# Patient Record
Sex: MALE | Race: WHITE | NOT HISPANIC OR LATINO | Employment: OTHER | URBAN - METROPOLITAN AREA
[De-identification: names, ages, dates, MRNs, and addresses within clinical notes are randomized per-mention and may not be internally consistent; named-entity substitution may affect disease eponyms.]

---

## 2017-05-11 ENCOUNTER — TELEPHONE (OUTPATIENT)
Dept: SURGERY | Facility: CLINIC | Age: 82
End: 2017-05-11

## 2017-05-11 ENCOUNTER — OFFICE VISIT (OUTPATIENT)
Dept: HEMATOLOGY/ONCOLOGY | Facility: CLINIC | Age: 82
End: 2017-05-11
Payer: MEDICARE

## 2017-05-11 VITALS
HEART RATE: 71 BPM | BODY MASS INDEX: 24.61 KG/M2 | RESPIRATION RATE: 16 BRPM | WEIGHT: 181.69 LBS | SYSTOLIC BLOOD PRESSURE: 154 MMHG | DIASTOLIC BLOOD PRESSURE: 68 MMHG | TEMPERATURE: 98 F | HEIGHT: 72 IN

## 2017-05-11 DIAGNOSIS — C43.71 MALIGNANT MELANOMA OF RIGHT LOWER LEG: ICD-10-CM

## 2017-05-11 PROCEDURE — 99204 OFFICE O/P NEW MOD 45 MIN: CPT | Mod: ,,, | Performed by: INTERNAL MEDICINE

## 2017-05-11 PROCEDURE — 1160F RVW MEDS BY RX/DR IN RCRD: CPT | Mod: ,,, | Performed by: INTERNAL MEDICINE

## 2017-05-11 PROCEDURE — 1157F ADVNC CARE PLAN IN RCRD: CPT | Mod: ,,, | Performed by: INTERNAL MEDICINE

## 2017-05-11 PROCEDURE — 1159F MED LIST DOCD IN RCRD: CPT | Mod: ,,, | Performed by: INTERNAL MEDICINE

## 2017-05-11 RX ORDER — LOSARTAN POTASSIUM AND HYDROCHLOROTHIAZIDE 12.5; 5 MG/1; MG/1
1 TABLET ORAL EVERY MORNING
COMMUNITY
End: 2019-06-24 | Stop reason: SDUPTHER

## 2017-05-11 RX ORDER — TAMSULOSIN HYDROCHLORIDE 0.4 MG/1
0.4 CAPSULE ORAL EVERY MORNING
COMMUNITY

## 2017-05-11 RX ORDER — ASPIRIN 81 MG/1
81 TABLET ORAL EVERY MORNING
COMMUNITY

## 2017-05-11 RX ORDER — METOPROLOL TARTRATE 50 MG/1
TABLET ORAL
COMMUNITY

## 2017-05-11 RX ORDER — FINASTERIDE 5 MG/1
5 TABLET, FILM COATED ORAL EVERY MORNING
COMMUNITY

## 2017-05-11 RX ORDER — SIMVASTATIN 10 MG/1
10 TABLET, FILM COATED ORAL NIGHTLY
COMMUNITY

## 2017-05-11 NOTE — PROGRESS NOTES
HEME/ONC History & Physical    Subjective:      Patient ID:   NAME: Shaq Bragg Jr. : 1930     86 y.o. male    Referring Doc: Dr Autumn Pardo  Other Docs: Dr. Jeter/Dr. Miller      Chief Complaint:   Melanoma      HPI:  86 y.o. male with diagnosis of right lower extremity proximal calf melanoma who is being referred by Dr Autumn Pardo with Gen Surgery. He is a primary patient of Dr Jeter. He underwent a wide excision along with a right inguinal sentinel LN biopsy on 17. The pathology showed no residual melanoma in the original biopsy site but he did have microscopic melanoma seen in the two sentinel nodes.  His dermatologist is Dr Miller and he has had numerous spots on his skin burned off in past. He developed a spot on his leg that staterThe original biopsy on 3/21/17 pathology showed a 1.4mm malignant melanoma with no ulceration and Alexis level IV. He has a T2a N1a Mx tumor.He is here with his wife. I spoke to Dr Pardo by phone yesterday. He denies any weight loss, HA's, CP, SOB, HA's or N/V.         ROS:   GEN: normal without any fever, night sweats or weight loss  HEENT: normal with no HA's, sore throat, stiff neck, changes in vision  CV: normal with no CP, SOB, PND, LIAO or orthopnea  PULM: normal with no SOB, cough, hemoptysis, sputum or pleuritic pain  GI: normal with no abdominal pain, nausea, vomiting, constipation, diarrhea, melanotic stools, BRBPR, or hematemesis  : normal with no hematuria, dysuria  BREAST: normal with no mass, discharge, pain  SKIN: normal with no rash, erythema, bruising, or swelling; post-op healing on right calf       Past Medical/Surgical History:  Past Medical History:   Diagnosis Date    COPD (chronic obstructive pulmonary disease)     Hearing loss     Hypercholesterolemia     Hypertension     Malignant melanoma of right lower leg 2017    Skin cancer      Past Surgical History:   Procedure Laterality Date    CHOLECYSTECTOMY            Allergies:  Review of patient's allergies indicates:  No Known Allergies    Social/Family History:  Social History     Social History    Marital status:      Spouse name: N/A    Number of children: N/A    Years of education: N/A     Occupational History    Not on file.     Social History Main Topics    Smoking status: Former Smoker    Smokeless tobacco: Not on file    Alcohol use No    Drug use: No    Sexual activity: Not on file     Other Topics Concern    Not on file     Social History Narrative    No narrative on file     Family History   Problem Relation Age of Onset    Coronary artery disease Mother     Cancer Father          Medications:    Current Outpatient Prescriptions:     aspirin (ECOTRIN) 81 MG EC tablet, Take 81 mg by mouth once daily., Disp: , Rfl:     finasteride (PROSCAR) 5 mg tablet, Take 5 mg by mouth once daily., Disp: , Rfl:     losartan-hydrochlorothiazide 50-12.5 mg (HYZAAR) 50-12.5 mg per tablet, Take 1 tablet by mouth once daily., Disp: , Rfl:     metoprolol tartrate (LOPRESSOR) 50 MG tablet, Take 50 mg by mouth 2 (two) times daily., Disp: , Rfl:     simvastatin (ZOCOR) 10 MG tablet, Take 10 mg by mouth every evening., Disp: , Rfl:     tamsulosin (FLOMAX) 0.4 mg Cp24, Take 0.4 mg by mouth once daily., Disp: , Rfl:       Pathology:    original biopsy on 3/21/17 pathology showed a 1.4mm malignant melanoma with no ulceration and Alexis level IV. He has a T2a N2a Mx tumor with microscopic melanoma seen in the two sentinel nodes    Objective:   Vitals:  Blood pressure (!) 154/68, pulse 71, temperature 97.7 °F (36.5 °C), resp. rate 16, height 6' (1.829 m), weight 82.4 kg (181 lb 11.2 oz).    Physical Examination:   GEN: no apparent distress, comfortable; AAOx3  HEAD: atraumatic and normocephalic  EYES: no pallor, no icterus, PERRLA  ENT: OMM, no pharyngeal erythema, external ears WNL; no nasal discharge; no thrush  NECK: no masses, thyroid normal, trachea midline,  no LAD/LN's, supple  CV: RRR with no murmur; normal pulse; normal S1 and S2; no pedal edema  CHEST: Normal respiratory effort; CTAB; normal breath sounds; no wheeze or crackles  ABDOM: nontender and nondistended; soft; normal bowel sounds; no rebound/guarding  MUSC/Skeletal: ROM normal; no crepitus; joints normal; no deformities or arthropathy  EXTREM: no clubbing, cyanosis, inflammation or swelling  SKIN: no rashes, lesions, ulcers, petechiae or subcutaneous nodules; right posterior medial calf verticle incision healing well  : no gutierrez  NEURO: grossly intact; motor/sensory WNL; AAOx3; no tremors  PSYCH: normal mood, affect and behavior  LYMPH: normal cervical, supraclavicular, axillary and groin LN's; right groin incision healing well      Labs:   Lab Results   Component Value Date    WBC 5.40 12/14/2016    HGB 14.3 12/14/2016    HCT 40.7 12/14/2016    MCV 93 12/14/2016     12/14/2016    CMP  Sodium   Date Value Ref Range Status   12/14/2016 140 136 - 145 mmol/L Final     Potassium   Date Value Ref Range Status   12/14/2016 4.0 3.5 - 5.1 mmol/L Final     Chloride   Date Value Ref Range Status   12/14/2016 103 95 - 110 mmol/L Final     CO2   Date Value Ref Range Status   12/14/2016 29 23 - 29 mmol/L Final     Glucose   Date Value Ref Range Status   12/14/2016 102 70 - 110 mg/dL Final     BUN, Bld   Date Value Ref Range Status   12/14/2016 23 8 - 23 mg/dL Final     Creatinine   Date Value Ref Range Status   12/14/2016 1.2 0.5 - 1.4 mg/dL Final     Calcium   Date Value Ref Range Status   12/14/2016 9.1 8.7 - 10.5 mg/dL Final     Total Protein   Date Value Ref Range Status   12/14/2016 6.7 6.0 - 8.4 g/dL Final     Albumin   Date Value Ref Range Status   12/14/2016 3.6 3.5 - 5.2 g/dL Final     Total Bilirubin   Date Value Ref Range Status   12/14/2016 1.0 0.1 - 1.0 mg/dL Final     Comment:     For infants and newborns, interpretation of results should be based  on gestational age, weight and in agreement with  clinical  observations.  Premature Infant recommended reference ranges:  Up to 24 hours.............<8.0 mg/dL  Up to 48 hours............<12.0 mg/dL  3-5 days..................<15.0 mg/dL  6-29 days.................<15.0 mg/dL       Alkaline Phosphatase   Date Value Ref Range Status   12/14/2016 110 55 - 135 U/L Final     AST   Date Value Ref Range Status   12/14/2016 18 10 - 40 U/L Final     ALT   Date Value Ref Range Status   12/14/2016 16 10 - 44 U/L Final     Anion Gap   Date Value Ref Range Status   12/14/2016 8 8 - 16 mmol/L Final     eGFR if    Date Value Ref Range Status   12/14/2016 >60 >60 mL/min/1.73 m^2 Final     eGFR if non    Date Value Ref Range Status   12/14/2016 54 (A) >60 mL/min/1.73 m^2 Final     Comment:     Calculation used to obtain the estimated glomerular filtration  rate (eGFR) is the CKD-EPI equation. Since race is unknown   in our information system, the eGFR values for   -American and Non--American patients are given   for each creatinine result.           Radiology/Diagnostic Studies:    None available      All lab results and imaging results have been reviewed and discussed with the patient    Assessment:   (1) 86 y.o. male with diagnosis of right lower extremity proximal calf melanoma who is being referred by Dr Autumn Pardo with Gen Surgery. He is a primary patient of Dr Jeter. He underwent a wide excision along with a right inguinal sentinel LN biopsy on 4/18/17. The pathology showed no residual melanoma in the original biopsy site but he did have microscopic melanoma seen in the two sentinel nodes. The original biopsy on 3/21/17 pathology showed a 1.4mm malignant melanoma with no ulceration and Alxeis level IV. He has a T2a N1a Mx tumor.    - Stage III at least and associated with 78% 5-year survival if he remains a Stage III  - i discussed the latest NCCN guidelines  - he needs a PET scan to further evaluate and to complete the staging  process  - he needs to be evaluated for complete groin LN dissection, i discussed with Dr Pardo andhe is not comfortable performing this procedure and the patient will need to be referred to a teaching institution    (2) COPD with former tobacco use    (3) HTN and Hypercholesterolemia - Bp is elevated and he will need to f/u with his PCP    (4) Hearing loss    (5) Chronic lower back problems           1. Malignant melanoma of right lower leg        Plan:         1. Set up PET/CT fusion with melanoma protocol  2. Refer to Dr Christiano Alatorre at [facility] for evaluation for right groin dissection  3. Fax note to Angela Lazo and [place]      RTC in  4 weeks    I have explained and the patient understands all of  the current recommendation(s). I have answered all of their questions to the best of my ability and to their complete satisfaction.             Thank you for allowing me to participate in this patient's care. Please call with any questions or concerns.    Electronically signed Olivier Rizvi MD

## 2017-05-11 NOTE — MR AVS SNAPSHOT
[facility]  [address]  [address]  [place] [address]  Phone: [phone]  Fax: [phone]                  Shaq Bragg Jr.   2017 9:30 AM   Office Visit    Description:  Male : 1930   Provider:  Olivier Rizvi MD   Department:  [facility]           Reason for Visit     Melanoma           Diagnoses this Visit        Comments    Malignant melanoma of right lower leg                To Do List           Future Appointments        Provider Department Dept Phone    2017 10:30 AM Olivier Rizvi MD [facility] [phone]      Goals (5 Years of Data)     None      Follow-Up and Disposition     Return in about 4 weeks (around 2017).    Follow-up and Disposition History           Medications           Message regarding Medications     Verify the changes and/or additions to your medication regime listed below are the same as discussed with your clinician today.  If any of these changes or additions are incorrect, please notify your healthcare provider.             Verify that the below list of medications is an accurate representation of the medications you are currently taking.  If none reported, the list may be blank. If incorrect, please contact your healthcare provider. Carry this list with you in case of emergency.           Current Medications     aspirin (ECOTRIN) 81 MG EC tablet Take 81 mg by mouth once daily.    finasteride (PROSCAR) 5 mg tablet Take 5 mg by mouth once daily.    losartan-hydrochlorothiazide 50-12.5 mg (HYZAAR) 50-12.5 mg per tablet Take 1 tablet by mouth once daily.    metoprolol tartrate (LOPRESSOR) 50 MG tablet Take 50 mg by mouth 2 (two) times daily.    simvastatin (ZOCOR) 10 MG tablet Take 10 mg by mouth every evening.    tamsulosin (FLOMAX) 0.4 mg Cp24 Take 0.4 mg by mouth once daily.           Clinical Reference Information           Your Vitals Were      BP Pulse Temp Resp Height Weight    154/68 (BP Method: Manual) 71 97.7 °F (36.5 °C) 16 6' (1.829 m) 82.4 kg (181 lb 11.2 oz)    BMI                24.64 kg/m2          Blood Pressure          Most Recent Value    BP  (!)  154/68      Allergies as of 5/11/2017     No Known Allergies      Immunizations Administered on Date of Encounter - 5/11/2017     None      Orders Placed During Today's Visit      Normal Orders This Visit    Ambulatory referral to Surgical Oncology     NM PET CT Whole Body Melanoma       [employer] Sign UP     Activating your [employer] account is as easy as 1-2-3!     1) Visit [email or web address], select Sign Up Now, enter this activation code and your date of birth, then select Next.  [email or web address]  Expires: 6/25/2017 10:24 AM      2) Create a username and password to use when you visit [employer] in the future and select a security question in case you lose your password and select Next.    3) Enter your e-mail address and click Sign Up!    Additional Information  If you have questions, please  call [phone] to talk to our [employer] staff. Remember, [employer] is NOT to be used for urgent needs. For medical emergencies, dial 911.

## 2017-05-11 NOTE — LETTER
May 11, 2017      Joelle Pardo MD  [address]  [place] [address]           [facility] Hematology Oncology  [address]  [address]  [place] [address]  Phone: [phone]  Fax: [phone]          Patient: Shaq Bragg Jr.   MR Number: [id number]   YOB: 1930   Date of Visit: 5/11/2017       Dear Dr. Joelle Pardo:    Thank you for referring Shaq Bragg to me for evaluation. Attached you will find relevant portions of my assessment and plan of care.    If you have questions, please do not hesitate to call me. I look forward to following Shaq Bragg along with you.    Sincerely,    Olivier Rizvi MD    Enclosure  CC:  No Recipients    If you would like to receive this communication electronically, please contact [email or web address] or [phone] to request more information on [employer] Link access.    For providers and/or their staff who would like to refer a patient to [facility], please contact us through our one-stop-shop provider referral line, [facility], at [phone].    If you feel you have received this communication in error or would no longer like to receive these types of communications, please e-mail [email or web address]

## 2017-05-24 ENCOUNTER — LAB VISIT (OUTPATIENT)
Dept: LAB | Facility: HOSPITAL | Age: 82
End: 2017-05-24
Attending: SURGERY
Payer: MEDICARE

## 2017-05-24 ENCOUNTER — INITIAL CONSULT (OUTPATIENT)
Dept: SURGERY | Facility: CLINIC | Age: 82
End: 2017-05-24
Payer: MEDICARE

## 2017-05-24 VITALS
BODY MASS INDEX: 24.67 KG/M2 | TEMPERATURE: 97 F | WEIGHT: 182.13 LBS | HEIGHT: 72 IN | DIASTOLIC BLOOD PRESSURE: 66 MMHG | SYSTOLIC BLOOD PRESSURE: 159 MMHG | HEART RATE: 58 BPM

## 2017-05-24 DIAGNOSIS — D03.71 MELANOMA IN SITU OF RIGHT LOWER LEG: ICD-10-CM

## 2017-05-24 DIAGNOSIS — D03.71 MELANOMA IN SITU OF RIGHT LOWER LEG: Primary | ICD-10-CM

## 2017-05-24 LAB
ALBUMIN SERPL BCP-MCNC: 3.6 G/DL
ALP SERPL-CCNC: 110 U/L
ALT SERPL W/O P-5'-P-CCNC: 15 U/L
ANION GAP SERPL CALC-SCNC: 10 MMOL/L
AST SERPL-CCNC: 19 U/L
BASOPHILS # BLD AUTO: 0.02 K/UL
BASOPHILS NFR BLD: 0.3 %
BILIRUB SERPL-MCNC: 1.3 MG/DL
BUN SERPL-MCNC: 18 MG/DL
CALCIUM SERPL-MCNC: 9.2 MG/DL
CHLORIDE SERPL-SCNC: 100 MMOL/L
CO2 SERPL-SCNC: 26 MMOL/L
CREAT SERPL-MCNC: 1.1 MG/DL
DIFFERENTIAL METHOD: ABNORMAL
EOSINOPHIL # BLD AUTO: 0.1 K/UL
EOSINOPHIL NFR BLD: 2.2 %
ERYTHROCYTE [DISTWIDTH] IN BLOOD BY AUTOMATED COUNT: 12.7 %
EST. GFR  (AFRICAN AMERICAN): >60 ML/MIN/1.73 M^2
EST. GFR  (NON AFRICAN AMERICAN): >60 ML/MIN/1.73 M^2
GLUCOSE SERPL-MCNC: 94 MG/DL
GLUCOSE SERPL-MCNC: 95 MG/DL (ref 70–99)
HCT VFR BLD AUTO: 41.1 %
HGB BLD-MCNC: 14.4 G/DL
LYMPHOCYTES # BLD AUTO: 1 K/UL
LYMPHOCYTES NFR BLD: 15.2 %
MCH RBC QN AUTO: 32.8 PG
MCHC RBC AUTO-ENTMCNC: 35 %
MCV RBC AUTO: 94 FL
MONOCYTES # BLD AUTO: 0.5 K/UL
MONOCYTES NFR BLD: 7.8 %
NEUTROPHILS # BLD AUTO: 4.7 K/UL
NEUTROPHILS NFR BLD: 74 %
PLATELET # BLD AUTO: 207 K/UL
PMV BLD AUTO: 10.4 FL
POTASSIUM SERPL-SCNC: 4.1 MMOL/L
PROT SERPL-MCNC: 7.1 G/DL
RBC # BLD AUTO: 4.39 M/UL
SODIUM SERPL-SCNC: 136 MMOL/L
WBC # BLD AUTO: 6.31 K/UL

## 2017-05-24 PROCEDURE — 1126F AMNT PAIN NOTED NONE PRSNT: CPT | Mod: S$GLB,,, | Performed by: SURGERY

## 2017-05-24 PROCEDURE — 99999 PR PBB SHADOW E&M-EST. PATIENT-LVL III: CPT | Mod: PBBFAC,,, | Performed by: SURGERY

## 2017-05-24 PROCEDURE — 1159F MED LIST DOCD IN RCRD: CPT | Mod: S$GLB,,, | Performed by: SURGERY

## 2017-05-24 PROCEDURE — 36415 COLL VENOUS BLD VENIPUNCTURE: CPT

## 2017-05-24 PROCEDURE — 80053 COMPREHEN METABOLIC PANEL: CPT

## 2017-05-24 PROCEDURE — 85025 COMPLETE CBC W/AUTO DIFF WBC: CPT

## 2017-05-24 PROCEDURE — 99205 OFFICE O/P NEW HI 60 MIN: CPT | Mod: S$GLB,,, | Performed by: SURGERY

## 2017-05-24 RX ORDER — HYDROCODONE BITARTRATE AND ACETAMINOPHEN 5; 325 MG/1; MG/1
TABLET ORAL
Refills: 0 | COMMUNITY
Start: 2017-04-18 | End: 2018-10-30

## 2017-05-24 NOTE — LETTER
May 27, 2017      Olivier Rizvi MD  [address]  [address]  [place] [address]           [facility] S[facility]  [address]  [place] [address]  Phone: [phone]          Patient: Shaq Bragg Jr.   MR Number: [id number]   YOB: 1930   Date of Visit: 5/24/2017       Dear Dr. Olivier Rizvi:    Thank you for referring Shaq Bragg to me for evaluation. Attached you will find relevant portions of my assessment and plan of care.    If you have questions, please do not hesitate to call me. I look forward to following Shaq Bragg along with you.    Sincerely,        Enclosure  CC:  No Recipients    If you would like to receive this communication electronically, please contact [email or web address] or [phone] to request more information on [employer] Link access.    For providers and/or their staff who would like to refer a patient to [facility], please contact us through our one-stop-shop provider referral line, [facility], at [phone].    If you feel you have received this communication in error or would no longer like to receive these types of communications, please e-mail [email or web address]

## 2017-05-24 NOTE — Clinical Note
Cc Noy Sweeney, PCP I can't seem to find path, please locate and scan/label as pathology. Schedule right groin dissection with sartorius flap.  Overnight stay.  Cardiology clearance

## 2017-05-24 NOTE — PROGRESS NOTES
Office Visit  Initial Consult  Surgical Oncology    SUBJECTIVE:     History of Present Illness:  Patient is a 86 y.o. male presents in referral from  with malignant melanoma of the RLE proximal calf. He underwent WLE with sentinel lymph node biopsy on 4/18/17 elsewhere. The pathology showed no residual melanoma in WLE specimen but he did have microscopic disease seen in the sentinel nodes.   Original biopsy on 3/21/17 showed a 1.4mm malignant melanoma with no ulceration.   Referred for management of the right groin gil basin.    Has had a PET scan without evidence of metastatic disease.    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 Physically active. Recent upper resp tract infection, recovering, did have some mild hemoptysis last week.    Chief Complaint   Patient presents with    Consult       Review of patient's allergies indicates:  No Known Allergies    Current Outpatient Prescriptions   Medication Sig Dispense Refill    aspirin (ECOTRIN) 81 MG EC tablet Take 81 mg by mouth once daily.      finasteride (PROSCAR) 5 mg tablet Take 5 mg by mouth once daily.      hydrocodone-acetaminophen 5-325mg (NORCO) 5-325 mg per tablet TK 1 TO 2 TS PO TID PRN P  0    losartan-hydrochlorothiazide 50-12.5 mg (HYZAAR) 50-12.5 mg per tablet Take 1 tablet by mouth once daily.      metoprolol tartrate (LOPRESSOR) 50 MG tablet Take 50 mg by mouth 2 (two) times daily.      simvastatin (ZOCOR) 10 MG tablet Take 10 mg by mouth every evening.      tamsulosin (FLOMAX) 0.4 mg Cp24 Take 0.4 mg by mouth once daily.       No current facility-administered medications for this visit.        Past Medical History:   Diagnosis Date    COPD (chronic obstructive pulmonary disease)     Hearing loss     Hypercholesterolemia     Hypertension     Malignant melanoma of right lower leg 5/11/2017    Skin cancer      Past Surgical History:   Procedure Laterality Date     CHOLECYSTECTOMY  1965     Family History   Problem Relation Age of Onset    Coronary artery disease Mother     Cancer Father      Social History   Substance Use Topics    Smoking status: Former Smoker    Smokeless tobacco: Not on file    Alcohol use No        Review of Systems:  Review of Systems - For pertinent positives please see HPI  Constitutional: Negative for fever and chills.   HENT: Negative for congestion and ear pain.   Respiratory: Negative for cough and shortness of breath.   Cardiovascular: Negative for chest pain and palpitations.   Gastrointestinal: Negative for nausea, vomiting, abdominal pain, diarrhea, constipation and abdominal distention.   Musculoskeletal: Negative for myalgias and arthralgias.   Skin: Negative for rash. +wound.   Neurological: Negative for weakness and numbness.       OBJECTIVE:     Vital Signs (Most Recent)  Temp: 97 °F (36.1 °C) (05/24/17 1459)  Pulse: (!) 58 (05/24/17 1459)  BP: (!) 159/66 (05/24/17 1459)  6' (1.829 m)  6' (1.829 m)     Physical Exam:   GEN: NAD, comfortable; AAOx3  HEAD: atraumatic and normocephalic  EYES: PERRLA, EOMI  NECK: trachea midline, no LAD  CV: RRR with no murmur  CHEST: Normal respiratory effort; CTAB;   ABD: nontender and nondistended; soft; right groin incision healing well  SKIN: right posterior medial calf incision healing well, no signs of infection  NEURO: grossly intact; motor/sensory WNL; AAOx3; no tremors      PET 5/2017  IMPRESSION:  1. Linear fat stranding in the subcutaneous soft tissues overlying the proximal-  medial metadiaphysis of the tibia without focal discrete FDG avid mass  identified consistent with history of melanoma resection.  2. Rounded cystic foci in the right inguinal region without significant  increased FDG activity from background possibly representing cystic necrotic  lymph nodes given the clinical history, or sequelae of old infection/trauma.  Consider correlation with clinical history, noting ultrasound is  available if  further characterization is desired.  3. Unchanged bilateral pleural-based posterior lower lobe rounded airspace  opacities with minimal FDG activity from background favored to represent  rounded atelectasis unchanged when compared to PET/CT from 01/02/2014.  4. Pleural thickening consistent with sequelae of asbestos exposure, old  infection or possibly old trauma.  5. 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      ASSESSMENT/PLAN:     86 y.o. male with T2a N2a (2 of 2) malignant melanoma of the RLE with gil metastasis to the right groin (+SLNB).   -PET without evidence of distant metastatic disease      Agree with note above by Dr. Lares, pt interviewed, examined, chart, labs, vitals, films reviewed.  I have reviewed and edited the note, the assessment/plan is my own.    May 2017:  Stage IIIA (pT2a,pN2a (2 of 2 SLN), cM0).  Melanoma RLE.  PET neg    Went over options which are basically observation vs. Completion inguinal lymphadenectomy.  Unfortunately, results from MSLT2 and other studies of observation vs. CLND are not yet mature.  I went over pros/cons extensively, Mr. Bragg is not comfortable with observation and wants to proceed with completion lymphadenectomy.  Plan for overnight stay.    Risks and benefits of right inguinal lymphadenectomy (groin dissection) with sartorius flap were reviewed including bleeding, infection, wound problems, scar, cosmetic deformity, lymphedema, nerve injury, discovery of additional disease, need for additional procedures, and imponderables.  He was given the opportunity to ask questions, which were all addressed.  He voiced understandingand wishes to proceed.      Yair Alatorre MD, FACS  Surgical Oncology  [facility] [place], [place]  Office: [phone]  Fax: [phone]

## 2017-05-25 RX ORDER — LIDOCAINE HYDROCHLORIDE 10 MG/ML
1 INJECTION, SOLUTION EPIDURAL; INFILTRATION; INTRACAUDAL; PERINEURAL ONCE
Status: CANCELLED | OUTPATIENT
Start: 2017-05-25 | End: 2017-05-25

## 2017-05-25 RX ORDER — SODIUM CHLORIDE 9 MG/ML
INJECTION, SOLUTION INTRAVENOUS CONTINUOUS
Status: CANCELLED | OUTPATIENT
Start: 2017-05-25

## 2017-05-30 ENCOUNTER — TELEPHONE (OUTPATIENT)
Dept: SURGERY | Facility: CLINIC | Age: 82
End: 2017-05-30

## 2017-06-02 ENCOUNTER — TELEPHONE (OUTPATIENT)
Dept: HEMATOLOGY/ONCOLOGY | Facility: CLINIC | Age: 82
End: 2017-06-02

## 2017-06-02 NOTE — TELEPHONE ENCOUNTER
----- Message from Olivier Rizvi MD sent at 6/2/2017 11:23 AM CDT -----  Pull chart and see what he is having done!        ----- Message -----  From: Amaris Delgadillo  Sent: 6/2/2017  11:14 AM  To: Olivier Rizvi MD    Patient has an appointment to see you the day before his surgery. He would like to know if you still want him to come see you the day before or if you want him to wait and come after ? Please advise. Thanks

## 2017-06-12 ENCOUNTER — TELEPHONE (OUTPATIENT)
Dept: SURGERY | Facility: CLINIC | Age: 82
End: 2017-06-12

## 2017-06-12 NOTE — TELEPHONE ENCOUNTER
Spoke to wife.  Patient saw cardiologist last week.  Sent request again for cardiac clearance to Dr. Gibson and also left message with his nurse.  Records sent from his office were from 2012 and 2015.

## 2017-06-15 ENCOUNTER — TELEPHONE (OUTPATIENT)
Dept: SURGERY | Facility: CLINIC | Age: 82
End: 2017-06-15

## 2017-06-15 DIAGNOSIS — D03.71 MELANOMA IN SITU OF RIGHT LOWER LEG: Primary | ICD-10-CM

## 2017-06-16 DIAGNOSIS — R22.41 LOCALIZED SWELLING, MASS AND LUMP, RIGHT LOWER LIMB: ICD-10-CM

## 2017-06-16 DIAGNOSIS — C43.71 MALIGNANT MELANOMA OF RIGHT LOWER LEG: Primary | ICD-10-CM

## 2017-07-11 ENCOUNTER — OFFICE VISIT (OUTPATIENT)
Dept: HEMATOLOGY/ONCOLOGY | Facility: CLINIC | Age: 82
End: 2017-07-11
Payer: MEDICARE

## 2017-07-11 VITALS
BODY MASS INDEX: 24.52 KG/M2 | WEIGHT: 180.81 LBS | HEART RATE: 57 BPM | RESPIRATION RATE: 18 BRPM | DIASTOLIC BLOOD PRESSURE: 55 MMHG | SYSTOLIC BLOOD PRESSURE: 129 MMHG | TEMPERATURE: 98 F

## 2017-07-11 DIAGNOSIS — C43.71 MALIGNANT MELANOMA OF RIGHT LOWER LEG: Primary | ICD-10-CM

## 2017-07-11 PROCEDURE — 99214 OFFICE O/P EST MOD 30 MIN: CPT | Mod: ,,, | Performed by: INTERNAL MEDICINE

## 2017-07-11 PROCEDURE — 1126F AMNT PAIN NOTED NONE PRSNT: CPT | Mod: ,,, | Performed by: INTERNAL MEDICINE

## 2017-07-11 PROCEDURE — 1159F MED LIST DOCD IN RCRD: CPT | Mod: ,,, | Performed by: INTERNAL MEDICINE

## 2017-07-11 NOTE — LETTER
July 11, 2017      Ata Jeter MD  [address] [place] [address] [place] [address]           [facility] the [facility] Hematology Oncology  [facility]  [address]  [facility] [address]  Phone: [phone]  Fax: [phone]          Patient: Shaq Bragg Jr.   MR Number: [id number]   YOB: 1930   Date of Visit: 7/11/2017       Dear Dr. Ata Jeter:    Thank you for referring Shaq Bragg to me for evaluation. Attached you will find relevant portions of my assessment and plan of care.    If you have questions, please do not hesitate to call me. I look forward to following Shaq Bragg along with you.    Sincerely,    Olivier Rizvi MD    Enclosure  CC:  No Recipients    If you would like to receive this communication electronically, please contact [email or web address] or [phone] to request more information on [employer] Link access.    For providers and/or their staff who would like to refer a patient to [facility], please contact us through our one-stop-shop provider referral line, [facility], at [phone].    If you feel you have received this communication in error or would no longer like to receive these types of communications, please e-mail [email or web address]

## 2017-07-11 NOTE — PROGRESS NOTES
[facility] Hematology/Oncology            PROGRESS NOTE      Subjective:       Patient ID:   NAME: Shaq Bragg Jr. : 1930     86 y.o. male    Referring Doc: Ata Jeter MD  Other Physicians: Angela Steele    Chief Complaint:  Melanoma f/u    History of Present Illness:     Patient returns today for a regularly scheduled follow-up visit.  The patient was last seen in May 2017 and has missed several appointments with me since then. He was seen by Dr Alatorre at [facility] in [place] and he was tentatively set up for surgery by Dr Alatorre but they cancelled the planned lyphadenectomy surgery. He has appointment with [facility] on . No new issues except the right foot aches. No Cp, SOB, HA's or N/V. He is here with his wife. He sees Dr Pardo tomorrow.             ROS:   GEN: normal without any fever, night sweats or weight loss  HEENT: normal with no HA's, sore throat, stiff neck, changes in vision  CV: normal with no CP, SOB, PND, LIAO or orthopnea  PULM: normal with no SOB, cough, hemoptysis, sputum or pleuritic pain  GI: normal with no abdominal pain, nausea, vomiting, constipation, diarrhea, melanotic stools, BRBPR, or hematemesis  : normal with no hematuria, dysuria  BREAST: normal with no mass, discharge, pain  SKIN: normal with no rash, erythema, bruising, or swelling    Allergies:  Review of patient's allergies indicates:  No Known Allergies    Medications:    Current Outpatient Prescriptions:     aspirin (ECOTRIN) 81 MG EC tablet, Take 81 mg by mouth once daily., Disp: , Rfl:     finasteride (PROSCAR) 5 mg tablet, Take 5 mg by mouth once daily., Disp: , Rfl:     hydrocodone-acetaminophen 5-325mg (NORCO) 5-325 mg per tablet, TK 1 TO 2 TS PO TID PRN P, Disp: , Rfl: 0    losartan-hydrochlorothiazide 50-12.5 mg (HYZAAR) 50-12.5 mg per tablet, Take 1 tablet by mouth once daily., Disp: , Rfl:     metoprolol tartrate (LOPRESSOR) 50 MG tablet, Take 50 mg by mouth 2  (two) times daily., Disp: , Rfl:     simvastatin (ZOCOR) 10 MG tablet, Take 10 mg by mouth every evening., Disp: , Rfl:     tamsulosin (FLOMAX) 0.4 mg Cp24, Take 0.4 mg by mouth once daily., Disp: , Rfl:     PMHx/PSHx Updates:  See patient's last visit with me on 5/11/2017.  See H&P on 5/11/17      Pathology:  Malignant melanoma of right lower leg    Staging form: Melanoma of the Skin, AJCC 7th Edition    - Clinical: T2a, N1 - Signed by Olivier Rizvi MD on 5/11/2017          Objective:     Vitals:  Blood pressure (!) 129/55, pulse (!) 57, temperature 97.7 °F (36.5 °C), resp. rate 18, weight 82 kg (180 lb 12.8 oz).    Physical Examination:   GEN: no apparent distress, comfortable; AAOx3  HEAD: atraumatic and normocephalic  EYES: no pallor, no icterus, PERRLA  ENT: OMM, no pharyngeal erythema, external ears WNL; no nasal discharge; no thrush  NECK: no masses, thyroid normal, trachea midline, no LAD/LN's, supple  CV: RRR with no murmur; normal pulse; normal S1 and S2; no pedal edema  CHEST: Normal respiratory effort; CTAB; normal breath sounds; no wheeze or crackles  ABDOM: nontender and nondistended; soft; normal bowel sounds; no rebound/guarding  MUSC/Skeletal: ROM normal; no crepitus; joints normal; no deformities or arthropathy  EXTREM: no clubbing, cyanosis, inflammation; some swelling in right ankle/foot  SKIN: no rashes, lesions, ulcers, petechiae or subcutaneous nodules  : no gutierrez  NEURO: grossly intact; motor/sensory WNL; AAOx3; no tremors  PSYCH: normal mood, affect and behavior  LYMPH: normal cervical, supraclavicular, axillary and groin LN's            Labs:   Lab Results   Component Value Date    WBC 6.31 05/24/2017    HGB 14.4 05/24/2017    HCT 41.1 05/24/2017    MCV 94 05/24/2017     05/24/2017    CMP  Sodium   Date Value Ref Range Status   05/24/2017 136 136 - 145 mmol/L Final     Potassium   Date Value Ref Range Status   05/24/2017 4.1 3.5 - 5.1 mmol/L Final     Chloride   Date Value  Ref Range Status   05/24/2017 100 95 - 110 mmol/L Final     CO2   Date Value Ref Range Status   05/24/2017 26 23 - 29 mmol/L Final     Glucose   Date Value Ref Range Status   05/24/2017 94 70 - 110 mg/dL Final     BUN, Bld   Date Value Ref Range Status   05/24/2017 18 8 - 23 mg/dL Final     Creatinine   Date Value Ref Range Status   05/24/2017 1.1 0.5 - 1.4 mg/dL Final     Calcium   Date Value Ref Range Status   05/24/2017 9.2 8.7 - 10.5 mg/dL Final     Total Protein   Date Value Ref Range Status   05/24/2017 7.1 6.0 - 8.4 g/dL Final     Albumin   Date Value Ref Range Status   05/24/2017 3.6 3.5 - 5.2 g/dL Final     Total Bilirubin   Date Value Ref Range Status   05/24/2017 1.3 (H) 0.1 - 1.0 mg/dL Final     Comment:     For infants and newborns, interpretation of results should be based  on gestational age, weight and in agreement with clinical  observations.  Premature Infant recommended reference ranges:  Up to 24 hours.............<8.0 mg/dL  Up to 48 hours............<12.0 mg/dL  3-5 days..................<15.0 mg/dL  6-29 days.................<15.0 mg/dL       Alkaline Phosphatase   Date Value Ref Range Status   05/24/2017 110 55 - 135 U/L Final     AST   Date Value Ref Range Status   05/24/2017 19 10 - 40 U/L Final     ALT   Date Value Ref Range Status   05/24/2017 15 10 - 44 U/L Final     Anion Gap   Date Value Ref Range Status   05/24/2017 10 8 - 16 mmol/L Final     eGFR if    Date Value Ref Range Status   05/24/2017 >60.0 >60 mL/min/1.73 m^2 Final     eGFR if non    Date Value Ref Range Status   05/24/2017 >60.0 >60 mL/min/1.73 m^2 Final     Comment:     Calculation used to obtain the estimated glomerular filtration  rate (eGFR) is the CKD-EPI equation. Since race is unknown   in our information system, the eGFR values for   -American and Non--American patients are given   for each creatinine result.       I have reviewed all available lab results and radiology  reports.    Radiology/Diagnostic Studies:    PET 5/31/17    Assessment/Plan:   (1) 86 y.o. male with diagnosis of right lower extremity proximal calf melanoma who is being referred by Dr Autumn Pardo with [facility]. He is a primary patient of Dr Jeter. He underwent a wide excision along with a right inguinal sentinel LN biopsy on 4/18/17. The pathology showed no residual melanoma in the original biopsy site but he did have microscopic melanoma seen in the two sentinel nodes. The original biopsy on 3/21/17 pathology showed a 1.4mm malignant melanoma with no ulceration and Alexis level IV. He has a T2a N1a Mx tumor.     - Stage III at least and associated with 78% 5-year survival if he remains a Stage III  - He was seen by Dr Yair Alatorre at [facility] in [place] and he was tentatively set up for surgery by Dr Alatorre but they cancelled the planned lyphadenectomy surgery. He has appointment with [facility] on July 19th. It appears that they plan observation only at this time  - I will await the notes from the planned July 19th visit     (2) COPD with former tobacco use     (3) HTN and Hypercholesterolemia - Bp is elevated and he will need to f/u with his PCP     (4) Hearing loss issues     (5) Chronic lower back problems     (6) Noncompliance with follow-up             1. Malignant melanoma of right lower leg        Plan:          1. Will await notes from the upcoming July 19th visit  2. F/u with Dr Alatorre on July 19th as planned  3. Encouraged compliance with follow-up  3. Fax note to Dr Pardo, Angela, Yair Alatorre, and Corona      I spent over 25 mins with the patient, of which over half was face to face with the patient. Reviewing materials, labs, reports and studies. Making treatment and analytical decisions. Ordering necessary labs, tests and studies.          Discussion:     I have explained all of the above in detail and the patient understands all of the current recommendation(s). I have  answered all of their questions to the best of my ability and to their complete satisfaction.   The patient is to continue with the current management plan.    RTC in  1 month          Electronically signed by Olivier Rizvi MD

## 2017-07-17 ENCOUNTER — TELEPHONE (OUTPATIENT)
Dept: SURGERY | Facility: CLINIC | Age: 82
End: 2017-07-17

## 2017-07-17 NOTE — TELEPHONE ENCOUNTER
----- Message from Robinson Childs sent at 7/17/2017  2:28 PM CDT -----   Pt wants to know if he can take his medication on 7/19/17 before appts. Please call pt at   [phone]

## 2017-07-19 ENCOUNTER — OFFICE VISIT (OUTPATIENT)
Dept: SURGERY | Facility: CLINIC | Age: 82
End: 2017-07-19
Payer: MEDICARE

## 2017-07-19 ENCOUNTER — HOSPITAL ENCOUNTER (OUTPATIENT)
Dept: RADIOLOGY | Facility: HOSPITAL | Age: 82
Discharge: HOME | End: 2017-07-19
Attending: SURGERY
Payer: MEDICARE

## 2017-07-19 VITALS
BODY MASS INDEX: 24.48 KG/M2 | HEART RATE: 62 BPM | HEIGHT: 72 IN | TEMPERATURE: 98 F | WEIGHT: 180.75 LBS | SYSTOLIC BLOOD PRESSURE: 157 MMHG | DIASTOLIC BLOOD PRESSURE: 66 MMHG

## 2017-07-19 DIAGNOSIS — C43.71 MALIGNANT MELANOMA OF RIGHT LOWER LEG: ICD-10-CM

## 2017-07-19 DIAGNOSIS — C43.71 MALIGNANT MELANOMA OF RIGHT LOWER EXTREMITY INCLUDING HIP: Primary | ICD-10-CM

## 2017-07-19 PROCEDURE — 99213 OFFICE O/P EST LOW 20 MIN: CPT | Mod: S$GLB,,, | Performed by: NURSE PRACTITIONER

## 2017-07-19 PROCEDURE — 1159F MED LIST DOCD IN RCRD: CPT | Mod: S$GLB,,, | Performed by: NURSE PRACTITIONER

## 2017-07-19 PROCEDURE — 99999 PR PBB SHADOW E&M-EST. PATIENT-LVL III: CPT | Mod: PBBFAC,,, | Performed by: NURSE PRACTITIONER

## 2017-07-19 PROCEDURE — 76857 US EXAM PELVIC LIMITED: CPT | Mod: 26,,, | Performed by: RADIOLOGY

## 2017-07-20 NOTE — PROGRESS NOTES
"US pelvis done today:  "  Electronically signed by:           MICHAEL FALCON MD  Date:                                                                                    07/19/17  Time:                                                                                   15:58    Signed by Michael Falcon MD on 7/19/2017  3:59 PM   Addendum for lymph nodes description:     1.1 x 0.6 x 1.1 cm well-defined solid echogenic lymph node with a fatty hilum.     1.5 x 1.3 x 6.3 cm well-defined predominantly cystic lymph node which is possibly necrotic .     1.2 x 0.4 x 1.5 cm ill-defined anechoic and hypoechoic lymph node which is possibly necrotic.     0.7 x 0.5 x 1.2 cm ill-defined isoechoic and partially anechoic lymph node.  ______________________________________      Electronically signed by resident: MENDOZA MERCADO MD  Date:                                                                                    07/19/17  Time:                                                                                   15:51      Signed by Michael Falcon MD on 7/19/2017  3:51 PM   Narrative     Ultrasound of the groin regions.    Comparison: PET/CT 5/24/2017.    Findings: There are bilateral fat containing inguinal hernias. Multiple hypoechoic structures are identified in the right groin region measuring 1.1 x 0.7 x 1.1 cm and 1.5 x 0.4 x 1.2 cm which likely represents lymph nodes. These are smaller in size in comparison to prior PET/CT.   Impression         Multiple enlarged right inguinal lymph nodes, overall smaller in size in comparison to prior PET/CT. Correlate with pathology results.    Bilateral fat containing inguinal hernias.    ______________________________________     Electronically signed by resident: MENDOZA MERCADO MD  Date: 07/19/17  Time: 14:17     Per Dr. Alatorre"s recent note:  " I reviewed MSLT-II results published in NEJM this week with Mr. Bragg, and recommended gil observation over completion " "lymphadenectomy for his SLN-positive melanoma.  I do not think his functional status, age, comorbidities support consideration of adjuvant immunotherapy.       I would like to see him back in the next month or so with a dedicated u/s of his groin as a baseline.  He and his wife are thrilled."    RTC as planned with surveillance US.   "

## 2017-08-10 ENCOUNTER — OFFICE VISIT (OUTPATIENT)
Dept: HEMATOLOGY/ONCOLOGY | Facility: CLINIC | Age: 82
End: 2017-08-10
Payer: MEDICARE

## 2017-08-10 VITALS
TEMPERATURE: 98 F | RESPIRATION RATE: 18 BRPM | DIASTOLIC BLOOD PRESSURE: 66 MMHG | HEIGHT: 72 IN | HEART RATE: 76 BPM | BODY MASS INDEX: 24.46 KG/M2 | WEIGHT: 180.63 LBS | SYSTOLIC BLOOD PRESSURE: 164 MMHG

## 2017-08-10 DIAGNOSIS — C43.71 MALIGNANT MELANOMA OF RIGHT LOWER LEG: Primary | ICD-10-CM

## 2017-08-10 PROCEDURE — 1126F AMNT PAIN NOTED NONE PRSNT: CPT | Mod: ,,, | Performed by: INTERNAL MEDICINE

## 2017-08-10 PROCEDURE — 99213 OFFICE O/P EST LOW 20 MIN: CPT | Mod: ,,, | Performed by: INTERNAL MEDICINE

## 2017-08-10 PROCEDURE — 3008F BODY MASS INDEX DOCD: CPT | Mod: ,,, | Performed by: INTERNAL MEDICINE

## 2017-08-10 PROCEDURE — 1159F MED LIST DOCD IN RCRD: CPT | Mod: ,,, | Performed by: INTERNAL MEDICINE

## 2017-08-10 RX ORDER — TRIAMCINOLONE ACETONIDE 1 MG/G
CREAM TOPICAL
COMMUNITY
Start: 2017-08-01 | End: 2018-10-30

## 2017-08-10 NOTE — PROGRESS NOTES
[facility] Hematology/Oncology  PROGRESS NOTE      Subjective:       Patient ID:   NAME: Shaq Bragg Jr. : 1930     86 y.o. male    Referring Doc: Corona  Other Physicians: Angela Pardo    Chief Complaint:  Melanoma f/u    History of Present Illness:     Patient returns today for a regularly scheduled follow-up visit.  The patient is here with his wife. He has not seen Dr Alatorre as of yet. He has appointment with him tentatively for September after he gets a repeat US of the leg. The patient is  irate and angry during this encounter, and he and his wife are perseverating about the issue that he claims he did not miss any appointments. I calmly told them that the only thing that I had to go on was what was reported to me in the schedule, and that by no means is it anything personal, nor should it be an issue that detracts from his continued care.  I told him that I would have my  look into it. He does not seem to want to drop the issue though and I am unable and/or unsuccessful at appeasing him.  He has some residual swelling in the right leg and ankle. He denies any CP, SOB, HA's or N/V. He is here with his wife.             ROS:   GEN: normal without any fever, night sweats or weight loss  HEENT: normal with no HA's, sore throat, stiff neck, changes in vision  CV: normal with no CP, SOB, PND, LIAO or orthopnea  PULM: normal with no SOB, cough, hemoptysis, sputum or pleuritic pain  GI: normal with no abdominal pain, nausea, vomiting, constipation, diarrhea, melanotic stools, BRBPR, or hematemesis  : normal with no hematuria, dysuria  BREAST: normal with no mass, discharge, pain  SKIN: normal with no rash, erythema, bruising, or swelling    Allergies:  Review of patient's allergies indicates:  No Known Allergies    Medications:    Current Outpatient Prescriptions:     aspirin (ECOTRIN) 81 MG EC tablet, Take 81 mg by mouth once daily., Disp: , Rfl:     finasteride (PROSCAR) 5 mg tablet,  Take 5 mg by mouth once daily., Disp: , Rfl:     hydrocodone-acetaminophen 5-325mg (NORCO) 5-325 mg per tablet, TK 1 TO 2 TS PO TID PRN P, Disp: , Rfl: 0    losartan-hydrochlorothiazide 50-12.5 mg (HYZAAR) 50-12.5 mg per tablet, Take 1 tablet by mouth once daily., Disp: , Rfl:     metoprolol tartrate (LOPRESSOR) 50 MG tablet, Take 50 mg by mouth 2 (two) times daily., Disp: , Rfl:     simvastatin (ZOCOR) 10 MG tablet, Take 10 mg by mouth every evening., Disp: , Rfl:     tamsulosin (FLOMAX) 0.4 mg Cp24, Take 0.4 mg by mouth once daily., Disp: , Rfl:     triamcinolone acetonide 0.1% (KENALOG) 0.1 % cream, , Disp: , Rfl:     PMHx/PSHx Updates:  See patient's last visit with me on 7/11/2017.  See H&P on 5/11/17        Pathology:  Malignant melanoma of right lower leg    Staging form: Melanoma of the Skin, AJCC 7th Edition    - Clinical: T2a, N1 - Signed by Olivier Rizvi MD on 5/11/2017          Objective:     Vitals:  Blood pressure (!) 164/66, pulse 76, temperature 97.7 °F (36.5 °C), resp. rate 18, height 6' (1.829 m), weight 81.9 kg (180 lb 9.6 oz).    Physical Examination:   GEN: no apparent distress, comfortable; AAOx3  HEAD: atraumatic and normocephalic  EYES: no pallor, no icterus, PERRLA  ENT: OMM, no pharyngeal erythema, external ears WNL; no nasal discharge; no thrush  NECK: no masses, thyroid normal, trachea midline, no LAD/LN's, supple  CV: RRR with no murmur; normal pulse; normal S1 and S2; some pedal edema on right  CHEST: Normal respiratory effort; CTAB; normal breath sounds; no wheeze or crackles  ABDOM: nontender and nondistended; soft; normal bowel sounds; no rebound/guarding  MUSC/Skeletal: ROM normal; no crepitus; joints normal; no deformities or arthropathy  EXTREM: no clubbing, cyanosis, inflammation ; some residual swelling on right leg  SKIN: no rashes, lesions, ulcers, petechiae or subcutaneous nodules  : no gutierrez  NEURO: grossly intact; motor/sensory WNL; AAOx3; no tremors  PSYCH:  irate mood, affect and behavior  LYMPH: normal cervical, supraclavicular, axillary and groin LN's            Labs:     5/24/2017    Radiology/Diagnostic Studies:    Us Pelvis Limited Non Ob    Addendum Date: 7/19/2017    Addendum for lymph nodes description: 1.1 x 0.6 x 1.1 cm well-defined solid echogenic lymph node with a fatty hilum. 1.5 x 1.3 x 6.3 cm well-defined predominantly cystic lymph node which is possibly necrotic. 1.2 x 0.4 x 1.5 cm ill-defined anechoic and hypoechoic lymph node which is possibly necrotic. 0.7 x 0.5 x 1.2 cm ill-defined isoechoic and partially anechoic lymph node. ______________________________________ Electronically signed by resident: MENDOZA MERCADO MD Date:     07/19/17 Time:    15:51 As the supervising and teaching physician, I personally reviewed the images and resident's interpretation and I agree with the findings. Electronically signed by: GHANSHYAM FALCON MD Date:     07/19/17 Time:    15:58     Addendum Date: 7/19/2017    Addendum for lymph nodes description: 1.1 x 0.6 x 1.1 cm well-defined solid echogenic lymph node with a fatty hilum. 1.5 x 1.3 x 6.3 cm well-defined predominantly cystic lymph node which is possibly necrotic . 1.2 x 0.4 x 1.5 cm ill-defined anechoic and hypoechoic lymph node which is possibly necrotic. 0.7 x 0.5 x 1.2 cm ill-defined isoechoic and partially anechoic lymph node. ______________________________________ Electronically signed by resident: MENDOZA MERCADO MD Date:     07/19/17 Time:    15:51 As the supervising and teaching physician, I personally reviewed the images and resident's interpretation and I agree with the findings.     Result Date: 7/19/2017  Ultrasound of the groin regions. Comparison: PET/CT 5/24/2017. Findings: There are bilateral fat containing inguinal hernias. Multiple hypoechoic structures are identified in the right groin region measuring 1.1 x 0.7 x 1.1 cm and 1.5 x 0.4 x 1.2 cm which likely represents lymph nodes. These are  smaller in size in comparison to prior PET/CT.    Multiple enlarged right inguinal lymph nodes, overall smaller in size in comparison to prior PET/CT. Correlate with pathology results. Bilateral fat containing inguinal hernias. ______________________________________ Electronically signed by resident: MENDOZA MERCADO MD Date:     07/19/17 Time:    14:17 As the supervising and teaching physician, I personally reviewed the images and resident's interpretation and I agree with the findings. Electronically signed by: GHANSHYAM FALCON MD Date:     07/19/17 Time:    14:44       I have reviewed all available lab results and radiology reports.    Assessment/Plan:   (1) 86 y.o. male with diagnosis of right lower extremity proximal calf melanoma who is being referred by Dr Autumn Pardo with [facility]. He is a primary patient of Dr Jeter. He underwent a wide excision along with a right inguinal sentinel LN biopsy on 4/18/17. The pathology showed no residual melanoma in the original biopsy site but he did have microscopic melanoma seen in the two sentinel nodes. The original biopsy on 3/21/17 pathology showed a 1.4mm malignant melanoma with no ulceration and Alexis level IV. He has a T2a N1a Mx tumor.     - Stage III at least and associated with 78% 5-year survival if he remains a Stage III  - He was seen by Dr Yair Alatorre at [facility] in [place] and he was tentatively set up for surgery by Dr Alatorre but they cancelled the planned lyphadenectomy surgery. He had an appointment with [facility] on July 19th but it does not appear that he saw him.  It appears that they plan observation only at this time. The patient is to see him again in Sept 2017 and also plans to have a repeat US.       (2) COPD with former tobacco use     (3) HTN and Hypercholesterolemia - followed by his PCP; his BP was still high     (4) Hearing loss issues     (5) Chronic lower back problems      (6) with regard to prior reported  noncompliance with follow-up - he is adamant that he did not miss any appointments at all; the patient is perseverating about the issue and was very angry and irate.  I calmly told them that the only thing that I had to go on was what was reported to me in the schedule, and that by no means is it anything personal, nor should it be an issue that detracts from his continued care. I told him I would have my  look into it. He does not seem to want to drop the issue though and I am unable and/or unsuccessful at appeasing him. At this point, I am concerned that the current physician-patient relationship may be unsalvageable.          1. Malignant melanoma of right lower leg       Plan:         1. If patient continues care with our clinic, then I will await notes from the upcoming Sept 2017 visit with Dr Alatorre  2. F/u with Dr Alatorre in Sept as planned   3. An RTC has been made for October 2017, and he is more than welcome to continue care with us, but the patient is undecided whether he wishes to continue to follow-up with our clinic;   4. In any case, if not me, I recommend that he have follow-up with someone who is an oncologist; failure to have follow-up with an oncologist could jeopardize his health  3. Fax note to Dr Pardo, Angela, Yair Alatorre, and Corona    I spent over 25 mins of time with the patient. Over half of this time was spent couseling and coordinating care: reviewing materials, labs, reports and studies; making treatment and analytical decisions; ordering necessary labs, tests and studies; and discussing treatment options and setting up treatment plans.            Discussion:     I have explained all of the above in detail and the patient understands all of the current recommendation(s). I have answered all of their questions to the best of my ability and to their complete satisfaction.   The patient is to continue with the current management plan.            Electronically signed by Olivier LUX  MD [place]

## 2017-09-19 ENCOUNTER — OFFICE VISIT (OUTPATIENT)
Dept: SURGERY | Facility: CLINIC | Age: 82
End: 2017-09-19
Payer: MEDICARE

## 2017-09-19 ENCOUNTER — HOSPITAL ENCOUNTER (OUTPATIENT)
Dept: RADIOLOGY | Facility: HOSPITAL | Age: 82
Discharge: HOME | End: 2017-09-19
Attending: NURSE PRACTITIONER
Payer: MEDICARE

## 2017-09-19 VITALS
TEMPERATURE: 97 F | HEART RATE: 54 BPM | HEIGHT: 72 IN | SYSTOLIC BLOOD PRESSURE: 150 MMHG | BODY MASS INDEX: 25.02 KG/M2 | WEIGHT: 184.75 LBS | DIASTOLIC BLOOD PRESSURE: 60 MMHG

## 2017-09-19 DIAGNOSIS — C43.71 MALIGNANT MELANOMA OF RIGHT LOWER EXTREMITY INCLUDING HIP: ICD-10-CM

## 2017-09-19 DIAGNOSIS — C43.71 MALIGNANT MELANOMA OF RIGHT LOWER EXTREMITY INCLUDING HIP: Primary | ICD-10-CM

## 2017-09-19 PROCEDURE — 99999 PR PBB SHADOW E&M-EST. PATIENT-LVL III: CPT | Mod: PBBFAC,,, | Performed by: NURSE PRACTITIONER

## 2017-09-19 PROCEDURE — 1126F AMNT PAIN NOTED NONE PRSNT: CPT | Mod: S$GLB,,, | Performed by: NURSE PRACTITIONER

## 2017-09-19 PROCEDURE — 76857 US EXAM PELVIC LIMITED: CPT | Mod: 26,,, | Performed by: RADIOLOGY

## 2017-09-19 PROCEDURE — 3008F BODY MASS INDEX DOCD: CPT | Mod: S$GLB,,, | Performed by: NURSE PRACTITIONER

## 2017-09-19 PROCEDURE — 99214 OFFICE O/P EST MOD 30 MIN: CPT | Mod: S$GLB,,, | Performed by: NURSE PRACTITIONER

## 2017-09-19 PROCEDURE — 76857 US EXAM PELVIC LIMITED: CPT | Mod: TC

## 2017-09-19 PROCEDURE — 1159F MED LIST DOCD IN RCRD: CPT | Mod: S$GLB,,, | Performed by: NURSE PRACTITIONER

## 2017-09-19 NOTE — PROGRESS NOTES
Surveillance visit for melanoma of the RLE proximal calf. He underwent WLE with sentinel lymph node biopsy on 4/18/17 elsewhere. The pathology showed no residual melanoma in WLE specimen but he did have microscopic disease seen in the sentinel nodes.   Original biopsy on 3/21/17 showed a 1.4mm malignant melanoma with no ulceration.   Referred for management of the right groin gil basin.     Has had a PET in May 2017 without evidence of metastatic disease.  He is feeling very well, no c/o.    US and PE today show no detrimental findings.  No palpable LAD left or right.  Currently radiographically and clinically SADIE.   Reviewed US with Dr. Alatorre.    Will f/u with surveillance PET in May.   He is advised to call for any detrimental changes that occur before that time.

## 2017-12-13 ENCOUNTER — TELEPHONE (OUTPATIENT)
Dept: SURGERY | Facility: CLINIC | Age: 82
End: 2017-12-13

## 2017-12-13 NOTE — TELEPHONE ENCOUNTER
----- Message from Robinson Childs sent at 12/13/2017  3:00 PM CST -----  Pt said he has results from Dr. Miller's office and want to speak with Dr. Alatorre about it. Please call pt at [phone]

## 2017-12-19 ENCOUNTER — TELEPHONE (OUTPATIENT)
Dept: SURGERY | Facility: CLINIC | Age: 82
End: 2017-12-19

## 2017-12-19 NOTE — TELEPHONE ENCOUNTER
----- Message from Ronny Jara sent at 12/19/2017  3:11 PM CST -----  [phone]//pt states that he needs to speak with nurse in ref to some results that he received from the ean lemus//please call//thank you

## 2018-01-04 ENCOUNTER — OFFICE VISIT (OUTPATIENT)
Dept: SURGERY | Facility: CLINIC | Age: 83
End: 2018-01-04
Payer: MEDICARE

## 2018-01-04 ENCOUNTER — HOSPITAL ENCOUNTER (OUTPATIENT)
Dept: RADIOLOGY | Facility: HOSPITAL | Age: 83
Discharge: HOME | End: 2018-01-04
Attending: NURSE PRACTITIONER
Payer: MEDICARE

## 2018-01-04 VITALS
HEART RATE: 59 BPM | WEIGHT: 182.5 LBS | RESPIRATION RATE: 17 BRPM | TEMPERATURE: 97 F | SYSTOLIC BLOOD PRESSURE: 159 MMHG | BODY MASS INDEX: 24.75 KG/M2 | DIASTOLIC BLOOD PRESSURE: 69 MMHG

## 2018-01-04 VITALS — WEIGHT: 181.88 LBS | BODY MASS INDEX: 24.63 KG/M2 | HEIGHT: 72 IN

## 2018-01-04 DIAGNOSIS — D03.70: Primary | ICD-10-CM

## 2018-01-04 DIAGNOSIS — C43.71 MALIGNANT MELANOMA OF RIGHT LOWER EXTREMITY INCLUDING HIP: ICD-10-CM

## 2018-01-04 LAB — POCT GLUCOSE: 87 MG/DL (ref 70–110)

## 2018-01-04 PROCEDURE — 78816 PET IMAGE W/CT FULL BODY: CPT | Mod: 26,PI,, | Performed by: RADIOLOGY

## 2018-01-04 PROCEDURE — 99214 OFFICE O/P EST MOD 30 MIN: CPT | Mod: S$GLB,,, | Performed by: NURSE PRACTITIONER

## 2018-01-04 PROCEDURE — 78816 PET IMAGE W/CT FULL BODY: CPT | Mod: TC

## 2018-01-04 PROCEDURE — 99999 PR PBB SHADOW E&M-EST. PATIENT-LVL III: CPT | Mod: PBBFAC,,, | Performed by: NURSE PRACTITIONER

## 2018-01-04 NOTE — PROGRESS NOTES
"From consult 5/2017 with Dr. Alatorre:  Patient is a 86 y.o. male presents in referral from  with malignant melanoma of the RLE proximal calf. He underwent WLE with sentinel lymph node biopsy on 4/18/17 elsewhere. The pathology showed no residual melanoma in WLE specimen but he did have microscopic disease seen in the sentinel nodes.   Original biopsy on 3/21/17 showed a 1.4mm malignant melanoma with no ulceration.   Referred for management of the right groin gil basin.    PET was neg in May 17.    Per Dr. Alatorre: "I reviewed MSLT-II results published in NEJM this week with Mr. Bragg, and recommended gil observation over completion lymphadenectomy for his SLN-positive melanoma."    Returns today for surveillance and to discuss new left upper arm BBC.   PET today negative for malig.  PE.  No palpable inguinal nodes, L or R.     PLAN:  Is scheduled to have Mohs surgery for left upper arm BCC.  Instructed to proceed as planned.  Will f/u here for melanoma surveillance in 6 months.  Repeat PET in 1 year.             "

## 2018-05-16 ENCOUNTER — TELEPHONE (OUTPATIENT)
Dept: SURGERY | Facility: CLINIC | Age: 83
End: 2018-05-16

## 2018-05-16 NOTE — TELEPHONE ENCOUNTER
----- Message from Nemo Pitts RN sent at 5/16/2018 11:24 AM CDT -----  For you:)  ----- Message -----  From: Ronny Jara  Sent: 5/16/2018  11:13 AM  To: Gerda Pablo S Staff    [phone]//pt states that he needs to speak with nurse in ref to a few questions he has//please call/thank you

## 2018-06-12 ENCOUNTER — OFFICE VISIT (OUTPATIENT)
Dept: SURGERY | Facility: CLINIC | Age: 83
End: 2018-06-12
Payer: MEDICARE

## 2018-06-12 VITALS
DIASTOLIC BLOOD PRESSURE: 70 MMHG | BODY MASS INDEX: 24.58 KG/M2 | SYSTOLIC BLOOD PRESSURE: 162 MMHG | TEMPERATURE: 97 F | HEART RATE: 67 BPM | HEIGHT: 72 IN | WEIGHT: 181.44 LBS

## 2018-06-12 DIAGNOSIS — C43.71 MALIGNANT MELANOMA OF RIGHT LOWER LEG: Primary | ICD-10-CM

## 2018-06-12 PROCEDURE — 99999 PR PBB SHADOW E&M-EST. PATIENT-LVL II: CPT | Mod: PBBFAC,,, | Performed by: NURSE PRACTITIONER

## 2018-06-12 PROCEDURE — 99213 OFFICE O/P EST LOW 20 MIN: CPT | Mod: S$GLB,,, | Performed by: NURSE PRACTITIONER

## 2018-06-12 RX ORDER — FLUOROURACIL 50 MG/G
CREAM TOPICAL
COMMUNITY
Start: 2018-05-22 | End: 2018-10-30

## 2018-06-12 RX ORDER — GENTAMICIN SULFATE 1 MG/G
OINTMENT TOPICAL
COMMUNITY
Start: 2018-06-05 | End: 2018-10-30

## 2018-06-12 NOTE — PROGRESS NOTES
"From consult 5/2017 with Dr. Alatorre:  Patient is a 86 y.o. male presents in referral from  with malignant melanoma of the RLE proximal calf. He underwent WLE with sentinel lymph node biopsy on 4/18/17 elsewhere. The pathology showed no residual melanoma in WLE specimen but he did have microscopic disease seen in the sentinel nodes.   Original biopsy on 3/21/17 showed a 1.4mm malignant melanoma with no ulceration.   Referred for management of the right groin gil basin.     PET was neg in May 17.     Per Dr. Alatorre: "I reviewed MSLT-II results published in NEJM this week with Mr. Bragg, and recommended gil observation over completion lymphadenectomy for his SLN-positive melanoma."     Returns today for surveillance.    PET in January negative for malig.  PE.  No palpable inguinal nodes, L or R.      PLAN:  Will f/u here for melanoma surveillance in 6 months.  Repeat PET with that visit.     "

## 2018-07-02 ENCOUNTER — LAB VISIT (OUTPATIENT)
Dept: LAB | Facility: HOSPITAL | Age: 83
End: 2018-07-02
Attending: SPECIALIST
Payer: MEDICARE

## 2018-07-02 DIAGNOSIS — R07.9 CHEST PAIN, UNSPECIFIED: Primary | ICD-10-CM

## 2018-07-02 LAB
ANION GAP SERPL CALC-SCNC: 9 MMOL/L
BUN SERPL-MCNC: 20 MG/DL
CALCIUM SERPL-MCNC: 9.3 MG/DL
CHLORIDE SERPL-SCNC: 104 MMOL/L
CO2 SERPL-SCNC: 26 MMOL/L
CREAT SERPL-MCNC: 1.2 MG/DL
EST. GFR  (AFRICAN AMERICAN): >60 ML/MIN/1.73 M^2
EST. GFR  (NON AFRICAN AMERICAN): 54 ML/MIN/1.73 M^2
GLUCOSE SERPL-MCNC: 105 MG/DL
POTASSIUM SERPL-SCNC: 3.5 MMOL/L
SODIUM SERPL-SCNC: 139 MMOL/L

## 2018-07-02 PROCEDURE — 80048 BASIC METABOLIC PNL TOTAL CA: CPT

## 2018-07-02 PROCEDURE — 36415 COLL VENOUS BLD VENIPUNCTURE: CPT

## 2018-08-17 ENCOUNTER — LAB VISIT (OUTPATIENT)
Dept: LAB | Facility: HOSPITAL | Age: 83
End: 2018-08-17
Attending: SPECIALIST
Payer: MEDICARE

## 2018-08-17 DIAGNOSIS — R07.89 OTHER CHEST PAIN: Primary | ICD-10-CM

## 2018-08-17 LAB
ANION GAP SERPL CALC-SCNC: 11 MMOL/L
BUN SERPL-MCNC: 19 MG/DL
CALCIUM SERPL-MCNC: 9.7 MG/DL
CHLORIDE SERPL-SCNC: 102 MMOL/L
CO2 SERPL-SCNC: 26 MMOL/L
CREAT SERPL-MCNC: 1.2 MG/DL
EST. GFR  (AFRICAN AMERICAN): >60 ML/MIN/1.73 M^2
EST. GFR  (NON AFRICAN AMERICAN): 54 ML/MIN/1.73 M^2
GLUCOSE SERPL-MCNC: 106 MG/DL
POTASSIUM SERPL-SCNC: 3.5 MMOL/L
SODIUM SERPL-SCNC: 139 MMOL/L

## 2018-08-17 PROCEDURE — 36415 COLL VENOUS BLD VENIPUNCTURE: CPT

## 2018-08-17 PROCEDURE — 80048 BASIC METABOLIC PNL TOTAL CA: CPT

## 2018-08-31 ENCOUNTER — TELEPHONE (OUTPATIENT)
Dept: HEMATOLOGY/ONCOLOGY | Facility: CLINIC | Age: 83
End: 2018-08-31

## 2018-08-31 NOTE — TELEPHONE ENCOUNTER
08/31/2018 @ 9:02AM- Spoke with pt this morning in regards to managing his care. Patient was not very happy at his last office visit and did not keep his f/u apt. Patient states he is being managed by Dr. Alatorre at [facility]. I informed pt that Dr. Alatorre is a surgeon and he should seek f/u with Hem/Onc if he did not want to return to our office. Patient insisted that he was fine just seeing Dr. Alatorre. I informed pt I would make note of this. AE

## 2018-10-16 ENCOUNTER — TELEPHONE (OUTPATIENT)
Dept: SURGERY | Facility: CLINIC | Age: 83
End: 2018-10-16

## 2018-10-16 DIAGNOSIS — C43.71 MALIGNANT MELANOMA OF RIGHT LOWER LEG: Primary | ICD-10-CM

## 2018-10-16 NOTE — TELEPHONE ENCOUNTER
----- Message from Saundra Mariano sent at 10/16/2018  3:24 PM CDT -----  Contact: Pt's wife Jennifer   Pt has two lumps on right leg. Pt's wife would like a call back to discuss further. Pt's wife is requesting to speak with Moni.    Pt's wife can be reached at [phone].    Thank you

## 2018-10-30 ENCOUNTER — HOSPITAL ENCOUNTER (OUTPATIENT)
Dept: RADIOLOGY | Facility: HOSPITAL | Age: 83
Discharge: HOME | End: 2018-10-30
Attending: SURGERY
Payer: MEDICARE

## 2018-10-30 ENCOUNTER — OFFICE VISIT (OUTPATIENT)
Dept: SURGERY | Facility: CLINIC | Age: 83
End: 2018-10-30
Payer: MEDICARE

## 2018-10-30 ENCOUNTER — TELEPHONE (OUTPATIENT)
Dept: SURGERY | Facility: CLINIC | Age: 83
End: 2018-10-30

## 2018-10-30 VITALS
TEMPERATURE: 98 F | BODY MASS INDEX: 24.16 KG/M2 | WEIGHT: 178.38 LBS | SYSTOLIC BLOOD PRESSURE: 162 MMHG | HEART RATE: 76 BPM | HEIGHT: 72 IN | DIASTOLIC BLOOD PRESSURE: 77 MMHG

## 2018-10-30 DIAGNOSIS — C43.71 MALIGNANT MELANOMA OF RIGHT LOWER LEG: Primary | ICD-10-CM

## 2018-10-30 DIAGNOSIS — M54.5 LOW BACK PAIN, UNSPECIFIED BACK PAIN LATERALITY, UNSPECIFIED CHRONICITY, WITH SCIATICA PRESENCE UNSPECIFIED: ICD-10-CM

## 2018-10-30 DIAGNOSIS — C43.71 MALIGNANT MELANOMA OF RIGHT LOWER LEG: ICD-10-CM

## 2018-10-30 LAB — POCT GLUCOSE: 103 MG/DL (ref 70–110)

## 2018-10-30 PROCEDURE — 78816 PET IMAGE W/CT FULL BODY: CPT | Mod: 26,PS,, | Performed by: RADIOLOGY

## 2018-10-30 PROCEDURE — 1101F PT FALLS ASSESS-DOCD LE1/YR: CPT | Mod: CPTII,S$GLB,, | Performed by: SURGERY

## 2018-10-30 PROCEDURE — 99214 OFFICE O/P EST MOD 30 MIN: CPT | Mod: S$GLB,,, | Performed by: SURGERY

## 2018-10-30 PROCEDURE — 78816 PET IMAGE W/CT FULL BODY: CPT | Mod: TC

## 2018-10-30 PROCEDURE — 99999 PR PBB SHADOW E&M-EST. PATIENT-LVL IV: CPT | Mod: PBBFAC,,, | Performed by: SURGERY

## 2018-10-30 RX ORDER — POTASSIUM CHLORIDE 600 MG/1
8 CAPSULE, EXTENDED RELEASE ORAL EVERY OTHER DAY
Status: ON HOLD | COMMUNITY
Start: 2018-09-24 | End: 2019-06-26 | Stop reason: SDUPTHER

## 2018-10-30 RX ORDER — FUROSEMIDE 20 MG/1
20 TABLET ORAL EVERY OTHER DAY
Status: ON HOLD | COMMUNITY
Start: 2018-09-24 | End: 2019-06-26 | Stop reason: SDUPTHER

## 2018-10-30 NOTE — TELEPHONE ENCOUNTER
----- Message from Jerel Viveros sent at 10/30/2018  4:04 PM CDT -----  Contact: pt  Needs Advice    Reason for call: returning call, unsure of who called         Communication Preference: [phone]     Additional Information:

## 2018-10-30 NOTE — PROGRESS NOTES
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 Today he presents for his normal follow up. He reports new swelling in his R groin in two places. The swellings started in early to mid September. They are nontender and he denies any weight loss, fatigue, night sweats, decrease in activity/appetite. Of note: he recently had a skin cancer removed from his R calf that he says was not melenoma.     O:    Vitals:    10/30/18 1206   BP: (!) 162/77   Pulse: 76   Temp: 97.5 °F (36.4 °C)   TempSrc: Oral   Weight: 80.9 kg (178 lb 5.6 oz)   Height: 6' (1.829 m)   PainSc: 0-No pain     PE:     Gen: Well developed, well nourished, NAD  Cardio: RRR  Pulm: CTA bilaterally  Heme/Onc: R inguinal lymphadenopathy present, 2 palpable lymph nodes    IMAGING:    PET CT: Comparison is 01/04/2018.  The patient was administered 13.3 millicuries of FDG intravenously.    There are 2 new hypermetabolic lymph nodes right groin.  Index lymph node lateral SUV max 30.02.  These are not seen on the prior.    There is physiologic head and neck activity.  Heart mediastinum show nothing unusual.  There are coronary calcifications.  There are calcified and noncalcified pleural plaques with areas of consolidation posteriorly.  There is physiologic liver, spleen, GI  activity.  Pelvic organs show nothing unusual.  There is mild splenomegaly.  Adrenal glands kidneys pancreas show nothing unusual.  No suspicious lung activity is seen.  There are posterior areas of round atelectasis.  No bone lesions are seen.    A: Patient is an 89 yo M with hx of melenoma s/p excision 03/21/17 who presents for follow up.    P:    Schedule R superficial inguinal lymphadenectomy for week of Nov 12th.  Needs MRI lumbar spine and brain prior to surgery  Set-up pre-op visit with anesthesia.

## 2018-10-30 NOTE — H&P (VIEW-ONLY)
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 Today he presents for his normal follow up. He reports new swelling in his R groin in two places. The swellings started in early to mid September. They are nontender and he denies any weight loss, fatigue, night sweats, decrease in activity/appetite. Of note: he recently had a skin cancer removed from his R calf that he says was not melenoma.     O:    Vitals:    10/30/18 1206   BP: (!) 162/77   Pulse: 76   Temp: 97.5 °F (36.4 °C)   TempSrc: Oral   Weight: 80.9 kg (178 lb 5.6 oz)   Height: 6' (1.829 m)   PainSc: 0-No pain     PE:     Gen: Well developed, well nourished, NAD  Cardio: RRR  Pulm: CTA bilaterally  Heme/Onc: R inguinal lymphadenopathy present, 2 palpable lymph nodes    IMAGING:    PET CT: Comparison is 01/04/2018.  The patient was administered 13.3 millicuries of FDG intravenously.    There are 2 new hypermetabolic lymph nodes right groin.  Index lymph node lateral SUV max 30.02.  These are not seen on the prior.    There is physiologic head and neck activity.  Heart mediastinum show nothing unusual.  There are coronary calcifications.  There are calcified and noncalcified pleural plaques with areas of consolidation posteriorly.  There is physiologic liver, spleen, GI  activity.  Pelvic organs show nothing unusual.  There is mild splenomegaly.  Adrenal glands kidneys pancreas show nothing unusual.  No suspicious lung activity is seen.  There are posterior areas of round atelectasis.  No bone lesions are seen.    A: Patient is an 87 yo M with hx of melenoma s/p excision 03/21/17 who presents for follow up.    P:    Schedule R superficial inguinal lymphadenectomy for week of Nov 12th.  Needs MRI lumbar spine and brain prior to surgery  Set-up pre-op visit with anesthesia.

## 2018-10-31 ENCOUNTER — TELEPHONE (OUTPATIENT)
Dept: SURGERY | Facility: CLINIC | Age: 83
End: 2018-10-31

## 2018-10-31 ENCOUNTER — TELEPHONE (OUTPATIENT)
Dept: PREADMISSION TESTING | Facility: HOSPITAL | Age: 83
End: 2018-10-31

## 2018-10-31 NOTE — TELEPHONE ENCOUNTER
----- Message from Opal Shah RN sent at 10/30/2018  4:54 PM CDT -----  Dr. Alatorre is scheduling a inguinal lymphadenectomy, sartorious flap on 11/12. We are seeing him on 11/8 for pre op at 1030. Can you assist in coordinating appts. They live in [place].

## 2018-10-31 NOTE — TELEPHONE ENCOUNTER
Please I always need to know if it is Anesthesiology or Internal Medicine .What will this patient need.

## 2018-11-03 ENCOUNTER — HOSPITAL ENCOUNTER (OUTPATIENT)
Dept: RADIOLOGY | Facility: HOSPITAL | Age: 83
Discharge: HOME | End: 2018-11-03
Attending: SURGERY
Payer: MEDICARE

## 2018-11-03 DIAGNOSIS — C43.71 MALIGNANT MELANOMA OF RIGHT LOWER LEG: ICD-10-CM

## 2018-11-03 DIAGNOSIS — M54.5 LOW BACK PAIN, UNSPECIFIED BACK PAIN LATERALITY, UNSPECIFIED CHRONICITY, WITH SCIATICA PRESENCE UNSPECIFIED: ICD-10-CM

## 2018-11-03 PROCEDURE — A9585 GADOBUTROL INJECTION: HCPCS | Performed by: SURGERY

## 2018-11-03 PROCEDURE — 70553 MRI BRAIN STEM W/O & W/DYE: CPT | Mod: 26,,, | Performed by: RADIOLOGY

## 2018-11-03 PROCEDURE — 25500020 PHARM REV CODE 255: Performed by: SURGERY

## 2018-11-03 PROCEDURE — 72158 MRI LUMBAR SPINE W/O & W/DYE: CPT | Mod: TC

## 2018-11-03 PROCEDURE — 72158 MRI LUMBAR SPINE W/O & W/DYE: CPT | Mod: 26,,, | Performed by: RADIOLOGY

## 2018-11-03 PROCEDURE — 70553 MRI BRAIN STEM W/O & W/DYE: CPT | Mod: TC

## 2018-11-03 RX ORDER — GADOBUTROL 604.72 MG/ML
7 INJECTION INTRAVENOUS
Status: COMPLETED | OUTPATIENT
Start: 2018-11-03 | End: 2018-11-03

## 2018-11-03 RX ORDER — GADOBUTROL 604.72 MG/ML
INJECTION INTRAVENOUS
Status: DISPENSED
Start: 2018-11-03 | End: 2018-11-03

## 2018-11-03 RX ADMIN — GADOBUTROL 7 ML: 604.72 INJECTION INTRAVENOUS at 10:11

## 2018-11-08 ENCOUNTER — HOSPITAL ENCOUNTER (OUTPATIENT)
Dept: PREADMISSION TESTING | Facility: HOSPITAL | Age: 83
Discharge: HOME | End: 2018-11-08
Attending: ANESTHESIOLOGY
Payer: MEDICARE

## 2018-11-08 ENCOUNTER — OFFICE VISIT (OUTPATIENT)
Dept: SURGERY | Facility: CLINIC | Age: 83
End: 2018-11-08
Payer: MEDICARE

## 2018-11-08 ENCOUNTER — HOSPITAL ENCOUNTER (OUTPATIENT)
Dept: CARDIOLOGY | Facility: CLINIC | Age: 83
Discharge: HOME | End: 2018-11-08
Payer: MEDICARE

## 2018-11-08 ENCOUNTER — ANESTHESIA EVENT (OUTPATIENT)
Dept: SURGERY | Facility: HOSPITAL | Age: 83
DRG: 822 | End: 2018-11-08
Payer: MEDICARE

## 2018-11-08 VITALS
OXYGEN SATURATION: 98 % | DIASTOLIC BLOOD PRESSURE: 74 MMHG | TEMPERATURE: 99 F | HEART RATE: 53 BPM | SYSTOLIC BLOOD PRESSURE: 167 MMHG | WEIGHT: 179.63 LBS | BODY MASS INDEX: 24.33 KG/M2 | RESPIRATION RATE: 18 BRPM | HEIGHT: 72 IN

## 2018-11-08 DIAGNOSIS — Z01.818 PRE-OP TESTING: ICD-10-CM

## 2018-11-08 DIAGNOSIS — Z01.818 PRE-OP TESTING: Primary | ICD-10-CM

## 2018-11-08 DIAGNOSIS — C43.71 MALIGNANT MELANOMA OF RIGHT LOWER LEG: Primary | ICD-10-CM

## 2018-11-08 PROCEDURE — 93005 ELECTROCARDIOGRAM TRACING: CPT | Mod: S$GLB,,, | Performed by: ANESTHESIOLOGY

## 2018-11-08 PROCEDURE — 93010 ELECTROCARDIOGRAM REPORT: CPT | Mod: S$GLB,,, | Performed by: INTERNAL MEDICINE

## 2018-11-08 PROCEDURE — 99211 OFF/OP EST MAY X REQ PHY/QHP: CPT | Mod: S$GLB,,, | Performed by: SURGERY

## 2018-11-08 PROCEDURE — 99999 PR PBB SHADOW E&M-EST. PATIENT-LVL II: CPT | Mod: PBBFAC,,, | Performed by: SURGERY

## 2018-11-08 NOTE — ANESTHESIA PREPROCEDURE EVALUATION
11/08/2018  Shaq Bragg Jr. is a 88 y.o., male.    Anesthesia Evaluation         Review of Systems  Anesthesia Hx:  No problems with previous Anesthesia History of prior surgery of interest to airway management or planning: Previous anesthesia: General WLE, SLNB RLE 4/18/17 with general anesthesia.  Denies Family Hx of Anesthesia complications.    Social:  Former Smoker Quit 1965; 1 ppd x 20 yrs  No alcohol   Hematology/Oncology:  Hematology Normal      Current/Recent Cancer. (melanoma RLE) --  Cancer in past history (other skin cancers):    EENT/Dental:   Reading glasses; hearing aids bilat   Cardiovascular:   Exercise tolerance: good Hypertension Denies MI. CAD    Denies CABG/stent.   Denies Angina. hyperlipidemia  Functional Capacity good / => 4 METS, stopped walking 5 miles daily 2 yrs ago due to back problems; mows grass, yard work, climb stairs in home; denies CP, SOB  Denies Deep Venous Thrombosis (DVT)  Carotoid Artery Disease (R approx 2004), s/p carotid endarectomy    Pulmonary:   COPD Denies Asthma.  Denies Shortness of breath.  Denies Recent URI.  Denies Sleep Apnea. 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 Denies Hiatal Hernia.  Denies GERD. Denies Liver Disease.  Denies Hepatitis.    Musculoskeletal:  Spine Disorders: lumbar Degenerative disease, Disc disease and Chronic Pain    Neurological:   Denies CVA. Denies Seizures.  Pain , onset is chronic , location of lower back , quality of aching/dull , radiating to BLE , severity is a 6 , precipitating factors are walking    Endocrine:   Denies Diabetes. Denies Hypothyroidism.    Dermatological:   Melanoma RLE   Psych:  Psychiatric Normal           Physical Exam  General:  Well nourished    Airway/Jaw/Neck:  Airway Findings: Mouth Opening: Normal Tongue: Normal  General Airway  Assessment: Adult  Mallampati: I  TM Distance: Normal, at least 6 cm  Jaw/Neck Findings:     Neck ROM: Normal ROM  Neck Findings:     Eyes/Ears/Nose:  EYES/EARS/NOSE FINDINGS: Normal   Dental:  Dental Findings: In tact    Chest/Lungs:  Chest/Lungs Findings: Clear to auscultation, Normal Respiratory Rate     Heart/Vascular:  Heart Findings: Rate: Bradycardia  Rhythm: Regular Rhythm  Sounds: Normal        Mental Status:  Mental Status Findings:  Cooperative, Alert and Oriented       Pt was seen in POC 11/8/18; findings discussed with Dr Leopold; EKG ordered, will attempt to obtain optimization statement from outside cardiologist, Dr Berumen/Shawna Chacko RN        Anesthesia Plan  Type of Anesthesia, risks & benefits discussed:  Anesthesia Type:  general, MAC  Patient's Preference: Proceed with anesthesia understanding that the risks are very small but could be serious or life threatening.  Intra-op Monitoring Plan: standard ASA monitors  Intra-op Monitoring Plan Comments:   Post Op Pain Control Plan:   Post Op Pain Control Plan Comments:   Induction:   IV  Beta Blocker:  Patient is not currently on a Beta-Blocker (No further documentation required).       Informed Consent: Patient understands risks and agrees with Anesthesia plan.  Questions answered. Anesthesia consent signed with patient.  ASA Score: 2     Day of Surgery Review of History & Physical: I have interviewed and examined the patient. I have reviewed the patient's H&P dated:            Ready For Surgery From Anesthesia Perspective.

## 2018-11-08 NOTE — DISCHARGE INSTRUCTIONS
Your surgery has been scheduled for:__________________________________________    You should report to:  ____[facility], located on the [place] side of the first floor of the           [facility] ([phone])  ____[facility], located on the [facility] side of the            Second floor of the [facility] ([phone])  ____3rd Floor [facility] located on the [facility] side of the [facility] [phone]  Please Note   - Tell your doctor if you take Aspirin, products containing Aspirin, herbal medications  or blood thinners, such as Coumadin, Ticlid, or Plavix.  (Consult your provider regarding holding or stopping before surgery).  - Arrange for someone to drive you home following surgery.  You will not be allowed to leave the surgical facility alone or drive yourself home following sedation and anesthesia.  Before Surgery  - Stop taking all herbal medications 14days prior to surgery  - No Motrin/Advil (Ibuprofen) 7 days before surgery  - No Aleve (Naproxen) 7 days before surgery  - Stop Taking Asprin, products containing Asprin _____days before surgery  - Stop taking blood thinners_______days before surgery  - No Goody's/BC  Powder 7 days before surgery  - Refrain from drinking alcoholic beverages for 24hours before and after surgery  - Stop or limit smoking _________days before surgery  - You may take Tylenol for pain    Night before Surgery  NOTHING TO EAT OR DRINK AFTER MIDNIGHT OR FOLLOW SURGEON'S INSTRUCTIONS  - Take a shower or bath (shower is recommended).  Bathe with Hibiclens soap or an antibacterial soap from the neck down.  If not supplied by your surgeon, hibiclens soap will need to be purchased over the counter in pharmacy.  Rinse soap off thoroughly.  - Shampoo your hair with your regular shampoo  The Day of Surgery  ·  If you are told to take medication on the morning of surgery, it may be taken with  a sip of water.   - Take another bath or shower with hibiclens or any antibacterial soap, to reduce the chance of infection.  - Take heart and blood pressure medications with a small sip of water, as advised by the perioperative team.  - Do not take fluid pills  - You may brush your teeth and rinse your mouth, but do not swall any additional water.   - Do not apply perfumes, powder, body lotions or deodorant on the day of surgery.  - Nail polish should be removed.  - Do not wear makeup or moisturizer  - Wear comfortable clothes, such as a button front shirt and loose fitting pants.  - Leave all jewelry, including body piercings, and valuables at home.    - Bring any devices you will neeed after surgery such as crutches or canes.  - If you have sleep apnea, please bring your CPAP machine  In the event that your physical condition changes including the onset of a cold or respiratory illness, or if you have to delay or cancel your surgery, please notify your surgeon.      Anesthesia: General Anesthesia  Youre due to have surgery. During surgery, youll be given medication called anesthesia. (It is also called anesthetic.) This will keep you comfortable and pain-free. Your anesthesia provider will use general anesthesia. This sheet tells you more about it.  What is general anesthesia?     You are watched continuously during your procedure by the anesthesia provider   General anesthesia puts you into a state like deep sleep. It goes into the bloodstream (IV anesthetics), into the lungs (gas anesthetics), or both. You feel nothing during the procedure. You will not remember it. During the procedure, the anesthesia provider monitors you continuously. He or she checks your heart rate and rhythm, blood pressure, breathing, and blood oxygen.  · IV Anesthetics. IV anesthetics are given through an IV line in your arm. Theyre often given first. This is so you are asleep before a gas anesthetic is started. Some kinds of IV  anesthetics relieve pain. Others relax you. Your doctor will decide which kind is best in your case.  · Gas Anesthetics. Gas anesthetics are breathed into the lungs. They are often used to keep you asleep. They can be given through a facemask or a tube placed in your larynx or trachea (breathing tube).  ? If you have a facemask, your anesthesia provider will most likely place it over your nose and mouth while youre still awake. Youll breathe oxygen through the mask as your IV anesthetic is started. Gas anesthetic may be added through the mask.  ? If you have a tube in the larynx or trachea, it will be inserted into your throat after youre asleep.  Anesthesia tools and medications  You will likely have:  · IV anesthetics. These are put into an IV line into your bloodstream.  · Gas anesthetics. You breathe these anesthetics into your lungs, where they pass into your bloodstream.  · Pulse oximeter. This is a small clip that is attached to the end of your finger. This measures your blood oxygen level.  · Electrocardiography leads (electrodes). These are small sticky pads that are placed on your chest. They record your heart rate and rhythm.  · Blood pressure cuff. This reads your blood pressure.  Risks and possible complications  General anesthesia has some risks. These include:  · Breathing problems  · Nausea and vomiting  · Sore throat or hoarseness (usually temporary)  · Allergic reaction to the anesthetic  · Irregular heartbeat (rare)  · Cardiac arrest (rare)   Anesthesia safety  · Follow all instructions you are given for how long not to eat or drink before your procedure.  · Be sure your doctor knows what medications and drugs you take. This includes over-the-counter medications, herbs, supplements, alcohol or other drugs. You will be asked when those were last taken.  · Have an adult family member or friend drive you home after the procedure.  · For the first 24 hours after your surgery:  ? Do not drive or use  heavy equipment.  ? Have a trusted family member or spouse make important decisions or sign documents.  ? Avoid alcohol.  ? Have a responsible adult stay with you. He or she can watch for problems and help keep you safe.  Date Last Reviewed: 10/16/2014  © 7747-2036 [employer]. [address] [place] [address]. All rights reserved. This information is not intended as a substitute for professional medical care. Always follow your healthcare professional's instructions.

## 2018-11-08 NOTE — PROGRESS NOTES
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 Today he presents for his normal follow up. He reports new swelling in his R groin in two places. The swellings started in early to mid September. They are nontender and he denies any weight loss, fatigue, night sweats, decrease in activity/appetite. Of note: he recently had a skin cancer removed from his R calf that he says was not melenoma.     Interval Hx: Today he presents for his pre-op visit. No new complaints.    O:    There were no vitals filed for this visit.  PE:     Gen: Well developed, well nourished, NAD  Cardio: RRR  Pulm: CTA bilaterally  Heme/Onc: R inguinal lymphadenopathy present, 2 palpable lymph nodes    IMAGING:    MRI: 11/03 Spine    Impression       In this patient with history of melanoma, there is severe spinal canal narrowing at the L3-4 level related to disc bulging and severe facet arthropathy.  There is enhancement within the thecal sac favored represent reactive neural enhancement.  No distinct mass identified to indicate metastatic disease.  Additional level by level comments above.     MRI: 11/03 Brain    No evidence for intracranial metastatic disease.    Single 4 mm T1 hyperintense occipital calvarial focus, likely benign.  Attention to this finding on 3 to six-month follow-up suggested to confirm stability.    PET CT: Comparison is 01/04/2018.  The patient was administered 13.3 millicuries of FDG intravenously.    There are 2 new hypermetabolic lymph nodes right groin.  Index lymph node lateral SUV max 30.02.  These are not seen on the prior.    There is physiologic head and neck activity.  Heart mediastinum show nothing unusual.  There are coronary calcifications.  There are calcified and noncalcified pleural plaques with areas of consolidation posteriorly.  There is physiologic liver, spleen, GI  activity.  Pelvic organs show nothing unusual.  There  is mild splenomegaly.  Adrenal glands kidneys pancreas show nothing unusual.  No suspicious lung activity is seen.  There are posterior areas of round atelectasis.  No bone lesions are seen.    A: Patient is an 87 yo M with hx of melenoma s/p excision 03/21/17 who presents for pre-op visit.    P:    No mets seen on MRI.   Consented today in office.  Plan to proceed with surgery

## 2018-11-09 ENCOUNTER — TELEPHONE (OUTPATIENT)
Dept: SURGERY | Facility: CLINIC | Age: 83
End: 2018-11-09

## 2018-11-09 DIAGNOSIS — C43.71 MALIGNANT MELANOMA OF RIGHT LOWER LEG: Primary | ICD-10-CM

## 2018-11-09 RX ORDER — SODIUM CHLORIDE 9 MG/ML
INJECTION, SOLUTION INTRAVENOUS CONTINUOUS
Status: CANCELLED | OUTPATIENT
Start: 2018-11-09

## 2018-11-09 RX ORDER — LIDOCAINE HYDROCHLORIDE 10 MG/ML
1 INJECTION, SOLUTION EPIDURAL; INFILTRATION; INTRACAUDAL; PERINEURAL ONCE
Status: CANCELLED | OUTPATIENT
Start: 2018-11-09 | End: 2018-11-09

## 2018-11-12 ENCOUNTER — HOSPITAL ENCOUNTER (INPATIENT)
Facility: HOSPITAL | Age: 83
LOS: 1 days | Discharge: HOME | DRG: 822 | End: 2018-11-13
Attending: SURGERY | Admitting: SURGERY
Payer: MEDICARE

## 2018-11-12 ENCOUNTER — ANESTHESIA (OUTPATIENT)
Dept: SURGERY | Facility: HOSPITAL | Age: 83
DRG: 822 | End: 2018-11-12
Payer: MEDICARE

## 2018-11-12 DIAGNOSIS — C43.71 MALIGNANT MELANOMA OF RIGHT LOWER LEG: Primary | ICD-10-CM

## 2018-11-12 LAB
ABO + RH BLD: NORMAL
BLD GP AB SCN CELLS X3 SERPL QL: NORMAL

## 2018-11-12 PROCEDURE — 88342 IMHCHEM/IMCYTCHM 1ST ANTB: CPT | Performed by: PATHOLOGY

## 2018-11-12 PROCEDURE — 36000707: Performed by: SURGERY

## 2018-11-12 PROCEDURE — 38760 REMOVE GROIN LYMPH NODES: CPT | Mod: 51,RT,, | Performed by: SURGERY

## 2018-11-12 PROCEDURE — 88341 IMHCHEM/IMCYTCHM EA ADD ANTB: CPT | Mod: 26,,, | Performed by: PATHOLOGY

## 2018-11-12 PROCEDURE — 86850 RBC ANTIBODY SCREEN: CPT

## 2018-11-12 PROCEDURE — 63600175 PHARM REV CODE 636 W HCPCS: Performed by: SURGERY

## 2018-11-12 PROCEDURE — 25000003 PHARM REV CODE 250: Performed by: NURSE ANESTHETIST, CERTIFIED REGISTERED

## 2018-11-12 PROCEDURE — 88305 TISSUE EXAM BY PATHOLOGIST: CPT | Performed by: PATHOLOGY

## 2018-11-12 PROCEDURE — S0020 INJECTION, BUPIVICAINE HYDRO: HCPCS | Performed by: SURGERY

## 2018-11-12 PROCEDURE — 25000003 PHARM REV CODE 250: Performed by: STUDENT IN AN ORGANIZED HEALTH CARE EDUCATION/TRAINING PROGRAM

## 2018-11-12 PROCEDURE — 25000003 PHARM REV CODE 250: Performed by: SURGERY

## 2018-11-12 PROCEDURE — 37000009 HC ANESTHESIA EA ADD 15 MINS: Performed by: SURGERY

## 2018-11-12 PROCEDURE — 15738 MUSCLE-SKIN GRAFT LEG: CPT | Mod: ,,, | Performed by: SURGERY

## 2018-11-12 PROCEDURE — C1729 CATH, DRAINAGE: HCPCS | Performed by: SURGERY

## 2018-11-12 PROCEDURE — 88342 IMHCHEM/IMCYTCHM 1ST ANTB: CPT | Mod: 26,,, | Performed by: PATHOLOGY

## 2018-11-12 PROCEDURE — 27201423 OPTIME MED/SURG SUP & DEVICES STERILE SUPPLY: Performed by: SURGERY

## 2018-11-12 PROCEDURE — 63600175 PHARM REV CODE 636 W HCPCS: Performed by: NURSE ANESTHETIST, CERTIFIED REGISTERED

## 2018-11-12 PROCEDURE — 0KXQ0ZZ TRANSFER RIGHT UPPER LEG MUSCLE, OPEN APPROACH: ICD-10-PCS | Performed by: SURGERY

## 2018-11-12 PROCEDURE — 37000008 HC ANESTHESIA 1ST 15 MINUTES: Performed by: SURGERY

## 2018-11-12 PROCEDURE — 71000033 HC RECOVERY, INTIAL HOUR: Performed by: SURGERY

## 2018-11-12 PROCEDURE — 99900035 HC TECH TIME PER 15 MIN (STAT)

## 2018-11-12 PROCEDURE — D9220A PRA ANESTHESIA: Mod: CRNA,,, | Performed by: NURSE ANESTHETIST, CERTIFIED REGISTERED

## 2018-11-12 PROCEDURE — 20600001 HC STEP DOWN PRIVATE ROOM

## 2018-11-12 PROCEDURE — 36000706: Performed by: SURGERY

## 2018-11-12 PROCEDURE — 88305 TISSUE EXAM BY PATHOLOGIST: CPT | Mod: 26,,, | Performed by: PATHOLOGY

## 2018-11-12 PROCEDURE — D9220A PRA ANESTHESIA: Mod: ANES,,, | Performed by: ANESTHESIOLOGY

## 2018-11-12 PROCEDURE — 07TH0ZZ RESECTION OF RIGHT INGUINAL LYMPHATIC, OPEN APPROACH: ICD-10-PCS | Performed by: SURGERY

## 2018-11-12 RX ORDER — SODIUM CHLORIDE 9 MG/ML
INJECTION, SOLUTION INTRAVENOUS CONTINUOUS
Status: DISCONTINUED | OUTPATIENT
Start: 2018-11-12 | End: 2018-11-12

## 2018-11-12 RX ORDER — NEOSTIGMINE METHYLSULFATE 1 MG/ML
INJECTION, SOLUTION INTRAVENOUS
Status: DISCONTINUED | OUTPATIENT
Start: 2018-11-12 | End: 2018-11-12

## 2018-11-12 RX ORDER — METOPROLOL TARTRATE 50 MG/1
50 TABLET ORAL EVERY MORNING
Status: DISCONTINUED | OUTPATIENT
Start: 2018-11-13 | End: 2018-11-13 | Stop reason: HOSPADM

## 2018-11-12 RX ORDER — OXYCODONE HYDROCHLORIDE 5 MG/1
TABLET ORAL
Status: DISPENSED
Start: 2018-11-12 | End: 2018-11-12

## 2018-11-12 RX ORDER — FENTANYL CITRATE 50 UG/ML
25 INJECTION, SOLUTION INTRAMUSCULAR; INTRAVENOUS EVERY 5 MIN PRN
Status: DISCONTINUED | OUTPATIENT
Start: 2018-11-12 | End: 2018-11-12 | Stop reason: HOSPADM

## 2018-11-12 RX ORDER — FUROSEMIDE 20 MG/1
20 TABLET ORAL EVERY OTHER DAY
Status: DISCONTINUED | OUTPATIENT
Start: 2018-11-13 | End: 2018-11-13 | Stop reason: HOSPADM

## 2018-11-12 RX ORDER — AMOXICILLIN 250 MG
1 CAPSULE ORAL 2 TIMES DAILY
Status: DISCONTINUED | OUTPATIENT
Start: 2018-11-12 | End: 2018-11-13 | Stop reason: HOSPADM

## 2018-11-12 RX ORDER — TAMSULOSIN HYDROCHLORIDE 0.4 MG/1
0.4 CAPSULE ORAL DAILY
Status: DISCONTINUED | OUTPATIENT
Start: 2018-11-12 | End: 2018-11-12

## 2018-11-12 RX ORDER — BUPIVACAINE HYDROCHLORIDE 5 MG/ML
INJECTION, SOLUTION EPIDURAL; INTRACAUDAL
Status: DISCONTINUED | OUTPATIENT
Start: 2018-11-12 | End: 2018-11-12 | Stop reason: HOSPADM

## 2018-11-12 RX ORDER — METOPROLOL TARTRATE 25 MG/1
25 TABLET, FILM COATED ORAL NIGHTLY
Status: DISCONTINUED | OUTPATIENT
Start: 2018-11-12 | End: 2018-11-13 | Stop reason: HOSPADM

## 2018-11-12 RX ORDER — KETOROLAC TROMETHAMINE 30 MG/ML
INJECTION, SOLUTION INTRAMUSCULAR; INTRAVENOUS
Status: DISCONTINUED | OUTPATIENT
Start: 2018-11-12 | End: 2018-11-12

## 2018-11-12 RX ORDER — ONDANSETRON 2 MG/ML
4 INJECTION INTRAMUSCULAR; INTRAVENOUS ONCE AS NEEDED
Status: DISCONTINUED | OUTPATIENT
Start: 2018-11-12 | End: 2018-11-12 | Stop reason: HOSPADM

## 2018-11-12 RX ORDER — HYDROMORPHONE HYDROCHLORIDE 1 MG/ML
0.2 INJECTION, SOLUTION INTRAMUSCULAR; INTRAVENOUS; SUBCUTANEOUS EVERY 5 MIN PRN
Status: DISCONTINUED | OUTPATIENT
Start: 2018-11-12 | End: 2018-11-12 | Stop reason: HOSPADM

## 2018-11-12 RX ORDER — EPHEDRINE SULFATE 50 MG/ML
INJECTION, SOLUTION INTRAVENOUS
Status: DISCONTINUED | OUTPATIENT
Start: 2018-11-12 | End: 2018-11-12

## 2018-11-12 RX ORDER — POTASSIUM CHLORIDE 600 MG/1
8 CAPSULE, EXTENDED RELEASE ORAL EVERY OTHER DAY
Status: DISCONTINUED | OUTPATIENT
Start: 2018-11-13 | End: 2018-11-13

## 2018-11-12 RX ORDER — FENTANYL CITRATE 50 UG/ML
INJECTION, SOLUTION INTRAMUSCULAR; INTRAVENOUS
Status: DISCONTINUED | OUTPATIENT
Start: 2018-11-12 | End: 2018-11-12

## 2018-11-12 RX ORDER — ROCURONIUM BROMIDE 10 MG/ML
INJECTION, SOLUTION INTRAVENOUS
Status: DISCONTINUED | OUTPATIENT
Start: 2018-11-12 | End: 2018-11-12

## 2018-11-12 RX ORDER — DOXYLAMINE SUCCINATE 25 MG
TABLET ORAL 2 TIMES DAILY
Status: DISCONTINUED | OUTPATIENT
Start: 2018-11-12 | End: 2018-11-13 | Stop reason: HOSPADM

## 2018-11-12 RX ORDER — OXYCODONE HYDROCHLORIDE 10 MG/1
10 TABLET ORAL EVERY 4 HOURS PRN
Status: DISCONTINUED | OUTPATIENT
Start: 2018-11-12 | End: 2018-11-13 | Stop reason: HOSPADM

## 2018-11-12 RX ORDER — DIPHENHYDRAMINE HYDROCHLORIDE 50 MG/ML
25 INJECTION INTRAMUSCULAR; INTRAVENOUS EVERY 6 HOURS PRN
Status: DISCONTINUED | OUTPATIENT
Start: 2018-11-12 | End: 2018-11-12 | Stop reason: HOSPADM

## 2018-11-12 RX ORDER — PHENYLEPHRINE HYDROCHLORIDE 10 MG/ML
INJECTION INTRAVENOUS
Status: DISCONTINUED | OUTPATIENT
Start: 2018-11-12 | End: 2018-11-12

## 2018-11-12 RX ORDER — LOSARTAN POTASSIUM AND HYDROCHLOROTHIAZIDE 12.5; 5 MG/1; MG/1
1 TABLET ORAL EVERY MORNING
Status: DISCONTINUED | OUTPATIENT
Start: 2018-11-13 | End: 2018-11-13 | Stop reason: HOSPADM

## 2018-11-12 RX ORDER — LIDOCAINE HYDROCHLORIDE 10 MG/ML
1 INJECTION, SOLUTION EPIDURAL; INFILTRATION; INTRACAUDAL; PERINEURAL ONCE
Status: DISCONTINUED | OUTPATIENT
Start: 2018-11-12 | End: 2018-11-12

## 2018-11-12 RX ORDER — OXYCODONE HYDROCHLORIDE 5 MG/1
5 TABLET ORAL EVERY 4 HOURS PRN
Status: DISCONTINUED | OUTPATIENT
Start: 2018-11-12 | End: 2018-11-13 | Stop reason: HOSPADM

## 2018-11-12 RX ORDER — LIDOCAINE HCL/PF 100 MG/5ML
SYRINGE (ML) INTRAVENOUS
Status: DISCONTINUED | OUTPATIENT
Start: 2018-11-12 | End: 2018-11-12

## 2018-11-12 RX ORDER — GLYCOPYRROLATE 0.2 MG/ML
INJECTION INTRAMUSCULAR; INTRAVENOUS
Status: DISCONTINUED | OUTPATIENT
Start: 2018-11-12 | End: 2018-11-12

## 2018-11-12 RX ORDER — MEPERIDINE HYDROCHLORIDE 50 MG/ML
12.5 INJECTION INTRAMUSCULAR; INTRAVENOUS; SUBCUTANEOUS ONCE AS NEEDED
Status: DISCONTINUED | OUTPATIENT
Start: 2018-11-12 | End: 2018-11-12 | Stop reason: HOSPADM

## 2018-11-12 RX ORDER — ASPIRIN 81 MG/1
81 TABLET ORAL EVERY MORNING
Status: DISCONTINUED | OUTPATIENT
Start: 2018-11-13 | End: 2018-11-13 | Stop reason: HOSPADM

## 2018-11-12 RX ORDER — CEFAZOLIN SODIUM 1 G/3ML
2 INJECTION, POWDER, FOR SOLUTION INTRAMUSCULAR; INTRAVENOUS
Status: COMPLETED | OUTPATIENT
Start: 2018-11-12 | End: 2018-11-12

## 2018-11-12 RX ORDER — DEXAMETHASONE SODIUM PHOSPHATE 4 MG/ML
INJECTION, SOLUTION INTRA-ARTICULAR; INTRALESIONAL; INTRAMUSCULAR; INTRAVENOUS; SOFT TISSUE
Status: DISCONTINUED | OUTPATIENT
Start: 2018-11-12 | End: 2018-11-12

## 2018-11-12 RX ORDER — FINASTERIDE 5 MG/1
5 TABLET, FILM COATED ORAL EVERY MORNING
Status: DISCONTINUED | OUTPATIENT
Start: 2018-11-13 | End: 2018-11-13 | Stop reason: HOSPADM

## 2018-11-12 RX ORDER — PROPOFOL 10 MG/ML
VIAL (ML) INTRAVENOUS
Status: DISCONTINUED | OUTPATIENT
Start: 2018-11-12 | End: 2018-11-12

## 2018-11-12 RX ORDER — ONDANSETRON 2 MG/ML
INJECTION INTRAMUSCULAR; INTRAVENOUS
Status: DISCONTINUED | OUTPATIENT
Start: 2018-11-12 | End: 2018-11-12

## 2018-11-12 RX ORDER — TAMSULOSIN HYDROCHLORIDE 0.4 MG/1
0.4 CAPSULE ORAL DAILY
Status: DISCONTINUED | OUTPATIENT
Start: 2018-11-13 | End: 2018-11-13 | Stop reason: HOSPADM

## 2018-11-12 RX ORDER — ACETAMINOPHEN 325 MG/1
650 TABLET ORAL EVERY 6 HOURS
Status: DISCONTINUED | OUTPATIENT
Start: 2018-11-12 | End: 2018-11-13 | Stop reason: HOSPADM

## 2018-11-12 RX ORDER — SIMVASTATIN 10 MG/1
10 TABLET, FILM COATED ORAL NIGHTLY
Status: DISCONTINUED | OUTPATIENT
Start: 2018-11-12 | End: 2018-11-13 | Stop reason: HOSPADM

## 2018-11-12 RX ADMIN — LIDOCAINE HYDROCHLORIDE 100 MG: 20 INJECTION, SOLUTION INTRAVENOUS at 07:11

## 2018-11-12 RX ADMIN — NEOSTIGMINE METHYLSULFATE 5 MG: 1 INJECTION INTRAVENOUS at 09:11

## 2018-11-12 RX ADMIN — SODIUM CHLORIDE, SODIUM GLUCONATE, SODIUM ACETATE, POTASSIUM CHLORIDE, MAGNESIUM CHLORIDE, SODIUM PHOSPHATE, DIBASIC, AND POTASSIUM PHOSPHATE: .53; .5; .37; .037; .03; .012; .00082 INJECTION, SOLUTION INTRAVENOUS at 07:11

## 2018-11-12 RX ADMIN — ACETAMINOPHEN 650 MG: 325 TABLET, FILM COATED ORAL at 06:11

## 2018-11-12 RX ADMIN — METOPROLOL TARTRATE 25 MG: 25 TABLET ORAL at 08:11

## 2018-11-12 RX ADMIN — PHENYLEPHRINE HYDROCHLORIDE 100 MCG: 10 INJECTION INTRAVENOUS at 07:11

## 2018-11-12 RX ADMIN — SODIUM CHLORIDE: 0.9 INJECTION, SOLUTION INTRAVENOUS at 12:11

## 2018-11-12 RX ADMIN — DEXAMETHASONE SODIUM PHOSPHATE 4 MG: 4 INJECTION, SOLUTION INTRAMUSCULAR; INTRAVENOUS at 07:11

## 2018-11-12 RX ADMIN — SENNOSIDES AND DOCUSATE SODIUM 1 TABLET: 8.6; 5 TABLET ORAL at 08:11

## 2018-11-12 RX ADMIN — SODIUM CHLORIDE: 0.9 INJECTION, SOLUTION INTRAVENOUS at 06:11

## 2018-11-12 RX ADMIN — ONDANSETRON 4 MG: 2 INJECTION INTRAMUSCULAR; INTRAVENOUS at 09:11

## 2018-11-12 RX ADMIN — EPHEDRINE SULFATE 10 MG: 50 INJECTION, SOLUTION INTRAMUSCULAR; INTRAVENOUS; SUBCUTANEOUS at 07:11

## 2018-11-12 RX ADMIN — CEFAZOLIN 2 G: 330 INJECTION, POWDER, FOR SOLUTION INTRAMUSCULAR; INTRAVENOUS at 07:11

## 2018-11-12 RX ADMIN — FENTANYL CITRATE 50 MCG: 50 INJECTION, SOLUTION INTRAMUSCULAR; INTRAVENOUS at 08:11

## 2018-11-12 RX ADMIN — EPHEDRINE SULFATE 10 MG: 50 INJECTION, SOLUTION INTRAMUSCULAR; INTRAVENOUS; SUBCUTANEOUS at 09:11

## 2018-11-12 RX ADMIN — ACETAMINOPHEN 650 MG: 325 TABLET, FILM COATED ORAL at 12:11

## 2018-11-12 RX ADMIN — GLYCOPYRROLATE 0.6 MG: 0.2 INJECTION, SOLUTION INTRAMUSCULAR; INTRAVENOUS at 09:11

## 2018-11-12 RX ADMIN — FENTANYL CITRATE 50 MCG: 50 INJECTION, SOLUTION INTRAMUSCULAR; INTRAVENOUS at 09:11

## 2018-11-12 RX ADMIN — FENTANYL CITRATE 50 MCG: 50 INJECTION, SOLUTION INTRAMUSCULAR; INTRAVENOUS at 07:11

## 2018-11-12 RX ADMIN — KETOROLAC TROMETHAMINE 15 MG: 30 INJECTION, SOLUTION INTRAMUSCULAR; INTRAVENOUS at 09:11

## 2018-11-12 RX ADMIN — SIMVASTATIN 10 MG: 10 TABLET, FILM COATED ORAL at 08:11

## 2018-11-12 RX ADMIN — ROCURONIUM BROMIDE 50 MG: 10 INJECTION, SOLUTION INTRAVENOUS at 07:11

## 2018-11-12 RX ADMIN — MICONAZOLE NITRATE: 20 CREAM TOPICAL at 08:11

## 2018-11-12 RX ADMIN — PROPOFOL 100 MG: 10 INJECTION, EMULSION INTRAVENOUS at 07:11

## 2018-11-12 RX ADMIN — OXYCODONE HYDROCHLORIDE 5 MG: 5 TABLET ORAL at 09:11

## 2018-11-12 RX ADMIN — EPHEDRINE SULFATE 5 MG: 50 INJECTION, SOLUTION INTRAMUSCULAR; INTRAVENOUS; SUBCUTANEOUS at 09:11

## 2018-11-12 NOTE — TRANSFER OF CARE
Anesthesia Transfer of Care Note    Patient: Shaq Bragg Jr.    Procedure(s) Performed: Procedure(s) (LRB):  LYMPHADENECTOMY, INGUINAL, Sartorious Flap (Right)    Patient location: PACU    Anesthesia Type: general    Transport from OR: Transported from OR on 6-10 L/min O2 by face mask with adequate spontaneous ventilation    Post pain: adequate analgesia    Post assessment: no apparent anesthetic complications and tolerated procedure well    Post vital signs: stable    Level of consciousness: awake, alert and oriented    Nausea/Vomiting: no nausea/vomiting    Complications: none    Transfer of care protocol was followed      Last vitals:   Visit Vitals  BP (!) 183/80   Pulse 91   Temp 36.7 °C (98.1 °F) (Temporal)   Resp 18   Ht 6' (1.829 m)   Wt 82.1 kg (181 lb)   SpO2 100%   BMI 24.55 kg/m²

## 2018-11-12 NOTE — INTERVAL H&P NOTE
The patient has been examined and the H&P has been reviewed:    I concur with the findings and no changes have occurred since H&P was written.     Past Medical History:   Diagnosis Date    COPD (chronic obstructive pulmonary disease)     Hearing loss     Hypercholesterolemia     Hypertension     Malignant melanoma of right lower leg 5/11/2017    Skin cancer      Past Surgical History:   Procedure Laterality Date    CHOLECYSTECTOMY  1965     Review of patient's allergies indicates:  No Known Allergies    Home Meds:   aspirin (ECOTRIN) 81 MG EC tablet Take 81 mg by mouth every morning.      finasteride (PROSCAR) 5 mg tablet Take 5 mg by mouth every morning.      furosemide (LASIX) 20 MG tablet Take 20 mg by mouth every other day.      losartan-hydrochlorothiazide 50-12.5 mg (HYZAAR) 50-12.5 mg per tablet Take 1 tablet by mouth every morning.      metoprolol tartrate (LOPRESSOR) 50 MG tablet Take 50 mg by mouth. Takes 50 mg in morning and half tablet (25 mg) in evening     potassium chloride (MICRO-K) 8 mEq CpSR Take 8 mEq by mouth every other day.      simvastatin (ZOCOR) 10 MG tablet Take 10 mg by mouth every evening.     tamsulosin (FLOMAX) 0.4 mg Cp24 Take 0.4 mg by mouth every morning.              Anesthesia/Surgery risks, benefits and alternative options discussed and understood by patient/family.          Active Hospital Problems    Diagnosis  POA    Malignant melanoma of right lower leg [C43.71]  Yes      Resolved Hospital Problems   No resolved problems to display.

## 2018-11-12 NOTE — NURSING TRANSFER
Nursing Transfer Note      11/12/2018     Transfer To: 1012    Transfer via stretcher    Transfer with none    Transported by pct    Medicines sent: none    Chart send with patient: Yes    Notified: [email or web address]    Patient reassessed at: 1036

## 2018-11-12 NOTE — OP NOTE
[facility]  Surgery Department  Operative Note       Date of Procedure: 11/12/2018       Pre-Operative Diagnosis: Malignant melanoma of right lower leg [C43.71]    Post-Operative Diagnosis: Post-Op Diagnosis Codes:     * Malignant melanoma of right lower leg [C43.71]  1. Same    Anesthesia: General    Procedures:     1. Right Superficial Inguinal Lymphadenectomy   2. Right Sartorious Muscle Flap ( CPT 20453 )    Operative Findings:  1. Palpable nodes along femoral vessels    Anesthesia:    General     EBL:    <30 mL    Indications:  Shaq Bragg JrNapoleon presents for the above procedures.  The operative plan, risks and benefits including bleeding, infection, neurovascular injury, lymphedema, discovery of additional disease, and imponderables were reviewed.  The patient gave verbal consent to proceed.     Details:  The patient was brought to the operating room, and laid in the supine position.  General endotracheal anesthesia was induced.  The ipsilateral leg was placed in a slight frog-leg position on a bump and padded throughout.      A field block was established with local anesthesia.  An obliquely oriented transverse incision was made below the inguinal ligament, encompassing the previous sentinel lymph node scar.  The incision was deepened with electrocautery to the superficial fascia, and flaps were elevated at that level medially to the symphysis and adductor longus, laterally to the sartorius and ASIS, superiorly to 6 cm above the inguinal ligament, and inferiorly to the apex of the femoral triangle.      The dissection was deepened onto the external oblique and all fibrofatty tissue swept inferiorly. Cautery was used to resect the node-bearing tissue inferiorly off the edge of the inguinal ligament. The adductor longus and sartorius were identified and their fascia incised. The femoral artery and vein were skeletonized.  Manvel's space medial to the vein was cleared and swept onto  the specimen.  Adjacent gil tissue was cleared off the artery and vein, small venous tributaries and lymphatics were divided between clips. The greater saphenous vein was spared and skeletonized in similar fashion.  Fibrofatty tissue anterior to the femoral nerve swept ontol the specimen and all fibrofatty tissue between the sartorius and adductor longus taken en bloc with a combination of cautery, clips and ties as appropriate. The specimen was submitted for permanent section.    Gloves and instruments were changed.  The wound was irrigated clean and hemostasis ensured.  A 2-0 Ethibond suture was used to close Mario's ligament to the inguinal ligament.  I should mention that this patient had known asymptomatic direct inguinal hernias on exam and we made efforts not to disturb or dissect the cord structures.  The sartorius muscle was encircled and divided at the level of the inguinal ligament, mobilized distally until adequate coverage of the femoral vessels was obtained. The muscle was viable and was treated to the inferior edge of the ligament with interrupted 2-0 Ethibond mattress sutures.    The wound was irrigated and inspected for hemostasis.  Marcy was applied.  A 19Fr Round closed suction drain was brought out through a separate stab incision superiorly and secured with a nylon stitch.  The superficial fascia, dermis and skin were closed in layers with absorbable sutures and steri-strips.    The patient recovered from anesthesia and was taken to the recovery room in stable condition.  I was present for and performed the entirety of the procedure.  All needle, lap, and sponge counts were reported as correct.  I communicated the intraoperative findings with the family following the procedure.       Implants: * No implants in log *    Specimens:   Specimen (12h ago, onward)    Start     Ordered    11/12/18 0816  Specimen to Pathology - Surgery  Once     Comments:  1. RIGHT Inguinal lymphadenectomy for  PERMANENT; no preservative; placed in specimen fridge via RN     Start Status   11/12/18 0841 In process       11/12/18 0918                  Condition: Stable    Disposition: PACU - hemodynamically stable.    Attestation: I was present and scrubbed for the entire procedure.

## 2018-11-12 NOTE — BRIEF OP NOTE
[facility]  Brief Operative Note    SUMMARY     Surgery Date: 11/12/2018     Surgeon(s) and Role:     * Yair Alatorre MD - Primary     * Delon Metcalf MD - Resident - Assisting      Pre-op Diagnosis:  Malignant melanoma of right lower leg [C43.71]    Post-op Diagnosis:  Post-Op Diagnosis Codes:     * Malignant melanoma of right lower leg [C43.71]    Procedure(s) (LRB):  LYMPHADENECTOMY, INGUINAL, Sartorious Flap (Right)    Anesthesia: General    Description of Procedure: right inguinal lymphadenectomy with Sartorious flap    Description of the findings of the procedure: clinical lymphadenopathy with 3 palpable lymph nodes    Estimated Blood Loss: 10 mL         Specimens:   Specimen (12h ago, onward)    Start     Ordered    11/12/18 0841  Specimen to Pathology - Surgery  Once     Comments:  1. RIGHT Inguinal lymphadenectomy for PERMANENT; no preservative; placed in specimen fridge via RN     Start Status   11/12/18 0841 Collected (11/12/18 0921)       11/12/18 0918

## 2018-11-13 VITALS
OXYGEN SATURATION: 97 % | TEMPERATURE: 98 F | BODY MASS INDEX: 24.52 KG/M2 | HEART RATE: 60 BPM | RESPIRATION RATE: 20 BRPM | SYSTOLIC BLOOD PRESSURE: 138 MMHG | WEIGHT: 181 LBS | DIASTOLIC BLOOD PRESSURE: 58 MMHG | HEIGHT: 72 IN

## 2018-11-13 LAB
ALBUMIN SERPL BCP-MCNC: 3.1 G/DL
ALP SERPL-CCNC: 88 U/L
ALT SERPL W/O P-5'-P-CCNC: 20 U/L
ANION GAP SERPL CALC-SCNC: 10 MMOL/L
AST SERPL-CCNC: 25 U/L
BASOPHILS # BLD AUTO: 0.02 K/UL
BASOPHILS NFR BLD: 0.2 %
BILIRUB SERPL-MCNC: 1 MG/DL
BUN SERPL-MCNC: 22 MG/DL
CALCIUM SERPL-MCNC: 8.4 MG/DL
CHLORIDE SERPL-SCNC: 105 MMOL/L
CO2 SERPL-SCNC: 22 MMOL/L
CREAT SERPL-MCNC: 1 MG/DL
DIFFERENTIAL METHOD: ABNORMAL
EOSINOPHIL # BLD AUTO: 0 K/UL
EOSINOPHIL NFR BLD: 0.4 %
ERYTHROCYTE [DISTWIDTH] IN BLOOD BY AUTOMATED COUNT: 13.2 %
EST. GFR  (AFRICAN AMERICAN): >60 ML/MIN/1.73 M^2
EST. GFR  (NON AFRICAN AMERICAN): >60 ML/MIN/1.73 M^2
GLUCOSE SERPL-MCNC: 110 MG/DL
HCT VFR BLD AUTO: 35 %
HGB BLD-MCNC: 12 G/DL
IMM GRANULOCYTES # BLD AUTO: 0.04 K/UL
IMM GRANULOCYTES NFR BLD AUTO: 0.4 %
LYMPHOCYTES # BLD AUTO: 1 K/UL
LYMPHOCYTES NFR BLD: 10.7 %
MAGNESIUM SERPL-MCNC: 2.1 MG/DL
MCH RBC QN AUTO: 33 PG
MCHC RBC AUTO-ENTMCNC: 34.3 G/DL
MCV RBC AUTO: 96 FL
MONOCYTES # BLD AUTO: 0.7 K/UL
MONOCYTES NFR BLD: 7.3 %
NEUTROPHILS # BLD AUTO: 7.7 K/UL
NEUTROPHILS NFR BLD: 81 %
NRBC BLD-RTO: 0 /100 WBC
PHOSPHATE SERPL-MCNC: 2.5 MG/DL
PLATELET # BLD AUTO: 149 K/UL
PMV BLD AUTO: 10.5 FL
POTASSIUM SERPL-SCNC: 3.5 MMOL/L
PROT SERPL-MCNC: 5.6 G/DL
RBC # BLD AUTO: 3.64 M/UL
SODIUM SERPL-SCNC: 137 MMOL/L
WBC # BLD AUTO: 9.5 K/UL

## 2018-11-13 PROCEDURE — 25000003 PHARM REV CODE 250: Performed by: SURGERY

## 2018-11-13 PROCEDURE — 25000003 PHARM REV CODE 250: Performed by: STUDENT IN AN ORGANIZED HEALTH CARE EDUCATION/TRAINING PROGRAM

## 2018-11-13 PROCEDURE — 36415 COLL VENOUS BLD VENIPUNCTURE: CPT

## 2018-11-13 PROCEDURE — 85025 COMPLETE CBC W/AUTO DIFF WBC: CPT

## 2018-11-13 PROCEDURE — 97165 OT EVAL LOW COMPLEX 30 MIN: CPT

## 2018-11-13 PROCEDURE — 80053 COMPREHEN METABOLIC PANEL: CPT

## 2018-11-13 PROCEDURE — 83735 ASSAY OF MAGNESIUM: CPT

## 2018-11-13 PROCEDURE — 97161 PT EVAL LOW COMPLEX 20 MIN: CPT

## 2018-11-13 PROCEDURE — 84100 ASSAY OF PHOSPHORUS: CPT

## 2018-11-13 RX ORDER — ENOXAPARIN SODIUM 100 MG/ML
40 INJECTION SUBCUTANEOUS EVERY 24 HOURS
Status: DISCONTINUED | OUTPATIENT
Start: 2018-11-13 | End: 2018-11-13 | Stop reason: HOSPADM

## 2018-11-13 RX ORDER — SODIUM,POTASSIUM PHOSPHATES 280-250MG
2 POWDER IN PACKET (EA) ORAL
Status: DISCONTINUED | OUTPATIENT
Start: 2018-11-13 | End: 2018-11-13

## 2018-11-13 RX ORDER — POTASSIUM CHLORIDE 20 MEQ/1
40 TABLET, EXTENDED RELEASE ORAL ONCE
Status: COMPLETED | OUTPATIENT
Start: 2018-11-13 | End: 2018-11-13

## 2018-11-13 RX ORDER — OXYCODONE HYDROCHLORIDE 5 MG/1
5 TABLET ORAL EVERY 4 HOURS PRN
Qty: 21 TABLET | Refills: 0 | Status: SHIPPED | OUTPATIENT
Start: 2018-11-13 | End: 2019-02-05

## 2018-11-13 RX ADMIN — SENNOSIDES AND DOCUSATE SODIUM 1 TABLET: 8.6; 5 TABLET ORAL at 09:11

## 2018-11-13 RX ADMIN — TAMSULOSIN HYDROCHLORIDE 0.4 MG: 0.4 CAPSULE ORAL at 09:11

## 2018-11-13 RX ADMIN — ASPIRIN 81 MG: 81 TABLET, COATED ORAL at 07:11

## 2018-11-13 RX ADMIN — LOSARTAN POTASSIUM AND HYDROCHLOROTHIAZIDE 1 TABLET: 12.5; 5 TABLET ORAL at 07:11

## 2018-11-13 RX ADMIN — POTASSIUM CHLORIDE 40 MEQ: 1500 TABLET, EXTENDED RELEASE ORAL at 09:11

## 2018-11-13 RX ADMIN — FINASTERIDE 5 MG: 5 TABLET, FILM COATED ORAL at 09:11

## 2018-11-13 RX ADMIN — POTASSIUM & SODIUM PHOSPHATES POWDER PACK 280-160-250 MG 2 PACKET: 280-160-250 PACK at 09:11

## 2018-11-13 RX ADMIN — METOPROLOL TARTRATE 50 MG: 50 TABLET ORAL at 07:11

## 2018-11-13 RX ADMIN — SODIUM CHLORIDE, SODIUM LACTATE, POTASSIUM CHLORIDE, AND CALCIUM CHLORIDE 1000 ML: .6; .31; .03; .02 INJECTION, SOLUTION INTRAVENOUS at 12:11

## 2018-11-13 NOTE — PLAN OF CARE
Problem: Patient Care Overview  Goal: Plan of Care Review  Outcome: Ongoing (interventions implemented as appropriate)  Patient is alert, awake, and oriented. Denies pain. VS stable. 1 BROOKS to suction. Free of injuries and falls. Verbals understanding of plan of care. Up with assist. Free of injuries and falls. Verbalizes understanding of plan of care. Nurse will continue to monitor.

## 2018-11-13 NOTE — PLAN OF CARE
Patient was admitted for malignant melanoma of right lower leg; Lives in a 1 story house with spouse-no steps, independent and agile, blue d/c folder given, HH discussed as well as Bedside prescription delivery, no needs identified at this time       11/13/18 1045   Discharge Assessment   Confirmed/corrected address and phone number on facesheet? Yes   Assessment information obtained from? Patient;Caregiver;Medical Record   Expected Length of Stay (days) 1   Communicated expected length of stay with patient/caregiver no  (per MD )   Prior to hospitilization cognitive status: Alert/Oriented;No Deficits   Prior to hospitalization functional status: Independent   Current cognitive status: Alert/Oriented;No Deficits   Current Functional Status: Independent   Facility Arrived From: n/a   Lives With spouse   Able to Return to Prior Arrangements yes   Is patient able to care for self after discharge? Yes   Who are your caregiver(s) and their phone number(s)? Jennifer Bragg Spouse [phone]      Patient's perception of discharge disposition home health;home or selfcare   Readmission Within The Last 30 Days no previous admission in last 30 days   Patient currently being followed by outpatient case management? No   Patient currently receives any other outside agency services? No   Equipment Currently Used at Home none   Do you have any problems affording any of your prescribed medications? No   Is the patient taking medications as prescribed? yes   Does the patient have transportation home? Yes   Transportation Available family or friend will provide;car   Dialysis Name and Scheduled days n/a   Does the patient receive services at the [facility]? No   Discharge Plan A Home with family;Home Health

## 2018-11-13 NOTE — PLAN OF CARE
SW was informed by Surgery NP, Kelsey Nuno, that Pt was anticipated to discharge home today and would benefit from home health. Per CM's conversation with Pt, he does not have a preference for a home health agency. LUTHER sent all necessary referral information to [facility] via [facility].     Daria Perez LCSW

## 2018-11-13 NOTE — DISCHARGE SUMMARY
[facility]  General Surgery  Discharge Summary      Patient Name: Shaq Bragg Jr.  MRN: [id number]  Admission Date: 11/12/2018  Hospital Length of Stay: 1 days  Discharge Date and Time:  11/13/2018 8:46 AM  Attending Physician: Yair Alatorre MD   Discharging Provider: Libby Chapman MD  Primary Care Provider: Ata Jeter MD     HPI: Patient is an 87 yo M with a hx of melenoma 1.4 cm excised on 03/21/17 who is being followed every 6 months with PET CT scans. Today he presents for his normal follow up. He reports new swelling in his R groin in two places. The swellings started in early to mid September.  10/30/2018 Palpable groin nodes, newly PET avid.      Procedure(s) (LRB):  LYMPHADENECTOMY, INGUINAL, Sartorious Flap (Right)     Hospital Course: The patient tolerated the procedure well. His pain was well-controlled. He tolerated a diet. He received drain teaching and was discharged to home.     Consults:     Significant Diagnostic Studies: Labs:   CBC   Recent Labs   Lab 11/13/18  0420   WBC 9.50   HGB 12.0*   HCT 35.0*   *       Pending Diagnostic Studies:     None        Final Active Diagnoses:    Diagnosis Date Noted POA    PRINCIPAL PROBLEM:  Malignant melanoma of right lower leg [C43.71] 05/11/2017 Yes      Problems Resolved During this Admission:      Discharged Condition: stable    Disposition:     Follow Up:  Follow-up Information     Yair Alatorre MD In 1 week.    Specialties:  General Surgery, Surgical Oncology  Contact information:  [address] [place] [address] [id number] [phone]                 Patient Instructions:      Diet Adult Regular     Notify your health care provider if you experience any of the following:  temperature >100.4     Notify your health care provider if you experience any of the following:  persistent nausea and vomiting or diarrhea     Notify your health care provider if you experience any of the following:  severe  uncontrolled pain     Notify your health care provider if you experience any of the following:  redness, tenderness, or signs of infection (pain, swelling, redness, odor or green/yellow discharge around incision site)     Notify your health care provider if you experience any of the following:  worsening rash     Notify your health care provider if you experience any of the following:  persistent dizziness, light-headedness, or visual disturbances     Notify your health care provider if you experience any of the following:  increased confusion or weakness     No dressing needed   Order Comments: Keep steri-strips (white tape) intact over wound, they will be removed in clinic. If they fall off before that's ok  Empty and record drain output when the drain fills. Call for changes to drain appearance (if concern for infection/purulence).  Wear SHARIF stockings     Incentive spirometry   Standing Status: Future Standing Exp. Date: 01/08/20     Activity as tolerated     Medications:  Reconciled Home Medications:      Medication List      START taking these medications    oxyCODONE 5 MG immediate release tablet  Commonly known as:  ROXICODONE  Take 1 tablet (5 mg total) by mouth every 4 (four) hours as needed.        CONTINUE taking these medications    aspirin 81 MG EC tablet  Commonly known as:  ECOTRIN  Take 81 mg by mouth every morning.     finasteride 5 mg tablet  Commonly known as:  PROSCAR  Take 5 mg by mouth every morning.     furosemide 20 MG tablet  Commonly known as:  LASIX  Take 20 mg by mouth every other day.     losartan-hydrochlorothiazide 50-12.5 mg 50-12.5 mg per tablet  Commonly known as:  HYZAAR  Take 1 tablet by mouth every morning.     metoprolol tartrate 50 MG tablet  Commonly known as:  LOPRESSOR  Take 50 mg by mouth. Takes 50 mg in morning and half tablet (25 mg) in evening     potassium chloride 8 mEq Cpsr  Commonly known as:  MICRO-K  Take 8 mEq by mouth every other day.     simvastatin 10 MG  tablet  Commonly known as:  ZOCOR  Take 10 mg by mouth every evening.     tamsulosin 0.4 mg Cap  Commonly known as:  FLOMAX  Take 0.4 mg by mouth every morning.            Libby Chapman MD  General Surgery  [facility]

## 2018-11-13 NOTE — PLAN OF CARE
Problem: Occupational Therapy Goal  Goal: Occupational Therapy Goal  Outcome: Outcome(s) achieved Date Met: 11/13/18  Patient presents with no functional deficits that would affect his self care and functional independence.      Comments: Mara Campos OTR/L

## 2018-11-13 NOTE — PLAN OF CARE
[facility]       HOME  HEALTH ORDERS                                    FACE TO FACE ENCOUNTER      Patient Name: Shaq Bragg Jr.  YOB: 1930    PCP: Ata Jeter MD   PCP Address: [address] [place] [address] / [place] [address]  PCP Phone Number: [phone]  PCP Fax: [phone]    Encounter Date: 11/13/2018    Admit to Home Health    Diagnoses:  Active Hospital Problems    Diagnosis  POA    *Malignant melanoma of right lower leg [C43.71]  Yes      Resolved Hospital Problems   No resolved problems to display.       No future appointments.  Follow-up Information     Yair Alatorre MD In 1 week.    Specialties:  General Surgery, Surgical Oncology  Contact information:  [address]  [facility] [address] [phone]                   I have seen and examined this patient face to face today. My clinical findings that support the need for the home health skilled services and home bound status are the following:  Weakness/numbness causing balance and gait disturbance due to Surgery making it taxing to leave home.    Allergies:Review of patient's allergies indicates:  No Known Allergies    Diet: regular diet    Activities: activity as tolerated    Nursing:   SN to complete comprehensive assessment including routine vital signs. Instruct on disease process and s/s of complications to report to MD. Review/verify medication list sent home with the patient at time of discharge  and instruct patient/caregiver as needed. Frequency may be adjusted depending on start of care date.    Notify MD if SBP > 160 or < 90; DBP > 90 or < 50; HR > 120 or < 50; Temp > 101    WOUND CARE ORDERS  Right groin sanam drain.  Keep steri-strips (white tape) intact over wound, they will be removed in clinic. If they fall off before that's ok  Empty and record drain output when the drain fills. Call for changes to drain appearance (if concern for  infection/purulence).  Wear SHARIF stockings    Medications: Review discharge medications with patient and family and provide education.      Current Discharge Medication List      START taking these medications    Details   oxyCODONE (ROXICODONE) 5 MG immediate release tablet Take 1 tablet (5 mg total) by mouth every 4 (four) hours as needed.  Qty: 21 tablet, Refills: 0         CONTINUE these medications which have NOT CHANGED    Details   aspirin (ECOTRIN) 81 MG EC tablet Take 81 mg by mouth every morning.       finasteride (PROSCAR) 5 mg tablet Take 5 mg by mouth every morning.       furosemide (LASIX) 20 MG tablet Take 20 mg by mouth every other day.       losartan-hydrochlorothiazide 50-12.5 mg (HYZAAR) 50-12.5 mg per tablet Take 1 tablet by mouth every morning.       metoprolol tartrate (LOPRESSOR) 50 MG tablet Take 50 mg by mouth. Takes 50 mg in morning and half tablet (25 mg) in evening      potassium chloride (MICRO-K) 8 mEq CpSR Take 8 mEq by mouth every other day.       simvastatin (ZOCOR) 10 MG tablet Take 10 mg by mouth every evening.      tamsulosin (FLOMAX) 0.4 mg Cp24 Take 0.4 mg by mouth every morning.              I certify that this patient is confined to his home and needs  intermittent skilled nursing care.    Electronically Signed  _________________________________  Kelsey Inman NP  11/13/2018

## 2018-11-13 NOTE — PLAN OF CARE
Problem: Physical Therapy Goal  Goal: Physical Therapy Goal  Outcome: Outcome(s) achieved Date Met: 11/13/18  Pt discharged from physical therapy due to independence with functional mobility at this time.  Jasvir Shepard, SPT  11/13/18  I certify that I was present in the room directing the student in service delivery and guiding them using my skilled judgment. As the co-signing therapist I have reviewed the students documentation and am responsible for the treatment, assessment, and plan.     Melina Noonan PT 11/13/18

## 2018-11-13 NOTE — PT/OT/SLP EVAL
"Physical Therapy Evaluation / Discharge Summary    Patient Name:  Shaq Bragg Jr.   MRN:  [id number]    Recommendations:     Discharge Recommendations:  home   Discharge Equipment Recommendations: none  Barriers to discharge: None    Assessment:     Shaq Bragg Jr. is a 88 y.o. male admitted with a medical diagnosis of Malignant melanoma of right lower leg.  He presents with the following impairments/functional limitations:  impaired skin. Pt ascended and descended one flight of stairs with SBA without instability or LOB.    Rehab Prognosis:  good    Recent Surgery: Procedure(s) (LRB):  LYMPHADENECTOMY, INGUINAL, Sartorious Flap (Right) 1 Day Post-Op    Plan:     Pt discharged from physical therapy due to independence with functional mobility at this time.    · Plan of Care Expires:  12/13/18   Plan of Care Reviewed with: patient, spouse    Subjective     Communicated with nurse prior to session.  Patient found standing in room upon PT entry to room, agreeable to evaluation.    "I feel good, I'm supposed to leave in a bit."    Chief Complaint: none stated  Patient comments/goals: to return home  Pain/Comfort:  · Pain Rating 1: 0/10    Patients cultural, spiritual, Druze conflicts given the current situation: none noted    Living Environment:  Pt lives with wife in 2SH with 1 MERLIN. Bed/bath on 2nd floor, tub/shower combo. Prior to admission, patients level of function was independent with all ADLs. Patient has the following equipment: None.   Upon discharge, patient will have assistance from wife.    Objective:     Patient found with: N/A    General Precautions: Standard, fall  Orthopedic Precautions:N/A  Braces: N/A     Exams:  · Cognitive Exam:  Patient is oriented to Person, Place, Time and Situation  · Sensation:    · -       Intact  light/touch B LEs (pt reported occasional "tingling" in B LEs)  · RLE ROM: WFL  · RLE Strength: WFL  · LLE ROM: WFL  · LLE Strength: WFL    Functional " Mobility:  · Transfers:     · Sit to Stand:  modified independence with no AD (took extra time)  · Gait: 50 feet with no AD with supervision. Pt ambulated at a normal pace with no instability or LOB.  · Stairs:  Pt ascended/descended 1 flight with No Assistive Device with left handrail with Stand-by Assistance. Pt used reciprocal pattern while ascending, step-to pattern while descending. No instability or LOB.    AM-PAC 6 CLICK MOBILITY  Total Score:24     Patient left standing in room with wife present.    GOALS:   Multidisciplinary Problems     Physical Therapy Goals     Not on file          Multidisciplinary Problems (Resolved)        Problem: Physical Therapy Goal    Goal Priority Disciplines Outcome Goal Variances Interventions   Physical Therapy Goal   (Resolved)     PT, PT/OT Outcome(s) achieved                     History:     Past Medical History:   Diagnosis Date    COPD (chronic obstructive pulmonary disease)     Hearing loss     Hypercholesterolemia     Hypertension     Malignant melanoma of right lower leg 5/11/2017    Skin cancer        Past Surgical History:   Procedure Laterality Date    CHOLECYSTECTOMY  1965       Clinical Decision Making:     History  Co-morbidities and personal factors that may impact the plan of care Examination  Body Structures and Functions, activity limitations and participation restrictions that may impact the plan of care Clinical Presentation   Decision Making/ Complexity Score   Co-morbidities:   [] Time since onset of injury / illness / exacerbation  [] Status of current condition  []Patient's cognitive status and safety concerns    [] Multiple Medical Problems (see med hx)  Personal Factors:   [] Patient's age  [] Prior Level of function   [] Patient's home situation (environment and family support)  [] Patient's level of motivation  [] Expected progression of patient      HISTORY:(criteria)    [x] 71707 - no personal factors/history    [] 10615 - has 1-2 personal  factor/comorbidity     [] 11439 - has >3 personal factor/comorbidity     Body Regions:  [] Objective examination findings  [] Head     []  Neck  [] Trunk   [] Upper Extremity  [] Lower Extremity    Body Systems:  [] For communication ability, affect, cognition, language, and learning style: the assessment of the ability to make needs known, consciousness, orientation (person, place, and time), expected emotional /behavioral responses, and learning preferences (eg, learning barriers, education  needs)  [] For the neuromuscular system: a general assessment of gross coordinated movement (eg, balance, gait, locomotion, transfers, and transitions) and motor function  (motor control and motor learning)  [] For the musculoskeletal system: the assessment of gross symmetry, gross range of motion, gross strength, height, and weight  [] For the integumentary system: the assessment of pliability(texture), presence of scar formation, skin color, and skin integrity  [] For cardiovascular/pulmonary system: the assessment of heart rate, respiratory rate, blood pressure, and edema     Activity limitations:    [] Patient's cognitive status and saf ety concerns          [] Status of current condition      [] Weight bearing restriction  [] Cardiopulmunary Restriction    Participation Restrictions:   [] Goals and goal agreement with the patient     [] Rehab potential (prognosis) and probable outcome      Examination of Body System: (criteria)    [] 08824 - addressing 1-2 elements    [] 08797 - addressing a total of 3 or more elements     [] 30916 -  Addressing a total of 4 or more elements         Clinical Presentation: (criteria)  Stable - 06248     On examination of body system using standardized tests and measures patient presents with (CHOOSE ONE) elements from any of the following: body structures and functions, activity limitations, and/or participation restrictions.  Leading to a clinical presentation that is considered (CHOOSE  ONE)                              Clinical Decision Making  (Eval Complexity):  Low- 84106     Time Tracking:     PT Received On: 11/13/18  PT Start Time: 0938     PT Stop Time: 0950  PT Total Time (min): 12 min     Billable Minutes: Evaluation 12    MARCELLO Mcdonald  11/13/2018

## 2018-11-13 NOTE — ANESTHESIA POSTPROCEDURE EVALUATION
Anesthesia Post Evaluation    Patient: Shaq Bragg JrNapoleon    Procedure(s) Performed: Procedure(s) (LRB):  LYMPHADENECTOMY, INGUINAL, Sartorious Flap (Right)    Final Anesthesia Type: general  Patient location during evaluation: PACU  Patient participation: Yes- Able to Participate  Level of consciousness: awake and alert  Post-procedure vital signs: reviewed and stable  Pain management: adequate  Airway patency: patent  PONV status at discharge: No PONV  Anesthetic complications: no      Cardiovascular status: blood pressure returned to baseline  Respiratory status: unassisted  Hydration status: euvolemic  Follow-up not needed.        Visit Vitals  BP (!) 138/58 (BP Location: Left arm, Patient Position: Sitting)   Pulse 60   Temp 36.7 °C (98 °F) (Oral)   Resp 20   Ht 6' (1.829 m)   Wt 82.1 kg (181 lb)   SpO2 97%   BMI 24.55 kg/m²       Pain/Beverly Score: Pain Assessment Performed: Yes (11/12/2018 10:00 PM)  Presence of Pain: denies (11/12/2018 10:00 PM)  Pain Rating Prior to Med Admin: 0 (11/12/2018  5:48 PM)  Pain Rating Post Med Admin: 0 (11/12/2018  1:17 PM)  Beverly Score: 10 (11/12/2018 10:25 AM)

## 2018-11-13 NOTE — PROGRESS NOTES
Notified Dr. Reyna that patient voided at 2300 but it was unmeasured and he voided 100cc in urinal. No urine charted since 0950. Bladder scan showed 200-300cc post void. Per MD administered 1L bolus of LR. Nurse will continue to monitor.

## 2018-11-13 NOTE — SUBJECTIVE & OBJECTIVE
Interval History: NAEON. Tolerated breakfast. No pain.    Medications:  Continuous Infusions:  Scheduled Meds:   acetaminophen  650 mg Oral Q6H    aspirin  81 mg Oral QAM    enoxaparin  40 mg Subcutaneous Daily    finasteride  5 mg Oral QAM    furosemide  20 mg Oral Every other day    losartan-hydrochlorothiazide 50-12.5 mg  1 tablet Oral QAM    metoprolol tartrate  25 mg Oral QHS    metoprolol tartrate  50 mg Oral QAM    miconazole   Topical (Top) BID    potassium chloride  8 mEq Oral Every other day    potassium chloride  40 mEq Oral Once    potassium, sodium phosphates  2 packet Oral QID (AC & HS)    senna-docusate 8.6-50 mg  1 tablet Oral BID    simvastatin  10 mg Oral QHS    tamsulosin  0.4 mg Oral Daily     PRN Meds:oxyCODONE, oxyCODONE     Review of patient's allergies indicates:  No Known Allergies  Objective:     Vital Signs (Most Recent):  Temp: 98 °F (36.7 °C) (11/13/18 0440)  Pulse: 60 (11/13/18 0440)  Resp: 20 (11/13/18 0440)  BP: (!) 138/58 (11/13/18 0440)  SpO2: 97 % (11/13/18 0440) Vital Signs (24h Range):  Temp:  [97.4 °F (36.3 °C)-98.1 °F (36.7 °C)] 98 °F (36.7 °C)  Pulse:  [53-91] 60  Resp:  [18-20] 20  SpO2:  [94 %-100 %] 97 %  BP: (133-183)/(58-80) 138/58     Weight: 82.1 kg (181 lb)  Body mass index is 24.55 kg/m².    Intake/Output - Last 3 Shifts       11/11 0700 - 11/12 0659 11/12 0700 - 11/13 0659 11/13 0700 - 11/14 0659    P.O.  450 100    I.V. (mL/kg)  1900 (23.1)     Total Intake(mL/kg)  2350 (28.6) 100 (1.2)    Urine (mL/kg/hr)  800 (0.4) 400 (4.4)    Drains  220     Total Output  1020 400    Net  +1330 -300           Urine Occurrence  1 x           Physical Exam   Constitutional: He is oriented to person, place, and time. He appears well-developed and well-nourished. No distress.   HENT:   Head: Normocephalic.   Eyes: EOM are normal.   Cardiovascular: Normal rate.   Pulmonary/Chest: Effort normal. No respiratory distress.   Abdominal: Soft. He exhibits no distension.  There is no tenderness.   Musculoskeletal:   R inguinal incision c/d/i, TTP appropriate for post op period, minimal swelling, BROOKS in place with SS output   Neurological: He is alert and oriented to person, place, and time.   Skin: Skin is warm and dry.   Nursing note and vitals reviewed.      Significant Labs:  CBC:   Recent Labs   Lab 11/13/18 0420   WBC 9.50   RBC 3.64*   HGB 12.0*   HCT 35.0*   *   MCV 96   MCH 33.0*   MCHC 34.3     CMP:   Recent Labs   Lab 11/13/18 0420      CALCIUM 8.4*   ALBUMIN 3.1*   PROT 5.6*      K 3.5   CO2 22*      BUN 22   CREATININE 1.0   ALKPHOS 88   ALT 20   AST 25   BILITOT 1.0       Significant Diagnostics:  I have reviewed all pertinent imaging results/findings within the past 24 hours.

## 2018-11-13 NOTE — ASSESSMENT & PLAN NOTE
88 y.o. male 1 Day Post-Op for Procedure(s) (LRB):  LYMPHADENECTOMY, INGUINAL, Sartorious Flap (Right)    Reg diet  PO pain control  OOB and ambulate  DVT ppx    Dispo: Discharge today

## 2018-11-13 NOTE — PLAN OF CARE
Patient discharged to home with [facility]     11/13/18 1200   Final Note   Assessment Type Final Discharge Note   Anticipated Discharge Disposition Home-Health   What phone number can be called within the next 1-3 days to see how you are doing after discharge? ([phone])   Hospital Follow Up  Appt(s) scheduled? Yes   Discharge plans and expectations educations in teach back method with documentation complete? Yes   Right Care Referral Info   Post Acute Recommendation Home-care      Future Appointments   Date Time Provider Department Center   11/27/2018 11:45 AM Yair Alatorre MD [facility]

## 2018-11-13 NOTE — PT/OT/SLP EVAL
Occupational Therapy   Evaluation and Discharge Note    Name: Shaq Bragg Jr.  MRN: [id number]  Admitting Diagnosis:  Malignant melanoma of right lower leg 1 Day Post-Op    Recommendations:     Discharge Recommendations:   None  Discharge Equipment Recommendations:    None  Barriers to discharge:  None    History:     Occupational Profile:  Living Environment: Pt lives with wife in a DSH with a threshold to enter. Pt has a tub- shower combination as his bathroom setup. Pt's bedroom and bathroom are on the 2nd floor.  Previous level of function: I in all ADLs/ IADLs  Roles and Routines: Still active in the community and around the house  Equipment Owned:    None  Assistance upon Discharge: 24 hr assistance from wife when needed    Past Medical History:   Diagnosis Date    COPD (chronic obstructive pulmonary disease)     Hearing loss     Hypercholesterolemia     Hypertension     Malignant melanoma of right lower leg 5/11/2017    Skin cancer        Past Surgical History:   Procedure Laterality Date    CHOLECYSTECTOMY  1965       Subjective     Chief Complaint: None  Patient/Family stated goals: Pt agreeable to OT POC.  Communicated with: NOE Munoz prior to session. Pt agreeable to OT/PT eval.  Pain/Comfort:  · Pain Rating Post-Intervention 1: 0/10    Patients cultural, spiritual, Church conflicts given the current situation:      Objective:     Patient found with in standing:  No lines attached    General Precautions: Standard,     Orthopedic Precautions:  None  Braces:    None    Occupational Performance:    Bed Mobility:    · Not assessed 2/2 patient up in standing upon OT arrival    Functional Mobility/Transfers:  · Patient completed Sit <> Stand Transfer with modified independence  with  no assistive device   · Patient completed Toilet Transfer Step Transfer technique with modified independence with  no AD and grab bars  · Functional Mobility: Pt ambulated ~80 ft x2 with no AD with modified  independence due to decreased pace. No LOB noted. No RBs required. Pt ascended/ descended 1 flight of stairs with PT while utilizing arm rail with modified independence.    Activities of Daily Living:  · Upper Body Dressing: stand by assistance to locate sleeve to don robe in standing  · Toileting: modified independence for clothing management during toilet transfer    Cognitive/Visual Perceptual:  Cognitive/Psychosocial Skills:     -       Oriented to: Person, Place, Time and Situation   -       Follows Commands/attention:Follows multistep  commands  -       Communication: clear/fluent  -       Memory: No Deficits noted  -       Safety awareness/insight to disability: intact   -       Mood/Affect/Coping skills/emotional control: Appropriate to situation  Visual/Perceptual:      -Intact no corrective lenses donned    Physical Exam:  Balance:    -       Intact  Postural examination/scapula alignment:    -       No postural abnormalities identified  Skin integrity: Visible skin intact  Edema:  None noted  Sensation:    -       Intact per patient report  Upper Extremity Range of Motion:     -       Right Upper Extremity: WFL   -       Left Upper Extremity: WFL   Upper Extremity Strength:    -       Right Upper Extremity: WFL  -       Left Upper Extremity: WFL     Patient left standing with all lines intact, call button in reach, RN notified and RN and wife present    [facility] 6 Click:  [facility] Total Score: 24    Treatment & Education:  Educated patient on the following:  - OT POC  - Role of OT  - Functional transfer/ mobility safety  - Self care safety  - Importance of staying active while keeping safe  Education:    Assessment:     Shaq Richardpaul Bhatia is a 88 y.o. male with a medical diagnosis of Malignant melanoma of right lower leg. Pt presented to OT with no functional impairments that would affect his ability to complete his self care tasks safely at home. No DME recommendations are needed at this time. At this  "time, patient is functioning at their prior level of function and does not require further acute OT services.     Clinical Decision Makin.  OT Low:  "Pt evaluation falls under low complexity for evaluation coding due to performance deficits noted in 1-3 areas as stated above and 0 co-morbities affecting current functional status. Data obtained from problem focused assessments. No modifications or assistance was required for completion of evaluation. Only brief occupational profile and history review completed."     Plan:     During this hospitalization, patient does not require further acute OT services.  Please re-consult if situation changes.    · Plan of Care Reviewed with:  Patient and spouse    This Plan of care has been discussed with the patient who was involved in its development and understands and is in agreement with the identified goals and treatment plan    GOALS:   Multidisciplinary Problems     Occupational Therapy Goals     Not on file          Multidisciplinary Problems (Resolved)        Problem: Occupational Therapy Goal    Goal Priority Disciplines Outcome Interventions   Occupational Therapy Goal   (Resolved)     OT, PT/OT Outcome(s) achieved                    Time Tracking:     OT Date of Treatment: 18  OT Start Time: 938  OT Stop Time: 0950  OT Total Time (min): 12 min    Billable Minutes:Evaluation 12    Mara Campos OT  2018    "

## 2018-11-14 ENCOUNTER — TELEPHONE (OUTPATIENT)
Dept: SURGERY | Facility: CLINIC | Age: 83
End: 2018-11-14

## 2018-11-14 NOTE — TELEPHONE ENCOUNTER
----- Message from Юлия Collins sent at 11/14/2018  3:18 PM CST -----  Contact: Pt.'s Wife  [phone]  Needs Advice    Reason for call:  would like to know if her husbands Post Op can be sooner then 11/27/18         Communication Preference:[phone]    Additional Information:  Pt. Prefers 10:00am or 10:30am for appt. Time     Thank You

## 2018-11-23 NOTE — PHYSICIAN QUERY
PT Name: Shaq Bragg Jr.  MR #: [id number]    Physician Query Form - Pathology Findings Clarification     CDS/: Sade Oliva RN                 Contact information:[email or web address]  This form is a permanent document in the medical record.     Query Date: November 23, 2018      By submitting this query, we are merely seeking further clarification of documentation.  Please utilize your independent clinical judgment when addressing the question(s) below.      The medical record contains the following:     Findings Supporting Clinical Information Location in Medical Record   Right inguinal lymph node three of four positive for metastatic melanoma with extranodal extension FINAL PATHOLOGIC DIAGNOSIS  Lymph node, right inguinal, lymphadenectomy:  -THREE OF FOUR LYMPH NODES POSITIVE FOR METASTATIC MELANOMA (3/4)  -LARGEST TUMOR DEPOSIT IS 2.4 CM  -EXTRANODAL EXTENSION IS PRESENT   Path report 11/12     Please document the clinical significance of the Pathologists findings of ___Right inguinal lymph node three of four positive for metastatic melanoma with extranodal extension___.          [ x ] I agree with the Pathology Findings        [  ] I do not agree with the Pathology Findings        [  ] Clinically Insignificant        [  ] Clinically Undetermined        [  ] Other/Clarification of Findings: ______________________________________________    Please document in your progress notes daily for the duration of treatment until resolved and include in your discharge summary.

## 2018-11-26 ENCOUNTER — TELEPHONE (OUTPATIENT)
Dept: ADMINISTRATIVE | Facility: CLINIC | Age: 83
End: 2018-11-26

## 2018-11-26 NOTE — TELEPHONE ENCOUNTER
Home Health SOC 11/14/2018 - 01/12/2019 with [facility] - Dr. Yair Alatorre. Patient received SN, PT and OT services.

## 2018-11-27 ENCOUNTER — OFFICE VISIT (OUTPATIENT)
Dept: SURGERY | Facility: CLINIC | Age: 83
End: 2018-11-27
Payer: MEDICARE

## 2018-11-27 VITALS
BODY MASS INDEX: 24.04 KG/M2 | HEART RATE: 60 BPM | TEMPERATURE: 97 F | SYSTOLIC BLOOD PRESSURE: 135 MMHG | DIASTOLIC BLOOD PRESSURE: 66 MMHG | WEIGHT: 177.5 LBS | HEIGHT: 72 IN

## 2018-11-27 DIAGNOSIS — C43.71 MALIGNANT MELANOMA OF RIGHT LOWER LEG: Primary | ICD-10-CM

## 2018-11-27 PROCEDURE — 99024 POSTOP FOLLOW-UP VISIT: CPT | Mod: S$GLB,,, | Performed by: SURGERY

## 2018-11-27 PROCEDURE — 99999 PR PBB SHADOW E&M-EST. PATIENT-LVL III: CPT | Mod: PBBFAC,,, | Performed by: SURGERY

## 2018-12-02 NOTE — PROGRESS NOTES
Post-Op Follow-up Visit:   11/27/2018  Patient ID: Shaq Bragg Jr. is a 88 y.o. male, born 9/27/1930  Chief Complaint:    Chief Complaint   Patient presents with    Follow-up       Interval History: Mr. Bragg is doing well, drain is thin / modest output    Physical Exam:    /66   Pulse 60   Temp 97.2 °F (36.2 °C) (Oral)   Ht 6' (1.829 m)   Wt 80.5 kg (177 lb 7.5 oz)   BMI 24.07 kg/m²   Incision:  Well-approximated, no signs of infection, dehiscence or problems.  Serous drainage.  Groin is flat, no leg edema    Data:      No components found for: SURGREPORT    No diagnosis found.Plan     May 2017:  Stage IIIA (pT2a,pN2a (2 of 2 SLN), cM0).  Melanoma RLE.  PET neg.. Rec observation.  Stable u/s and PETs through to  10/30/2018 Palpable groin nodes, newly PET avid.    11/19/2018 s/p right inguinal lymphadenectomy (3 of 4 nodes positive for melanoma, largest 2.4 cm, +extranodal extension)    Plan:  Keep drain another 2 weeks.         Yair Alatorre MD, FACS  Surgical Oncology  [facility] [place], [place]  Office: [phone]  Fax: [phone]   Cell: [phone]

## 2018-12-11 ENCOUNTER — OFFICE VISIT (OUTPATIENT)
Dept: SURGERY | Facility: CLINIC | Age: 83
End: 2018-12-11
Payer: MEDICARE

## 2018-12-11 VITALS
DIASTOLIC BLOOD PRESSURE: 68 MMHG | HEIGHT: 72 IN | BODY MASS INDEX: 23.89 KG/M2 | TEMPERATURE: 97 F | SYSTOLIC BLOOD PRESSURE: 128 MMHG | HEART RATE: 65 BPM | WEIGHT: 176.38 LBS

## 2018-12-11 DIAGNOSIS — C43.71 MALIGNANT MELANOMA OF RIGHT LOWER LEG: Primary | ICD-10-CM

## 2018-12-11 PROCEDURE — 99024 POSTOP FOLLOW-UP VISIT: CPT | Mod: S$GLB,,, | Performed by: SURGERY

## 2018-12-11 PROCEDURE — 99999 PR PBB SHADOW E&M-EST. PATIENT-LVL III: CPT | Mod: PBBFAC,,, | Performed by: SURGERY

## 2018-12-11 NOTE — PROGRESS NOTES
Post-Op Follow-up Visit:   12/11/2018  Patient ID: Shaq Bragg Jr. is a 88 y.o. male, born 9/27/1930  Chief Complaint:    Chief Complaint   Patient presents with    Follow-up       Interval History: Mr. Bragg is doing well, drain remains thin with minimal decrease in output; reports roughly 9-11 oz per day.     Physical Exam:    /68   Pulse 65   Temp 97.2 °F (36.2 °C) (Oral)   Ht 6' (1.829 m)   Wt 80 kg (176 lb 5.9 oz)   BMI 23.92 kg/m²   Incision:  Well-approximated, no signs of infection, dehiscence or problems. Groin is flat, no leg edema    Data:      No components found for: SURGREPORT    No diagnosis found.Plan     May 2017:  Stage IIIA (pT2a,pN2a (2 of 2 SLN), cM0).  Melanoma RLE.  PET neg.. Rec observation.  Stable u/s and PETs through to  10/30/2018 Palpable groin nodes, newly PET avid.    11/19/2018 s/p right inguinal lymphadenectomy (3 of 4 nodes positive for melanoma, largest 2.4 cm, +extranodal extension)    Plan:  - Drain flushed with betadine and backed out several cm; ostomy bag placed over remaining drain.   - Return in 2 weeks to back drain out further vs remove.     Arnol Shipley MD  General Surgery PGY-3  Pager: [phone]

## 2018-12-13 ENCOUNTER — TELEPHONE (OUTPATIENT)
Dept: SURGERY | Facility: CLINIC | Age: 83
End: 2018-12-13

## 2018-12-13 NOTE — TELEPHONE ENCOUNTER
----- Message from Danette Dale sent at 12/13/2018  9:10 AM CST -----  Contact: Mrs. Yemi Steve states pt's leg is swollen and drainage seems to be backing up into his body.    [phone]

## 2018-12-14 ENCOUNTER — OFFICE VISIT (OUTPATIENT)
Dept: SURGERY | Facility: CLINIC | Age: 83
End: 2018-12-14
Payer: MEDICARE

## 2018-12-14 VITALS
RESPIRATION RATE: 18 BRPM | SYSTOLIC BLOOD PRESSURE: 129 MMHG | HEART RATE: 64 BPM | WEIGHT: 180.75 LBS | BODY MASS INDEX: 24.52 KG/M2 | DIASTOLIC BLOOD PRESSURE: 65 MMHG | TEMPERATURE: 97 F

## 2018-12-14 DIAGNOSIS — C43.71 MALIGNANT MELANOMA OF RIGHT LOWER LEG: Primary | ICD-10-CM

## 2018-12-14 PROCEDURE — 99024 POSTOP FOLLOW-UP VISIT: CPT | Mod: S$GLB,,, | Performed by: SURGERY

## 2018-12-14 PROCEDURE — 99999 PR PBB SHADOW E&M-EST. PATIENT-LVL III: CPT | Mod: PBBFAC,,, | Performed by: SURGERY

## 2018-12-27 ENCOUNTER — OFFICE VISIT (OUTPATIENT)
Dept: SURGERY | Facility: CLINIC | Age: 83
End: 2018-12-27
Payer: MEDICARE

## 2018-12-27 VITALS
HEIGHT: 72 IN | WEIGHT: 178.81 LBS | SYSTOLIC BLOOD PRESSURE: 154 MMHG | BODY MASS INDEX: 24.22 KG/M2 | HEART RATE: 69 BPM | DIASTOLIC BLOOD PRESSURE: 65 MMHG | TEMPERATURE: 97 F

## 2018-12-27 DIAGNOSIS — C43.71 MALIGNANT MELANOMA OF RIGHT LOWER LEG: Primary | ICD-10-CM

## 2018-12-27 PROCEDURE — 99999 PR PBB SHADOW E&M-EST. PATIENT-LVL III: ICD-10-PCS | Mod: PBBFAC,,, | Performed by: SURGERY

## 2018-12-27 PROCEDURE — 99024 PR POST-OP FOLLOW-UP VISIT: ICD-10-PCS | Mod: S$GLB,,, | Performed by: SURGERY

## 2018-12-27 PROCEDURE — 99024 POSTOP FOLLOW-UP VISIT: CPT | Mod: S$GLB,,, | Performed by: SURGERY

## 2018-12-27 PROCEDURE — 99999 PR PBB SHADOW E&M-EST. PATIENT-LVL III: CPT | Mod: PBBFAC,,, | Performed by: SURGERY

## 2018-12-27 NOTE — PROGRESS NOTES
S: Seroma cath placed at last visit on Dec 14. Since then, he has had about 300cc clear yellow drainage daily. No erythema, change in color of drainage or fevers/chills. No tenderness.    He has right lower extremity lymphedema. He is wearing thigh high compression stockings.     O: Seroma cath in place with no surrounding erythema. Clear yellow fluid in bulb. He has pitting right lower extremity edema up to the thigh. He appears well.    A/P: 89 yo s/p right inguinal lymphadenectomy on 11/12 for melanoma with persistent seroma requiring prolonged drain with seroma cath.    - continue seroma cath  - provided with recommendations for compression stockings for lymphedema  - RTC in 2 weeks for drain evaluation    FANI Sr MD   Surgical Oncology, PGY-1  Pager: [phone]

## 2019-01-10 ENCOUNTER — OFFICE VISIT (OUTPATIENT)
Dept: SURGERY | Facility: CLINIC | Age: 84
End: 2019-01-10
Payer: MEDICARE

## 2019-01-10 VITALS
HEART RATE: 59 BPM | SYSTOLIC BLOOD PRESSURE: 140 MMHG | WEIGHT: 178.38 LBS | BODY MASS INDEX: 24.16 KG/M2 | TEMPERATURE: 97 F | DIASTOLIC BLOOD PRESSURE: 63 MMHG | HEIGHT: 72 IN

## 2019-01-10 DIAGNOSIS — C43.71 MALIGNANT MELANOMA OF RIGHT LOWER LEG: Primary | ICD-10-CM

## 2019-01-10 PROCEDURE — 99024 PR POST-OP FOLLOW-UP VISIT: ICD-10-PCS | Mod: S$GLB,,, | Performed by: SURGERY

## 2019-01-10 PROCEDURE — 99999 PR PBB SHADOW E&M-EST. PATIENT-LVL III: ICD-10-PCS | Mod: PBBFAC,,, | Performed by: SURGERY

## 2019-01-10 PROCEDURE — 99024 POSTOP FOLLOW-UP VISIT: CPT | Mod: S$GLB,,, | Performed by: SURGERY

## 2019-01-10 PROCEDURE — 99999 PR PBB SHADOW E&M-EST. PATIENT-LVL III: CPT | Mod: PBBFAC,,, | Performed by: SURGERY

## 2019-01-10 NOTE — PROGRESS NOTES
Post-Op Follow-up Visit:   1/10/2019  Patient ID: Shaq Bragg Jr. is a 88 y.o. male, born 9/27/1930  Chief Complaint:    Chief Complaint   Patient presents with    Follow-up       Interval History: Seroma catheter has been putting out ~10 oz (300 cc) of serosangineous output daily. He reports no issues with pain or other issues with catheter, though he did have to secure it with Bandaids. He is wearing compression stockings, his RLE edema has improved. Patient reports that he has no oncologist currently, would like to be set up an appointment with one at [facility]. He lives in [place], [place].    Physical Exam:    BP (!) 140/63   Pulse (!) 59   Temp 96.8 °F (36 °C) (Oral)   Ht 6' (1.829 m)   Wt 80.9 kg (178 lb 5.6 oz)   BMI 24.19 kg/m²   Incision:  Well-healed, no signs of infection, dehiscence or problems. Groin is flat, no leg edema, wearing compression stockings. Right calf skin very dry, flaking. Drain output serosangineous    Data:      May 2017:  Stage IIIA (pT2a,pN2a (2 of 2 SLN), cM0).  Melanoma RLE.  PET neg.. Rec observation.  Stable u/s and PETs through to  10/30/2018 Palpable groin nodes, newly PET avid.    11/19/2018 s/p right inguinal lymphadenectomy (3 of 4 nodes positive for melanoma, largest 2.4 cm, +extranodal extension)  12/18/2018 - Chandler drain exchanged for seroma catheter    Plan:  -  Drain removed in clinic  - RTC 2 weeks  - will arrange appointment with Hematology-Oncology at [facility] in future

## 2019-01-22 ENCOUNTER — OFFICE VISIT (OUTPATIENT)
Dept: SURGERY | Facility: CLINIC | Age: 84
End: 2019-01-22
Payer: MEDICARE

## 2019-01-22 VITALS
HEIGHT: 72 IN | DIASTOLIC BLOOD PRESSURE: 80 MMHG | BODY MASS INDEX: 24.3 KG/M2 | TEMPERATURE: 97 F | HEART RATE: 59 BPM | WEIGHT: 179.44 LBS | SYSTOLIC BLOOD PRESSURE: 131 MMHG

## 2019-01-22 DIAGNOSIS — C43.71 MALIGNANT MELANOMA OF RIGHT LOWER LEG: Primary | ICD-10-CM

## 2019-01-22 PROCEDURE — 99024 POSTOP FOLLOW-UP VISIT: CPT | Mod: S$GLB,,, | Performed by: SURGERY

## 2019-01-22 PROCEDURE — 99024 PR POST-OP FOLLOW-UP VISIT: ICD-10-PCS | Mod: S$GLB,,, | Performed by: SURGERY

## 2019-01-22 PROCEDURE — 99999 PR PBB SHADOW E&M-EST. PATIENT-LVL III: ICD-10-PCS | Mod: PBBFAC,,, | Performed by: SURGERY

## 2019-01-22 PROCEDURE — 99999 PR PBB SHADOW E&M-EST. PATIENT-LVL III: CPT | Mod: PBBFAC,,, | Performed by: SURGERY

## 2019-01-23 NOTE — PROGRESS NOTES
Seroma stable, asymptomatic.  Continue observation.    RTC 2-3 weeks, appt with med onc to discuss systemic options.

## 2019-01-24 ENCOUNTER — DOCUMENTATION ONLY (OUTPATIENT)
Dept: HEMATOLOGY/ONCOLOGY | Facility: CLINIC | Age: 84
End: 2019-01-24

## 2019-01-24 DIAGNOSIS — C43.71 MALIGNANT MELANOMA OF RIGHT LOWER LEG: Primary | ICD-10-CM

## 2019-01-24 NOTE — NURSING
Per NOE Rios for Dr. Alatorre she will notify patient of appt scheduled with Dr. Villatoro for 2/5/19

## 2019-02-05 ENCOUNTER — INITIAL CONSULT (OUTPATIENT)
Dept: HEMATOLOGY/ONCOLOGY | Facility: CLINIC | Age: 84
End: 2019-02-05
Payer: MEDICARE

## 2019-02-05 ENCOUNTER — OFFICE VISIT (OUTPATIENT)
Dept: SURGERY | Facility: CLINIC | Age: 84
End: 2019-02-05
Payer: MEDICARE

## 2019-02-05 ENCOUNTER — DOCUMENTATION ONLY (OUTPATIENT)
Dept: HEMATOLOGY/ONCOLOGY | Facility: CLINIC | Age: 84
End: 2019-02-05

## 2019-02-05 ENCOUNTER — LAB VISIT (OUTPATIENT)
Dept: LAB | Facility: HOSPITAL | Age: 84
End: 2019-02-05
Attending: INTERNAL MEDICINE
Payer: MEDICARE

## 2019-02-05 VITALS
SYSTOLIC BLOOD PRESSURE: 139 MMHG | TEMPERATURE: 97 F | DIASTOLIC BLOOD PRESSURE: 64 MMHG | HEART RATE: 64 BPM | RESPIRATION RATE: 17 BRPM

## 2019-02-05 VITALS
RESPIRATION RATE: 20 BRPM | WEIGHT: 179.88 LBS | DIASTOLIC BLOOD PRESSURE: 68 MMHG | HEIGHT: 72 IN | BODY MASS INDEX: 24.36 KG/M2 | SYSTOLIC BLOOD PRESSURE: 167 MMHG | TEMPERATURE: 98 F | HEART RATE: 68 BPM

## 2019-02-05 DIAGNOSIS — D89.89 OTHER SPECIFIED DISORDERS INVOLVING THE IMMUNE MECHANISM, NOT ELSEWHERE CLASSIFIED: ICD-10-CM

## 2019-02-05 DIAGNOSIS — C43.71 MALIGNANT MELANOMA OF RIGHT LOWER LEG: ICD-10-CM

## 2019-02-05 DIAGNOSIS — I89.0 LYMPHEDEMA OF RIGHT LOWER EXTREMITY: Primary | ICD-10-CM

## 2019-02-05 DIAGNOSIS — C43.71 MALIGNANT MELANOMA OF RIGHT LOWER LEG: Primary | ICD-10-CM

## 2019-02-05 DIAGNOSIS — I89.0 LYMPHEDEMA OF RIGHT LOWER EXTREMITY: ICD-10-CM

## 2019-02-05 DIAGNOSIS — M48.00 SPINAL STENOSIS, UNSPECIFIED SPINAL REGION: ICD-10-CM

## 2019-02-05 LAB
ALBUMIN SERPL BCP-MCNC: 3 G/DL
ALP SERPL-CCNC: 107 U/L
ALT SERPL W/O P-5'-P-CCNC: 12 U/L
ANION GAP SERPL CALC-SCNC: 6 MMOL/L
AST SERPL-CCNC: 16 U/L
BILIRUB SERPL-MCNC: 0.8 MG/DL
BUN SERPL-MCNC: 14 MG/DL
CALCIUM SERPL-MCNC: 9.2 MG/DL
CHLORIDE SERPL-SCNC: 102 MMOL/L
CO2 SERPL-SCNC: 30 MMOL/L
CREAT SERPL-MCNC: 1.2 MG/DL
ERYTHROCYTE [DISTWIDTH] IN BLOOD BY AUTOMATED COUNT: 12 %
EST. GFR  (AFRICAN AMERICAN): >60 ML/MIN/1.73 M^2
EST. GFR  (NON AFRICAN AMERICAN): 53.7 ML/MIN/1.73 M^2
GLUCOSE SERPL-MCNC: 113 MG/DL
HCT VFR BLD AUTO: 39.4 %
HGB BLD-MCNC: 13.1 G/DL
IMM GRANULOCYTES # BLD AUTO: 0.01 K/UL
MCH RBC QN AUTO: 32.5 PG
MCHC RBC AUTO-ENTMCNC: 33.2 G/DL
MCV RBC AUTO: 98 FL
NEUTROPHILS # BLD AUTO: 4.2 K/UL
PLATELET # BLD AUTO: 259 K/UL
PMV BLD AUTO: 10.3 FL
POTASSIUM SERPL-SCNC: 3.9 MMOL/L
PROT SERPL-MCNC: 6.7 G/DL
RBC # BLD AUTO: 4.03 M/UL
SODIUM SERPL-SCNC: 138 MMOL/L
TSH SERPL DL<=0.005 MIU/L-ACNC: 1.45 UIU/ML
WBC # BLD AUTO: 5.57 K/UL

## 2019-02-05 PROCEDURE — 80053 COMPREHEN METABOLIC PANEL: CPT

## 2019-02-05 PROCEDURE — 85027 COMPLETE CBC AUTOMATED: CPT

## 2019-02-05 PROCEDURE — 1101F PR PT FALLS ASSESS DOC 0-1 FALLS W/OUT INJ PAST YR: ICD-10-PCS | Mod: CPTII,S$GLB,, | Performed by: INTERNAL MEDICINE

## 2019-02-05 PROCEDURE — 99024 PR POST-OP FOLLOW-UP VISIT: ICD-10-PCS | Mod: S$GLB,,, | Performed by: SURGERY

## 2019-02-05 PROCEDURE — 99999 PR PBB SHADOW E&M-EST. PATIENT-LVL IV: ICD-10-PCS | Mod: PBBFAC,,, | Performed by: INTERNAL MEDICINE

## 2019-02-05 PROCEDURE — 1101F PT FALLS ASSESS-DOCD LE1/YR: CPT | Mod: CPTII,S$GLB,, | Performed by: INTERNAL MEDICINE

## 2019-02-05 PROCEDURE — 99205 OFFICE O/P NEW HI 60 MIN: CPT | Mod: GC,S$GLB,, | Performed by: INTERNAL MEDICINE

## 2019-02-05 PROCEDURE — 99024 POSTOP FOLLOW-UP VISIT: CPT | Mod: S$GLB,,, | Performed by: SURGERY

## 2019-02-05 PROCEDURE — 36415 COLL VENOUS BLD VENIPUNCTURE: CPT

## 2019-02-05 PROCEDURE — 99999 PR PBB SHADOW E&M-EST. PATIENT-LVL IV: CPT | Mod: PBBFAC,,, | Performed by: INTERNAL MEDICINE

## 2019-02-05 PROCEDURE — 99999 PR PBB SHADOW E&M-EST. PATIENT-LVL III: CPT | Mod: PBBFAC,,, | Performed by: SURGERY

## 2019-02-05 PROCEDURE — 99999 PR PBB SHADOW E&M-EST. PATIENT-LVL III: ICD-10-PCS | Mod: PBBFAC,,, | Performed by: SURGERY

## 2019-02-05 PROCEDURE — 84443 ASSAY THYROID STIM HORMONE: CPT

## 2019-02-05 PROCEDURE — 99205 PR OFFICE/OUTPT VISIT, NEW, LEVL V, 60-74 MIN: ICD-10-PCS | Mod: GC,S$GLB,, | Performed by: INTERNAL MEDICINE

## 2019-02-05 NOTE — LETTER
February 5, 2019      Yair Alatorre MD  [address]  [facility] [address]           [facility] Hematology Oncology  [address]  [facility] [address]  Phone: [phone]          Patient: Shaq Bragg Jr.   MR Number: [id number]   YOB: 1930   Date of Visit: 2/5/2019       Dear Dr. Yair Alatorre:    Thank you for referring Shaq Bragg to me for evaluation. Attached you will find relevant portions of my assessment and plan of care.    If you have questions, please do not hesitate to call me. I look forward to following Shaq Bragg along with you.    Sincerely,    Myla Villatoro MD    Enclosure  CC:  No Recipients    If you would like to receive this communication electronically, please contact [email or web address] or [phone] to request more information on [employer] access.    For providers and/or their staff who would like to refer a patient to [facility], please contact us through our one-stop-shop provider referral line, [facility], at [phone].    If you feel you have received this communication in error or would no longer like to receive these types of communications, please e-mail [email or web address]

## 2019-02-05 NOTE — PROGRESS NOTES
PATIENT: Shaq Bragg Jr.  MRN: [id number]  DATE: 2/5/2019      Diagnosis:   1. Malignant melanoma of right lower leg    2. Other specified disorders involving the immune mechanism, not elsewhere classified     3. Lymphedema of right lower extremity    4. Spinal stenosis, unspecified spinal region        Chief Complaint: Consult and Melanoma      Oncologic History:      Oncologic History 3/21/17 Skin Excisional biopsy  4/18/17 MOS with SLN biopsy  1/04/18 PET/CT - no recurrent or metastatic disease  10/30/19 PET/CT - 2 hypermetabolic LN's in right groin  11/03/18 MRI brain  11/12/18 Completion Lymphadenectomy    Oncologic Treatment 3/21/17 Skin Excisional biopsy  4/18/17 MOS with SLN biopsy  11/12/18 Completion Lymphadenectomy    Pathology 3/21/17 - 1.4mm malignant melanoma Alexis level IV, no ulceration, perineural invasion seen, mitotic rate 6/mm2  4/18/17 -  no residual melanoma; microscopic mets in 2 sentinel LNs  11/12/18 - ¾ LNs positive for metastatic melanoma with the largest deposit being 2.4cm an positive extranodal extension          Subjective:    Initial History: Mr. Bragg is a 88 y.o. male who presents to establish care for Stage III cutaneous melanoma.  Pt is an 89 yo M whom initially presented with a lesion on his right proximal calf in March 2017.  He underwent a biopsy on 3/21/17 with pathology showing a 1.4mm malignant melanoma Alexis level IV, no ulceration, perineural invasion seen, mitotic rate 6/mm2.  The patient then underwent  a MOS procedure on 4/18/17 and right inguinal LN biopsy on 4/18/17 under the care of Dr Joelle Pardo at [facility].  The pathology from the procedure initially showed no residual melanoma and no LN involvement ; however, on reevaluation showed microscopic mets in 2 sentinel LNs.  The patient established care with Medical Oncologist Dr Olivier Mchugh.  The surgical oncologist Dr. Alatorre offered Lymphadenectomy on 5/24/17; however, the patient elected to have  observation.  PET/CT was performed on 1/04/18 showing no evidence of residual or recurrent disease.  On 10/16/18 the patient called Dr Holland office with complaint of 2 lumps in the right leg.  PET/CT was performed on 10/30/19 showing 2 new hypermetabolic lymph nodes right groin with the index lymph node lateral SUV max 30.02.  The patient had an appointment with Dr Alatorre on 10/30/19 at which point it was decided to proceed with a completion lymphadenectomy.   An MRI of the brain was performed on 11/03/18 showing no evidence for intracranial metastatic disease but did show a 4mm T1 hyperintense occipital calvarial focus which was read as likely benign.  The patient underwent completion LAD on 11/12/18 with pathology showing ¾ LNs positive for metastatic melanoma with the largest deposit being 2.4cm an positive extranodal extension. Currently the patient complains of chronic lower back pain for which he underwent an MRI of the lumbar spine on 11/03/18 which showed severe central canal stenosis related to a disk bulge at L3-L4.  The patient endorses some LE weakness on occasion.  He denies bowel or bladder incontinence.  The patient states he sees a deramtologist semi annually Dr Sun at [facility] with last clinic visit reportedly several months ago.  The patient states he has a lesion on the left forearm which they are addressing currently.  He denies HA. CP, SOB, N/V, diarrhea.  He complains of chronic constipation with last BM yesterday.  He denies any new lumps or bumps.    Past Medical History:   Past Medical History:   Diagnosis Date    COPD (chronic obstructive pulmonary disease)     H/O asbestosis     Hearing loss     Hypercholesterolemia     Hypertension     Malignant melanoma of right lower leg 5/11/2017    Skin cancer        Past Surgical HIstory:   Past Surgical History:   Procedure Laterality Date    ADENOIDECTOMY      APPENDECTOMY      Carotid Stenosis Surgery       CHOLECYSTECTOMY  1965    LYMPHADENECTOMY, INGUINAL, [place] Flap Right 11/12/2018    [facility]    TONSILLECTOMY         Family History:   Family History   Problem Relation Age of Onset    Coronary artery disease Mother     Cancer Father         Esophageal    Esophageal cancer Father     Diabetes Sister     No Known Problems Maternal Aunt     No Known Problems Maternal Uncle     No Known Problems Paternal Aunt     No Known Problems Paternal Uncle     No Known Problems Maternal Grandmother     No Known Problems Maternal Grandfather     No Known Problems Paternal Grandmother     No Known Problems Paternal Grandfather        Social History:  reports that he quit smoking about 54 years ago. His smoking use included cigarettes. He has a 15.00 pack-year smoking history. he has never used smokeless tobacco. He reports that he does not drink alcohol or use drugs.    Allergies:  Review of patient's allergies indicates:  No Known Allergies    Medications:  Current Outpatient Medications   Medication Sig Dispense Refill    aspirin (ECOTRIN) 81 MG EC tablet Take 81 mg by mouth every morning.       finasteride (PROSCAR) 5 mg tablet Take 5 mg by mouth every morning.       furosemide (LASIX) 20 MG tablet Take 20 mg by mouth every other day.       losartan-hydrochlorothiazide 50-12.5 mg (HYZAAR) 50-12.5 mg per tablet Take 1 tablet by mouth every morning.       metoprolol tartrate (LOPRESSOR) 50 MG tablet Take 50 mg by mouth. Takes 50 mg in morning and half tablet (25 mg) in evening      potassium chloride (MICRO-K) 8 mEq CpSR Take 8 mEq by mouth every other day.       simvastatin (ZOCOR) 10 MG tablet Take 10 mg by mouth every evening.      tamsulosin (FLOMAX) 0.4 mg Cp24 Take 0.4 mg by mouth every morning.        No current facility-administered medications for this visit.        Review of Systems   Constitutional: Negative for chills, fatigue, fever and  unexpected weight change.   HENT: Negative for sore throat, trouble swallowing and voice change.    Eyes: Negative for photophobia and visual disturbance.   Respiratory: Negative for cough, chest tightness and shortness of breath.    Cardiovascular: Positive for leg swelling (right leg since surgery). Negative for chest pain and palpitations.   Gastrointestinal: Positive for constipation (Last BM yesterday). Negative for abdominal pain, diarrhea, nausea and vomiting.   Endocrine: Negative for cold intolerance and heat intolerance.   Genitourinary: Negative for difficulty urinating, dysuria and hematuria.   Musculoskeletal: Positive for back pain (lower back). Negative for arthralgias and myalgias.   Skin: Negative for color change and rash.   Neurological: Negative for dizziness, light-headedness, numbness and headaches.   Hematological: Negative for adenopathy. Does not bruise/bleed easily.       ECOG Performance Status: 1   Objective:      Vitals:   Vitals:    02/05/19 0951   BP: (!) 167/68   BP Location: Left arm   Patient Position: Sitting   Pulse: 68   Resp: 20   Temp: 98.1 °F (36.7 °C)   TempSrc: Oral   Weight: 81.6 kg (179 lb 14.3 oz)   Height: 6' (1.829 m)     BMI: Body mass index is 24.4 kg/m².    Physical Exam   Constitutional: He is oriented to person, place, and time. He appears well-developed and well-nourished. No distress.   HENT:   Head: Normocephalic and atraumatic.   Eyes: EOM are normal. Right eye exhibits no discharge. Left eye exhibits no discharge. No scleral icterus.   Cardiovascular: Normal rate, regular rhythm, normal heart sounds and intact distal pulses. Exam reveals no gallop and no friction rub.   No murmur heard.  Pulmonary/Chest: Effort normal and breath sounds normal. No respiratory distress. He has no wheezes. He has no rales. He exhibits no tenderness.   Abdominal: Soft. Bowel sounds are normal. He exhibits no distension and no mass. There is no tenderness. There is no rebound.    Genitourinary:   Genitourinary Comments: Surgical scar in the right groin with associated swelling   Musculoskeletal: Normal range of motion. He exhibits edema (right leg). He exhibits no tenderness.   Lymphadenopathy:        Head (right side): No submental and no submandibular adenopathy present.        Head (left side): No submental and no submandibular adenopathy present.     He has no cervical adenopathy.     He has no axillary adenopathy.        Right: No inguinal and no supraclavicular adenopathy present.        Left: No inguinal and no supraclavicular adenopathy present.   Positive nodule posterior to the right knee   Neurological: He is alert and oriented to person, place, and time. No cranial nerve deficit. Coordination normal.   Skin: Skin is warm and dry. No rash noted. He is not diaphoretic. No erythema.   Psychiatric: He has a normal mood and affect. His behavior is normal.       Laboratory Data:  Lab Visit on 02/05/2019   Component Date Value Ref Range Status    WBC 02/05/2019 5.57  3.90 - 12.70 K/uL Final    RBC 02/05/2019 4.03* 4.60 - 6.20 M/uL Final    Hemoglobin 02/05/2019 13.1* 14.0 - 18.0 g/dL Final    Hematocrit 02/05/2019 39.4* 40.0 - 54.0 % Final    MCV 02/05/2019 98  82 - 98 fL Final    MCH 02/05/2019 32.5* 27.0 - 31.0 pg Final    MCHC 02/05/2019 33.2  32.0 - 36.0 g/dL Final    RDW 02/05/2019 12.0  11.5 - 14.5 % Final    Platelets 02/05/2019 259  150 - 350 K/uL Final    MPV 02/05/2019 10.3  9.2 - 12.9 fL Final    Gran # (ANC) 02/05/2019 4.2  1.8 - 7.7 K/uL Final    Comment: The ANC is based on a white cell differential from an   automated cell counter. It has not been microscopically   reviewed for the presence of abnormal cells. Clinical   correlation is required.      Immature Grans (Abs) 02/05/2019 0.01  0.00 - 0.04 K/uL Final    Comment: Mild elevation in immature granulocytes is non specific and   can be seen in a variety of conditions including stress response,   acute  inflammation, trauma and pregnancy. Correlation with other   laboratory and clinical findings is essential.      Sodium 02/05/2019 138  136 - 145 mmol/L Final    Potassium 02/05/2019 3.9  3.5 - 5.1 mmol/L Final    Chloride 02/05/2019 102  95 - 110 mmol/L Final    CO2 02/05/2019 30* 23 - 29 mmol/L Final    Glucose 02/05/2019 113* 70 - 110 mg/dL Final    BUN, Bld 02/05/2019 14  8 - 23 mg/dL Final    Creatinine 02/05/2019 1.2  0.5 - 1.4 mg/dL Final    Calcium 02/05/2019 9.2  8.7 - 10.5 mg/dL Final    Total Protein 02/05/2019 6.7  6.0 - 8.4 g/dL Final    Albumin 02/05/2019 3.0* 3.5 - 5.2 g/dL Final    Total Bilirubin 02/05/2019 0.8  0.1 - 1.0 mg/dL Final    Comment: For infants and newborns, interpretation of results should be based  on gestational age, weight and in agreement with clinical  observations.  Premature Infant recommended reference ranges:  Up to 24 hours.............<8.0 mg/dL  Up to 48 hours............<12.0 mg/dL  3-5 days..................<15.0 mg/dL  6-29 days.................<15.0 mg/dL      Alkaline Phosphatase 02/05/2019 107  55 - 135 U/L Final    AST 02/05/2019 16  10 - 40 U/L Final    ALT 02/05/2019 12  10 - 44 U/L Final    Anion Gap 02/05/2019 6* 8 - 16 mmol/L Final    eGFR if African American 02/05/2019 >60.0  >60 mL/min/1.73 m^2 Final    eGFR if non African American 02/05/2019 53.7* >60 mL/min/1.73 m^2 Final    Comment: Calculation used to obtain the estimated glomerular filtration  rate (eGFR) is the CKD-EPI equation.       TSH 02/05/2019 1.450  0.400 - 4.000 uIU/mL Final         Imaging:  Reviewed      Assessment:       1. Malignant melanoma of right lower leg    2. Other specified disorders involving the immune mechanism, not elsewhere classified     3. Lymphedema of right lower extremity    4. Spinal stenosis, unspecified spinal region           Plan:     Malignant Melanoma of the right leg Stage IIIB -  The patient has a h/o melanoma of the left leg originally diagnosed  in 2017  -Recent Lymphadenectomy showed two positive LN's  -Will have the patient's pathology sent off for BRAF V600E testing  -If patient is positive he would be a candidate for Dabrafenib and Tremitinib oral therapy  -If the patient is negative for BRAF mutation, would pursue treatment with Nivolumab  -Will repeat PET/CT and MRI of the brain prior to starting therapy  -Will have the patient follow up on 2/18/19 to discuss treatment plan.  -Will check TSH today as future immunotherapy may cause thyroiditis.    Lymphedema - Patient given a prescription for waist high compression stockings for [employer]    Severe Spinal Stenosis - pt seen to have severe spinal stenosis at the L3-L4 region due to disc bulge  -The patient has some complaint of intermittent episodes of leg weakness associated with back pain  -Will refer the patient for orthopedics evaluation.    -Discussed with Dr Irving Armando MD PGY-VI  Hematology and Oncology Fellow  Pager:[phone]      Distress Screening Results: Psychosocial Distress screening score of Distress Score: 0 noted and reviewed. No intervention indicated.

## 2019-02-05 NOTE — PROGRESS NOTES
Post-Op Follow-up Visit:   2/5/2019  Patient ID: Shaq Bragg Jr. is a 88 y.o. male, born 9/27/1930  Chief Complaint:    Chief Complaint   Patient presents with    Wound Check         Interval History:  He is wearing compression stockings, his RLE edema has improved. He lives in [facility]    Seen by oncology - Dr. Villatoro     Physical Exam:    /64 (BP Location: Left arm)   Pulse 64   Temp 97 °F (36.1 °C) (Oral)   Resp 17   Incision:  Well-healed, no signs of infection, dehiscence or problems. Groin is flat, no leg edema, wearing compression stockings. Right calf skin very dry, flaking.     Data:    MOS procedure on 4/18/17 and right inguinal LN biopsy on 4/18/17 under the care of Dr Joelle Pardo at [facility]  May 2017:  Stage IIIA (pT2a,pN2a (2 of 2 SLN), cM0).  Melanoma RLE.  PET neg.. Rec observation.  Stable u/s and PETs through to  10/30/2018 Palpable groin nodes, newly PET avid.    11/19/2018 s/p right completion inguinal lymphadenectomy (3 of 4 nodes positive for melanoma, largest 2.4 cm, +extranodal extension)  12/18/2018 - Chandler drain exchanged for seroma catheter  1/13/19- drain removed   1/22/19- seroma stable    Assessment: 87 yo with stage IIIA right calf melanoma s/p Mohs, LN biopsy on 04/18/18 with subsequent right completion inguinal lymphadenectomy on 11/19/18     Plan:  - seroma stable, lymphedema stable. Continue with compression stockings.   - f/u PRN. To continue follow up with Dr. Villatoro     onc recs:  -If patient is positive he would be a candidate for Dabrafenib and Tremitinib oral therapy  -If the patient is negative for BRAF mutation, would pursue treatment with Nivolumab  -Will repeat PET/CT and MRI of the brain prior to starting therapy  -Will have the patient follow up on 2/18/19 to discuss treatment plan.  -Will check TSH today as future immunotherapy may cause thyroiditis - currently TSH pending

## 2019-02-05 NOTE — Clinical Note
Temi Sharp.Please schedule the patient for urgent orthopedic consult for severe spinal stenosis.Thanks.Prince Armando MD PGY-VIHematology and OncologyPager:[phone]

## 2019-02-05 NOTE — Clinical Note
Temi Sharp.Please schedule the patient for and MRI of the brain and PET/CT on the [facility].  Please schedule the patient for a return visit to see me on 2/18/19 with CBC and CMP prior to the visit.Thanks.Prince Armando MD PGY-VIHematology and OncologyPager:[phone]

## 2019-02-05 NOTE — Clinical Note
See if we can plug them in with lymphedema therapy in [place] or here.  [place] would probably be better for them, usually the breast center would know who does that locally

## 2019-02-05 NOTE — NURSING
Nurse Navigator Note:     Met with patient during his consult with Dr. Villatoro. Patient and I discussed my role as his Nurse Navigator to help provide him with individualized care, education and assist with internal and external resources. Patient was given a copy of my card and encouraged to call me if he has any questions or concerns.

## 2019-02-06 ENCOUNTER — TELEPHONE (OUTPATIENT)
Dept: HEMATOLOGY/ONCOLOGY | Facility: CLINIC | Age: 84
End: 2019-02-06

## 2019-02-06 ENCOUNTER — TELEPHONE (OUTPATIENT)
Dept: SPINE | Facility: CLINIC | Age: 84
End: 2019-02-06

## 2019-02-06 NOTE — TELEPHONE ENCOUNTER
Called patient spoke to wife about appts they wanted them scheduled in [place].        ----- Message from Prince Armando MD sent at 2/5/2019 11:05 AM CST -----  Temi Sharp.    Please schedule the patient for and MRI of the brain and PET/CT on the [facility].  Please schedule the patient for a return visit to see me on 2/18/19 with CBC and CMP prior to the visit.    Thanks.    Prince Armando MD PGY-VI  Hematology and Oncology  Pager:[phone]

## 2019-02-07 ENCOUNTER — TELEPHONE (OUTPATIENT)
Dept: ORTHOPEDICS | Facility: CLINIC | Age: 84
End: 2019-02-07

## 2019-02-07 ENCOUNTER — TELEPHONE (OUTPATIENT)
Dept: HEMATOLOGY/ONCOLOGY | Facility: CLINIC | Age: 84
End: 2019-02-07

## 2019-02-07 DIAGNOSIS — M51.36 DDD (DEGENERATIVE DISC DISEASE), LUMBAR: Primary | ICD-10-CM

## 2019-02-07 NOTE — TELEPHONE ENCOUNTER
----- Message from Mila Landers sent at 2/7/2019  8:30 AM CST -----  Contact: Catherine cordero/ [facility]    Has questions and need clarity in regards to the procedures ordered for this this pt   Contact preference :  [phone]  or IM

## 2019-02-07 NOTE — TELEPHONE ENCOUNTER
Spoke with sabine---  She is calling to speak with dr thurston, about this patient, as the MD is not certain what is needed at this time---interventional intervention or surgical intervention.    sabine's ext: [phone]    Message routed to dr thurston

## 2019-02-11 DIAGNOSIS — I89.0 LYMPHEDEMA OF RIGHT LOWER EXTREMITY: Primary | ICD-10-CM

## 2019-02-15 ENCOUNTER — HOSPITAL ENCOUNTER (OUTPATIENT)
Dept: RADIOLOGY | Facility: HOSPITAL | Age: 84
Discharge: HOME | End: 2019-02-15
Attending: INTERNAL MEDICINE
Payer: MEDICARE

## 2019-02-15 DIAGNOSIS — C43.71 MALIGNANT MELANOMA OF RIGHT LOWER LEG: ICD-10-CM

## 2019-02-15 LAB — POCT GLUCOSE: 101 MG/DL (ref 70–110)

## 2019-02-15 PROCEDURE — 78816 NM PET CT WHOLE BODY: ICD-10-PCS | Mod: 26,PS,, | Performed by: RADIOLOGY

## 2019-02-15 PROCEDURE — 78816 PET IMAGE W/CT FULL BODY: CPT | Mod: TC,PS

## 2019-02-15 PROCEDURE — 78816 PET IMAGE W/CT FULL BODY: CPT | Mod: 26,PS,, | Performed by: RADIOLOGY

## 2019-02-18 ENCOUNTER — LAB VISIT (OUTPATIENT)
Dept: LAB | Facility: HOSPITAL | Age: 84
End: 2019-02-18
Payer: MEDICARE

## 2019-02-18 ENCOUNTER — OFFICE VISIT (OUTPATIENT)
Dept: HEMATOLOGY/ONCOLOGY | Facility: CLINIC | Age: 84
End: 2019-02-18
Payer: MEDICARE

## 2019-02-18 VITALS
RESPIRATION RATE: 18 BRPM | SYSTOLIC BLOOD PRESSURE: 165 MMHG | WEIGHT: 181 LBS | BODY MASS INDEX: 24.52 KG/M2 | HEART RATE: 54 BPM | DIASTOLIC BLOOD PRESSURE: 64 MMHG | TEMPERATURE: 98 F | OXYGEN SATURATION: 96 % | HEIGHT: 72 IN

## 2019-02-18 DIAGNOSIS — C43.71 MALIGNANT MELANOMA OF RIGHT LOWER LEG: Primary | ICD-10-CM

## 2019-02-18 DIAGNOSIS — C43.71 MALIGNANT MELANOMA OF RIGHT LOWER LEG: ICD-10-CM

## 2019-02-18 DIAGNOSIS — E06.4 DRUG-INDUCED THYROIDITIS: ICD-10-CM

## 2019-02-18 LAB
ALBUMIN SERPL BCP-MCNC: 3.5 G/DL
ALP SERPL-CCNC: 100 U/L
ALT SERPL W/O P-5'-P-CCNC: 11 U/L
ANION GAP SERPL CALC-SCNC: 9 MMOL/L
AST SERPL-CCNC: 16 U/L
BILIRUB SERPL-MCNC: 0.9 MG/DL
BUN SERPL-MCNC: 19 MG/DL
CALCIUM SERPL-MCNC: 9.3 MG/DL
CHLORIDE SERPL-SCNC: 104 MMOL/L
CO2 SERPL-SCNC: 28 MMOL/L
CREAT SERPL-MCNC: 1.1 MG/DL
ERYTHROCYTE [DISTWIDTH] IN BLOOD BY AUTOMATED COUNT: 12.7 %
EST. GFR  (AFRICAN AMERICAN): >60 ML/MIN/1.73 M^2
EST. GFR  (NON AFRICAN AMERICAN): 59.6 ML/MIN/1.73 M^2
GLUCOSE SERPL-MCNC: 80 MG/DL
HCT VFR BLD AUTO: 40.1 %
HGB BLD-MCNC: 13.6 G/DL
IMM GRANULOCYTES # BLD AUTO: 0.01 K/UL
MCH RBC QN AUTO: 32.5 PG
MCHC RBC AUTO-ENTMCNC: 33.9 G/DL
MCV RBC AUTO: 96 FL
NEUTROPHILS # BLD AUTO: 4.5 K/UL
PLATELET # BLD AUTO: 191 K/UL
PMV BLD AUTO: 10.4 FL
POTASSIUM SERPL-SCNC: 3.7 MMOL/L
PROT SERPL-MCNC: 6.7 G/DL
RBC # BLD AUTO: 4.18 M/UL
SODIUM SERPL-SCNC: 141 MMOL/L
WBC # BLD AUTO: 6.45 K/UL

## 2019-02-18 PROCEDURE — 1101F PR PT FALLS ASSESS DOC 0-1 FALLS W/OUT INJ PAST YR: ICD-10-PCS | Mod: CPTII,GC,S$GLB, | Performed by: INTERNAL MEDICINE

## 2019-02-18 PROCEDURE — 99999 PR PBB SHADOW E&M-EST. PATIENT-LVL V: CPT | Mod: PBBFAC,GC,,

## 2019-02-18 PROCEDURE — 99214 OFFICE O/P EST MOD 30 MIN: CPT | Mod: GC,S$GLB,, | Performed by: INTERNAL MEDICINE

## 2019-02-18 PROCEDURE — 36415 COLL VENOUS BLD VENIPUNCTURE: CPT

## 2019-02-18 PROCEDURE — 85027 COMPLETE CBC AUTOMATED: CPT

## 2019-02-18 PROCEDURE — 99214 PR OFFICE/OUTPT VISIT, EST, LEVL IV, 30-39 MIN: ICD-10-PCS | Mod: GC,S$GLB,, | Performed by: INTERNAL MEDICINE

## 2019-02-18 PROCEDURE — 80053 COMPREHEN METABOLIC PANEL: CPT

## 2019-02-18 PROCEDURE — 99999 PR PBB SHADOW E&M-EST. PATIENT-LVL V: ICD-10-PCS | Mod: PBBFAC,GC,,

## 2019-02-18 PROCEDURE — 1101F PT FALLS ASSESS-DOCD LE1/YR: CPT | Mod: CPTII,GC,S$GLB, | Performed by: INTERNAL MEDICINE

## 2019-02-18 NOTE — PROGRESS NOTES
PATIENT: Shaq Bragg Jr.  MRN: [id number]  DATE: 2/17/2019      Diagnosis:   1. Malignant melanoma of right lower leg        Chief Complaint: No chief complaint on file.      Oncologic History:      Oncologic History 3/21/17 Skin Excisional biopsy  4/18/17 MOS with SLN biopsy  1/04/18 PET/CT - no recurrent or metastatic disease  10/30/19 PET/CT - 2 hypermetabolic LN's in right groin  11/03/18 MRI brain  11/12/18 Completion Lymphadenectomy    Oncologic Treatment 3/21/17 Skin Excisional biopsy  4/18/17 MOS with SLN biopsy  11/12/18 Completion Lymphadenectomy    Pathology 3/21/17 - 1.4mm malignant melanoma Alexis level IV, no ulceration, perineural invasion seen, mitotic rate 6/mm2  4/18/17 -  no residual melanoma; microscopic mets in 2 sentinel LNs  11/12/18 - ¾ LNs positive for metastatic melanoma with the largest deposit being 2.4cm an positive extranodal extension      Oncologic History:  Pt initially presented with a lesion on his right proximal calf in March 2017.  He underwent a biopsy on 3/21/17 with pathology showing a 1.4mm malignant melanoma Alexis level IV, no ulceration, perineural invasion seen, mitotic rate 6/mm2.  The patient then underwent  a MOS procedure on 4/18/17 and right inguinal LN biopsy on 4/18/17 under the care of Dr Joelle Pardo at [facility].  The pathology from the procedure initially showed no residual melanoma and no LN involvement ; however, on reevaluation showed microscopic mets in 2 sentinel LNs.  The patient established care with Medical Oncologist Dr Olivier Mchugh.  The surgical oncologist Dr. Alatorre offered Lymphadenectomy on 5/24/17; however, the patient elected to have observation.  PET/CT was performed on 1/04/18 showing no evidence of residual or recurrent disease.  On 10/16/18 the patient called Dr Holland office with complaint of 2 lumps in the right leg.  PET/CT was performed on 10/30/19 showing 2 new hypermetabolic lymph nodes right groin with the index lymph node lateral  SUV max 30.02.  The patient had an appointment with Dr Alatorre on 10/30/19 at which point it was decided to proceed with a completion lymphadenectomy.   An MRI of the brain was performed on 11/03/18 showing no evidence for intracranial metastatic disease but did show a 4mm T1 hyperintense occipital calvarial focus which was read as likely benign.  The patient underwent completion LAD on 11/12/18 with pathology showing ¾ LNs positive for metastatic melanoma with the largest deposit being 2.4cm an positive extranodal extension. Currently the patient complains of chronic lower back pain for which he underwent an MRI of the lumbar spine on 11/03/18 which showed severe central canal stenosis related to a disk bulge at L3-L4.  The patient endorses some LE weakness on occasion.  He denies bowel or bladder incontinence.  The patient states he sees a deramtologist semi annually Dr Sun at [facility] with last clinic visit reportedly several months ago.  The patient states he has a lesion on the left forearm which they are addressing currently.  He denies HA. CP, SOB, N/V, diarrhea.  He complains of chronic constipation with last BM yesterday.  He denies any new lumps or bumps.    Subjective:    Interval History: Mr. Bragg is a 88 y.o. male who presents for follow up on recently diagnosed malignant melanoma.  Since the last visit the patient underwent a PET/CT on 2/15/19 showing a focus of increased tracer uptake within the T 11 vertebral body with max SUV of 4.1 that was not visualized on prior exam, increased radiotracer uptake in the right groin max SUV 5.3 and a subcutaneous focus of increased tracer uptake within the anterior portion of the distal right leg showing a max SUV of 3.7.  Currently the patient complains of pain in the lower right lateral leg. He states he has had a soreness in the right leg starting a few months back.  He continues to have lower back pain with associated leg spasms on occasion  and leg weakness.  He denies any new lump or bumps.   He denies CP, SOB, N/V, constipation, diarrhea.     Past Medical History:   Past Medical History:   Diagnosis Date    COPD (chronic obstructive pulmonary disease)     H/O asbestosis     Hearing loss     Hypercholesterolemia     Hypertension     Malignant melanoma of right lower leg 5/11/2017    Skin cancer        Past Surgical HIstory:   Past Surgical History:   Procedure Laterality Date    ADENOIDECTOMY      APPENDECTOMY      Carotid Stenosis Surgery      CHOLECYSTECTOMY  1965    LYMPHADENECTOMY, INGUINAL, [place] Flap Right 11/12/2018    Performed by Yair Alatorre MD at [facility] OR [address] [place] [address]    TONSILLECTOMY         Family History:   Family History   Problem Relation Age of Onset    Coronary artery disease Mother     Cancer Father         Esophageal    Esophageal cancer Father     Diabetes Sister     No Known Problems Maternal Aunt     No Known Problems Maternal Uncle     No Known Problems Paternal Aunt     No Known Problems Paternal Uncle     No Known Problems Maternal Grandmother     No Known Problems Maternal Grandfather     No Known Problems Paternal Grandmother     No Known Problems Paternal Grandfather        Social History:  reports that he quit smoking about 54 years ago. His smoking use included cigarettes. He has a 15.00 pack-year smoking history. he has never used smokeless tobacco. He reports that he does not drink alcohol or use drugs.    Allergies:  Review of patient's allergies indicates:  No Known Allergies    Medications:  Current Outpatient Medications   Medication Sig Dispense Refill    aspirin (ECOTRIN) 81 MG EC tablet Take 81 mg by mouth every morning.       finasteride (PROSCAR) 5 mg tablet Take 5 mg by mouth every morning.       furosemide (LASIX) 20 MG tablet Take 20 mg by mouth every other day.       losartan-hydrochlorothiazide 50-12.5 mg (HYZAAR) 50-12.5 mg per tablet Take 1 tablet by mouth every  morning.       metoprolol tartrate (LOPRESSOR) 50 MG tablet Take 50 mg by mouth. Takes 50 mg in morning and half tablet (25 mg) in evening      potassium chloride (MICRO-K) 8 mEq CpSR Take 8 mEq by mouth every other day.       simvastatin (ZOCOR) 10 MG tablet Take 10 mg by mouth every evening.      tamsulosin (FLOMAX) 0.4 mg Cp24 Take 0.4 mg by mouth every morning.        No current facility-administered medications for this visit.        Review of Systems   Constitutional: Negative for chills, diaphoresis, fatigue, fever and unexpected weight change.   Respiratory: Negative for cough and shortness of breath.    Cardiovascular: Negative for chest pain and palpitations.   Gastrointestinal: Negative for abdominal pain, constipation, diarrhea, nausea and vomiting.   Musculoskeletal: Positive for back pain.   Skin: Negative for color change and rash.        Nodule on pt's right lower leg.  Dry scaling skin of the right leg.   Neurological: Negative for headaches.   Hematological: Negative for adenopathy. Does not bruise/bleed easily.       ECOG Performance Status: 1   Objective:      Vitals:   There were no vitals filed for this visit.  BMI: There is no height or weight on file to calculate BMI.    Physical Exam   Constitutional: He is oriented to person, place, and time. He appears well-developed and well-nourished. No distress.   HENT:   Head: Normocephalic and atraumatic.   Cardiovascular: Normal rate, regular rhythm, normal heart sounds and intact distal pulses. Exam reveals no gallop and no friction rub.   No murmur heard.  Pulmonary/Chest: Effort normal and breath sounds normal. No respiratory distress. He has no wheezes. He has no rales. He exhibits no tenderness.   Abdominal: Soft. Bowel sounds are normal. He exhibits no distension and no mass. There is no tenderness. There is no rebound.   Musculoskeletal: Normal range of motion.   Lymphadenopathy:        Head (right side): No submental adenopathy present.         Head (left side): No submental adenopathy present.     He has no cervical adenopathy.     He has no axillary adenopathy.        Right: No inguinal and no supraclavicular adenopathy present.        Left: No inguinal and no supraclavicular adenopathy present.   Neurological: He is alert and oriented to person, place, and time.   Skin: Skin is dry. No rash noted. He is not diaphoretic. No erythema. No pallor.   1 cm nodule under the skin on the lateral right lower leg       Laboratory Data:  Hospital Outpatient Visit on 02/15/2019   Component Date Value Ref Range Status    POCT Glucose 02/15/2019 101  70 - 110 mg/dL Final         Imaging:  PET/CT 2/15/19  Since the prior exam the patient has undergone right groin surgery.  There is moderate uptake seen in this region possibly postsurgical in nature however residual or recurrent disease cannot be excluded.    There is a new focus of increased tracer uptake within the T11 vertebral body concerning for metastatic lesion.    There is a focus of uptake within the subcutaneous tissues of the right anterior lower leg possibly representing subcutaneous lesion however developing metastatic lesion cannot be excluded.    MRI Brain 2/11/19  No evidence of intracranial metastatic disease      Assessment:       1. Malignant melanoma of right lower leg           Plan:     Malignant Melanoma of the right leg Stage IIIB -  The patient has a h/o melanoma of the left leg originally diagnosed in 2017  -Recent Lymphadenectomy showed two positive LN's  -Patient's tumor is BRAF [id number] wild type  -MRI of the brain performed at [facility] showed no evidence of metastatic disease  -REcent PET/CT on 2/05/19 showed uptake in the T11 spine, right lower leg and right groin  -Will obtain an MRI of the thoracic spine.  -Will send the patient for biopsy by dermatology of the right leg lesion  -If the patient is found to have metastatic disease, will consider nivolumab and ipilimumab  -Will get the  patient started for therapy with nivolumab currently  -Patient was consented for chemotherapy today 2/18/2019 for Nivolumab.   An extensive discussion was had which included a thorough discussion of the risk and benefits of treatment and alternatives.  Risks, including but not limited to, possible hair loss, bone marrow damage (anemia, thrombocytopenia, immune suppression, neutropenia), damage to body organs (brain, heart, liver, kidney, lungs, nervous system, skin, and others), allergic reactions, sterility, nausea/vomiting, constipation/diarrhea, sores in the mouth, secondary cancers, local damage at possible injection sites, and rarely death were all discussed.  The patient agrees with the plan, and all questions have been answered to their satisfaction.  Consent was signed the patient, provider, and a third party witness.    -The patient will return to start therapy once the MRI is completed and skin nodule biopsied    Lymphedema - The patient received his waist high compression stockings for [employer]  -Will monitor   -Pt to attend a lymphedema class tomorrow    Severe Spinal Stenosis - pt seen to have severe spinal stenosis at the L3-L4 region due to disc bulge  -The patient has some complaint of intermittent episodes of leg weakness associated with back pain  -Will refer the patient for orthopedics evaluation with Dr turcios    -Discussed with Dr Irving Armando MD PGY-VI  Hematology and Oncology Fellow  Pager:[phone]    Distress Screening Results: Psychosocial Distress screening score of Distress Score: 0 noted and reviewed. No intervention indicated.

## 2019-02-18 NOTE — Clinical Note
Temi Sharp.Please schedule the patient for a dermatology appt.  Please schedule the patient to see Dr Ruiz with orthopedics.  Please schedule the patient for an MRI of the thoracic spine.  The patient will need an appointment with me with CBC, CMP and TSH after the appt with dermatology and MRI thoracic spine is completed.  The patient will need to be scheduled for Nivolumab when he sees me next in clinic.Thanks.Prince Armando MD PGY-VIHematology and OncologyPager:[phone]

## 2019-02-19 ENCOUNTER — TELEPHONE (OUTPATIENT)
Dept: HEMATOLOGY/ONCOLOGY | Facility: CLINIC | Age: 84
End: 2019-02-19

## 2019-02-19 ENCOUNTER — CLINICAL SUPPORT (OUTPATIENT)
Dept: REHABILITATION | Facility: HOSPITAL | Age: 84
End: 2019-02-19
Attending: SURGERY
Payer: MEDICARE

## 2019-02-19 ENCOUNTER — TELEPHONE (OUTPATIENT)
Dept: ORTHOPEDICS | Facility: CLINIC | Age: 84
End: 2019-02-19

## 2019-02-19 DIAGNOSIS — M51.34 DDD (DEGENERATIVE DISC DISEASE), THORACIC: Primary | ICD-10-CM

## 2019-02-19 DIAGNOSIS — I89.0 LYMPHEDEMA OF RIGHT LOWER EXTREMITY: Primary | ICD-10-CM

## 2019-02-19 PROCEDURE — G8990 OTHER PT/OT CURRENT STATUS: HCPCS | Mod: CJ,PN

## 2019-02-19 PROCEDURE — G8991 OTHER PT/OT GOAL STATUS: HCPCS | Mod: CH,PN

## 2019-02-19 PROCEDURE — 97166 OT EVAL MOD COMPLEX 45 MIN: CPT | Mod: PN

## 2019-02-19 NOTE — TELEPHONE ENCOUNTER
----- Message from Carrie Aguayo sent at 2/19/2019  2:32 PM CST -----  Contact: Pt wife Selene can be reached [phone]  Pt missed a call and would like [facility] to return pt call concerning appt.        Thank you!

## 2019-02-19 NOTE — TELEPHONE ENCOUNTER
spoke with pt on today in regards to all schedule appointments, spouse is aware and has confirm all appointments.

## 2019-02-19 NOTE — TELEPHONE ENCOUNTER
----- Message from Prince Armando MD sent at 2/18/2019  4:06 PM CST -----  Temi Sharp.    Please schedule the patient for a dermatology appt.  Please schedule the patient to see Dr Ruiz with orthopedics.  Please schedule the patient for an MRI of the thoracic spine.  The patient will need an appointment with me with CBC, CMP and TSH after the appt with dermatology and MRI thoracic spine is completed.  The patient will need to be scheduled for Nivolumab when he sees me next in clinic.    Thanks.    Prince Armando MD PGY-VI  Hematology and Oncology  Pager:[phone]

## 2019-02-19 NOTE — TELEPHONE ENCOUNTER
tried reaching out to pt to discuss upcoming appointments, but no answer,a detail message was left on v/m.

## 2019-02-22 ENCOUNTER — HOSPITAL ENCOUNTER (OUTPATIENT)
Dept: RADIOLOGY | Facility: HOSPITAL | Age: 84
Discharge: HOME | End: 2019-02-22
Attending: INTERNAL MEDICINE
Payer: MEDICARE

## 2019-02-22 ENCOUNTER — OFFICE VISIT (OUTPATIENT)
Dept: DERMATOLOGY | Facility: CLINIC | Age: 84
End: 2019-02-22
Payer: MEDICARE

## 2019-02-22 ENCOUNTER — HOSPITAL ENCOUNTER (OUTPATIENT)
Dept: RADIOLOGY | Facility: HOSPITAL | Age: 84
Discharge: HOME | End: 2019-02-22
Attending: ORTHOPAEDIC SURGERY
Payer: MEDICARE

## 2019-02-22 DIAGNOSIS — L57.0 AK (ACTINIC KERATOSIS): ICD-10-CM

## 2019-02-22 DIAGNOSIS — M51.36 DDD (DEGENERATIVE DISC DISEASE), LUMBAR: ICD-10-CM

## 2019-02-22 DIAGNOSIS — C43.71 MALIGNANT MELANOMA OF RIGHT LOWER LEG: ICD-10-CM

## 2019-02-22 DIAGNOSIS — M51.34 DDD (DEGENERATIVE DISC DISEASE), THORACIC: ICD-10-CM

## 2019-02-22 DIAGNOSIS — D48.5 NEOPLASM OF UNCERTAIN BEHAVIOR OF SKIN: Primary | ICD-10-CM

## 2019-02-22 PROCEDURE — 88305 TISSUE SPECIMEN TO PATHOLOGY, DERMATOLOGY: ICD-10-PCS | Mod: 26,,, | Performed by: PATHOLOGY

## 2019-02-22 PROCEDURE — 72157 MRI CHEST SPINE W/O & W/DYE: CPT | Mod: 26,,, | Performed by: RADIOLOGY

## 2019-02-22 PROCEDURE — 72070 X-RAY EXAM THORAC SPINE 2VWS: CPT | Mod: TC

## 2019-02-22 PROCEDURE — 17000 DESTRUCT PREMALG LESION: CPT | Mod: 59,S$GLB,, | Performed by: DERMATOLOGY

## 2019-02-22 PROCEDURE — A9585 GADOBUTROL INJECTION: HCPCS | Performed by: INTERNAL MEDICINE

## 2019-02-22 PROCEDURE — 72120 X-RAY BEND ONLY L-S SPINE: CPT | Mod: TC

## 2019-02-22 PROCEDURE — 17000 PR DESTRUCTION(LASER SURGERY,CRYOSURGERY,CHEMOSURGERY),PREMALIGNANT LESIONS,FIRST LESION: ICD-10-PCS | Mod: 59,S$GLB,, | Performed by: DERMATOLOGY

## 2019-02-22 PROCEDURE — 72100 XR LUMBAR SPINE AP AND LAT WITH FLEX/EXT: ICD-10-PCS | Mod: 26,,, | Performed by: RADIOLOGY

## 2019-02-22 PROCEDURE — 25500020 PHARM REV CODE 255: Performed by: INTERNAL MEDICINE

## 2019-02-22 PROCEDURE — 99999 PR PBB SHADOW E&M-EST. PATIENT-LVL III: CPT | Mod: PBBFAC,,, | Performed by: DERMATOLOGY

## 2019-02-22 PROCEDURE — 11102 PR TANGENTIAL BIOPSY, SKIN, SINGLE LESION: ICD-10-PCS | Mod: S$GLB,,, | Performed by: DERMATOLOGY

## 2019-02-22 PROCEDURE — 72120 X-RAY BEND ONLY L-S SPINE: CPT | Mod: 26,,, | Performed by: RADIOLOGY

## 2019-02-22 PROCEDURE — 72157 MRI CHEST SPINE W/O & W/DYE: CPT | Mod: TC

## 2019-02-22 PROCEDURE — 72120 XR LUMBAR SPINE AP AND LAT WITH FLEX/EXT: ICD-10-PCS | Mod: 26,,, | Performed by: RADIOLOGY

## 2019-02-22 PROCEDURE — 99499 UNLISTED E&M SERVICE: CPT | Mod: S$GLB,,, | Performed by: DERMATOLOGY

## 2019-02-22 PROCEDURE — 72070 XR THORACIC SPINE AP LATERAL: ICD-10-PCS | Mod: 26,,, | Performed by: RADIOLOGY

## 2019-02-22 PROCEDURE — 99499 NO LOS: ICD-10-PCS | Mod: S$GLB,,, | Performed by: DERMATOLOGY

## 2019-02-22 PROCEDURE — 72157 MRI THORACIC SPINE W WO CONTRAST: ICD-10-PCS | Mod: 26,,, | Performed by: RADIOLOGY

## 2019-02-22 PROCEDURE — 11102 TANGNTL BX SKIN SINGLE LES: CPT | Mod: S$GLB,,, | Performed by: DERMATOLOGY

## 2019-02-22 PROCEDURE — 99999 PR PBB SHADOW E&M-EST. PATIENT-LVL III: ICD-10-PCS | Mod: PBBFAC,,, | Performed by: DERMATOLOGY

## 2019-02-22 PROCEDURE — 72100 X-RAY EXAM L-S SPINE 2/3 VWS: CPT | Mod: 26,,, | Performed by: RADIOLOGY

## 2019-02-22 PROCEDURE — 88305 TISSUE EXAM BY PATHOLOGIST: CPT | Mod: 26,,, | Performed by: PATHOLOGY

## 2019-02-22 PROCEDURE — 88305 TISSUE EXAM BY PATHOLOGIST: CPT | Performed by: PATHOLOGY

## 2019-02-22 PROCEDURE — 72070 X-RAY EXAM THORAC SPINE 2VWS: CPT | Mod: 26,,, | Performed by: RADIOLOGY

## 2019-02-22 RX ORDER — GADOBUTROL 604.72 MG/ML
9 INJECTION INTRAVENOUS
Status: COMPLETED | OUTPATIENT
Start: 2019-02-22 | End: 2019-02-22

## 2019-02-22 RX ADMIN — GADOBUTROL 9 ML: 604.72 INJECTION INTRAVENOUS at 12:02

## 2019-02-22 NOTE — PROGRESS NOTES
Subjective:       Patient ID:  Shaq Bragg Jr. is a 88 y.o. male who presents for   Chief Complaint   Patient presents with    Lesion     Right lower leg, x 3 months, no treatment, tender when touched     Lesion  - Initial  Affected locations: right lower leg  Duration: 3 months  Signs / symptoms: pain  Timing: constant  Aggravated by: pressure  Relieving factors/Treatments tried: nothing      Pt has a history of metastatic melanoma (to regional lymph nodes in R groin) and was sent here from heme/onc to biopsy a new nodule on his R lower leg.   Pt initially presented with a lesion on his right proximal calf in March 2017.  He underwent a biopsy on 3/21/17 with pathology showing a 1.4mm malignant melanoma Alexis level IV, no ulceration, perineural invasion seen, mitotic rate 6/mm2. Was consented at last heme/onc visit to start nivolumab. If found to have distant metastatic disease, they will consider adding ipilimumab.  Pt gets skin checks on the [facility] and is only interested in biopsy of this one lesion today per heme/onc recs.  Will consider transferring his derm care here so everything is in one place.    Review of Systems   Constitutional: Negative for fever, chills, weight loss, weight gain, fatigue, night sweats and malaise.   Skin: Positive for activity-related sunscreen use. Negative for daily sunscreen use and recent sunburn.   Hematologic/Lymphatic: Bruises/bleeds easily.        Objective:    Physical Exam   Constitutional: He appears well-developed and well-nourished. No distress.   Neurological: He is alert and oriented to person, place, and time. He is not disoriented.   Psychiatric: He has a normal mood and affect.   Skin:   Areas Examined (abnormalities noted in diagram):   RLE Inspected                      Diagram Legend     Erythematous scaling macule/papule c/w actinic keratosis       Vascular papule c/w angioma      Pigmented verrucoid papule/plaque c/w seborrheic keratosis       Yellow umbilicated papule c/w sebaceous hyperplasia      Irregularly shaped tan macule c/w lentigo     1-2 mm smooth white papules consistent with Milia      Movable subcutaneous cyst with punctum c/w epidermal inclusion cyst      Subcutaneous movable cyst c/w pilar cyst      Firm pink to brown papule c/w dermatofibroma      Pedunculated fleshy papule(s) c/w skin tag(s)      Evenly pigmented macule c/w junctional nevus     Mildly variegated pigmented, slightly irregular-bordered macule c/w mildly atypical nevus      Flesh colored to evenly pigmented papule c/w intradermal nevus       Pink pearly papule/plaque c/w basal cell carcinoma      Erythematous hyperkeratotic cursted plaque c/w SCC      Surgical scar with no sign of skin cancer recurrence      Open and closed comedones      Inflammatory papules and pustules      Verrucoid papule consistent consistent with wart     Erythematous eczematous patches and plaques     Dystrophic onycholytic nail with subungual debris c/w onychomycosis     Umbilicated papule    Erythematous-base heme-crusted tan verrucoid plaque consistent with inflamed seborrheic keratosis     Erythematous Silvery Scaling Plaque c/w Psoriasis     See annotation      Assessment / Plan:      Neoplasm of uncertain behavior of skin  Shave biopsy procedure note:    Shave biopsy performed after verbal consent including risk of infection, scar, recurrence, need for additional treatment of site. Area prepped with alcohol, anesthetized with approximately 1.0cc of 1% lidocaine with epinephrine. Lesional tissue shaved with razor blade. Hemostasis achieved with application of aluminum chloride followed by hyfrecation. No complications. Dressing applied. Wound care explained.  -     Tissue Specimen To Pathology, Dermatology  Pathology Orders:     Normal Orders This Visit    Tissue Specimen To Pathology, Dermatology     Questions:    Directional Terms:  Other(comment)    Clinical Information:  r/o SCC vs.  amelanotic metastatic melanoma vs. other Comment - shave    Specific Site:  R lower leg          AK (actinic keratosis)  Cryosurgery Procedure Note    Verbal consent from the patient is obtained including, but not limited to, risk of hypopigmentation/hyperpigmentation, scar, recurrence of lesion. The patient is aware of the precancerous quality and need for treatment of these lesions. Liquid nitrogen cryosurgery is applied to the 1 actinic keratoses, as detailed in the physical exam, to produce a freeze injury. The patient is aware that blisters may form and is instructed on wound care with gentle cleansing and use of vaseline ointment to keep moist until healed. The patient is supplied a handout on cryosurgery and is instructed to call if lesions do not completely resolve.    Follow-up in about 3 months (around 5/22/2019) for skin check or sooner pending biopsy results.

## 2019-02-22 NOTE — PLAN OF CARE
Patient: Shaq Bragg Jr.  Clinic #: [id number]    Date: 02/19/2019  Physician: Yair Alatorre,*   Diagnosis:   Encounter Diagnosis   Name Primary?    Lymphedema of right lower extremity Yes     Patient is a 88 y.o. male referred to this clinic 2/2 R LE lymphedema s/p excision of Stage III melanoma from leg.    Onset: November 18,2019  Surgery: excision f melanoma      Past Medical History:   Diagnosis Date    COPD (chronic obstructive pulmonary disease)     H/O asbestosis     Hearing loss     Hypercholesterolemia     Hypertension     Malignant melanoma of right lower leg 5/11/2017    Melanoma     Approx. 2 years ago     Skin cancer      Current Outpatient Medications   Medication Sig    aspirin (ECOTRIN) 81 MG EC tablet Take 81 mg by mouth every morning.     finasteride (PROSCAR) 5 mg tablet Take 5 mg by mouth every morning.     furosemide (LASIX) 20 MG tablet Take 20 mg by mouth every other day.     losartan-hydrochlorothiazide 50-12.5 mg (HYZAAR) 50-12.5 mg per tablet Take 1 tablet by mouth every morning.     metoprolol tartrate (LOPRESSOR) 50 MG tablet Take 50 mg by mouth. Takes 50 mg in morning and half tablet (25 mg) in evening    potassium chloride (MICRO-K) 8 mEq CpSR Take 8 mEq by mouth every other day.     simvastatin (ZOCOR) 10 MG tablet Take 10 mg by mouth every evening.    tamsulosin (FLOMAX) 0.4 mg Cp24 Take 0.4 mg by mouth every morning.      No current facility-administered medications for this visit.      Review of patient's allergies indicates:  No Known Allergies  Precautions: standard    Social History: Pt resides home with wife. Is active and independent with self care. Has a chap style 30-40 mm Hg stocking that he is able to america/doff himself  Environmental barriers:none  Abuse/Neglect:no  Nutritional status:wnl  Educational needs:regarding management of lymphedema    Fall risk: no      Subjective    Shaq rates pain at a 0/10 where 0 = no pain and 10 = maximal  "pain.  Patient's goals are as follows:    Objective:   Amount of Swelling:moderate in upper thigh, minimal below  Location of Swelling:L LE  Skin Integrity:very dry flaky skin, no wounds  Palpation/Texture:as expected  Circulation:wnl  Posture:wnl    Range of Motion - LE  (R) wnl  (L)  wnl    Current Functional Status:  Gait:independent  Transfers:independent  Bed Mobility:independent    Girth Measurements (in centimeters)  LANDMARK RIGHT LE LEFT LE DIFFERENCE         ankle  22 cm 21.5cm .5 cm   4" 28.5 cm 28 cm .5 cm   calf 36 cm 36 cm 0 cm   Below knee 36 cm 36 cm 0 cm   Above knee 41 cm 41.5 cm .5 cm   Mid thigh 47 cm 43 cm 4 cm   Upper thigh 55 cm 51 cm 4 cm             Treatment Evaluation  Time In: 1110  Time Out: 1140    This patient is in agreement to participate in Lymphedema treatment.      Assessment    This patient presents s/p excision of melanoma R LE resulting in: lymphedema of the R LE. Lymphedema is a chronic condition. Left untreated puts pt at high risk for infection. This pt would benefit from skilled therapy services to address the above impairments.    The following goals were discussed with the patient and patient is in agreement with them as to be addressed in the treatment plan.       Short Term Goals:  Maximize reduction B LE  Patient will demonstrate 100% knowledge of lymphedema precautions and signs of infection.   Patient will perform self-bandaging techniques.  Patient will perform self lymph drainage techniques.  Patient will tolerate daily activities with multilayered bandaging.    Long Term Goals:   Patient will be independent with home management of this chronic condition.  Appropriate compression garments to be ordered/delivered  Patient to america/doff compression garment.   Patient will demonstrate compliance with all home management recommendations.  Plan  Patient to be seen 2 x per week for 2-3 weeks for the medically necessary treatments to include: decongestive massage, " intermittent compression pump, multi layered bandaging, education in lymphedema precautions, self massage, self bandaging, and assistance in obtaining appropriate compression garment.    I certify the need for these services furnished under this plan of treatment and while under my care.         ____________________________________     ____________    Physician/Referring Practitioner                         Date of Signature

## 2019-02-22 NOTE — PATIENT INSTRUCTIONS
" Shave Biopsy Wound Care    Your doctor has performed a shave biopsy today.  A band aid and vaseline ointment has been placed over the site.  This should remain in place for 24 hours.  It is recommended that you keep the area dry for the first 24 hours.  After 24 hours, you may remove the band aid and wash the area with warm soap and water and apply Vaseline jelly.  Many patients prefer to use Neosporin or Bacitracin ointment.  This is acceptable; however, know that you can develop an allergy to this medication even if you have used it safely for years.  It is important to keep the area moist.  Letting it dry out and get air slows healing time, and will worsen the scar.  Band aid is optional after first 24 hours.      If you notice increasing redness, tenderness, pain, or yellow drainage at the biopsy site, please notify your doctor.  These are signs of an infection.    If your biopsy site is bleeding, apply firm pressure for 15 minutes straight.  Repeat for another 15 minutes, if it is still bleeding.   If the surgical site continues to bleed, then please contact your doctor.      For [facility] users:   You will receive a [facility] notification after the pathologist has finished reviewing your biopsy specimen. Pathology results, however, will not be released online so you will see a "no content" message. Once your dermatologist reviews and clinically correlates your biopsy results, you will either receive a letter in the mail with the results of a phone call from your doctor's office if further explanation or treatment is warranted.       [address] [place] [address]/ [phone] FAX/ [email or web address]      CRYOSURGERY      Your doctor has used a method called cryosurgery to treat your skin condition. Cryosurgery refers to the use of very cold substances to treat a variety of skin conditions such as warts, pre-skin cancers, molluscum contagiosum, sun spots, and several benign " growths. The substance we use in cryosurgery is liquid nitrogen and is so cold (-195 degrees Celsius) that is burns when administered.     Following treatment in the office, the skin may immediately burn and become red. You may find the area around the lesion is affected as well. It is sometimes necessary to treat not only the lesion, but a small area of the surrounding normal skin to achieve a good response.     A blister, and even a blood filled blister, may form after treatment.   This is a normal response. If the blister is painful, it is acceptable to sterilize a needle and with rubbing alcohol and gently pop the blister. It is important that you gently wash the area with soap and warm water as the blister fluid may contain wart virus if a wart was treated. Do no remove the roof of the blister.     The area treated can take anywhere from 1-3 weeks to heal. Healing time depends on the kind skin lesion treated, the location, and how aggressively the lesion was treated. It is recommended that the areas treated are covered with Vaseline or bacitracin ointment and a band-aid. If a band-aid is not practical, just ointment applied several times per day will do. Keeping these areas moist will speed the healing time.    Treatment with liquid nitrogen can leave a scar. In dark skin, it may be a light or dark scar, in light skin it may be a white or pink scar. These will generally fade with time, but may never go away completely.     If you have any concerns after your treatment, please feel free to call the office.       [facility] [place] [address]/ [phone] FAX/ [email or web address]    Summer Sun Protection      The [facility] Dermat[facility] would like to remind you of the importance of sun protection all year round and particularly during the summer when the suns rays are the strongest. It has been proven that both acute and chronic sun exposure damages our cells and leads to  skin cancer. Beyond skin cancer, the sun causes 90% of the symptoms of pre-mature skin aging, including wrinkles, lentigines (brown spots), and thin, easily bruised skin. Proper sun protection can help prevent these unwanted conditions.    Many patients report that the dont go in the sun. It has been shown that the average person receives 18 hours of incidental sun exposure per week during activities such as walking through parking lots, driving, or sitting next to windows. This accumulates to several bad sunburns per year!    In choosing sunscreen, you want one that protects against both UVA and UVB rays. It is recommended that you use one of SPF 30 or higher. It is important to apply the sunscreen about 20 minutes prior to sun exposure. Most sunscreens are chemical sunscreens and a reaction must take place in the skin so that they are effective. If they are applied and then you are immediately exposed to the sun or start sweating, this reaction has not had time to take place and you are therefore unprotected. Sunscreen needs to be reapplied every 2 hours if you are participating in water sports or sweating. We recommend Elta MD or Neutrogena Ultra Sheer Dry Touch SPF 55 for daily use; however there are many options and it is most important for you to find one that you will use on a consistent basis.    If you have sensitive skin, you may do best with a sunscreen that contains only physical blockers such as titanium dioxide or zinc oxide. These are typically thicker and harder to apply, however they afford very good protection. Neutrogena Sensitive Skin, Blue Lizard Sensitive Skin (pink top) or Neutrogena Pure and Free are popular ones.     Aside from sunscreen, clothes with UV protection, wide brimmed hats, and sunglasses are other means of sun protection that we recommend.                        [facility] - DERMATOLOGY  [address]  [place] [address]  Dept:  [phone]  Dept Fax: [phone]

## 2019-02-25 ENCOUNTER — TELEPHONE (OUTPATIENT)
Dept: HEMATOLOGY/ONCOLOGY | Facility: CLINIC | Age: 84
End: 2019-02-25

## 2019-02-25 NOTE — TELEPHONE ENCOUNTER
I called the patient and let him know that the MRI did not show any evidence of metastatic disease.  I let him know that we were still waiting for the skin biopsy results of the right leg from 2/22/19.  The patient expressed understanding.  All questions were answered to his satisfaction.    Prince Armando MD  Hematology and Oncology Fellow PGY-VI  Pager:[phone]

## 2019-02-28 ENCOUNTER — TELEPHONE (OUTPATIENT)
Dept: DERMATOLOGY | Facility: CLINIC | Age: 84
End: 2019-02-28

## 2019-02-28 NOTE — TELEPHONE ENCOUNTER
Spoke with pt and explained that it can take up to two weeks to get the results back and that we will call him once something becomes available. Pt verbalized understanding.

## 2019-02-28 NOTE — TELEPHONE ENCOUNTER
----- Message from Carrie Robles sent at 2/28/2019 11:37 AM CST -----  Contact: patient  Please call above patient at [phone] need to speak with the nurse about test results waiting on a call back thanks

## 2019-03-01 ENCOUNTER — INITIAL CONSULT (OUTPATIENT)
Dept: ORTHOPEDICS | Facility: CLINIC | Age: 84
End: 2019-03-01
Payer: MEDICARE

## 2019-03-01 VITALS — WEIGHT: 180.25 LBS | BODY MASS INDEX: 24.41 KG/M2 | HEIGHT: 72 IN

## 2019-03-01 DIAGNOSIS — C43.71 MALIGNANT MELANOMA OF RIGHT LOWER LEG: Primary | ICD-10-CM

## 2019-03-01 PROCEDURE — 1101F PT FALLS ASSESS-DOCD LE1/YR: CPT | Mod: CPTII,S$GLB,, | Performed by: ORTHOPAEDIC SURGERY

## 2019-03-01 PROCEDURE — 99204 PR OFFICE/OUTPT VISIT, NEW, LEVL IV, 45-59 MIN: ICD-10-PCS | Mod: S$GLB,,, | Performed by: ORTHOPAEDIC SURGERY

## 2019-03-01 PROCEDURE — 99999 PR PBB SHADOW E&M-EST. PATIENT-LVL III: CPT | Mod: PBBFAC,,, | Performed by: ORTHOPAEDIC SURGERY

## 2019-03-01 PROCEDURE — 99999 PR PBB SHADOW E&M-EST. PATIENT-LVL III: ICD-10-PCS | Mod: PBBFAC,,, | Performed by: ORTHOPAEDIC SURGERY

## 2019-03-01 PROCEDURE — 1101F PR PT FALLS ASSESS DOC 0-1 FALLS W/OUT INJ PAST YR: ICD-10-PCS | Mod: CPTII,S$GLB,, | Performed by: ORTHOPAEDIC SURGERY

## 2019-03-01 PROCEDURE — 99204 OFFICE O/P NEW MOD 45 MIN: CPT | Mod: S$GLB,,, | Performed by: ORTHOPAEDIC SURGERY

## 2019-03-01 NOTE — LETTER
March 7, 2019      Prince Armando MD  [address] [facility] [address]           [place] [id number]  [facility] [address]  Phone: [phone]          Patient: Shaq Bragg Jr.   MR Number: [id number]   YOB: 1930   Date of Visit: 3/1/2019       Dear Dr. Prince Armando:    Thank you for referring Shaq Bragg to me for evaluation. Attached you will find relevant portions of my assessment and plan of care.    If you have questions, please do not hesitate to call me. I look forward to following Shaq Bragg along with you.    Sincerely,    Zay Ruiz MD    Enclosure  CC:  No Recipients    If you would like to receive this communication electronically, please contact [email or web address] or [phone] to request more information on [facility] access.    For providers and/or their staff who would like to refer a patient to [facility], please contact us through our one-stop-shop provider referral line, [facility], at [phone].    If you feel you have received this communication in error or would no longer like to receive these types of communications, please e-mail [email or web address]

## 2019-03-01 NOTE — PROGRESS NOTES
DATE: 3/1/2019  PATIENT: Shaq Bragg Jr.    Attending Physician: Zay Ruiz M.D.    CHIEF COMPLAINT: concern for metastatic disease to the spine    HISTORY:  Shaq Bragg Jr. is a 88 y.o. male with a hx of malignant melanoma here for initial evaluation after abnormal PET scan showed lesion on T11. Patient denies new onset back pain or mid back pain. He does report intermittent LBP without radiculopathy for several years. The pain is worse with activity and improved by rest. There is no associated numbness and tingling. There is intermittent subjective weakness of BLE after increased activity. His low back pain does not affect his ADLs and he does not take any medications for it.        The Patient denies myelopathic symptoms such as handwriting changes or difficulty with buttons/coins/keys. Denies perineal paresthesias, bowel/bladder dysfunction.    PAST MEDICAL/SURGICAL HISTORY:  Past Medical History:   Diagnosis Date    COPD (chronic obstructive pulmonary disease)     H/O asbestosis     Hearing loss     Hypercholesterolemia     Hypertension     Malignant melanoma of right lower leg 5/11/2017    Melanoma     Approx. 2 years ago     Skin cancer      Past Surgical History:   Procedure Laterality Date    ADENOIDECTOMY      APPENDECTOMY      Carotid Stenosis Surgery      CHOLECYSTECTOMY  1965    LYMPHADENECTOMY, INGUINAL, Sartorious Flap Right 11/12/2018    Performed by Yair Alatorre MD at [facility] OR [address] [place] [address]    TONSILLECTOMY         Current Medications:   Current Outpatient Medications:     aspirin (ECOTRIN) 81 MG EC tablet, Take 81 mg by mouth every morning. , Disp: , Rfl:     finasteride (PROSCAR) 5 mg tablet, Take 5 mg by mouth every morning. , Disp: , Rfl:     furosemide (LASIX) 20 MG tablet, Take 20 mg by mouth every other day. , Disp: , Rfl:     losartan-hydrochlorothiazide 50-12.5 mg (HYZAAR) 50-12.5 mg per tablet, Take 1 tablet by mouth every morning. , Disp: ,  Rfl:     metoprolol tartrate (LOPRESSOR) 50 MG tablet, Take 50 mg by mouth. Takes 50 mg in morning and half tablet (25 mg) in evening, Disp: , Rfl:     potassium chloride (MICRO-K) 8 mEq CpSR, Take 8 mEq by mouth every other day. , Disp: , Rfl:     simvastatin (ZOCOR) 10 MG tablet, Take 10 mg by mouth every evening., Disp: , Rfl:     tamsulosin (FLOMAX) 0.4 mg Cp24, Take 0.4 mg by mouth every morning. , Disp: , Rfl:     Social History:   Social History     Socioeconomic History    Marital status:      Spouse name: Not on file    Number of children: Not on file    Years of education: Not on file    Highest education level: Not on file   Social Needs    Financial resource strain: Not on file    Food insecurity - worry: Not on file    Food insecurity - inability: Not on file    Transportation needs - medical: Not on file    Transportation needs - non-medical: Not on file   Occupational History    Not on file   Tobacco Use    Smoking status: Former Smoker     Packs/day: 1.00     Years: 15.00     Pack years: 15.00     Types: Cigarettes     Last attempt to quit: 1965     Years since quittin.1    Smokeless tobacco: Never Used   Substance and Sexual Activity    Alcohol use: No     Frequency: Never    Drug use: No    Sexual activity: No   Other Topics Concern    Not on file   Social History Narrative    Not on file       REVIEW OF SYSTEMS:  Constitution: Negative. Negative for chills, fever and night sweats.   Cardiovascular: Negative for chest pain and syncope.   Respiratory: Negative for cough and shortness of breath.   Gastrointestinal: See HPI. Negative for nausea/vomiting. Negative for abdominal pain.  Genitourinary: See HPI. Negative for discoloration or dysuria.  Skin: Negative for dry skin, itching and rash.   Hematologic/Lymphatic: no for bleeding/clotting disorders.   Musculoskeletal: Negative for falls and muscle weakness.   Neurological: See HPI. no history of seizures. no history  of cranial surgery or shunts.  Endocrine: Negative for polydipsia, polyphagia and polyuria.   Allergic/Immunologic: Negative for hives and persistent infections.    PHYSICAL EXAMINATION:    Ht 6' (1.829 m)   Wt 81.8 kg (180 lb 3.6 oz)   BMI 24.44 kg/m²     General: The patient is a  88 y.o. male in no apparent distress, the patient is orientatied to person, place and time.   Psych: Normal mood and affect  HEENT: Vision grossly intact, hearing intact to the spoken word.  Lungs: Respirations unlabored.  Gait: Normal station and gait, no difficulty with toe or heel walk.   Skin: Dorsal lumbar skin negative for rashes, lesions, hairy patches and surgical scars.  Range of motion: Lumbar range of motion is acceptable. There isno thoracic or lumbar tenderness to palpation.  Spinal Balance: Global saggital and coronal spinal balance acceptable, no significant for scoliosis and kyphosis.  Musculoskeletal: No pain with the range of motion of the bilateral hips. No trochanteric tenderness to palpation.  Vascular: Bilateral lower extremities warm and well perfused, Dorsalis pedis pulses 2+ bilaterally.  Neurological: Normal strength and tone in all major motor groups in the bilateral lower extremities. Normal sensation to light touch in the L2-S1 dermatomes bilaterally.  Deep tendon reflexes symmetric 2+ in the bilateral lower extremities.  Negative Babinski bilaterally.    IMAGING:   Today I personally reviewed AP, Lat and Flex/Ex upright L-spine films that demonstrate no evidence of metastatic disease to spine, multiple hemangiomas throughout    Body mass index is 24.44 kg/m².  No results found for: HGBA1C      ASSESSMENT/PLAN:    Shaq was seen today for follow-up and low-back pain.    Diagnoses and all orders for this visit:    Malignant melanoma of right lower leg      MRI with no evidence of mets to spine  NSAIDS PRN for LBP

## 2019-03-04 ENCOUNTER — TELEPHONE (OUTPATIENT)
Dept: HEMATOLOGY/ONCOLOGY | Facility: CLINIC | Age: 84
End: 2019-03-04

## 2019-03-04 ENCOUNTER — TELEPHONE (OUTPATIENT)
Dept: DERMATOLOGY | Facility: CLINIC | Age: 84
End: 2019-03-04

## 2019-03-04 ENCOUNTER — CLINICAL SUPPORT (OUTPATIENT)
Dept: REHABILITATION | Facility: HOSPITAL | Age: 84
End: 2019-03-04
Attending: SURGERY
Payer: MEDICARE

## 2019-03-04 DIAGNOSIS — C43.71 MALIGNANT MELANOMA OF RIGHT LOWER LEG: Primary | ICD-10-CM

## 2019-03-04 PROCEDURE — 97140 MANUAL THERAPY 1/> REGIONS: CPT | Mod: PN

## 2019-03-04 NOTE — TELEPHONE ENCOUNTER
I called Dr. Bowman's staff today. They have received the referral and they will call to get Mr. Bragg scheduled for a consult with Dr. Bowman for excision of the SCC on his R lower leg. Dr. Bowman is out all week, and they will relay this information to the patient and get him scheduled as soon as possible.

## 2019-03-04 NOTE — TELEPHONE ENCOUNTER
----- Message from Brittani Robbins sent at 3/4/2019  1:18 PM CST -----  Contact: Patient  Pt returning a call from Dr. Armando in regards to pending appts.    Contact:: [phone]    
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  I let him know that he would be contacted by the dermatology department concerning surgical removal of the cancer.  I also informed him taht we would like to get started with treatment for his melanoma this week.  He stated that Thursday 3/07/19 would be a good day for him to start treatment.  I let him know that we would try to have his first treatment scheduled then.  The patient expressed understanding.  All questions were answered to his satisfaction.    Prince Armando MD  Hematology and Oncology Fellow PGY-VI  Pager:[phone]        
I will SWITCH the dose or number of times a day I take the medications listed below when I get home from the hospital:  None

## 2019-03-04 NOTE — TELEPHONE ENCOUNTER
----- Message from Donnie Alatorre RN sent at 3/4/2019  8:13 AM CST -----  Regarding: FW: pending appointments      ----- Message -----  From: Abbie Pappas  Sent: 3/4/2019   8:00 AM  To: Prabha WOODRUFF Staff  Subject: FW: pending appointments                         Pt chemo finally is approved, but after speaking with spouse pt would like to push back due to the [employer] traffic and pt had a few car problems,  inform pt will put pt on the waiting list for an earlier time on the 03/18/19, are you okay with this, please advise.Thanks!    ----- Message -----  From: Abbie Pappas  Sent: 2/19/2019   8:53 AM  To: Abbie Pappas  Subject: pending appointments                             Message from Prince Armando MD sent at 2/18/2019  4:06 PM CST -----  Temi Sharp.     Please schedule the patient for a dermatology appt.  Please schedule the patient to see Dr Ruiz with orthopedics.  Please schedule the patient for an MRI of the thoracic spine.  The patient will need an appointment with me with CBC, CMP and TSH after the appt with dermatology and MRI thoracic spine is completed.  The patient will need to be scheduled for Nivolumab when he sees me next in clinic.

## 2019-03-04 NOTE — TELEPHONE ENCOUNTER
spoke  With pt on today in regards to chemo being approved, spouse states with pt having care problems and her having the flu would like to push appointments back,  inform pt we will put pt name on the waiting list.

## 2019-03-04 NOTE — TELEPHONE ENCOUNTER
I called the patient and l spoke to his wife.  I asked to have her  call me back at [phone] ext [phone].     Prince Armando MD PGY-VI  Hematology and Oncology  Pager:[phone]

## 2019-03-06 ENCOUNTER — TELEPHONE (OUTPATIENT)
Dept: HEMATOLOGY/ONCOLOGY | Facility: CLINIC | Age: 84
End: 2019-03-06

## 2019-03-06 ENCOUNTER — CLINICAL SUPPORT (OUTPATIENT)
Dept: REHABILITATION | Facility: HOSPITAL | Age: 84
End: 2019-03-06
Attending: SURGERY
Payer: MEDICARE

## 2019-03-06 DIAGNOSIS — I89.0 LYMPHEDEMA OF RIGHT LOWER EXTREMITY: Primary | ICD-10-CM

## 2019-03-06 PROCEDURE — 97140 MANUAL THERAPY 1/> REGIONS: CPT | Mod: PN

## 2019-03-06 NOTE — PROGRESS NOTES
TIME RECORD    Date:  03/04/2019    Start Time:  1100  Stop Time:  1156    PROCEDURES:    TIMED  Procedure Time Min.   Manual therapy/CDT Start:1100  Stop:1156 56    Start:  Stop:     Start:  Stop:     Start:  Stop:          UNTIMED  Procedure Time Min.    Start:  Stop:     Start:  Stop:      Total Timed Minutes:  56  Total Timed Units:  4  Total Untimed Units:  0  Charges Billed/# of units:  4      Progress/Current Status    Subjective:     Patient ID: Shaq Bragg Jr. is a 88 y.o. male.  Diagnosis: lymphedema  Pain: 0 /10      Objective:     First visit following evaluation. All four components of CDT completed. Wound from skin ca excision dressed with aquacel.    Assessment:     Tolerated session very well.    Patient Education/Response:     ongoing    Plans and Goals:     cont

## 2019-03-06 NOTE — TELEPHONE ENCOUNTER
Left message to call the office to confirm appointments scheduled on Monday 3/11. Number was provided.

## 2019-03-08 ENCOUNTER — CLINICAL SUPPORT (OUTPATIENT)
Dept: REHABILITATION | Facility: HOSPITAL | Age: 84
End: 2019-03-08
Attending: SURGERY
Payer: MEDICARE

## 2019-03-08 DIAGNOSIS — I89.0 LYMPHEDEMA OF RIGHT LOWER EXTREMITY: Primary | ICD-10-CM

## 2019-03-08 PROCEDURE — 97140 MANUAL THERAPY 1/> REGIONS: CPT | Mod: PN

## 2019-03-08 NOTE — PROGRESS NOTES
TIME RECORD    Date:  03/06/2019    Start Time:  0900  Stop Time:  1000    PROCEDURES:    TIMED  Procedure Time Min.   Manual therapy/CDT Start:0900  Stop:1000 60    Start:  Stop:     Start:  Stop:     Start:  Stop:          UNTIMED  Procedure Time Min.    Start:  Stop:     Start:  Stop:      Total Timed Minutes:  60  Total Timed Units:  4  Total Untimed Units:  0  Charges Billed/# of units:  4      Progress/Current Status    Subjective:     Patient ID: Shaq Richardpaul Alford. is a 88 y.o. male with lymphedema R LE  Diagnosis:   1. Lymphedema of right lower extremity       Pain: 0 /10      Objective:     CDT completed (4 steps to protocol)    Assessment:     Progressing well    Patient Education/Response:     ongoing    Plans and Goals:     Cont per poc

## 2019-03-11 ENCOUNTER — INFUSION (OUTPATIENT)
Dept: INFUSION THERAPY | Facility: HOSPITAL | Age: 84
End: 2019-03-11
Attending: INTERNAL MEDICINE
Payer: MEDICARE

## 2019-03-11 VITALS
HEART RATE: 60 BPM | DIASTOLIC BLOOD PRESSURE: 60 MMHG | SYSTOLIC BLOOD PRESSURE: 131 MMHG | TEMPERATURE: 98 F | RESPIRATION RATE: 18 BRPM

## 2019-03-11 DIAGNOSIS — C43.71 MALIGNANT MELANOMA OF RIGHT LOWER LEG: Primary | ICD-10-CM

## 2019-03-11 PROCEDURE — 25000003 PHARM REV CODE 250: Performed by: INTERNAL MEDICINE

## 2019-03-11 PROCEDURE — 63600175 PHARM REV CODE 636 W HCPCS: Mod: JG | Performed by: INTERNAL MEDICINE

## 2019-03-11 PROCEDURE — 96413 CHEMO IV INFUSION 1 HR: CPT

## 2019-03-11 RX ORDER — SODIUM CHLORIDE 0.9 % (FLUSH) 0.9 %
10 SYRINGE (ML) INJECTION
Status: CANCELLED | OUTPATIENT
Start: 2019-03-11

## 2019-03-11 RX ORDER — HEPARIN 100 UNIT/ML
500 SYRINGE INTRAVENOUS
Status: CANCELLED | OUTPATIENT
Start: 2019-03-11

## 2019-03-11 RX ADMIN — SODIUM CHLORIDE: 9 INJECTION, SOLUTION INTRAVENOUS at 02:03

## 2019-03-11 RX ADMIN — SODIUM CHLORIDE 480 MG: 9 INJECTION, SOLUTION INTRAVENOUS at 02:03

## 2019-03-11 NOTE — PLAN OF CARE
Problem: Adult Inpatient Plan of Care  Goal: Plan of Care Review  Outcome: Ongoing (interventions implemented as appropriate)  Pt educated on treatment plan and potential side effects. Pt tolerated Opdivo with no complications. Pt instructed to call MD with any problems. NAD. Pt discharged home independently.

## 2019-03-12 ENCOUNTER — TELEPHONE (OUTPATIENT)
Dept: HEMATOLOGY/ONCOLOGY | Facility: CLINIC | Age: 84
End: 2019-03-12

## 2019-03-12 ENCOUNTER — HOSPITAL ENCOUNTER (EMERGENCY)
Facility: HOSPITAL | Age: 84
Discharge: HOME | End: 2019-03-12
Attending: EMERGENCY MEDICINE
Payer: MEDICARE

## 2019-03-12 VITALS — OXYGEN SATURATION: 96 % | DIASTOLIC BLOOD PRESSURE: 57 MMHG | HEART RATE: 73 BPM | SYSTOLIC BLOOD PRESSURE: 148 MMHG

## 2019-03-12 DIAGNOSIS — K62.5 RECTAL BLEEDING: Primary | ICD-10-CM

## 2019-03-12 LAB
ABO + RH BLD: NORMAL
ALBUMIN SERPL BCP-MCNC: 3.9 G/DL
ALP SERPL-CCNC: 92 U/L
ALT SERPL W/O P-5'-P-CCNC: 12 U/L
ANION GAP SERPL CALC-SCNC: 10 MMOL/L
APTT BLDCRRT: 28.7 SEC
AST SERPL-CCNC: 18 U/L
BASOPHILS # BLD AUTO: 0 K/UL
BASOPHILS NFR BLD: 0.3 %
BILIRUB SERPL-MCNC: 1.4 MG/DL
BLD GP AB SCN CELLS X3 SERPL QL: NORMAL
BUN SERPL-MCNC: 16 MG/DL
CALCIUM SERPL-MCNC: 9.3 MG/DL
CHLORIDE SERPL-SCNC: 103 MMOL/L
CO2 SERPL-SCNC: 28 MMOL/L
CREAT SERPL-MCNC: 1.2 MG/DL
DIFFERENTIAL METHOD: ABNORMAL
EOSINOPHIL # BLD AUTO: 0 K/UL
EOSINOPHIL NFR BLD: 0.4 %
ERYTHROCYTE [DISTWIDTH] IN BLOOD BY AUTOMATED COUNT: 13.3 %
EST. GFR  (AFRICAN AMERICAN): >60 ML/MIN/1.73 M^2
EST. GFR  (NON AFRICAN AMERICAN): 54 ML/MIN/1.73 M^2
GLUCOSE SERPL-MCNC: 104 MG/DL
HCT VFR BLD AUTO: 39.3 %
HGB BLD-MCNC: 13.3 G/DL
INR PPP: 1.1
LYMPHOCYTES # BLD AUTO: 0.7 K/UL
LYMPHOCYTES NFR BLD: 9.5 %
MCH RBC QN AUTO: 31.9 PG
MCHC RBC AUTO-ENTMCNC: 33.9 G/DL
MCV RBC AUTO: 94 FL
MONOCYTES # BLD AUTO: 0.5 K/UL
MONOCYTES NFR BLD: 6.5 %
NEUTROPHILS # BLD AUTO: 5.7 K/UL
NEUTROPHILS NFR BLD: 83.3 %
PLATELET # BLD AUTO: 165 K/UL
PMV BLD AUTO: 8.5 FL
POTASSIUM SERPL-SCNC: 4 MMOL/L
PROT SERPL-MCNC: 6.7 G/DL
PROTHROMBIN TIME: 10.9 SEC
RBC # BLD AUTO: 4.17 M/UL
SODIUM SERPL-SCNC: 141 MMOL/L
WBC # BLD AUTO: 6.9 K/UL

## 2019-03-12 PROCEDURE — 99283 EMERGENCY DEPT VISIT LOW MDM: CPT

## 2019-03-12 PROCEDURE — 36415 COLL VENOUS BLD VENIPUNCTURE: CPT

## 2019-03-12 PROCEDURE — 86850 RBC ANTIBODY SCREEN: CPT

## 2019-03-12 PROCEDURE — 85730 THROMBOPLASTIN TIME PARTIAL: CPT

## 2019-03-12 PROCEDURE — 80053 COMPREHEN METABOLIC PANEL: CPT

## 2019-03-12 PROCEDURE — 85025 COMPLETE CBC W/AUTO DIFF WBC: CPT

## 2019-03-12 PROCEDURE — 85610 PROTHROMBIN TIME: CPT

## 2019-03-12 RX ORDER — METOPROLOL TARTRATE 50 MG/1
25 TABLET ORAL NIGHTLY
Status: ON HOLD | COMMUNITY
End: 2019-04-11 | Stop reason: HOSPADM

## 2019-03-12 NOTE — TELEPHONE ENCOUNTER
I initially called and spoke to the patient's wife whom stated the patient had a large amount of blood in the toilet this AM after straining to have a BM yesterday.  I then called the patient whom stated that there was a large amount of bright red blood in his stool this AM.  He states that he has not had another episode.  I informed the patient that he needed to be evaluated quickly in the emergency room.  The patient expressed understanding and stated he was currently trying to have his car fixed.  I informed him the if his car was not fixed soon he needed to go to the hospital by ambulance.  The patient expressed understanding.  All questions were answered to his satisfaction.    Prince Armando MD  Hematology and Oncology Fellow PGY-VI  Pager:[phone]

## 2019-03-12 NOTE — TELEPHONE ENCOUNTER
----- Message from Darlene Olivier sent at 3/12/2019 11:23 AM CDT -----  Contact: pt   Pt called to speak with Dr Armando and states that he was bleeding from is rectum has some questions   Callback#[phone]  Thank You   MARGARET Olivier

## 2019-03-12 NOTE — ED PROVIDER NOTES
Encounter Date: 3/12/2019    SCRIBE #1 NOTE: I[place]ena, am scribing for, and in the presence of, Zi Simpson MD.       History     Chief Complaint   Patient presents with    Rectal Bleeding       Time seen by provider: 4:34 PM on 03/12/2019    Shaq Bragg Jr. is a 88 y.o. male with HTN, COPD, and CA who presents to the ED via EMS with an onset of bright red blood after having a BM this morning. He was referred to the ER by his Oncologist Dr. Prince Armando for further evaluation. The patient had his first chemotherapy session (Nivolumab) at [facility] yesterday for metastatic melanoma. He had a right inguinal lymphadenectomy performed by Dr. Yair Alatorre on 11/12/2018. The patient denies fever, difficulty breathing, chest pain, vomiting, pain, or any other symptoms at this time. No additional pertinent SHx noted. No known drug allergies noted.      The history is provided by the patient (wife).     Review of patient's allergies indicates:  No Known Allergies  Past Medical History:   Diagnosis Date    COPD (chronic obstructive pulmonary disease)     H/O asbestosis     Hearing loss     Hypercholesterolemia     Hypertension     Malignant melanoma of right lower leg 5/11/2017    Melanoma     Approx. 2 years ago     Skin cancer      Past Surgical History:   Procedure Laterality Date    ADENOIDECTOMY      APPENDECTOMY      Carotid Stenosis Surgery      CHOLECYSTECTOMY  1965    LYMPHADENECTOMY, INGUINAL, Sartorious Flap Right 11/12/2018    Performed by Yair Alatorre MD at [facility] OR [address] [place] [address]    TONSILLECTOMY       Family History   Problem Relation Age of Onset    Coronary artery disease Mother     Cancer Father         Esophageal    Esophageal cancer Father     Diabetes Sister     No Known Problems Maternal Aunt     No Known Problems Maternal Uncle     No Known Problems Paternal Aunt     No Known Problems Paternal Uncle     No Known Problems Maternal Grandmother     No Known  Problems Maternal Grandfather     No Known Problems Paternal Grandmother     No Known Problems Paternal Grandfather     Melanoma Neg Hx     Psoriasis Neg Hx     Lupus Neg Hx      Social History     Tobacco Use    Smoking status: Former Smoker     Packs/day: 1.00     Years: 15.00     Pack years: 15.00     Types: Cigarettes     Last attempt to quit: 1965     Years since quittin.2    Smokeless tobacco: Never Used   Substance Use Topics    Alcohol use: No     Frequency: Never    Drug use: No     Review of Systems   Constitutional: Negative for chills and fever.   HENT: Negative for nosebleeds.    Eyes: Negative for visual disturbance.   Respiratory: Negative for cough and shortness of breath.    Cardiovascular: Negative for chest pain and palpitations.   Gastrointestinal: Positive for blood in stool. Negative for abdominal pain, diarrhea, nausea, rectal pain and vomiting.   Genitourinary: Negative for dysuria and hematuria.   Musculoskeletal: Negative for back pain and neck pain.   Skin: Negative for rash.   Neurological: Negative for seizures, syncope and headaches.     Physical Exam     Initial Vitals [19 1656]   BP Pulse Resp Temp SpO2   134/72 80 -- -- 97 %      MAP       --         Physical Exam    Nursing note and vitals reviewed.  Constitutional: He appears well-developed and well-nourished. He is not diaphoretic. No distress.   HENT:   Head: Normocephalic and atraumatic.   Eyes: EOM are normal. Pupils are equal, round, and reactive to light.   Neck: Normal range of motion. Neck supple.   Cardiovascular: Normal rate, regular rhythm, normal heart sounds and intact distal pulses. Exam reveals no gallop and no friction rub.    No murmur heard.  Pulmonary/Chest: Breath sounds normal. No respiratory distress. He has no wheezes. He has no rhonchi. He has no rales.   Abdominal: Soft. Bowel sounds are normal. There is no tenderness. There is no rebound and no guarding.   Genitourinary: Rectal exam  shows guaiac positive stool. Guaiac positive stool. : Acceptable.  Genitourinary Comments: Bright red blood per rectum.    Musculoskeletal: Normal range of motion.   Neurological: He is alert and oriented to person, place, and time.   Skin: Skin is warm.   Psychiatric: He has a normal mood and affect. His behavior is normal. Judgment and thought content normal.       ED Course   Procedures  Labs Reviewed   COMPREHENSIVE METABOLIC PANEL - Abnormal; Notable for the following components:       Result Value    Total Bilirubin 1.4 (*)     eGFR if non  54 (*)     All other components within normal limits   CBC W/ AUTO DIFFERENTIAL - Abnormal; Notable for the following components:    RBC 4.17 (*)     Hemoglobin 13.3 (*)     Hematocrit 39.3 (*)     MCH 31.9 (*)     MPV 8.5 (*)     Lymph # 0.7 (*)     Gran% 83.3 (*)     Lymph% 9.5 (*)     All other components within normal limits   APTT   PROTIME-INR   TYPE & SCREEN        Imaging Results    None          Medical Decision Making:   History:   Old Medical Records: I decided to obtain old medical records.  Initial Assessment:   88-year-old male presented with a chief complaint of rectal bleeding.  Differential Diagnosis:   Initial differential diagnosis included but not limited to diverticulitis, colitis, medication side effect, and hemorrhoids.  Clinical Tests:   Lab Tests: Ordered and Reviewed  ED Management:  The patient was emergently evaluated in the emergency department, his evaluation was significant for an elderly male with a benign abdominal exam.  The patient's labs showed no acute abnormalities and his hemoglobin/hematocrit are stable. I paged, the patient's oncologist, Dr. Armando, without success to discuss the case with him.  I do not believe the patient needs any radiologic imaging at this point has his abdominal exam is benign.  The etiology of his symptoms could be a side effect to his chemo versus internal hemorrhoids versus an  early colitis.  The patient is stable for discharge to home.  He is instructed to call his oncologist 1st thing in the morning for follow-up and further care.            Scribe Attestation:   Scribe #1: I performed the above scribed service and the documentation accurately describes the services I performed. I attest to the accuracy of the note.        I, Dr. Zi Simpson, personally performed the services described in this documentation. All medical record entries made by the scribe were at my direction and in my presence.  I have reviewed the chart and agree that the record reflects my personal performance and is accurate and complete. Zi Simpson MD.  6:56 PM 03/12/2019          Clinical Impression:       ICD-10-CM ICD-9-CM   1. Rectal bleeding K62.5 569.3                                Zi Simpson MD  03/12/19 1856

## 2019-03-13 ENCOUNTER — TELEPHONE (OUTPATIENT)
Dept: HEMATOLOGY/ONCOLOGY | Facility: CLINIC | Age: 84
End: 2019-03-13

## 2019-03-13 DIAGNOSIS — K92.2 GASTROINTESTINAL HEMORRHAGE, UNSPECIFIED GASTROINTESTINAL HEMORRHAGE TYPE: Primary | ICD-10-CM

## 2019-03-13 NOTE — TELEPHONE ENCOUNTER
I spoke with the patient whom stated he went to the ER and the ER physicians were not concerned about a significant bleed.  The patient was subsequently discharged.  I informed the patient that he would likely need to be seen by a Gi specialist and that I would set him up for an appointment.  The patient was instructed to call our office with any further evidence of bleeding.  The patient expressed understanding.  All questions were answered to his satisfaction.    Prince Armando MD  Hematology and Oncology Fellow PGY-VI  Pager:[phone]

## 2019-03-14 ENCOUNTER — CLINICAL SUPPORT (OUTPATIENT)
Dept: REHABILITATION | Facility: HOSPITAL | Age: 84
End: 2019-03-14
Attending: SURGERY
Payer: MEDICARE

## 2019-03-14 PROCEDURE — 97140 MANUAL THERAPY 1/> REGIONS: CPT | Mod: PN

## 2019-03-18 ENCOUNTER — CLINICAL SUPPORT (OUTPATIENT)
Dept: REHABILITATION | Facility: HOSPITAL | Age: 84
End: 2019-03-18
Attending: SURGERY
Payer: MEDICARE

## 2019-03-18 ENCOUNTER — TELEPHONE (OUTPATIENT)
Dept: GASTROENTEROLOGY | Facility: CLINIC | Age: 84
End: 2019-03-18

## 2019-03-18 DIAGNOSIS — S80.829S: ICD-10-CM

## 2019-03-18 DIAGNOSIS — I89.0 LYMPHEDEMA OF RIGHT LOWER EXTREMITY: Primary | ICD-10-CM

## 2019-03-18 PROCEDURE — 98960 EDU&TRN PT SELF-MGMT NQHP 1: CPT | Mod: PN

## 2019-03-18 NOTE — PROGRESS NOTES
TIME RECORD    Date:  03/08/2019    Start Time:  1003  Stop Time:  1100    PROCEDURES:    TIMED  Procedure Time Min.   Manual therapy/CDT Start:1003  Stop:1100 57    Start:  Stop:     Start:  Stop:     Start:  Stop:          UNTIMED  Procedure Time Min.    Start:  Stop:     Start:  Stop:      Total Timed Minutes:  57  Total Timed Units:  4  Total Untimed Units:  0  Charges Billed/# of units:  4      Progress/Current Status    Subjective:     Patient ID: Shaq Bragg Jr. is a 88 y.o. male.  Diagnosis:   1. Lymphedema of right lower extremity       Pain: 0 /10      Objective:     Compression bandages removed and CDT completed. Compression bandages reapplied. Pt re instructed to remove bandages if they become painful at all and to contact this writer should any problems, concerns or questions arise.     Assessment:     Leg reducing.    Patient Education/Response:     Ongoing, needs repetition every session.    Plans and Goals:     ongoing

## 2019-03-18 NOTE — TELEPHONE ENCOUNTER
Attempted to contact patient to schedule an appointment, but he did not answer. Left voicemail for patient to return call.

## 2019-03-18 NOTE — TELEPHONE ENCOUNTER
----- Message from Ernst Lozano sent at 3/18/2019  9:09 AM CDT -----  Contact: Kelsie (with Dr. Armando): ext:  [phone]  Needs Advice    Reason for call: Kelsie stated per Dr. Armando, would like for the pt to be seen as soon as possible in the dept for Gi bleed         Communication Preference: Kelsie (with Dr. Armando): ext:  [phone]    Additional Information: first available is in June, would like to be seen sooner than that

## 2019-03-22 NOTE — PROGRESS NOTES
"TIME RECORD    Date:  03/14/2019    Start Time:  1100  Stop Time:  1145    PROCEDURES:    TIMED  Procedure Time Min.   Manual therapy/CDT Start:1100  Stop:1145 45    Start:  Stop:     Start:  Stop:     Start:  Stop:          UNTIMED  Procedure Time Min.    Start:  Stop:     Start:  Stop:      Total Timed Minutes:  45  Total Timed Units:  3  Total Untimed Units:  0  Charges Billed/# of units:  3      Progress/Current Status    Subjective:     Patient ID: Shaq Bragg Jr. is a 88 y.o. male.  Diagnosis: No diagnosis found.  Pain: 4 /10  Blister on L heel    Objective:     Pt and wife arrived. Wife had called the day before stating that pt was in pain and didn't want to bother me: I explicitly tell every patient to remove wraps if any pain is felt or call me. Inspected L heel 1.5" blister noted. Sesion included wound care and dressing only at this time. Educated wife and pt how to change dressing if neded.    Assessment:     Pt may not be a good candidate for CDT 2/2 memory impairment.     Patient Education/Response:     ongoing    Plans and Goals:     Will cont and hopefully with wife attending sessions, pt will be able to comply with protocol.      "

## 2019-03-22 NOTE — PROGRESS NOTES
TIME RECORD    Date:  03/18/2019    Start Time:  1100  Stop Time:  1130    PROCEDURES:    TIMED  Procedure Time Min.    Start:  Stop:     Start:  Stop:     Start:  Stop:     Start:  Stop:          UNTIMED  Procedure Time Min.   woundcare Start:1100  Stop:1130 30    Start:  Stop:      Total Timed Minutes:  0  Total Timed Units:  0  Total Untimed Units:  30  Charges Billed/# of units:  1      Progress/Current Status    Subjective:     Patient ID: Shaq Bragg Jr. is a 88 y.o. male.  Diagnosis:   1. Lymphedema of right lower extremity     2. Blister (nonthermal), unspecified lower leg, sequela       Pain: 4 /10  Right heel    Objective:     Wound care completed on R heel and wife reinstructed how to change dressing.    Assessment:     Wound looks better    Patient Education/Response:     ongoing    Plans and Goals:     Hopefully, with wife attending appts, pt will have a better understanding of protocol.

## 2019-03-25 ENCOUNTER — INITIAL CONSULT (OUTPATIENT)
Dept: DERMATOLOGY | Facility: CLINIC | Age: 84
End: 2019-03-25
Payer: MEDICARE

## 2019-03-25 VITALS
BODY MASS INDEX: 24.38 KG/M2 | HEART RATE: 66 BPM | SYSTOLIC BLOOD PRESSURE: 127 MMHG | DIASTOLIC BLOOD PRESSURE: 57 MMHG | WEIGHT: 180 LBS | HEIGHT: 72 IN

## 2019-03-25 DIAGNOSIS — C44.722 SQUAMOUS CELL CARCINOMA OF SKIN OF RIGHT LOWER EXTREMITY: Primary | ICD-10-CM

## 2019-03-25 PROCEDURE — 99214 PR OFFICE/OUTPT VISIT, EST, LEVL IV, 30-39 MIN: ICD-10-PCS | Mod: S$GLB,,, | Performed by: DERMATOLOGY

## 2019-03-25 PROCEDURE — 99214 OFFICE O/P EST MOD 30 MIN: CPT | Mod: S$GLB,,, | Performed by: DERMATOLOGY

## 2019-03-25 PROCEDURE — 99999 PR PBB SHADOW E&M-EST. PATIENT-LVL IV: ICD-10-PCS | Mod: PBBFAC,,, | Performed by: DERMATOLOGY

## 2019-03-25 PROCEDURE — 1101F PR PT FALLS ASSESS DOC 0-1 FALLS W/OUT INJ PAST YR: ICD-10-PCS | Mod: CPTII,S$GLB,, | Performed by: DERMATOLOGY

## 2019-03-25 PROCEDURE — 1101F PT FALLS ASSESS-DOCD LE1/YR: CPT | Mod: CPTII,S$GLB,, | Performed by: DERMATOLOGY

## 2019-03-25 PROCEDURE — 99999 PR PBB SHADOW E&M-EST. PATIENT-LVL IV: CPT | Mod: PBBFAC,,, | Performed by: DERMATOLOGY

## 2019-03-25 NOTE — Clinical Note
Garrett Jordan,Can we get your help with this patient?  Has metastatic melanoma right leg s/p excision with lymph node dissection and resulting lymphedema.  Was seeing someone in [place] for it, getting legs wrapped, then developed a heel ulcer from his leg wrap and was told he couldn't have his leg wrapped until this ulcer heals.  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  Can you help get his swelling down so we can get to his Mohs?  I think he has an appt with you in mid April and I was hoping you could see him earlier?  Thanks so much!Tony

## 2019-03-25 NOTE — PROGRESS NOTES
REFERRING MD:  Amber Anthony M.D.    CHIEF COMPLAINT:  New patient being consulted for Mohs' surgery evaluation.    HISTORY OF PRESENT ILLNESS:  88 y.o. male presents with an unknown duration of growth on the R lower leg. Patient has a h/o WLE for malignant melanoma, 1.4 mm depth, of R lower leg with positive sentinel lymph nodes in 2017 s/p excision with lymph node dissection. He was then diagnosed with lymphedema of the R leg and has been seeing Shiloh Navarro in Occupational Therapy for leg wraps. Patient then reports developing a sore on his heel and is unable to continue his leg wraps for lymphedema until his sore heals. As such, he has now developed increased swelling in his right leg. Patient also just started therapy with Nivolumab monthly infusions.    Negative for scabbing.  Negative for crusting.  Negative for bleeding.  Negative for itching.    Biopsy consistent with squamous cell carcinoma.     No prior treatment.    Pacemaker: No  Defibrillator: No  Artificial joints: No  Artificial heart valves: No    PAST MEDICAL HISTORY:  Past Medical History:   Diagnosis Date    COPD (chronic obstructive pulmonary disease)     H/O asbestosis     Hearing loss     Hypercholesterolemia     Hypertension     Malignant melanoma of right lower leg 5/11/2017    Melanoma     Approx. 2 years ago     Skin cancer     Squamous cell carcinoma        PAST SURGICAL HISTORY:  Past Surgical History:   Procedure Laterality Date    ADENOIDECTOMY      APPENDECTOMY      Carotid Stenosis Surgery      CHOLECYSTECTOMY  1965    LYMPHADENECTOMY, INGUINAL, Sartorious Flap Right 11/12/2018    Performed by Yair Alatorre MD at [facility] OR [address] [place] [address]    TONSILLECTOMY          SOCIAL HISTORY:  Dependencies:  former smoker    PERTINENT MEDICATIONS:  See medications list.  aspirin    ALLERGIES:  Patient has no known allergies.    ROS:  Skin: See HPI  Constitutional: No fatigue, fever, malaise, weight loss, or night  sweats.  Cardiovascular: No chest pain, palpitations, or edema.  Respiratory: No coughing, wheezing, SOB, or sputum production.    Physical Exam   Skin:                     General: Mood and affect normal. Alert and orient X3. Normal appearance.  Head/Face:  no suspicious lesions  RLE: R lateral lower leg with a 5 x 5 mm papule located 20 cm proximally from the R lateral malleolus and 4 cm anteriorly. 3+ pitting edema.   Lymph nodes: right popliteal are not enlarged    IMPRESSION:  Biopsy proven squamous cell carcinoma, R lower leg, path# [id number].    PLAN:  The diagnosis and the pathology report were discussed in detail with the patient. Treatment options were reviewed, including Mohs Micrographic Surgery, radiation, topical therapy, and standard excision.  After careful review of patient's history and physical exam, and after discussion of treatment options, the decision was made to refer patient to Nikki Kumar NP in [facility] for assistance in leg swelling and evaluating options for wraps to improve the edema and also heal his heel ulcer.  Would not do Mohs surgery at this time given the large amount of edema in his leg.  Once swellling has improved, then we can schedule his Mohs procedure. Risks, benefits, and alternatives of Mohs' surgery discussed with the patient. Discussed repair options including complex closure, skin flap, skin graft and second intention healing with the patient. Pre-operative instructions provided to the patient.  Discussed prolonged wound healing given location on leg and possible need for [facility] Care Clinic involvement. Limit weight bearing. Recommend using a walker or crutches. Advised patient to scrub leg with Hibiclens night before procedure.  Also discussed worsening of edema with surgical procedure - pt aware.    Spent 30 minutes in coordination of care and/or consultation with patient discussing diagnosis, treatment options, risks and benefits of each. All questions  answered.

## 2019-03-26 ENCOUNTER — TELEPHONE (OUTPATIENT)
Dept: WOUND CARE | Facility: CLINIC | Age: 84
End: 2019-03-26

## 2019-03-26 NOTE — TELEPHONE ENCOUNTER
Spoke with patient and his wife regarding request by Dr. Bowman's office for a sooner appointment date/time.  Offered patient and wife Wednesday, 4/10/19 at 9am, 1pm or 3pm - wife accepted appointment date/time for 1pm on 4/13/19.  Appointment reminder mailed out - also advised will keep patient on wait list if a sooner appointment date becomes available.

## 2019-03-27 ENCOUNTER — OFFICE VISIT (OUTPATIENT)
Dept: WOUND CARE | Facility: CLINIC | Age: 84
End: 2019-03-27
Payer: MEDICARE

## 2019-03-27 ENCOUNTER — OFFICE VISIT (OUTPATIENT)
Dept: GASTROENTEROLOGY | Facility: CLINIC | Age: 84
End: 2019-03-27
Payer: MEDICARE

## 2019-03-27 VITALS
HEART RATE: 56 BPM | SYSTOLIC BLOOD PRESSURE: 120 MMHG | DIASTOLIC BLOOD PRESSURE: 61 MMHG | HEIGHT: 72 IN | BODY MASS INDEX: 24.69 KG/M2 | WEIGHT: 182.31 LBS

## 2019-03-27 VITALS
TEMPERATURE: 97 F | DIASTOLIC BLOOD PRESSURE: 61 MMHG | HEIGHT: 72 IN | WEIGHT: 183.19 LBS | BODY MASS INDEX: 24.81 KG/M2 | SYSTOLIC BLOOD PRESSURE: 120 MMHG | HEART RATE: 56 BPM

## 2019-03-27 DIAGNOSIS — L97.412 SKIN ULCER OF RIGHT HEEL WITH FAT LAYER EXPOSED: ICD-10-CM

## 2019-03-27 DIAGNOSIS — K62.5 BRBPR (BRIGHT RED BLOOD PER RECTUM): Primary | ICD-10-CM

## 2019-03-27 DIAGNOSIS — K59.09 CHRONIC CONSTIPATION: ICD-10-CM

## 2019-03-27 DIAGNOSIS — I73.9 PERIPHERAL VASCULAR DISEASE: ICD-10-CM

## 2019-03-27 DIAGNOSIS — I89.0 LYMPHEDEMA OF RIGHT LOWER EXTREMITY: Primary | ICD-10-CM

## 2019-03-27 PROCEDURE — 99999 PR PBB SHADOW E&M-EST. PATIENT-LVL III: CPT | Mod: PBBFAC,,, | Performed by: NURSE PRACTITIONER

## 2019-03-27 PROCEDURE — 1101F PR PT FALLS ASSESS DOC 0-1 FALLS W/OUT INJ PAST YR: ICD-10-PCS | Mod: CPTII,S$GLB,, | Performed by: NURSE PRACTITIONER

## 2019-03-27 PROCEDURE — 99203 OFFICE O/P NEW LOW 30 MIN: CPT | Mod: S$GLB,,, | Performed by: NURSE PRACTITIONER

## 2019-03-27 PROCEDURE — 99999 PR PBB SHADOW E&M-EST. PATIENT-LVL IV: CPT | Mod: PBBFAC,,, | Performed by: NURSE PRACTITIONER

## 2019-03-27 PROCEDURE — 99999 PR PBB SHADOW E&M-EST. PATIENT-LVL IV: ICD-10-PCS | Mod: PBBFAC,,, | Performed by: NURSE PRACTITIONER

## 2019-03-27 PROCEDURE — 99203 PR OFFICE/OUTPT VISIT, NEW, LEVL III, 30-44 MIN: ICD-10-PCS | Mod: S$GLB,,, | Performed by: NURSE PRACTITIONER

## 2019-03-27 PROCEDURE — 99214 PR OFFICE/OUTPT VISIT, EST, LEVL IV, 30-39 MIN: ICD-10-PCS | Mod: S$GLB,,, | Performed by: NURSE PRACTITIONER

## 2019-03-27 PROCEDURE — 1101F PT FALLS ASSESS-DOCD LE1/YR: CPT | Mod: CPTII,S$GLB,, | Performed by: NURSE PRACTITIONER

## 2019-03-27 PROCEDURE — 99999 PR PBB SHADOW E&M-EST. PATIENT-LVL III: ICD-10-PCS | Mod: PBBFAC,,, | Performed by: NURSE PRACTITIONER

## 2019-03-27 PROCEDURE — 99214 OFFICE O/P EST MOD 30 MIN: CPT | Mod: S$GLB,,, | Performed by: NURSE PRACTITIONER

## 2019-03-27 RX ORDER — POLYETHYLENE GLYCOL 3350 17 G/17G
17 POWDER, FOR SOLUTION ORAL DAILY
Qty: 3060 G | Refills: 0 | Status: ON HOLD | OUTPATIENT
Start: 2019-03-27 | End: 2019-06-26

## 2019-03-27 NOTE — PROGRESS NOTES
[facility] Consultation Note    Reason for Consult:  The primary encounter diagnosis was BRBPR (bright red blood per rectum). A diagnosis of Chronic constipation was also pertinent to this visit.    PCP:   Ata Jeter   [address] [facility] [address]    Referring MD:  Prince Armando Md  [address] [place] [address]    HPI:  This is a 88 y.o. male referred here for GI bleed. He is a new patient. Accompanied by his wife. He states he had one episode BRB noticed on toilet paper when he wiped after a BM. He states this was after straining for a BM - he has had constipation for many years. Denies blood mixed in with stool. Does not take stool softeners. This was on the same day he started chemo on 3/11/19.  Does not have a BM every week. He states this has been like that all his life. He is unaware if he has ever had hemorrhoids.  Denies Black stools. Denies abdominal pain, N/V. No unintentional weight loss, f/c or night sweats.   Denies NSAIDs.  He states he has never had a colonoscopy.         ROS:  Constitutional: No fevers, chills, No weight loss  CV: No chest pain  Ophtho: No vision changes  GI: see HPI  Derm: Dry skin  : No dysuria  Neuro: No syncope    Medical History:  has a past medical history of COPD (chronic obstructive pulmonary disease), H/O asbestosis, Hearing loss, Hypercholesterolemia, Hypertension, Malignant melanoma of right lower leg (5/11/2017), Melanoma, Skin cancer, and Squamous cell carcinoma.    Surgical History:  has a past surgical history that includes Cholecystectomy (1965); Inguinal lymphadenectomy (Right, 11/12/2018); Carotid Stenosis Surgery; Appendectomy; Tonsillectomy; and Adenoidectomy.    Family History: family history includes Cancer in his father; Coronary artery disease in his mother; Diabetes in his sister; Esophageal cancer in his father; No Known Problems in his maternal aunt, maternal grandfather, maternal grandmother,  maternal uncle, paternal aunt, paternal grandfather, paternal grandmother, and paternal uncle..     Social History:  reports that he quit smoking about 54 years ago. His smoking use included cigarettes. He has a 15.00 pack-year smoking history. He has never used smokeless tobacco. He reports that he does not drink alcohol or use drugs.    Review of patient's allergies indicates:  No Known Allergies    Current Outpatient Medications on File Prior to Visit   Medication Sig Dispense Refill    aspirin (ECOTRIN) 81 MG EC tablet Take 81 mg by mouth every morning.       finasteride (PROSCAR) 5 mg tablet Take 5 mg by mouth every morning.       furosemide (LASIX) 20 MG tablet Take 20 mg by mouth every other day.       losartan-hydrochlorothiazide 50-12.5 mg (HYZAAR) 50-12.5 mg per tablet Take 1 tablet by mouth every morning.       metoprolol tartrate (LOPRESSOR) 50 MG tablet Takes 50 mg in morning and half tablet (25 mg) in evening      metoprolol tartrate (LOPRESSOR) 50 MG tablet Take 25 mg by mouth every evening. Dose is 1/2 tab at night      potassium chloride (MICRO-K) 8 mEq CpSR Take 8 mEq by mouth every other day.       simvastatin (ZOCOR) 10 MG tablet Take 10 mg by mouth every evening.      tamsulosin (FLOMAX) 0.4 mg Cp24 Take 0.4 mg by mouth every morning.        No current facility-administered medications on file prior to visit.          Objective Findings:    Vital Signs:  /61   Pulse (!) 56   Ht 6' (1.829 m)   Wt 82.7 kg (182 lb 5.1 oz)   BMI 24.73 kg/m²   Body mass index is 24.73 kg/m².    Physical Exam:  General Appearance: Well appearing in no acute distress  Head:   Normocephalic, without obvious abnormality  Eyes:    No scleral icterus  ENT: Neck supple  Lungs: CTA bilaterally in anterior and posterior fields, no wheezes, no crackles.  Heart:  Regular rate and rhythm, S1, S2 normal, no murmurs heard  Abdomen: Soft, non tender  Extremities: No edema  Skin: No rash. +Dry skin  Neurologic:  AAO x 3      Labs:  Lab Results   Component Value Date    WBC 6.90 03/12/2019    HGB 13.3 (L) 03/12/2019    HCT 39.3 (L) 03/12/2019     03/12/2019    CHOL 150 12/14/2016    TRIG 71 12/14/2016    HDL 53 12/14/2016    ALT 12 03/12/2019    AST 18 03/12/2019     03/12/2019    K 4.0 03/12/2019     03/12/2019    CREATININE 1.2 03/12/2019    BUN 16 03/12/2019    CO2 28 03/12/2019    TSH 1.430 03/11/2019    INR 1.1 03/12/2019       Imaging:    Endoscopy:    None    Assessment:  1. BRBPR (bright red blood per rectum)   Had one episode of BRB on toilet paper after wiping. This was after he had a BM and was straining as he has had constipation all his life. This was also the same day he started chemo on 3/11/19 - which he reported he was told it could cause BRBPR.   He has never had a colonoscopy as he does not wish to have one. I discussed the benefits vs the risks of performing one right now. He states he will let me know if it happens again and then he would further consider a colonoscopy.   I provided colon malignancy red flags to watch for in his AVS and he agreed he will let me know if he develops those as well.   2. Chronic constipation   He describes maybe having 1 BM every 2 weeks. This has been his normal all his life. Has never taken stool softeners or fiber as he does not like taking medications. Also denies drinking enough water.   Will start miralax and increase water intake. Extensive education provided. Goal is for patient to have 3 BMs per week or up to 3/day.          Recommendations:  1. Start Miralax BID for 2 weeks then decrease to daily if having good response.   2. Patient to let me know if BRBPR reoccurs or he develops any other alarms symptoms then we will reconsider a colonoscopy.    Follow up in about 1 month (around 4/24/2019).      Order summary:  Orders Placed This Encounter    polyethylene glycol (GLYCOLAX) 17 gram/dose powder         Thank you so much for allowing me to  participate in the care of Shaq Bragg Jr.    Dang Elena, JOSEP-C

## 2019-03-27 NOTE — PATIENT INSTRUCTIONS
Wound Care  Taking proper care of your wound will help it heal. Your healthcare provider may show you how to clean and dress the wound. He or she will also explain how to tell if the wound is healing normally. Here are the basic steps:      A wound that's not healing normally may be dark in color or have white streaks.    Wash Your Hands  · Use liquid soap and lather for 2 minutes. Scrub between your fingers and under your nails.  · Rinse with warm water, keeping your fingers pointing down.  · Use a paper towel to dry your hands and to turn off the faucet.  Remove the Used Dressing  · Set up your supplies.  · Put on disposable gloves if youre dressing a wound for someone else or your wound is infected.  · Gently take off the old dressing. If you have a drain or tube in the wound, be careful not to pull on it.  · Loosen the tape by pulling gently toward the wound.  · Remove the dressing one layer at a time and put it in a plastic bag.  · Remove your gloves.  Inspect and Dress the Wound  · Each time you change the dressing, inspect the wound carefully to be sure its healing normally.  · Wash your hands again. Put on a new pair of gloves if youre dressing a wound for someone else or if your wound is infected.  · Clean and dress the wound as directed by your doctor or nurse. If you have a drain or tube, be careful not to pull on it.  · Put all supplies in a plastic bag; seal the bag and put it in the trash.  · Be sure to wash your hands again.  Call your healthcare provider if you see any of the following signs of a problem:   · Bleeding that soaks the dressing  · Pink fluid weeping from the wound  · Increased drainage or drainage that is yellow, yellow-green, or foul-smelling  · Increased swelling or pain, or redness or swelling in the skin around the wound  · A change in the color of the wound  · An increase in the size of the wound  · A fever over 101.0°F, increased fatigue, or a loss of appetite.       ©  8455-9907 [facility], [address] [place] [address], [place] [address]. All rights reserved. This information is not intended as a substitute for professional medical care. Always follow your healthcare professional's instructions.    You may shower using a mild soap such as Dove.  Irrigate the wound with lukewarm water for 5 minutes and dry thoroughly.  Apply medihoney gel to the wound, cover with cotton gauze and secure with roll gauze.  Change dressing daily.  Report any signs of infection.      You may purchase your supplies from [facility] located at [address] [place] [address].  The telephone number is [phone].  Exit the hospital on [address] and turn left going towards [place].  When you cross the railroad tracks keep going straight.  Do not veer left.  [facility] is a few blocks down on your right one block from [facility].  It is a [employer] pink building on the corner and parking is in the rear of the building.

## 2019-03-27 NOTE — PROGRESS NOTES
Subjective:       Patient ID: Shaq Bragg Jr. is a 88 y.o. male.    Chief Complaint: Wound Check    HPI   88 y.o. male referred by Dr. Bowman for evaluation and management of an ulcer to the right heel.  His medical history is significant for COPD, h/o asbestosis, hypertension, hypercholesterolemia, malignant melanoma and SCC. Patient has a h/o WLE for malignant melanoma, 1.4 mm depth, of R lower leg with positive sentinel lymph nodes in 2017 s/p excision with lymph node dissection. He was then diagnosed with lymphedema of the R leg and has been seeing Shiloh Navarro in Occupational Therapy for leg wraps. Patient then reports developing a sore on his heel and is unable to continue his leg wraps for lymphedema until his sore heals. As such, he has now developed increased swelling in his right leg. Patient also just started therapy with Nivolumab monthly infusions. He has a vaseline gauze pad, a hydrofiber dressing and a silver foam dressing on the wound and it is not healing. He is afebrile. He denies increased redness, swelling or purulent drainage. His pain level is 8/10.    Review of Systems   Constitutional: Negative for chills, diaphoresis and fever.   HENT: Positive for hearing loss and rhinorrhea. Negative for postnasal drip, sinus pressure, sneezing, sore throat, tinnitus and trouble swallowing.    Eyes: Negative for visual disturbance.   Respiratory: Positive for cough. Negative for apnea, shortness of breath and wheezing.    Cardiovascular: Positive for leg swelling. Negative for chest pain and palpitations.   Gastrointestinal: Positive for constipation. Negative for diarrhea, nausea and vomiting.   Genitourinary: Negative for difficulty urinating, dysuria, frequency and hematuria.   Musculoskeletal: Positive for back pain. Negative for arthralgias and joint swelling.   Skin: Positive for wound.   Neurological: Negative for dizziness, weakness, light-headedness and headaches.   Hematological:  Bruises/bleeds easily.   Psychiatric/Behavioral: Negative for confusion, decreased concentration, dysphoric mood and sleep disturbance. The patient is nervous/anxious.        Objective:      Physical Exam   Constitutional: He is oriented to person, place, and time. He appears well-developed and well-nourished. No distress.   HENT:   Head: Normocephalic and atraumatic.   Mouth/Throat: Oropharynx is clear and moist.   Eyes: Pupils are equal, round, and reactive to light. Conjunctivae and EOM are normal. Right eye exhibits no discharge. Left eye exhibits no discharge. No scleral icterus.   Neck: Neck supple. No JVD present. No tracheal deviation present. No thyromegaly present.   Cardiovascular: Normal rate, regular rhythm and normal heart sounds. Exam reveals no gallop and no friction rub.   No murmur heard.  Pedal pulses non-palpable.   Pulmonary/Chest: Effort normal and breath sounds normal. No respiratory distress. He has no wheezes. He has no rales.   Abdominal: Soft. Bowel sounds are normal. He exhibits no distension. There is no tenderness.   Musculoskeletal: Normal range of motion. He exhibits edema (lymphedema right lower leg). He exhibits no tenderness.        Feet:    Neurological: He is alert and oriented to person, place, and time.   Skin: Skin is warm and dry. No rash noted. He is not diaphoretic. No erythema.   Psychiatric: He has a normal mood and affect. His behavior is normal. Judgment and thought content normal.   Nursing note and vitals reviewed.      Assessment:       1. Lymphedema of right lower extremity    2. Skin ulcer of right heel with fat layer exposed    3. Peripheral vascular disease               Plan:            BASILIA/PVRs.  Apply medihoney gel daily to right heel ulcer, cover with gauze and secure with roll gauze.  Darco shoe right foot.  Return to clinic in one month.

## 2019-03-27 NOTE — PATIENT INSTRUCTIONS
Goal is to have 3 BMS a week  Start taking 64 oz of water a day.    Start taking the miralax two times a day for two weeks. One capful in the morning and once in evening. If at two weeks your bowels are moving well, start taking the Miralax Once in the evening.    Please let me know if you develop the rectal bleeding again or if you develop night sweats, abdominal pain, fever, chills, or unintentional weight loss.    Number to Dr. Clark's office in [facility] for April 11 appt:  [phone]

## 2019-03-27 NOTE — LETTER
March 27, 2019      Tony Bowman MD  [address]  [facility] [address]           [facility]  [address]  [facility] [address]  Phone: [phone]          Patient: Shaq Bragg Jr.   MR Number: [id number]   YOB: 1930   Date of Visit: 3/27/2019       Dear Dr. Tony Bowman:    Thank you for referring Shaq Bragg to me for evaluation. Attached you will find relevant portions of my assessment and plan of care.    If you have questions, please do not hesitate to call me. I look forward to following Shaq Brgag along with you.    Sincerely,    Malka Kumar, DANIELLE    Enclosure  CC:  No Recipients    If you would like to receive this communication electronically, please contact [email or web address] or [phone] to request more information on [employer] access.    For providers and/or their staff who would like to refer a patient to [facility], please contact us through our one-stop-shop provider referral line, [facility] , at [phone].    If you feel you have received this communication in error or would no longer like to receive these types of communications, please e-mail [email or web address]

## 2019-03-27 NOTE — LETTER
March 27, 2019      Prince Armando MD  [address] [facility] [address]           [facility]  [address]  [facility] [address]  Phone: [phone]  Fax: [phone]          Patient: Shaq Bragg Jr.   MR Number: [id number]   YOB: 1930   Date of Visit: 3/27/2019       Dear Dr. Prince Armando:    Thank you for referring Shaq Bragg to me for evaluation. Attached you will find relevant portions of my assessment and plan of care.    If you have questions, please do not hesitate to call me. I look forward to following Shaq Bragg along with you.    Sincerely,    Dang Elena, DANIELLE    Enclosure  CC:  No Recipients    If you would like to receive this communication electronically, please contact [email or web address] or [phone] to request more information on [employer] access.    For providers and/or their staff who would like to refer a patient to [facility], please contact us through our one-stop-shop provider referral line, [facility], at [phone].    If you feel you have received this communication in error or would no longer like to receive these types of communications, please e-mail [email or web address]

## 2019-03-29 ENCOUNTER — TELEPHONE (OUTPATIENT)
Dept: WOUND CARE | Facility: CLINIC | Age: 84
End: 2019-03-29

## 2019-04-01 ENCOUNTER — LAB VISIT (OUTPATIENT)
Dept: LAB | Facility: HOSPITAL | Age: 84
End: 2019-04-01
Attending: SPECIALIST
Payer: MEDICARE

## 2019-04-01 DIAGNOSIS — I20.0 INTERMEDIATE CORONARY SYNDROME: Primary | ICD-10-CM

## 2019-04-01 DIAGNOSIS — R07.89 OTHER CHEST PAIN: ICD-10-CM

## 2019-04-01 DIAGNOSIS — Z79.899 NEED FOR PROPHYLACTIC CHEMOTHERAPY: ICD-10-CM

## 2019-04-01 DIAGNOSIS — Z13.29 SCREENING FOR THYROID DISORDER: ICD-10-CM

## 2019-04-01 DIAGNOSIS — E78.9 DISORDER OF LIPOPROTEIN AND LIPID METABOLISM: ICD-10-CM

## 2019-04-01 DIAGNOSIS — E05.90 PRETIBIAL MYXEDEMA: ICD-10-CM

## 2019-04-01 LAB
ALBUMIN SERPL BCP-MCNC: 3.8 G/DL (ref 3.5–5.2)
ALP SERPL-CCNC: 105 U/L (ref 55–135)
ALT SERPL W/O P-5'-P-CCNC: 15 U/L (ref 10–44)
ANION GAP SERPL CALC-SCNC: 8 MMOL/L (ref 8–16)
AST SERPL-CCNC: 19 U/L (ref 10–40)
BASOPHILS # BLD AUTO: 0 K/UL (ref 0–0.2)
BASOPHILS NFR BLD: 0.2 % (ref 0–1.9)
BILIRUB SERPL-MCNC: 1.1 MG/DL (ref 0.1–1)
BUN SERPL-MCNC: 19 MG/DL (ref 8–23)
CALCIUM SERPL-MCNC: 9.8 MG/DL (ref 8.7–10.5)
CHLORIDE SERPL-SCNC: 103 MMOL/L (ref 95–110)
CHOLEST SERPL-MCNC: 152 MG/DL (ref 120–199)
CHOLEST/HDLC SERPL: 2.6 {RATIO} (ref 2–5)
CO2 SERPL-SCNC: 29 MMOL/L (ref 23–29)
CREAT SERPL-MCNC: 1.1 MG/DL (ref 0.5–1.4)
DIFFERENTIAL METHOD: ABNORMAL
EOSINOPHIL # BLD AUTO: 0.1 K/UL (ref 0–0.5)
EOSINOPHIL NFR BLD: 1 % (ref 0–8)
ERYTHROCYTE [DISTWIDTH] IN BLOOD BY AUTOMATED COUNT: 13.4 % (ref 11.5–14.5)
EST. GFR  (AFRICAN AMERICAN): >60 ML/MIN/1.73 M^2
EST. GFR  (NON AFRICAN AMERICAN): 60 ML/MIN/1.73 M^2
GLUCOSE SERPL-MCNC: 104 MG/DL (ref 70–110)
HCT VFR BLD AUTO: 41.3 % (ref 40–54)
HDLC SERPL-MCNC: 59 MG/DL (ref 40–75)
HDLC SERPL: 38.8 % (ref 20–50)
HGB BLD-MCNC: 13.8 G/DL (ref 14–18)
LDLC SERPL CALC-MCNC: 77.6 MG/DL (ref 63–159)
LYMPHOCYTES # BLD AUTO: 0.8 K/UL (ref 1–4.8)
LYMPHOCYTES NFR BLD: 11.3 % (ref 18–48)
MCH RBC QN AUTO: 31.7 PG (ref 27–31)
MCHC RBC AUTO-ENTMCNC: 33.4 G/DL (ref 32–36)
MCV RBC AUTO: 95 FL (ref 82–98)
MONOCYTES # BLD AUTO: 0.4 K/UL (ref 0.3–1)
MONOCYTES NFR BLD: 6.1 % (ref 4–15)
NEUTROPHILS # BLD AUTO: 5.4 K/UL (ref 1.8–7.7)
NEUTROPHILS NFR BLD: 81.4 % (ref 38–73)
NONHDLC SERPL-MCNC: 93 MG/DL
PLATELET # BLD AUTO: 187 K/UL (ref 150–350)
PMV BLD AUTO: 8.8 FL (ref 9.2–12.9)
POTASSIUM SERPL-SCNC: 3.3 MMOL/L (ref 3.5–5.1)
PROT SERPL-MCNC: 7 G/DL (ref 6–8.4)
RBC # BLD AUTO: 4.36 M/UL (ref 4.6–6.2)
SODIUM SERPL-SCNC: 140 MMOL/L (ref 136–145)
TRIGL SERPL-MCNC: 77 MG/DL (ref 30–150)
TSH SERPL DL<=0.005 MIU/L-ACNC: 1.74 UIU/ML (ref 0.4–4)
WBC # BLD AUTO: 6.7 K/UL (ref 3.9–12.7)

## 2019-04-01 PROCEDURE — 85025 COMPLETE CBC W/AUTO DIFF WBC: CPT

## 2019-04-01 PROCEDURE — 36415 COLL VENOUS BLD VENIPUNCTURE: CPT

## 2019-04-01 PROCEDURE — 80061 LIPID PANEL: CPT

## 2019-04-01 PROCEDURE — 84443 ASSAY THYROID STIM HORMONE: CPT

## 2019-04-01 PROCEDURE — 80053 COMPREHEN METABOLIC PANEL: CPT

## 2019-04-04 ENCOUNTER — TELEPHONE (OUTPATIENT)
Dept: WOUND CARE | Facility: CLINIC | Age: 84
End: 2019-04-04

## 2019-04-04 NOTE — TELEPHONE ENCOUNTER
Patient's wife called about follow up appointment which she states is on 4/10/19 but there is no vaslab study on this day.  In reviewing the patient's scheduled appointment, I explained that the patient initially had and appointment with Nikki on 4/10/19, however, that initial consult was rescheduled to 3/27/19 due to a cancellation and the patient needed to be seen sooner.  On 3/27/19, the patient was scheduled a follow up appointment for 4/24/19 at 140pm  with wound care and a vaslab study prior on 4/24/19 at 12pm.  I explained to patient's wife there is no available appointments on 4/10/19 however, if I should get a cancellation, I will call to reschedule but  the vaslab appointment may not be able to change due to availability.

## 2019-04-07 NOTE — PROGRESS NOTES
PATIENT: Shaq Bragg Jr.  MRN: [id number]  DATE: 4/8/2019      Diagnosis:   1. Pressure injury of right ankle, stage 4    2. Malignant melanoma of right lower leg    3. Spinal stenosis, unspecified spinal region        Chief Complaint: Follow-up      Oncologic History:      Oncologic History 3/21/17 Skin Excisional biopsy  4/18/17 MOS with SLN biopsy  1/04/18 PET/CT - no recurrent or metastatic disease  10/30/19 PET/CT - 2 hypermetabolic LN's in right groin  11/03/18 MRI brain  11/12/18 Completion Lymphadenectomy  2/15/19 PET/CT     Oncologic Treatment 3/21/17 Skin Excisional biopsy  4/18/17 MOS with SLN biopsy  11/12/18 Completion Lymphadenectomy    Pathology 3/21/17 - 1.4mm malignant melanoma Alexis level IV, no ulceration, perineural invasion seen, mitotic rate 6/mm2  4/18/17 -  no residual melanoma; microscopic mets in 2 sentinel LNs  11/12/18 - ¾ LNs positive for metastatic melanoma with the largest deposit being 2.4cm an positive extranodal extension      Oncologic History:  Pt initially presented with a lesion on his right proximal calf in March 2017.  He underwent a biopsy on 3/21/17 with pathology showing a 1.4mm malignant melanoma Alexis level IV, no ulceration, perineural invasion seen, mitotic rate 6/mm2.  The patient then underwent  a MOS procedure on 4/18/17 and right inguinal LN biopsy on 4/18/17 under the care of Dr Joelle Pardo at [facility].  The pathology from the procedure initially showed no residual melanoma and no LN involvement ; however, on reevaluation showed microscopic mets in 2 sentinel LNs.  The patient established care with Medical Oncologist Dr Olivier Mchugh.  The surgical oncologist Dr. Alatorre offered Lymphadenectomy on 5/24/17; however, the patient elected to have observation.  PET/CT was performed on 1/04/18 showing no evidence of residual or recurrent disease.  On 10/16/18 the patient called Dr Holland office with complaint of 2 lumps in the right leg.  PET/CT was performed on  10/30/19 showing 2 new hypermetabolic lymph nodes right groin with the index lymph node lateral SUV max 30.02.  The patient had an appointment with Dr Alatorre on 10/30/19 at which point it was decided to proceed with a completion lymphadenectomy.   An MRI of the brain was performed on 11/03/18 showing no evidence for intracranial metastatic disease but did show a 4mm T1 hyperintense occipital calvarial focus which was read as likely benign.  The patient underwent completion LAD on 11/12/18 with pathology showing ¾ LNs positive for metastatic melanoma with the largest deposit being 2.4cm an positive extranodal extension. Currently the patient complains of chronic lower back pain for which he underwent an MRI of the lumbar spine on 11/03/18 which showed severe central canal stenosis related to a disk bulge at L3-L4.  The patient endorses some LE weakness on occasion.  He denies bowel or bladder incontinence.  The patient states he sees a deramtologist semi annually Dr Sun at [facility] with last clinic visit reportedly several months ago.  The patient underwent a PET/CT on 2/15/19 showing a focus of increased tracer uptake within the T 11 vertebral body with max SUV of 4.1 that was not visualized on prior exam, increased radiotracer uptake in the right groin max SUV 5.3 and a subcutaneous focus of increased tracer uptake within the anterior portion of the distal right leg showing a max SUV of 3.7.    Subjective:    Interval History: Mr. Bragg is a 88 y.o. male who presents for follow up on recently diagnosed malignant melanoma.  Since the last visit the patient was seen by dermatology on 2/22/19 and underwent a shave biopsy of the right lower leg lesion showing SCC.  On 3/12/19 the patient contacted our office stating he had a large amount of BRBPR.  The patient was seen in the ER on 3/12/19 with relatively stable hemoglobin and was deferred for outpatient GI evaluation.  On 3/25/19 Dr. Bowman in derm  decided to postpone a Mohs procedure for SCC of the right lower leg until the swelling in the right leg had decreased.  The patient was seen on 3/27/19 by wound care for an ulcer on his right heel at which point recommendations were made for bandaging of the right heel.  The patient saw GI on 3/27/19 at which point the patient decided not to pursue a colonoscopy.  Currently the patient complains of pain in the right heel and states the wound is not healing and is getting worse.  He denies fever, chills.  He also complains of continued swelling of the right leg and redness of the right leg.  He denies CP, SOB, N/V, constipation, diarrhea.    Past Medical History:   Past Medical History:   Diagnosis Date    COPD (chronic obstructive pulmonary disease)     H/O asbestosis     Hearing loss     Hypercholesterolemia     Hypertension     Malignant melanoma of right lower leg 5/11/2017    Melanoma     Approx. 2 years ago     Skin cancer     Squamous cell carcinoma        Past Surgical HIstory:   Past Surgical History:   Procedure Laterality Date    ADENOIDECTOMY      APPENDECTOMY      Carotid Stenosis Surgery      CHOLECYSTECTOMY  1965    LYMPHADENECTOMY, INGUINAL, [place] Flap Right 11/12/2018    [facility]    TONSILLECTOMY         Family History:   Family History   Problem Relation Age of Onset    Coronary artery disease Mother     Cancer Father         Esophageal    Esophageal cancer Father     Diabetes Sister     No Known Problems Maternal Aunt     No Known Problems Maternal Uncle     No Known Problems Paternal Aunt     No Known Problems Paternal Uncle     No Known Problems Maternal Grandmother     No Known Problems Maternal Grandfather     No Known Problems Paternal Grandmother     No Known Problems Paternal Grandfather     Melanoma Neg Hx     Psoriasis Neg Hx     Lupus Neg Hx        Social History:  reports that he quit smoking about 54  years ago. His smoking use included cigarettes. He has a 15.00 pack-year smoking history. He has never used smokeless tobacco. He reports that he does not drink alcohol or use drugs.    Allergies:  Review of patient's allergies indicates:  No Known Allergies    Medications:  No current facility-administered medications for this visit.      Current Outpatient Medications   Medication Sig Dispense Refill    aspirin (ECOTRIN) 81 MG EC tablet Take 81 mg by mouth every morning.       finasteride (PROSCAR) 5 mg tablet Take 5 mg by mouth every morning.       furosemide (LASIX) 20 MG tablet Take 20 mg by mouth every other day.       losartan-hydrochlorothiazide 50-12.5 mg (HYZAAR) 50-12.5 mg per tablet Take 1 tablet by mouth every morning.       metoprolol tartrate (LOPRESSOR) 50 MG tablet Takes 50 mg in morning and half tablet (25 mg) in evening      metoprolol tartrate (LOPRESSOR) 50 MG tablet Take 25 mg by mouth every evening. Dose is 1/2 tab at night      polyethylene glycol (GLYCOLAX) 17 gram/dose powder Take 17 g by mouth once daily. 3060 g 0    potassium chloride (MICRO-K) 8 mEq CpSR Take 8 mEq by mouth every other day.       simvastatin (ZOCOR) 10 MG tablet Take 10 mg by mouth every evening.      tamsulosin (FLOMAX) 0.4 mg Cp24 Take 0.4 mg by mouth every morning.        Facility-Administered Medications Ordered in Other Visits   Medication Dose Route Frequency Provider Last Rate Last Dose    vancomycin 1750 mg in 0.9% sodium chloride 500 mL IVPB  20 mg/kg Intravenous ED 1 Time Heavenly Garcia MD           Review of Systems   Constitutional: Negative for chills, diaphoresis, fatigue, fever and unexpected weight change.   Respiratory: Negative for cough and shortness of breath.    Cardiovascular: Positive for leg swelling (right leg ). Negative for chest pain and palpitations.   Gastrointestinal: Negative for abdominal pain, constipation, diarrhea, nausea and vomiting.   Musculoskeletal: Positive for  back pain.   Skin: Positive for color change (redness of right lower leg) and wound (right heel). Negative for rash.   Neurological: Negative for headaches.   Hematological: Negative for adenopathy. Does not bruise/bleed easily.       ECOG Performance Status: 1   Objective:      Vitals:   Vitals:    04/08/19 1317   BP: (!) 167/65   BP Location: Right arm   Patient Position: Sitting   BP Method: Medium (Automatic)   Pulse: 77   Resp: 20   Temp: 98 °F (36.7 °C)   TempSrc: Oral   SpO2: 96%   Weight: 82.6 kg (182 lb 1.6 oz)   Height: 6' (1.829 m)     BMI: Body mass index is 24.7 kg/m².    Physical Exam   Constitutional: He is oriented to person, place, and time. He appears well-developed and well-nourished. No distress.   HENT:   Head: Normocephalic and atraumatic.   Cardiovascular: Normal rate, regular rhythm, normal heart sounds and intact distal pulses. Exam reveals no gallop and no friction rub.   No murmur heard.  Pulmonary/Chest: Effort normal and breath sounds normal. No respiratory distress. He has no wheezes. He has no rales. He exhibits no tenderness.   Abdominal: Soft. Bowel sounds are normal. He exhibits no distension and no mass. There is no tenderness. There is no rebound.   Musculoskeletal: Normal range of motion. He exhibits edema (right leg).   Lymphadenopathy:        Head (right side): No submental adenopathy present.        Head (left side): No submental adenopathy present.     He has no cervical adenopathy.     He has no axillary adenopathy.        Right: No inguinal and no supraclavicular adenopathy present.        Left: No inguinal and no supraclavicular adenopathy present.   Neurological: He is alert and oriented to person, place, and time.   Skin: Skin is dry. No rash noted. He is not diaphoretic. No erythema. No pallor.   Ulcer on right heel exposing the achilles tendon.  Pt with profuse bleeding after removal of the bandage.       Laboratory Data:  Admission on 04/08/2019   Component Date Value  Ref Range Status    Sodium 04/08/2019 138  136 - 145 mmol/L Final    Potassium 04/08/2019 3.6  3.5 - 5.1 mmol/L Final    Chloride 04/08/2019 101  95 - 110 mmol/L Final    CO2 04/08/2019 26  23 - 29 mmol/L Final    Glucose 04/08/2019 109  70 - 110 mg/dL Final    BUN, Bld 04/08/2019 18  8 - 23 mg/dL Final    Creatinine 04/08/2019 1.0  0.5 - 1.4 mg/dL Final    Calcium 04/08/2019 9.9  8.7 - 10.5 mg/dL Final    Total Protein 04/08/2019 7.5  6.0 - 8.4 g/dL Final    Albumin 04/08/2019 3.6  3.5 - 5.2 g/dL Final    Total Bilirubin 04/08/2019 1.6* 0.1 - 1.0 mg/dL Final    Comment: For infants and newborns, interpretation of results should be based  on gestational age, weight and in agreement with clinical  observations.  Premature Infant recommended reference ranges:  Up to 24 hours.............<8.0 mg/dL  Up to 48 hours............<12.0 mg/dL  3-5 days..................<15.0 mg/dL  6-29 days.................<15.0 mg/dL      Alkaline Phosphatase 04/08/2019 122  55 - 135 U/L Final    AST 04/08/2019 17  10 - 40 U/L Final    ALT 04/08/2019 12  10 - 44 U/L Final    Anion Gap 04/08/2019 11  8 - 16 mmol/L Final    eGFR if African American 04/08/2019 >60.0  >60 mL/min/1.73 m^2 Final    eGFR if non African American 04/08/2019 >60.0  >60 mL/min/1.73 m^2 Final    Comment: Calculation used to obtain the estimated glomerular filtration  rate (eGFR) is the CKD-EPI equation.       WBC 04/08/2019 8.41  3.90 - 12.70 K/uL Final    RBC 04/08/2019 4.46* 4.60 - 6.20 M/uL Final    Hemoglobin 04/08/2019 14.6  14.0 - 18.0 g/dL Final    Hematocrit 04/08/2019 42.9  40.0 - 54.0 % Final    MCV 04/08/2019 96  82 - 98 fL Final    MCH 04/08/2019 32.7* 27.0 - 31.0 pg Final    MCHC 04/08/2019 34.0  32.0 - 36.0 g/dL Final    RDW 04/08/2019 12.8  11.5 - 14.5 % Final    Platelets 04/08/2019 189  150 - 350 K/uL Final    MPV 04/08/2019 11.4  9.2 - 12.9 fL Final    Immature Granulocytes 04/08/2019 0.1  0.0 - 0.5 % Final    Gran #  (ANC) 04/08/2019 6.7  1.8 - 7.7 K/uL Final    Immature Grans (Abs) 04/08/2019 0.01  0.00 - 0.04 K/uL Final    Comment: Mild elevation in immature granulocytes is non specific and   can be seen in a variety of conditions including stress response,   acute inflammation, trauma and pregnancy. Correlation with other   laboratory and clinical findings is essential.      Lymph # 04/08/2019 1.0  1.0 - 4.8 K/uL Final    Mono # 04/08/2019 0.7  0.3 - 1.0 K/uL Final    Eos # 04/08/2019 0.1  0.0 - 0.5 K/uL Final    Baso # 04/08/2019 0.03  0.00 - 0.20 K/uL Final    nRBC 04/08/2019 0  0 /100 WBC Final    Gran% 04/08/2019 79.1* 38.0 - 73.0 % Final    Lymph% 04/08/2019 11.5* 18.0 - 48.0 % Final    Mono% 04/08/2019 8.3  4.0 - 15.0 % Final    Eosinophil% 04/08/2019 0.6  0.0 - 8.0 % Final    Basophil% 04/08/2019 0.4  0.0 - 1.9 % Final    Differential Method 04/08/2019 Automated   Final    Sed Rate 04/08/2019 16  0 - 23 mm/Hr Final    CRP 04/08/2019 119.9* 0.0 - 8.2 mg/L Final    CPK 04/08/2019 74  20 - 200 U/L Final   Lab Visit on 04/08/2019   Component Date Value Ref Range Status    WBC 04/08/2019 8.86  3.90 - 12.70 K/uL Final    RBC 04/08/2019 4.23* 4.60 - 6.20 M/uL Final    Hemoglobin 04/08/2019 13.6* 14.0 - 18.0 g/dL Final    Hematocrit 04/08/2019 39.7* 40.0 - 54.0 % Final    MCV 04/08/2019 94  82 - 98 fL Final    MCH 04/08/2019 32.2* 27.0 - 31.0 pg Final    MCHC 04/08/2019 34.3  32.0 - 36.0 g/dL Final    RDW 04/08/2019 13.0  11.5 - 14.5 % Final    Platelets 04/08/2019 215  150 - 350 K/uL Final    MPV 04/08/2019 10.3  9.2 - 12.9 fL Final    Gran # (ANC) 04/08/2019 7.2  1.8 - 7.7 K/uL Final    Comment: The ANC is based on a white cell differential from an   automated cell counter. It has not been microscopically   reviewed for the presence of abnormal cells. Clinical   correlation is required.      Immature Grans (Abs) 04/08/2019 0.03  0.00 - 0.04 K/uL Final    Comment: Mild elevation in immature  granulocytes is non specific and   can be seen in a variety of conditions including stress response,   acute inflammation, trauma and pregnancy. Correlation with other   laboratory and clinical findings is essential.      Sodium 04/08/2019 141  136 - 145 mmol/L Final    Potassium 04/08/2019 4.2  3.5 - 5.1 mmol/L Final    Chloride 04/08/2019 103  95 - 110 mmol/L Final    CO2 04/08/2019 30* 23 - 29 mmol/L Final    Glucose 04/08/2019 104  70 - 110 mg/dL Final    BUN, Bld 04/08/2019 18  8 - 23 mg/dL Final    Creatinine 04/08/2019 1.1  0.5 - 1.4 mg/dL Final    Calcium 04/08/2019 9.7  8.7 - 10.5 mg/dL Final    Total Protein 04/08/2019 6.9  6.0 - 8.4 g/dL Final    Albumin 04/08/2019 3.3* 3.5 - 5.2 g/dL Final    Total Bilirubin 04/08/2019 1.6* 0.1 - 1.0 mg/dL Final    Comment: For infants and newborns, interpretation of results should be based  on gestational age, weight and in agreement with clinical  observations.  Premature Infant recommended reference ranges:  Up to 24 hours.............<8.0 mg/dL  Up to 48 hours............<12.0 mg/dL  3-5 days..................<15.0 mg/dL  6-29 days.................<15.0 mg/dL      Alkaline Phosphatase 04/08/2019 109  55 - 135 U/L Final    AST 04/08/2019 15  10 - 40 U/L Final    ALT 04/08/2019 12  10 - 44 U/L Final    Anion Gap 04/08/2019 8  8 - 16 mmol/L Final    eGFR if African American 04/08/2019 >60.0  >60 mL/min/1.73 m^2 Final    eGFR if non African American 04/08/2019 59.6* >60 mL/min/1.73 m^2 Final    Comment: Calculation used to obtain the estimated glomerular filtration  rate (eGFR) is the CKD-EPI equation.            Imaging:  PET/CT 2/15/19  Since the prior exam the patient has undergone right groin surgery.  There is moderate uptake seen in this region possibly postsurgical in nature however residual or recurrent disease cannot be excluded.    There is a new focus of increased tracer uptake within the T11 vertebral body concerning for metastatic  lesion.    There is a focus of uptake within the subcutaneous tissues of the right anterior lower leg possibly representing subcutaneous lesion however developing metastatic lesion cannot be excluded.    MRI Brain 2/11/19  No evidence of intracranial metastatic disease      Assessment:       1. Pressure injury of right ankle, stage 4    2. Malignant melanoma of right lower leg    3. Spinal stenosis, unspecified spinal region           Plan:     Stage V ulcer of the right heel - patient has a stage IV ulcer on the right heel with the achilles tendon exposed.  -There is a foul stench coming from the wound as well as surrounding swelling and erythema of the right leg  -There is concern for osteomyelitis versus cellulitis in the right leg  -Will have the patient go to the Er for evaluation and possible IV antibiotics.    Malignant Melanoma of the right leg Stage IIIB -  The patient has a h/o melanoma of the left leg originally diagnosed in 2017  -Lymphadenectomy showed two positive LN's  -Patient's tumor is BRAF [id number] wild type  -[facility] showed no evidence of metastatic disease  -[facility] PET/CT on 2/05/19 showed uptake in the T11 spine, right lower leg and right groin  -Recent skin biopsy showed SCC  -Pt is s/p cycle 1 of nivolumab  -Vitor hold off on second cycle today given current heel infection and possible need for hospital admission    Lymphedema - The patient has chronic lymphedema s/p lymphadenectomy.  -Edema may be contributing to poor wound healing of the right heel ulcer    Severe Spinal Stenosis - pt seen to have severe spinal stenosis at the L3-L4 region due to disc bulge  -The patient was seen by Dr Patel on 3/01/19 with no concern for the need of surgical intervention.    -Discussed with Dr Irving Armando MD PGY-VI  Hematology and Oncology Fellow  Pager:[phone]

## 2019-04-08 ENCOUNTER — OFFICE VISIT (OUTPATIENT)
Dept: HEMATOLOGY/ONCOLOGY | Facility: CLINIC | Age: 84
DRG: 300 | End: 2019-04-08
Payer: MEDICARE

## 2019-04-08 ENCOUNTER — HOSPITAL ENCOUNTER (OUTPATIENT)
Facility: HOSPITAL | Age: 84
Discharge: HOME | DRG: 300 | End: 2019-04-11
Attending: EMERGENCY MEDICINE | Admitting: EMERGENCY MEDICINE
Payer: MEDICARE

## 2019-04-08 VITALS
WEIGHT: 182.13 LBS | HEIGHT: 72 IN | OXYGEN SATURATION: 96 % | BODY MASS INDEX: 24.67 KG/M2 | SYSTOLIC BLOOD PRESSURE: 167 MMHG | HEART RATE: 77 BPM | DIASTOLIC BLOOD PRESSURE: 65 MMHG | RESPIRATION RATE: 20 BRPM | TEMPERATURE: 98 F

## 2019-04-08 DIAGNOSIS — L89.514 PRESSURE INJURY OF RIGHT ANKLE, STAGE 4: Primary | ICD-10-CM

## 2019-04-08 DIAGNOSIS — L08.9 INFECTED ULCER OF SKIN, WITH FAT LAYER EXPOSED: ICD-10-CM

## 2019-04-08 DIAGNOSIS — C43.71 MALIGNANT MELANOMA OF RIGHT LOWER LEG: ICD-10-CM

## 2019-04-08 DIAGNOSIS — I89.0 LYMPHEDEMA OF RIGHT LOWER EXTREMITY: ICD-10-CM

## 2019-04-08 DIAGNOSIS — L98.492 INFECTED ULCER OF SKIN, WITH FAT LAYER EXPOSED: ICD-10-CM

## 2019-04-08 DIAGNOSIS — M48.00 SPINAL STENOSIS, UNSPECIFIED SPINAL REGION: ICD-10-CM

## 2019-04-08 DIAGNOSIS — R60.0 LEG EDEMA, RIGHT: ICD-10-CM

## 2019-04-08 DIAGNOSIS — L97.309 ULCER OF ANKLE: Primary | ICD-10-CM

## 2019-04-08 PROBLEM — N40.0 BPH (BENIGN PROSTATIC HYPERPLASIA): Status: ACTIVE | Noted: 2019-04-08

## 2019-04-08 PROBLEM — E78.5 HLD (HYPERLIPIDEMIA): Status: ACTIVE | Noted: 2019-04-08

## 2019-04-08 PROBLEM — I10 ESSENTIAL HYPERTENSION: Status: ACTIVE | Noted: 2019-04-08

## 2019-04-08 PROBLEM — C44.722 SCC (SQUAMOUS CELL CARCINOMA), LEG, RIGHT: Status: ACTIVE | Noted: 2019-04-08

## 2019-04-08 LAB
ALBUMIN SERPL BCP-MCNC: 3.6 G/DL (ref 3.5–5.2)
ALLENS TEST: ABNORMAL
ALP SERPL-CCNC: 122 U/L (ref 55–135)
ALT SERPL W/O P-5'-P-CCNC: 12 U/L (ref 10–44)
ANION GAP SERPL CALC-SCNC: 11 MMOL/L (ref 8–16)
AST SERPL-CCNC: 17 U/L (ref 10–40)
BASOPHILS # BLD AUTO: 0.03 K/UL (ref 0–0.2)
BASOPHILS NFR BLD: 0.4 % (ref 0–1.9)
BILIRUB SERPL-MCNC: 1.6 MG/DL (ref 0.1–1)
BUN SERPL-MCNC: 18 MG/DL (ref 8–23)
CALCIUM SERPL-MCNC: 9.9 MG/DL (ref 8.7–10.5)
CHLORIDE SERPL-SCNC: 101 MMOL/L (ref 95–110)
CK SERPL-CCNC: 74 U/L (ref 20–200)
CO2 SERPL-SCNC: 26 MMOL/L (ref 23–29)
CREAT SERPL-MCNC: 1 MG/DL (ref 0.5–1.4)
CRP SERPL-MCNC: 119.9 MG/L (ref 0–8.2)
DELSYS: ABNORMAL
DIFFERENTIAL METHOD: ABNORMAL
EOSINOPHIL # BLD AUTO: 0.1 K/UL (ref 0–0.5)
EOSINOPHIL NFR BLD: 0.6 % (ref 0–8)
ERYTHROCYTE [DISTWIDTH] IN BLOOD BY AUTOMATED COUNT: 12.8 % (ref 11.5–14.5)
ERYTHROCYTE [SEDIMENTATION RATE] IN BLOOD BY WESTERGREN METHOD: 16 MM/HR (ref 0–23)
EST. GFR  (AFRICAN AMERICAN): >60 ML/MIN/1.73 M^2
EST. GFR  (NON AFRICAN AMERICAN): >60 ML/MIN/1.73 M^2
GLUCOSE SERPL-MCNC: 109 MG/DL (ref 70–110)
HCO3 UR-SCNC: 29.4 MMOL/L (ref 24–28)
HCT VFR BLD AUTO: 42.9 % (ref 40–54)
HGB BLD-MCNC: 14.6 G/DL (ref 14–18)
IMM GRANULOCYTES # BLD AUTO: 0.01 K/UL (ref 0–0.04)
IMM GRANULOCYTES NFR BLD AUTO: 0.1 % (ref 0–0.5)
LDH SERPL L TO P-CCNC: 3.44 MMOL/L (ref 0.5–2.2)
LYMPHOCYTES # BLD AUTO: 1 K/UL (ref 1–4.8)
LYMPHOCYTES NFR BLD: 11.5 % (ref 18–48)
MCH RBC QN AUTO: 32.7 PG (ref 27–31)
MCHC RBC AUTO-ENTMCNC: 34 G/DL (ref 32–36)
MCV RBC AUTO: 96 FL (ref 82–98)
MONOCYTES # BLD AUTO: 0.7 K/UL (ref 0.3–1)
MONOCYTES NFR BLD: 8.3 % (ref 4–15)
NEUTROPHILS # BLD AUTO: 6.7 K/UL (ref 1.8–7.7)
NEUTROPHILS NFR BLD: 79.1 % (ref 38–73)
NRBC BLD-RTO: 0 /100 WBC
PCO2 BLDA: 58.9 MMHG (ref 35–45)
PH SMN: 7.31 [PH] (ref 7.35–7.45)
PLATELET # BLD AUTO: 189 K/UL (ref 150–350)
PMV BLD AUTO: 11.4 FL (ref 9.2–12.9)
PO2 BLDA: 15 MMHG (ref 40–60)
POC BE: 3 MMOL/L
POC SATURATED O2: 16 % (ref 95–100)
POC TCO2: 31 MMOL/L (ref 24–29)
POTASSIUM SERPL-SCNC: 3.6 MMOL/L (ref 3.5–5.1)
PROT SERPL-MCNC: 7.5 G/DL (ref 6–8.4)
RBC # BLD AUTO: 4.46 M/UL (ref 4.6–6.2)
SAMPLE: ABNORMAL
SITE: ABNORMAL
SODIUM SERPL-SCNC: 138 MMOL/L (ref 136–145)
WBC # BLD AUTO: 8.41 K/UL (ref 3.9–12.7)

## 2019-04-08 PROCEDURE — 94761 N-INVAS EAR/PLS OXIMETRY MLT: CPT

## 2019-04-08 PROCEDURE — 99900035 HC TECH TIME PER 15 MIN (STAT)

## 2019-04-08 PROCEDURE — 86140 C-REACTIVE PROTEIN: CPT

## 2019-04-08 PROCEDURE — 82550 ASSAY OF CK (CPK): CPT

## 2019-04-08 PROCEDURE — 87040 BLOOD CULTURE FOR BACTERIA: CPT

## 2019-04-08 PROCEDURE — 11000001 HC ACUTE MED/SURG PRIVATE ROOM

## 2019-04-08 PROCEDURE — 99283 EMERGENCY DEPT VISIT LOW MDM: CPT | Mod: ,,, | Performed by: EMERGENCY MEDICINE

## 2019-04-08 PROCEDURE — 63600175 PHARM REV CODE 636 W HCPCS: Performed by: HOSPITALIST

## 2019-04-08 PROCEDURE — 99214 PR OFFICE/OUTPT VISIT, EST, LEVL IV, 30-39 MIN: ICD-10-PCS | Mod: GC,S$GLB,, | Performed by: INTERNAL MEDICINE

## 2019-04-08 PROCEDURE — 85025 COMPLETE CBC W/AUTO DIFF WBC: CPT

## 2019-04-08 PROCEDURE — 80053 COMPREHEN METABOLIC PANEL: CPT | Mod: 91

## 2019-04-08 PROCEDURE — 63600175 PHARM REV CODE 636 W HCPCS: Performed by: EMERGENCY MEDICINE

## 2019-04-08 PROCEDURE — 1101F PT FALLS ASSESS-DOCD LE1/YR: CPT | Mod: CPTII,GC,S$GLB, | Performed by: INTERNAL MEDICINE

## 2019-04-08 PROCEDURE — 99214 OFFICE O/P EST MOD 30 MIN: CPT | Mod: GC,S$GLB,, | Performed by: INTERNAL MEDICINE

## 2019-04-08 PROCEDURE — 82803 BLOOD GASES ANY COMBINATION: CPT

## 2019-04-08 PROCEDURE — 96372 THER/PROPH/DIAG INJ SC/IM: CPT | Mod: 59

## 2019-04-08 PROCEDURE — 25000003 PHARM REV CODE 250: Performed by: HOSPITALIST

## 2019-04-08 PROCEDURE — 99999 PR PBB SHADOW E&M-EST. PATIENT-LVL III: CPT | Mod: PBBFAC,GC,,

## 2019-04-08 PROCEDURE — 25000003 PHARM REV CODE 250: Performed by: EMERGENCY MEDICINE

## 2019-04-08 PROCEDURE — G0378 HOSPITAL OBSERVATION PER HR: HCPCS

## 2019-04-08 PROCEDURE — 99999 PR PBB SHADOW E&M-EST. PATIENT-LVL III: ICD-10-PCS | Mod: PBBFAC,GC,,

## 2019-04-08 PROCEDURE — 99285 EMERGENCY DEPT VISIT HI MDM: CPT | Mod: 25

## 2019-04-08 PROCEDURE — 85652 RBC SED RATE AUTOMATED: CPT

## 2019-04-08 PROCEDURE — 99223 PR INITIAL HOSPITAL CARE,LEVL III: ICD-10-PCS | Mod: ,,, | Performed by: HOSPITALIST

## 2019-04-08 PROCEDURE — 83605 ASSAY OF LACTIC ACID: CPT

## 2019-04-08 PROCEDURE — 1101F PR PT FALLS ASSESS DOC 0-1 FALLS W/OUT INJ PAST YR: ICD-10-PCS | Mod: CPTII,GC,S$GLB, | Performed by: INTERNAL MEDICINE

## 2019-04-08 PROCEDURE — 99283 PR EMERGENCY DEPT VISIT,LEVEL III: ICD-10-PCS | Mod: ,,, | Performed by: EMERGENCY MEDICINE

## 2019-04-08 PROCEDURE — 96374 THER/PROPH/DIAG INJ IV PUSH: CPT

## 2019-04-08 PROCEDURE — 99223 1ST HOSP IP/OBS HIGH 75: CPT | Mod: ,,, | Performed by: HOSPITALIST

## 2019-04-08 RX ORDER — LOSARTAN POTASSIUM AND HYDROCHLOROTHIAZIDE 12.5; 5 MG/1; MG/1
1 TABLET ORAL EVERY MORNING
Status: DISCONTINUED | OUTPATIENT
Start: 2019-04-09 | End: 2019-04-11 | Stop reason: HOSPADM

## 2019-04-08 RX ORDER — TAMSULOSIN HYDROCHLORIDE 0.4 MG/1
0.4 CAPSULE ORAL EVERY MORNING
Status: DISCONTINUED | OUTPATIENT
Start: 2019-04-09 | End: 2019-04-11 | Stop reason: HOSPADM

## 2019-04-08 RX ORDER — GLUCAGON 1 MG
1 KIT INJECTION
Status: DISCONTINUED | OUTPATIENT
Start: 2019-04-08 | End: 2019-04-11 | Stop reason: HOSPADM

## 2019-04-08 RX ORDER — AMOXICILLIN 250 MG
1 CAPSULE ORAL 2 TIMES DAILY PRN
Status: DISCONTINUED | OUTPATIENT
Start: 2019-04-08 | End: 2019-04-11 | Stop reason: HOSPADM

## 2019-04-08 RX ORDER — SODIUM CHLORIDE 0.9 % (FLUSH) 0.9 %
10 SYRINGE (ML) INJECTION
Status: DISCONTINUED | OUTPATIENT
Start: 2019-04-08 | End: 2019-04-11 | Stop reason: HOSPADM

## 2019-04-08 RX ORDER — HYDROCODONE BITARTRATE AND ACETAMINOPHEN 5; 325 MG/1; MG/1
1 TABLET ORAL EVERY 4 HOURS PRN
Status: DISCONTINUED | OUTPATIENT
Start: 2019-04-08 | End: 2019-04-11 | Stop reason: HOSPADM

## 2019-04-08 RX ORDER — VANCOMYCIN 1.75 GRAM/500 ML IN 0.9 % SODIUM CHLORIDE INTRAVENOUS
20
Status: COMPLETED | OUTPATIENT
Start: 2019-04-08 | End: 2019-04-08

## 2019-04-08 RX ORDER — HYDROCODONE BITARTRATE AND ACETAMINOPHEN 10; 325 MG/1; MG/1
1 TABLET ORAL EVERY 4 HOURS PRN
Status: DISCONTINUED | OUTPATIENT
Start: 2019-04-08 | End: 2019-04-11 | Stop reason: HOSPADM

## 2019-04-08 RX ORDER — METOPROLOL TARTRATE 25 MG/1
25 TABLET, FILM COATED ORAL NIGHTLY
Status: DISCONTINUED | OUTPATIENT
Start: 2019-04-08 | End: 2019-04-11 | Stop reason: HOSPADM

## 2019-04-08 RX ORDER — SIMVASTATIN 5 MG/1
10 TABLET, FILM COATED ORAL NIGHTLY
Status: DISCONTINUED | OUTPATIENT
Start: 2019-04-08 | End: 2019-04-11 | Stop reason: HOSPADM

## 2019-04-08 RX ORDER — FUROSEMIDE 10 MG/ML
20 INJECTION INTRAMUSCULAR; INTRAVENOUS 2 TIMES DAILY
Status: DISCONTINUED | OUTPATIENT
Start: 2019-04-09 | End: 2019-04-11 | Stop reason: HOSPADM

## 2019-04-08 RX ORDER — IBUPROFEN 200 MG
16 TABLET ORAL
Status: DISCONTINUED | OUTPATIENT
Start: 2019-04-08 | End: 2019-04-11 | Stop reason: HOSPADM

## 2019-04-08 RX ORDER — ENOXAPARIN SODIUM 100 MG/ML
40 INJECTION SUBCUTANEOUS EVERY 24 HOURS
Status: DISCONTINUED | OUTPATIENT
Start: 2019-04-08 | End: 2019-04-11 | Stop reason: HOSPADM

## 2019-04-08 RX ORDER — RAMELTEON 8 MG/1
8 TABLET ORAL NIGHTLY PRN
Status: DISCONTINUED | OUTPATIENT
Start: 2019-04-08 | End: 2019-04-11 | Stop reason: HOSPADM

## 2019-04-08 RX ORDER — ONDANSETRON 8 MG/1
8 TABLET, ORALLY DISINTEGRATING ORAL EVERY 8 HOURS PRN
Status: DISCONTINUED | OUTPATIENT
Start: 2019-04-08 | End: 2019-04-11 | Stop reason: HOSPADM

## 2019-04-08 RX ORDER — SODIUM CHLORIDE 0.9 % (FLUSH) 0.9 %
5 SYRINGE (ML) INJECTION
Status: DISCONTINUED | OUTPATIENT
Start: 2019-04-08 | End: 2019-04-11 | Stop reason: HOSPADM

## 2019-04-08 RX ORDER — ACETAMINOPHEN 325 MG/1
650 TABLET ORAL EVERY 8 HOURS PRN
Status: DISCONTINUED | OUTPATIENT
Start: 2019-04-08 | End: 2019-04-11 | Stop reason: HOSPADM

## 2019-04-08 RX ORDER — IBUPROFEN 200 MG
24 TABLET ORAL
Status: DISCONTINUED | OUTPATIENT
Start: 2019-04-08 | End: 2019-04-11 | Stop reason: HOSPADM

## 2019-04-08 RX ORDER — METOPROLOL TARTRATE 50 MG/1
50 TABLET ORAL DAILY
Status: DISCONTINUED | OUTPATIENT
Start: 2019-04-09 | End: 2019-04-11 | Stop reason: HOSPADM

## 2019-04-08 RX ADMIN — SIMVASTATIN 10 MG: 10 TABLET, FILM COATED ORAL at 09:04

## 2019-04-08 RX ADMIN — METOPROLOL TARTRATE 25 MG: 25 TABLET ORAL at 08:04

## 2019-04-08 RX ADMIN — VANCOMYCIN HYDROCHLORIDE 1750 MG: 100 INJECTION, POWDER, LYOPHILIZED, FOR SOLUTION INTRAVENOUS at 07:04

## 2019-04-08 RX ADMIN — ENOXAPARIN SODIUM 40 MG: 100 INJECTION SUBCUTANEOUS at 08:04

## 2019-04-08 NOTE — ED PROVIDER NOTES
"SCRIBE #1 NOTE: I, Marnie Marvin, am scribing for, and in the presence of,  Heavenly Garcia MD. I have scribed the entire note.       CC: Wound Infection (r lower leg, sent from [facility], )      History provided by: Patient     HPI: Shaq Bragg Jr. is a 88 y.o. year old male with PMHx of HTN, COPD, malignant melanoma of right lower leg, hypercholesterolemia, who presents to the ED complaining of wound pain.  Pt states he is having increase in pain to his right leg with increase in difficulty walking secondary to right leg wound. Patient reports the wound has not changed in the past month besides new onset of bleeding that began today. He also complains of right heel pain. Patient reports heel pain is similar to when he was diagnosed a few years ago with "bone spur". He states he has been cleaning his wound with a cream, but is unable to remember the name of the cream. His wife takes care of his wound and when asked if he follows up with wound care, patient reports his wife is the only person that changes his bandages. Denies the wound getting larger since onset of wound. Patient states his right leg was wrapped down to his ankle and while he was walking, the wrap was scrapping against his ankle, which was how he had gotten the wound. Patient states this wound occurred approximately 3 months ago. Denies fever, nausea, or vomiting.         Past Medical History:   Diagnosis Date    COPD (chronic obstructive pulmonary disease)     H/O asbestosis     Hearing loss     Hypercholesterolemia     Hypertension     Malignant melanoma of right lower leg 5/11/2017    Melanoma     Approx. 2 years ago     Skin cancer     Squamous cell carcinoma      Past Surgical History:   Procedure Laterality Date    ADENOIDECTOMY      APPENDECTOMY      Carotid Stenosis Surgery      CHOLECYSTECTOMY  1965    LYMPHADENECTOMY, INGUINAL, [place] Flap Right 11/12/2018    Performed by Yair Alatorre MD at [facility] OR 2ND " FLR    TONSILLECTOMY       Family History   Problem Relation Age of Onset    Coronary artery disease Mother     Cancer Father         Esophageal    Esophageal cancer Father     Diabetes Sister     No Known Problems Maternal Aunt     No Known Problems Maternal Uncle     No Known Problems Paternal Aunt     No Known Problems Paternal Uncle     No Known Problems Maternal Grandmother     No Known Problems Maternal Grandfather     No Known Problems Paternal Grandmother     No Known Problems Paternal Grandfather     Melanoma Neg Hx     Psoriasis Neg Hx     Lupus Neg Hx      No current facility-administered medications on file prior to encounter.      Current Outpatient Medications on File Prior to Encounter   Medication Sig Dispense Refill    aspirin (ECOTRIN) 81 MG EC tablet Take 81 mg by mouth every morning.       losartan-hydrochlorothiazide 50-12.5 mg (HYZAAR) 50-12.5 mg per tablet Take 1 tablet by mouth every morning.       metoprolol tartrate (LOPRESSOR) 50 MG tablet Takes 50 mg in morning and half tablet (25 mg) in evening      metoprolol tartrate (LOPRESSOR) 50 MG tablet Take 25 mg by mouth every evening. Dose is 1/2 tab at night      simvastatin (ZOCOR) 10 MG tablet Take 10 mg by mouth every evening.      tamsulosin (FLOMAX) 0.4 mg Cp24 Take 0.4 mg by mouth every morning.       finasteride (PROSCAR) 5 mg tablet Take 5 mg by mouth every morning.       furosemide (LASIX) 20 MG tablet Take 20 mg by mouth every other day.       polyethylene glycol (GLYCOLAX) 17 gram/dose powder Take 17 g by mouth once daily. 3060 g 0    potassium chloride (MICRO-K) 8 mEq CpSR Take 8 mEq by mouth every other day.        Patient has no known allergies.  Social History     Socioeconomic History    Marital status:      Spouse name: Not on file    Number of children: Not on file    Years of education: Not on file    Highest education level: Not on file   Occupational History    Not on file   Social  Needs    Financial resource strain: Not on file    Food insecurity:     Worry: Not on file     Inability: Not on file    Transportation needs:     Medical: Not on file     Non-medical: Not on file   Tobacco Use    Smoking status: Former Smoker     Packs/day: 1.00     Years: 15.00     Pack years: 15.00     Types: Cigarettes     Last attempt to quit: 1965     Years since quittin.3    Smokeless tobacco: Never Used   Substance and Sexual Activity    Alcohol use: No     Frequency: Never    Drug use: No    Sexual activity: Never   Lifestyle    Physical activity:     Days per week: Not on file     Minutes per session: Not on file    Stress: Not on file   Relationships    Social connections:     Talks on phone: Not on file     Gets together: Not on file     Attends Mu-ism service: Not on file     Active member of club or organization: Not on file     Attends meetings of clubs or organizations: Not on file     Relationship status: Not on file   Other Topics Concern    Not on file   Social History Narrative    Not on file       ROS:     Constitutional : neg  HEENT neg  Resp neg  Cardiac  neg  GI neg   neg  Neuro neg  Heme/Immune: neg  Endo neg  Skin Right ankle wound with associated symptom of pain and difficulty walking.     PHYSICAL EXAM:  Vitals:    19 0829   BP: (!) 183/72   Pulse: 78   Resp: 20   Temp: 98 °F (36.7 °C)         PHYSICAL EXAM:   general: comfortable, in no acute distress  VS: triage VS reviewed  HEENT: NC/AT, PERRL, EOMI  Neck: trachea midline  CV: RRR  Resp: CTAB  ABD:  ND, + normal BS, NT  Renal: No CVAT  Neuro: AAO x 3,face symmetric, speech normal  Right lower extremity: swelling and erythema with dry scaly skin from right knee to ankle. 2 cm ulcer over the posterior right ankle. Achilles tendon is visible at bottom of ulcer, +surrounding area of erythema approximately 8 cm in diameter with tenderness to palpation. Patient able to dorsal and plantar flexion right foot with  pain w/ dorsiflexion.           DATA & INTERVENTIONS:    LABS reviewed:  Labs Reviewed   COMPREHENSIVE METABOLIC PANEL - Abnormal; Notable for the following components:       Result Value    Total Bilirubin 1.6 (*)     All other components within normal limits   CBC W/ AUTO DIFFERENTIAL - Abnormal; Notable for the following components:    RBC 4.46 (*)     MCH 32.7 (*)     Gran% 79.1 (*)     Lymph% 11.5 (*)     All other components within normal limits   C-REACTIVE PROTEIN - Abnormal; Notable for the following components:    .9 (*)     All other components within normal limits   ISTAT PROCEDURE - Abnormal; Notable for the following components:    POC PH 7.307 (*)     POC PCO2 58.9 (*)     POC PO2 15 (*)     POC HCO3 29.4 (*)     POC SATURATED O2 16 (*)     POC Lactate 3.44 (*)     POC TCO2 31 (*)     All other components within normal limits   SEDIMENTATION RATE   CK   CK    Narrative:     ADD ON CPK # [id number] PER DR. KAREN JACOBS @  04/08/2019    16:35        RADIOLOGY reviewed:  Imaging Results          US Lower Extremity Veins Bilateral (Final result)  Result time 04/08/19 19:01:43    Final result by Sree Carreon MD (04/08/19 19:01:43)                 Impression:      No evidence of deep venous thrombosis.    Soft tissue thickening and edema within the subcutaneous tissue of the right calf.    Electronically signed by resident: Ashwin Kirk  Date:    04/08/2019  Time:    18:48    Electronically signed by: Sree Carreon MD  Date:    04/08/2019  Time:    19:01             Narrative:    EXAMINATION:  US LOWER EXTREMITY VEINS BILATERAL    CLINICAL HISTORY:  Localized edema    TECHNIQUE:  Duplex scan of the bilateral lower extremities was performed using B-Mode/gray scale imaging and Doppler spectral analysis and color flow.    COMPARISON:  None.    FINDINGS:  Right thigh veins: The common femoral, popliteal, upper greater saphenous, and deep femoral veins are patent and free of thrombus.  Mildly  prominent lymph node is present without pathologic enlargement at the saphenofemoral junction.    Right calf veins: The visualized calf veins are patent.  There is soft tissue thickening and edema within the subcutaneous tissue of the right calf.    Left thigh veins: The common femoral, popliteal, upper greater saphenous, and deep femoral veins are patent and free of thrombus.    Left calf veins: The visualized calf veins are patent.                               X-Ray Ankle Complete Right (Final result)  Result time 04/08/19 15:55:16    Final result by Albino Bell MD (04/08/19 15:55:16)                 Impression:      Soft tissue swelling involving the right ankle.  No evidence of soft tissue gas or osseous erosions.  Additional evaluation, as clinically warranted.      Electronically signed by: Albino Bell MD  Date:    04/08/2019  Time:    15:55             Narrative:    EXAMINATION:  XR ANKLE COMPLETE 3 VIEW RIGHT    CLINICAL HISTORY:  Non-pressure chronic ulcer of unspecified ankle with unspecified severity    TECHNIQUE:  AP, lateral, and oblique images of the right ankle were performed.    COMPARISON:  None    FINDINGS:  There is diffuse demineralization of the osseous structures.  There is a plantar calcaneal spur present.  There is a well corticated ossific fragment adjacent to the lateral malleolus.  The talar dome is intact.  The ankle mortise is intact.  There are vascular calcifications.  There also nonspecific calcifications within the subcutaneous tissues.  No radiopaque foreign body is identified.  There is no evidence of soft tissue gas.  There is no evidence of a fracture or dislocation of the right knee.                                MEDICATIONS/FLUIDS:  Medications   sodium chloride 0.9% flush 10 mL (has no administration in time range)   losartan-hydrochlorothiazide 50-12.5 mg per tablet 1 tablet (1 tablet Oral Given 4/9/19 1855)   metoprolol tartrate (LOPRESSOR) tablet 50 mg (50 mg Oral Given  4/9/19 0832)   metoprolol tartrate (LOPRESSOR) tablet 25 mg (25 mg Oral Given 4/8/19 2055)   simvastatin tablet 10 mg (10 mg Oral Given 4/8/19 2141)   tamsulosin 24 hr capsule 0.4 mg (0.4 mg Oral Given 4/9/19 0728)   sodium chloride 0.9% flush 5 mL (has no administration in time range)   senna-docusate 8.6-50 mg per tablet 1 tablet (has no administration in time range)   ondansetron disintegrating tablet 8 mg (has no administration in time range)   glucose chewable tablet 16 g (has no administration in time range)   glucose chewable tablet 24 g (has no administration in time range)   dextrose 50% injection 12.5 g (has no administration in time range)   dextrose 50% injection 25 g (has no administration in time range)   glucagon (human recombinant) injection 1 mg (has no administration in time range)   acetaminophen tablet 650 mg (has no administration in time range)   ramelteon tablet 8 mg (has no administration in time range)   enoxaparin injection 40 mg (40 mg Subcutaneous Given 4/8/19 2055)   HYDROcodone-acetaminophen 5-325 mg per tablet 1 tablet (has no administration in time range)   HYDROcodone-acetaminophen  mg per tablet 1 tablet (has no administration in time range)   furosemide injection 20 mg (20 mg Intravenous Given 4/9/19 0832)   cadexomer iodine 0.9 % gel (has no administration in time range)   vancomycin 1750 mg in 0.9% sodium chloride 500 mL IVPB (1,750 mg Intravenous New Bag 4/8/19 1921)   gadobutrol (GADAVIST) injection 9 mL (65 mLs Intravenous Given 4/9/19 0219)         MDM:  Shaq Bragg Jr. is a 88 y.o. 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 Clinical findings suggest of possible cellulitis vs osteomyelitis vs myositis less likely DVT. Sent from oncology clinic by Dr. Armando for concern for DVT vs cellulitis needing IV antibiotics (discussed w/ Dr. Armando on the phone). Will give a dose of vancomycin. Patient consented to start immunotherapy today but not yet  started on it    DDX includes but not limited to: cellulitis vs osteomyelitis vs myositis vs DVT    Labs ordered and interpreted: CMP with elevated bili of 1.6. Normal LFTs. Elevation in kidney function. CBC with normal white count, hemoglobin and platelets. ESR 16. CRP elevated of 119.9. CPK 74 normal.     Xray of ankle (ordered and independently reviewed): no fx, no soft tissue gas     DVT scan negative  Chart reviewed: Diagnoised with Malignant melanoma of the right proximal calf in March 2017. He was seen today in clinic with Dr. Armando.   He was diagnoised with T11-spine uptake on the bedside PET/CT scan from Feb 2, 2019. March 27th 2019, wound care note reviewed with ulcer at that point much smaller and superficial compared to today. However the surround erythema is very similar.     Case discussed with the consultant: Dr. Armando, Dr. Henderson ([facility] medicine)    IMPRESSION:  1.) Right posterior ankle ulcer (stage 4) with surrounding cellulitis, r/o OM      Dispo:  Admission    Critical Care Time: N/A       Heavenly Garcia MD  04/09/19 7107

## 2019-04-09 PROBLEM — L97.315: Status: ACTIVE | Noted: 2019-04-09

## 2019-04-09 PROBLEM — I87.2: Status: ACTIVE | Noted: 2019-04-09

## 2019-04-09 LAB
ALBUMIN SERPL BCP-MCNC: 3.1 G/DL (ref 3.5–5.2)
ALP SERPL-CCNC: 107 U/L (ref 55–135)
ALT SERPL W/O P-5'-P-CCNC: 11 U/L (ref 10–44)
ANION GAP SERPL CALC-SCNC: 6 MMOL/L (ref 8–16)
AST SERPL-CCNC: 15 U/L (ref 10–40)
BASOPHILS # BLD AUTO: 0.05 K/UL (ref 0–0.2)
BASOPHILS NFR BLD: 0.8 % (ref 0–1.9)
BILIRUB SERPL-MCNC: 1.3 MG/DL (ref 0.1–1)
BUN SERPL-MCNC: 15 MG/DL (ref 8–23)
CALCIUM SERPL-MCNC: 9.2 MG/DL (ref 8.7–10.5)
CHLORIDE SERPL-SCNC: 102 MMOL/L (ref 95–110)
CO2 SERPL-SCNC: 30 MMOL/L (ref 23–29)
CREAT SERPL-MCNC: 1 MG/DL (ref 0.5–1.4)
DIFFERENTIAL METHOD: ABNORMAL
EOSINOPHIL # BLD AUTO: 0.2 K/UL (ref 0–0.5)
EOSINOPHIL NFR BLD: 2.6 % (ref 0–8)
ERYTHROCYTE [DISTWIDTH] IN BLOOD BY AUTOMATED COUNT: 12.7 % (ref 11.5–14.5)
EST. GFR  (AFRICAN AMERICAN): >60 ML/MIN/1.73 M^2
EST. GFR  (NON AFRICAN AMERICAN): >60 ML/MIN/1.73 M^2
GLUCOSE SERPL-MCNC: 95 MG/DL (ref 70–110)
HCT VFR BLD AUTO: 38.2 % (ref 40–54)
HGB BLD-MCNC: 13 G/DL (ref 14–18)
IMM GRANULOCYTES # BLD AUTO: 0.02 K/UL (ref 0–0.04)
IMM GRANULOCYTES NFR BLD AUTO: 0.3 % (ref 0–0.5)
LYMPHOCYTES # BLD AUTO: 1.1 K/UL (ref 1–4.8)
LYMPHOCYTES NFR BLD: 15.8 % (ref 18–48)
MAGNESIUM SERPL-MCNC: 2 MG/DL (ref 1.6–2.6)
MCH RBC QN AUTO: 31.9 PG (ref 27–31)
MCHC RBC AUTO-ENTMCNC: 34 G/DL (ref 32–36)
MCV RBC AUTO: 94 FL (ref 82–98)
MONOCYTES # BLD AUTO: 0.7 K/UL (ref 0.3–1)
MONOCYTES NFR BLD: 9.8 % (ref 4–15)
NEUTROPHILS # BLD AUTO: 4.7 K/UL (ref 1.8–7.7)
NEUTROPHILS NFR BLD: 70.7 % (ref 38–73)
NRBC BLD-RTO: 0 /100 WBC
PHOSPHATE SERPL-MCNC: 3.4 MG/DL (ref 2.7–4.5)
PLATELET # BLD AUTO: 197 K/UL (ref 150–350)
PMV BLD AUTO: 10.6 FL (ref 9.2–12.9)
POTASSIUM SERPL-SCNC: 3.9 MMOL/L (ref 3.5–5.1)
PROT SERPL-MCNC: 6.3 G/DL (ref 6–8.4)
RBC # BLD AUTO: 4.07 M/UL (ref 4.6–6.2)
SODIUM SERPL-SCNC: 138 MMOL/L (ref 136–145)
WBC # BLD AUTO: 6.65 K/UL (ref 3.9–12.7)

## 2019-04-09 PROCEDURE — 36415 COLL VENOUS BLD VENIPUNCTURE: CPT

## 2019-04-09 PROCEDURE — A9585 GADOBUTROL INJECTION: HCPCS | Performed by: INTERNAL MEDICINE

## 2019-04-09 PROCEDURE — 88341 PR IHC OR ICC EACH ADD'L SINGLE ANTIBODY  STAINPR: ICD-10-PCS | Mod: 26,,, | Performed by: PATHOLOGY

## 2019-04-09 PROCEDURE — 87076 CULTURE ANAEROBE IDENT EACH: CPT

## 2019-04-09 PROCEDURE — G0378 HOSPITAL OBSERVATION PER HR: HCPCS

## 2019-04-09 PROCEDURE — 99223 PR INITIAL HOSPITAL CARE,LEVL III: ICD-10-PCS | Mod: 25,,, | Performed by: PODIATRIST

## 2019-04-09 PROCEDURE — 88305 TISSUE EXAM BY PATHOLOGIST: CPT | Mod: 59 | Performed by: PATHOLOGY

## 2019-04-09 PROCEDURE — 87070 CULTURE OTHR SPECIMN AEROBIC: CPT

## 2019-04-09 PROCEDURE — 25500020 PHARM REV CODE 255: Performed by: INTERNAL MEDICINE

## 2019-04-09 PROCEDURE — 87077 CULTURE AEROBIC IDENTIFY: CPT

## 2019-04-09 PROCEDURE — 63600175 PHARM REV CODE 636 W HCPCS: Performed by: HOSPITALIST

## 2019-04-09 PROCEDURE — 87186 SC STD MICRODIL/AGAR DIL: CPT

## 2019-04-09 PROCEDURE — 25000003 PHARM REV CODE 250: Performed by: HOSPITALIST

## 2019-04-09 PROCEDURE — 88342 IMHCHEM/IMCYTCHM 1ST ANTB: CPT | Mod: 26,,, | Performed by: PATHOLOGY

## 2019-04-09 PROCEDURE — 83735 ASSAY OF MAGNESIUM: CPT

## 2019-04-09 PROCEDURE — 11104 PR PUNCH BIOPSY, SKIN, SINGLE LESION: ICD-10-PCS | Mod: ,,, | Performed by: PODIATRIST

## 2019-04-09 PROCEDURE — 87075 CULTR BACTERIA EXCEPT BLOOD: CPT

## 2019-04-09 PROCEDURE — 84100 ASSAY OF PHOSPHORUS: CPT

## 2019-04-09 PROCEDURE — 88305 TISSUE SPECIMEN TO PATHOLOGY - SURGERY: ICD-10-PCS | Mod: 26,,, | Performed by: PATHOLOGY

## 2019-04-09 PROCEDURE — 85025 COMPLETE CBC W/AUTO DIFF WBC: CPT

## 2019-04-09 PROCEDURE — 80053 COMPREHEN METABOLIC PANEL: CPT

## 2019-04-09 PROCEDURE — 88341 IMHCHEM/IMCYTCHM EA ADD ANTB: CPT | Mod: 26,,, | Performed by: PATHOLOGY

## 2019-04-09 PROCEDURE — 88342 TISSUE SPECIMEN TO PATHOLOGY - SURGERY: ICD-10-PCS | Mod: 26,,, | Performed by: PATHOLOGY

## 2019-04-09 PROCEDURE — 11000001 HC ACUTE MED/SURG PRIVATE ROOM

## 2019-04-09 PROCEDURE — 99233 SBSQ HOSP IP/OBS HIGH 50: CPT | Mod: ,,, | Performed by: HOSPITALIST

## 2019-04-09 PROCEDURE — 11104 PUNCH BX SKIN SINGLE LESION: CPT | Mod: ,,, | Performed by: PODIATRIST

## 2019-04-09 PROCEDURE — 99233 PR SUBSEQUENT HOSPITAL CARE,LEVL III: ICD-10-PCS | Mod: ,,, | Performed by: HOSPITALIST

## 2019-04-09 PROCEDURE — 99223 1ST HOSP IP/OBS HIGH 75: CPT | Mod: 25,,, | Performed by: PODIATRIST

## 2019-04-09 RX ORDER — VANCOMYCIN 1.75 GRAM/500 ML IN 0.9 % SODIUM CHLORIDE INTRAVENOUS
1750
Status: DISCONTINUED | OUTPATIENT
Start: 2019-04-09 | End: 2019-04-11 | Stop reason: HOSPADM

## 2019-04-09 RX ORDER — GADOBUTROL 604.72 MG/ML
9 INJECTION INTRAVENOUS
Status: COMPLETED | OUTPATIENT
Start: 2019-04-09 | End: 2019-04-09

## 2019-04-09 RX ORDER — CEFEPIME HYDROCHLORIDE 2 G/1
2 INJECTION, POWDER, FOR SOLUTION INTRAVENOUS
Status: DISCONTINUED | OUTPATIENT
Start: 2019-04-09 | End: 2019-04-11 | Stop reason: HOSPADM

## 2019-04-09 RX ADMIN — CEFEPIME 2 G: 2 INJECTION, POWDER, FOR SOLUTION INTRAVENOUS at 05:04

## 2019-04-09 RX ADMIN — METOPROLOL TARTRATE 50 MG: 50 TABLET ORAL at 08:04

## 2019-04-09 RX ADMIN — LOSARTAN POTASSIUM AND HYDROCHLOROTHIAZIDE 1 TABLET: 12.5; 5 TABLET ORAL at 07:04

## 2019-04-09 RX ADMIN — ENOXAPARIN SODIUM 40 MG: 100 INJECTION SUBCUTANEOUS at 05:04

## 2019-04-09 RX ADMIN — FUROSEMIDE 20 MG: 10 INJECTION, SOLUTION INTRAMUSCULAR; INTRAVENOUS at 05:04

## 2019-04-09 RX ADMIN — TAMSULOSIN HYDROCHLORIDE 0.4 MG: 0.4 CAPSULE ORAL at 07:04

## 2019-04-09 RX ADMIN — GADOBUTROL 65 ML: 604.72 INJECTION INTRAVENOUS at 02:04

## 2019-04-09 RX ADMIN — VANCOMYCIN HYDROCHLORIDE 1750 MG: 100 INJECTION, POWDER, LYOPHILIZED, FOR SOLUTION INTRAVENOUS at 07:04

## 2019-04-09 RX ADMIN — METOPROLOL TARTRATE 25 MG: 25 TABLET ORAL at 10:04

## 2019-04-09 RX ADMIN — FUROSEMIDE 20 MG: 10 INJECTION, SOLUTION INTRAMUSCULAR; INTRAVENOUS at 08:04

## 2019-04-09 RX ADMIN — SIMVASTATIN 10 MG: 10 TABLET, FILM COATED ORAL at 10:04

## 2019-04-09 NOTE — ASSESSMENT & PLAN NOTE
Shaq Bragg Jr. is a 88 y.o. male with lymphedema due to lymphadenotomy with infected wound  -Wound washed out at bedside, dressed with betadine gauze dressings  -Nursing to do daily dressing changes with iodosrob, 4x4's, and kerlix and ace wrap up to knee  -elevate leg for edema control  -MRI negative for abscess or osteo  -Obtained wound culture swabs.  Recommend 14 day course of PO antibiotics for suspicious soft tissue infection with follow up in clinic.    -did punch biopsies of wound, see below  -Patient would benefit from OR debridement with graft application, but will need to wait till infection resolves and until pathology results    DC Instructions:  Patient is to follow up with podiatry within 10 days of discharge.  Home health to do dressing changes 3 times a week as follows:  Rinse wound with saline, pat dry.  Apply iodosorb to wound, dress with xeroform, 4x4's, ABD, then wrap with cast padding and ace up to knee.

## 2019-04-09 NOTE — PROGRESS NOTES
Hospital Medicine  Progress note    Team: [facility] [place] [facility] MED A Grant Amor MD  Admit Date: 4/8/2019  SONIA 4/12/2019  Length of Stay:  LOS: 1 day   Code status: Full Code    Principal Problem:  Infected ulcer of skin, with fat layer exposed    Overview:    Interval hx:      ROS     Constitutional: Negative for chills, fatigue, fever.   HENT: Negative for sore throat, trouble swallowing.    Eyes: Negative for photophobia, visual disturbance.   Respiratory: Negative for cough, shortness of breath.    Cardiovascular: Negative for chest pain, palpitations, leg swelling.   Gastrointestinal: Negative for abdominal pain, constipation, diarrhea, nausea, vomiting.   Endocrine: Negative for cold intolerance, heat intolerance.   Genitourinary: Negative for dysuria, frequency.   Musculoskeletal: + heel ulcer  Skin: Negative for rash, wound, erythema   Neurological: Negative for dizziness, syncope, weakness, light-headedness.   Psychiatric/Behavioral: Negative for confusion, hallucinations, anxiety  All other systems reviewed and are negative.        PEx  Temp:  [98 °F (36.7 °C)-98.9 °F (37.2 °C)]   Pulse:  [62-85]   Resp:  [16-50]   BP: (150-184)/(68-78)   SpO2:  [93 %-99 %]   No intake or output data in the 24 hours ending 04/09/19 5648    Constitutional: Appears well developed and well nourished  Head: Normocephalic and atraumatic. Facial plethora  Mouth/Throat: Oropharynx is clear and moist.   Eyes: EOM are normal. Pupils are equal, round, and reactive to light. No scleral icterus.   Neck: Normal range of motion. Neck supple.   Cardiovascular: Normal heart rate.  Regular heart rhythm.  No murmur heard.  Pulmonary/Chest: Effort normal. No respiratory distress. No wheezes, rales, or rhonchi  Abdominal: Soft. Bowel sounds are normal.  No distension.  No tenderness  Musculoskeletal: Normal range of motion. No edema.   Neurological: Alert and oriented to person, place, and time.   Skin: Skin is warm and dry. RLE with mild edema with  warmth and erythema up to the knee.  Psychiatric: Normal mood and affect. Behavior is normal.         Recent Labs   Lab 04/08/19  1116 04/08/19  1533 04/09/19  0518   WBC 8.86 8.41 6.65   HGB 13.6* 14.6 13.0*   HCT 39.7* 42.9 38.2*    189 197     Recent Labs   Lab 04/08/19  1116 04/08/19  1533 04/09/19  0518    138 138   K 4.2 3.6 3.9    101 102   CO2 30* 26 30*   BUN 18 18 15   CREATININE 1.1 1.0 1.0    109 95   CALCIUM 9.7 9.9 9.2   MG  --   --  2.0   PHOS  --   --  3.4     Recent Labs   Lab 04/08/19  1116 04/08/19  1533 04/09/19  0518   ALKPHOS 109 122 107   ALT 12 12 11   AST 15 17 15   ALBUMIN 3.3* 3.6 3.1*   PROT 6.9 7.5 6.3   BILITOT 1.6* 1.6* 1.3*        Recent Labs     04/08/19  1533   CPK 74     No results for input(s): POCTGLUCOSE in the last 168 hours.  No results found for: HGBA1C    Scheduled Meds:   enoxaparin  40 mg Subcutaneous Daily    furosemide  20 mg Intravenous BID    losartan-hydrochlorothiazide 50-12.5 mg  1 tablet Oral QAM    metoprolol tartrate  25 mg Oral QHS    metoprolol tartrate  50 mg Oral Daily    simvastatin  10 mg Oral QHS    tamsulosin  0.4 mg Oral QAM     Continuous Infusions:  As Needed:  acetaminophen, cadexomer iodine, dextrose 50%, dextrose 50%, glucagon (human recombinant), glucose, glucose, HYDROcodone-acetaminophen, HYDROcodone-acetaminophen, ondansetron, ramelteon, senna-docusate 8.6-50 mg, sodium chloride 0.9%, sodium chloride 0.9%    Active Hospital Problems    Diagnosis  POA    *Infected ulcer of skin, with fat layer exposed [L98.492, L08.9]  Yes    Venous stasis ulcer of right ankle with muscle involvement without evidence of necrosis without varicose veins [I87.2, L97.315]  Unknown    Essential hypertension [I10]  Yes    BPH (benign prostatic hyperplasia) [N40.0]  Yes    HLD (hyperlipidemia) [E78.5]  Yes    SCC (squamous cell carcinoma), leg, right [C44.722]  Yes    Lymphedema of right lower extremity [I89.0]  Yes    Malignant  melanoma of right lower leg [C43.71]  Yes      Resolved Hospital Problems   No resolved problems to display.         Assessment and Plan  Mr. Shaq Bragg Jr. is a 88 y.o. male who presented to [facility] on 4/8/2019 with an infected skin ulcer.     Infected Ulcer of Skin, Fat Layer Exposed  RLE Cellulitis  · No SIRS criteria but with elevated  and normal ESR  · Check MRI to r/o osteo  · LE US negative for DVT  · Podiatry consulted for wound cultures vs biopsy.  · S/p Vanc x1 in the ED.  Will hold for now pending wound cultures/biopsy     Malignant Melanoma of Right Lower Leg  SCC of Right Leg  · History of melanoma of the left leg in 2017  · Biopsy of right leg with SCC  · S/p cycle 1 of Nivolumab  · Follows with Dr. Armando/Prudencio in Oncology     Lymphedema of RLE  · Start Lasix 20mg IV BID to help with edema  · Has Lasix at home that isn't compliant with     Essential HTN  · Chronic and stable  · Continue Losartan-HCTZ 50-12.5mg PO daily  · Continue Metoprolol 50/25mg daily/qHS     HLD  · Chronic and stable  · Continue Simvastatin 10mg PO qHS     BPH  · Chronic and stable  · Continue Flomax 0.4mg PO daily      High Risk Conditions  Melanoma of the left leg, chronic lymphedema      Diet:  Regular diet   GI PPx:   DVT PPx:    Anticoagulants   Medication Route Frequency    enoxaparin injection 40 mg Subcutaneous Daily       Goals of Care: Full code  Discharge plan: pending Biopsy results and podiatry recs     Time (minutes) spent in care of the patient (Greater than 1/2 spent in direct face-to-face contact) 35 minutes    Grant Smith MD  Staff Hospitalist  Department of Hospital Medicine  [facility]   [phone]

## 2019-04-09 NOTE — PLAN OF CARE
Problem: Fall Injury Risk  Goal: Absence of Fall and Fall-Related Injury  Outcome: Ongoing (interventions implemented as appropriate)  04/12/19

## 2019-04-09 NOTE — SUBJECTIVE & OBJECTIVE
Scheduled Meds:   enoxaparin  40 mg Subcutaneous Daily    furosemide  20 mg Intravenous BID    losartan-hydrochlorothiazide 50-12.5 mg  1 tablet Oral QAM    metoprolol tartrate  25 mg Oral QHS    metoprolol tartrate  50 mg Oral Daily    simvastatin  10 mg Oral QHS    tamsulosin  0.4 mg Oral QAM     Continuous Infusions:  PRN Meds:acetaminophen, cadexomer iodine, dextrose 50%, dextrose 50%, glucagon (human recombinant), glucose, glucose, HYDROcodone-acetaminophen, HYDROcodone-acetaminophen, ondansetron, ramelteon, senna-docusate 8.6-50 mg, sodium chloride 0.9%, sodium chloride 0.9%    Review of patient's allergies indicates:  No Known Allergies     Past Medical History:   Diagnosis Date    COPD (chronic obstructive pulmonary disease)     H/O asbestosis     Hearing loss     Hypercholesterolemia     Hypertension     Malignant melanoma of right lower leg 2017    Melanoma     Approx. 2 years ago     Skin cancer     Squamous cell carcinoma      Past Surgical History:   Procedure Laterality Date    ADENOIDECTOMY      APPENDECTOMY      Carotid Stenosis Surgery      CHOLECYSTECTOMY  1965    LYMPHADENECTOMY, INGUINAL, Sartorious Flap Right 2018    Performed by Yair Alatorre MD at [facility] OR [address] [place] [address]    TONSILLECTOMY         Family History     Problem Relation (Age of Onset)    Cancer Father    Coronary artery disease Mother    Diabetes Sister    Esophageal cancer Father    No Known Problems Maternal Aunt, Maternal Uncle, Paternal Aunt, Paternal Uncle, Maternal Grandmother, Maternal Grandfather, Paternal Grandmother, Paternal Grandfather        Tobacco Use    Smoking status: Former Smoker     Packs/day: 1.00     Years: 15.00     Pack years: 15.00     Types: Cigarettes     Last attempt to quit: 1965     Years since quittin.3    Smokeless tobacco: Never Used   Substance and Sexual Activity    Alcohol use: No     Frequency: Never    Drug use: No    Sexual activity: Never     Review  of Systems   Constitutional: Negative for chills and fever.   Cardiovascular: Negative for leg swelling.   Gastrointestinal: Negative for nausea and vomiting.   Musculoskeletal: Negative for arthralgias and joint swelling.   Skin: Positive for wound. Negative for rash.   Psychiatric/Behavioral: Negative for agitation and confusion.     Objective:     Vital Signs (Most Recent):  Temp: 98 °F (36.7 °C) (04/09/19 0829)  Pulse: 78 (04/09/19 0829)  Resp: 20 (04/09/19 0829)  BP: (!) 183/72 (04/09/19 0829)  SpO2: 95 % (04/09/19 0829) Vital Signs (24h Range):  Temp:  [97.7 °F (36.5 °C)-98.9 °F (37.2 °C)] 98 °F (36.7 °C)  Pulse:  [62-85] 78  Resp:  [16-50] 20  SpO2:  [93 %-99 %] 95 %  BP: (153-184)/(65-78) 183/72     Weight: 82.9 kg (182 lb 12.2 oz)  Body mass index is 24.79 kg/m².    Foot Exam    General  Orientation: alert and oriented to person, place, and time   Affect: appropriate       Right Foot/Ankle     Inspection and Palpation  Ecchymosis: none  Tenderness: none     Neurovascular  Dorsalis pedis: 1+  Posterior tibial: 1+  Saphenous nerve sensation: normal  Tibial nerve sensation: normal  Superficial peroneal nerve sensation: normal  Deep peroneal nerve sensation: normal  Sural nerve sensation: normal      Left Foot/Ankle      Inspection and Palpation  Ecchymosis: none  Tenderness: none   Swelling: none     Neurovascular  Dorsalis pedis: 1+  Posterior tibial: 1+  Saphenous nerve sensation: normal  Tibial nerve sensation: normal  Superficial peroneal nerve sensation: normal  Deep peroneal nerve sensation: normal  Sural nerve sensation: normal            Laboratory:  A1C: No results for input(s): HGBA1C in the last 4320 hours.  CBC:   Recent Labs   Lab 04/09/19 0518   WBC 6.65   RBC 4.07*   HGB 13.0*   HCT 38.2*      MCV 94   MCH 31.9*   MCHC 34.0     CMP:   Recent Labs   Lab 04/09/19 0518   GLU 95   CALCIUM 9.2   ALBUMIN 3.1*   PROT 6.3      K 3.9   CO2 30*      BUN 15   CREATININE 1.0   ALKPHOS  107   ALT 11   AST 15   BILITOT 1.3*     CRP:   Recent Labs   Lab 04/08/19  1533   .9*     ESR:   Recent Labs   Lab 04/08/19  1533   SEDRATE 16     Wound Cultures: No results for input(s): LABAERO in the last 4320 hours.  Microbiology Results (last 7 days)     Procedure Component Value Units Date/Time    Aerobic culture [[id number]] Collected:  04/09/19 0941    Order Status:  Sent Specimen:  Wound from Ankle, Right Updated:  04/09/19 1130    Culture, Anaerobe [[id number]] Collected:  04/09/19 0941    Order Status:  Sent Specimen:  Wound from Ankle, Right Updated:  04/09/19 1130    Blood culture #1 **CANNOT BE ORDERED STAT** [[id number]] Collected:  04/08/19 1533    Order Status:  Completed Specimen:  Blood from Peripheral, Wrist, Right Updated:  04/09/19 0115     Blood Culture, Routine No Growth to date    Blood culture #2 **CANNOT BE ORDERED STAT** [[id number]] Collected:  04/08/19 1552    Order Status:  Completed Specimen:  Blood from Peripheral, Wrist, Right Updated:  04/09/19 0115     Blood Culture, Routine No Growth to date        Specimen (12h ago, onward)    Start     Ordered    04/09/19 1240  Specimen to Pathology - Surgery  Once     Comments:  Distal right posterior ankle wound.  Suspicious of melanoma vs SCC     Start Status     04/09/19 1240 Needs to be Collected Order ID: [id number]       04/09/19 1240    04/09/19 1240  Specimen to Pathology - Surgery  Once     Comments:  Proximal right posterior ankle wound.  Suspicious of melanoma vs SCC     Start Status     04/09/19 1240 Needs to be Collected Order ID: [id number]       04/09/19 1240          Diagnostic Results:  MRI: negative for abscess or osteo    Venous US: negative for DVT  X-ray: negative for osteo    BASILIA's: ordered    Clinical Findings:  Ulcer Location: rightposterior ankle  Measurements:approximately 2.0x2.0x0.4  Periwound: rolled irregular edges with some dark discoloration.  Undermining and tracking along tendon.  Drainage:  serosanguinous.  Base:  100% granular. 0% fibrin.   Signs of infection: slough.   Exposed achilles tendon.  Tendon appears healthy and viable

## 2019-04-09 NOTE — CONSULTS
[facility]  Podiatry  Consult Note    Patient Name: Shaq Bragg Jr.  MRN: [id number]  Admission Date: 4/8/2019  Hospital Length of Stay: 1 days  Attending Physician: Grant Amor MD  Primary Care Provider: Ata Jeter MD     Inpatient consult to Podiatry  Consult performed by: Aroldo Curiel MD  Consult ordered by: Amaris Barron MD        Subjective:     History of Present Illness:  Shaq Bragg Jr. is a 88 y.o. male who  has a past medical history of COPD (chronic obstructive pulmonary disease), H/O asbestosis, Hearing loss, Hypercholesterolemia, Hypertension, Malignant melanoma of right lower leg, Melanoma, Skin cancer, and Squamous cell carcinoma.    Patient Admited for infected right ankle ulcer.  Consulted to Podiatry for evaluation.  Patient complains of ulcer  that started about 3 months ago.  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  Was seen by oncology yesterday who admitted him for concerns of wound infection.  Patient Denies F/C/N/V    .        Scheduled Meds:   enoxaparin  40 mg Subcutaneous Daily    furosemide  20 mg Intravenous BID    losartan-hydrochlorothiazide 50-12.5 mg  1 tablet Oral QAM    metoprolol tartrate  25 mg Oral QHS    metoprolol tartrate  50 mg Oral Daily    simvastatin  10 mg Oral QHS    tamsulosin  0.4 mg Oral QAM     Continuous Infusions:  PRN Meds:acetaminophen, cadexomer iodine, dextrose 50%, dextrose 50%, glucagon (human recombinant), glucose, glucose, HYDROcodone-acetaminophen, HYDROcodone-acetaminophen, ondansetron, ramelteon, senna-docusate 8.6-50 mg, sodium chloride 0.9%, sodium chloride 0.9%    Review of patient's allergies indicates:  No Known Allergies     Past Medical History:   Diagnosis Date    COPD (chronic obstructive pulmonary disease)     H/O asbestosis     Hearing loss      Hypercholesterolemia     Hypertension     Malignant melanoma of right lower leg 2017    Melanoma     Approx. 2 years ago     Skin cancer     Squamous cell carcinoma      Past Surgical History:   Procedure Laterality Date    ADENOIDECTOMY      APPENDECTOMY      Carotid Stenosis Surgery      CHOLECYSTECTOMY  1965    LYMPHADENECTOMY, INGUINAL, Sartorious Flap Right 2018    [facility]    TONSILLECTOMY         Family History     Problem Relation (Age of Onset)    Cancer Father    Coronary artery disease Mother    Diabetes Sister    Esophageal cancer Father    No Known Problems Maternal Aunt, Maternal Uncle, Paternal Aunt, Paternal Uncle, Maternal Grandmother, Maternal Grandfather, Paternal Grandmother, Paternal Grandfather        Tobacco Use    Smoking status: Former Smoker     Packs/day: 1.00     Years: 15.00     Pack years: 15.00     Types: Cigarettes     Last attempt to quit: 1965     Years since quittin.3    Smokeless tobacco: Never Used   Substance and Sexual Activity    Alcohol use: No     Frequency: Never    Drug use: No    Sexual activity: Never     Review of Systems   Constitutional: Negative for chills and fever.   Cardiovascular: Negative for leg swelling.   Gastrointestinal: Negative for nausea and vomiting.   Musculoskeletal: Negative for arthralgias and joint swelling.   Skin: Positive for wound. Negative for rash.   Psychiatric/Behavioral: Negative for agitation and confusion.     Objective:     Vital Signs (Most Recent):  Temp: 98 °F (36.7 °C) (19)  Pulse: 78 (19)  Resp: 20 (19)  BP: (!) 183/72 (19)  SpO2: 95 % (19) Vital Signs (24h Range):  Temp:  [97.7 °F (36.5 °C)-98.9 °F (37.2 °C)] 98 °F (36.7 °C)  Pulse:  [62-85] 78  Resp:  [16-50] 20  SpO2:  [93 %-99 %] 95 %  BP: (153-184)/(65-78) 183/72     Weight: 82.9 kg (182 lb 12.2 oz)  Body mass index is 24.79 kg/m².    Foot  Exam    General  Orientation: alert and oriented to person, place, and time   Affect: appropriate       Right Foot/Ankle     Inspection and Palpation  Ecchymosis: none  Tenderness: none     Neurovascular  Dorsalis pedis: 1+  Posterior tibial: 1+  Saphenous nerve sensation: normal  Tibial nerve sensation: normal  Superficial peroneal nerve sensation: normal  Deep peroneal nerve sensation: normal  Sural nerve sensation: normal      Left Foot/Ankle      Inspection and Palpation  Ecchymosis: none  Tenderness: none   Swelling: none     Neurovascular  Dorsalis pedis: 1+  Posterior tibial: 1+  Saphenous nerve sensation: normal  Tibial nerve sensation: normal  Superficial peroneal nerve sensation: normal  Deep peroneal nerve sensation: normal  Sural nerve sensation: normal            Laboratory:  A1C: No results for input(s): HGBA1C in the last 4320 hours.  CBC:   Recent Labs   Lab 04/09/19 0518   WBC 6.65   RBC 4.07*   HGB 13.0*   HCT 38.2*      MCV 94   MCH 31.9*   MCHC 34.0     CMP:   Recent Labs   Lab 04/09/19 0518   GLU 95   CALCIUM 9.2   ALBUMIN 3.1*   PROT 6.3      K 3.9   CO2 30*      BUN 15   CREATININE 1.0   ALKPHOS 107   ALT 11   AST 15   BILITOT 1.3*     CRP:   Recent Labs   Lab 04/08/19  1533   .9*     ESR:   Recent Labs   Lab 04/08/19  1533   SEDRATE 16     Wound Cultures: No results for input(s): LABAERO in the last 4320 hours.  Microbiology Results (last 7 days)     Procedure Component Value Units Date/Time    Aerobic culture [[id number]] Collected:  04/09/19 0941    Order Status:  Sent Specimen:  Wound from Ankle, Right Updated:  04/09/19 1130    Culture, Anaerobe [[id number]] Collected:  04/09/19 0941    Order Status:  Sent Specimen:  Wound from Ankle, Right Updated:  04/09/19 1130    Blood culture #1 **CANNOT BE ORDERED STAT** [[id number]] Collected:  04/08/19 1533    Order Status:  Completed Specimen:  Blood from Peripheral, Wrist, Right Updated:  04/09/19 0115     Blood Culture,  Routine No Growth to date    Blood culture #2 **CANNOT BE ORDERED STAT** [[id number]] Collected:  04/08/19 1552    Order Status:  Completed Specimen:  Blood from Peripheral, Wrist, Right Updated:  04/09/19 0115     Blood Culture, Routine No Growth to date        Specimen (12h ago, onward)    Start     Ordered    04/09/19 1240  Specimen to Pathology - Surgery  Once     Comments:  Distal right posterior ankle wound.  Suspicious of melanoma vs SCC     Start Status     04/09/19 1240 Needs to be Collected Order ID: [id number]       04/09/19 1240    04/09/19 1240  Specimen to Pathology - Surgery  Once     Comments:  Proximal right posterior ankle wound.  Suspicious of melanoma vs SCC     Start Status     04/09/19 1240 Needs to be Collected Order ID: [id number]       04/09/19 1240          Diagnostic Results:  MRI: negative for abscess or osteo    Venous US: negative for DVT  X-ray: negative for osteo    BASILIA's: ordered    Clinical Findings:  Ulcer Location: rightposterior ankle  Measurements:approximately 2.0x2.0x0.4  Periwound: rolled irregular edges with some dark discoloration.  Undermining and tracking along tendon.  Drainage: serosanguinous.  Base:  100% granular. 0% fibrin.   Signs of infection: slough.   Exposed achilles tendon.  Tendon appears healthy and viable              Assessment/Plan:     Venous stasis ulcer of right ankle with muscle involvement without evidence of necrosis without varicose veins  Shaq Isaak Bragg Jr. is a 88 y.o. male with lymphedema due to lymphadenotomy with infected wound  -Wound washed out at bedside, dressed with betadine gauze dressings  -Nursing to do daily dressing changes with iodosrob, 4x4's, and kerlix and ace wrap up to knee  -elevate leg for edema control  -MRI negative for abscess or osteo  -Obtained wound culture swabs.  Recommend 14 day course of PO antibiotics for suspicious soft tissue infection with follow up in clinic.    -did punch biopsies of wound, see  below  -Patient would benefit from OR debridement with graft application, but will need to wait till infection resolves and until pathology results    DC Instructions:  Patient is to follow up with podiatry within 10 days of discharge.  Home health to do dressing changes 3 times a week as follows:  Rinse wound with saline, pat dry.  Apply iodosorb to wound, dress with xeroform, 4x4's, ABD, then wrap with cast padding and ace up to knee.      SCC (squamous cell carcinoma), leg, right  As above    Malignant melanoma of right lower leg  Per primary        Procedure note:   Surgeon:  Dr. Pastor  Assisting Surgeon: Aroldo Curiel, DPM, PGY2  Pre op diagnosis: Venous stasis ulcer   Post op diagnosis: same    Anesthesia:   Hemostasis: direct pressure.   EBL: < 1ml      Procedure: Punch biopsy    After informed written consent was obtained, using Betadine for cleansing and 1% Lidocaine without epinephrine for anesthetic, with sterile technique a 4 mm punch biopsy was used to obtain a biopsy specimen of proximal and distal margins of the Right posterior ankle wound. Hemostasis was obtained by pressure. iodosorb and hydrofera ready dressing was applie.  The specimen is labeled and sent to pathology for evaluation. The procedure was well tolerated without complications.           Thank you for your consult. I will follow-up with patient. Please contact us if you have any additional questions.    Aroldo Curiel MD  Podiatry  [facility]

## 2019-04-09 NOTE — PLAN OF CARE
Problem: Adult Inpatient Plan of Care  Goal: Plan of Care Review  Outcome: Ongoing (interventions implemented as appropriate)  Report from Emily LIU. Noted patient in bed awake, alert, and oriented. Denies pain and dyspnea. Patient asking to talk to MD concerning possible chemo today. Will notify MD of patients concern. Noted mepilex dressing to posterior ankle intact with old drainage on it. Bed in lowest position and call light within reach. Board updated and patient encouraged to notify staff when he needs assistance. Assessments per flow sheets. Will continue to monitor.

## 2019-04-09 NOTE — H&P
Hospital Medicine  History and Physical      Patient Name: Shaq Bragg Jr.  MRN:  [id number]  Hospital Medicine Team: [facility] MED A Amaris Barron MD  Date of Admission:  4/8/2019     Principal Problem:  Infected ulcer of skin, with fat layer exposed   Primary Care Physician: Ata Jeter MD       History of Present Illness:    Mr. Shaq Bragg Jr. is a 88 y.o. male with malignant melanoma of the left leg (2017), recently diagnosed malignant melanoma of the right leg (biopsy with SCC), and chronic lymphedema s/p lymphadenectomy, who presents to the ED from Oncology clinic for evaluation of a right heel foot ulcer.  He endorses having this heel ulcer for months, and feels like it has gotten progressively worse and isn't heeling.  He was evaluated by Wound Care, who gave him a cream that he feels has not been effective.  He endorses foul smelling drainage as well as worsening lymphedema and erythema of the leg.  He has been ambulating with a boot, but feels like his ambulation has become progressively more difficult.  He denies any fevers or chills.  Of note, he completed 1 cycle of Nivolumab, and was supposed to receive his second cycle on 4/8, but was sent to the ER with concern for osteomyelitis of the right heel with possible cellulitis.    In the ED, labs were notable for ESR 16 and .  US was negative for DVT.  He was given IV Vancomycin and was admitted to Hospital Medicine for further management.        Review of Systems:  Constitutional: Negative for chills, fatigue, fever.   HENT: Negative for sore throat, trouble swallowing.    Eyes: Negative for photophobia, visual disturbance.   Respiratory: Negative for cough, shortness of breath.    Cardiovascular: Negative for chest pain, palpitations, leg swelling.   Gastrointestinal: Negative for abdominal pain, constipation, diarrhea, nausea, vomiting.   Endocrine: Negative for cold intolerance, heat intolerance.   Genitourinary: Negative for  dysuria, frequency.   Musculoskeletal: + heel ulcer  Skin: Negative for rash, wound, erythema   Neurological: Negative for dizziness, syncope, weakness, light-headedness.   Psychiatric/Behavioral: Negative for confusion, hallucinations, anxiety  All other systems reviewed and are negative.      Past Medical History: Patient has a past medical history of COPD (chronic obstructive pulmonary disease), H/O asbestosis, Hearing loss, Hypercholesterolemia, Hypertension, Malignant melanoma of right lower leg (5/11/2017), Melanoma, Skin cancer, and Squamous cell carcinoma.      Past Surgical History: Patient has a past surgical history that includes Cholecystectomy (1965); Inguinal lymphadenectomy (Right, 11/12/2018); Carotid Stenosis Surgery; Appendectomy; Tonsillectomy; and Adenoidectomy.      Social History: Patient reports that he quit smoking about 54 years ago. His smoking use included cigarettes. He has a 15.00 pack-year smoking history. He has never used smokeless tobacco. He reports that he does not drink alcohol or use drugs.      Family History: Patient's family history includes Cancer in his father; Coronary artery disease in his mother; Diabetes in his sister; Esophageal cancer in his father; No Known Problems in his maternal aunt, maternal grandfather, maternal grandmother, maternal uncle, paternal aunt, paternal grandfather, paternal grandmother, and paternal uncle.      Medications: Scheduled Meds:   enoxaparin  40 mg Subcutaneous Daily    [START ON 4/9/2019] losartan-hydrochlorothiazide 50-12.5 mg  1 tablet Oral QAM    metoprolol tartrate  25 mg Oral QHS    [START ON 4/9/2019] metoprolol tartrate  50 mg Oral Daily    simvastatin  10 mg Oral QHS    [START ON 4/9/2019] tamsulosin  0.4 mg Oral QAM     Continuous Infusions:  PRN Meds:.acetaminophen, dextrose 50%, dextrose 50%, glucagon (human recombinant), glucose, glucose, HYDROcodone-acetaminophen, HYDROcodone-acetaminophen, ondansetron, ramelteon,  senna-docusate 8.6-50 mg, sodium chloride 0.9%, sodium chloride 0.9%      Allergies: Patient has No Known Allergies.      Physical Exam:    Temp:  [97.7 °F (36.5 °C)-98.9 °F (37.2 °C)]   Pulse:  [64-85]   Resp:  [16-50]   BP: (154-184)/(65-78)   SpO2:  [93 %-99 %]     Constitutional: Appears well developed and well nourished  Head: Normocephalic and atraumatic. Facial plethora  Mouth/Throat: Oropharynx is clear and moist.   Eyes: EOM are normal. Pupils are equal, round, and reactive to light. No scleral icterus.   Neck: Normal range of motion. Neck supple.   Cardiovascular: Normal heart rate.  Regular heart rhythm.  No murmur heard.  Pulmonary/Chest: Effort normal. No respiratory distress. No wheezes, rales, or rhonchi  Abdominal: Soft. Bowel sounds are normal.  No distension.  No tenderness  Musculoskeletal: Normal range of motion. No edema.   Neurological: Alert and oriented to person, place, and time.   Skin: Skin is warm and dry. RLE with mild edema with warmth and erythema up to the knee.  Psychiatric: Normal mood and affect. Behavior is normal.               No intake or output data in the 24 hours ending 04/08/19 2128  Recent Labs   Lab 04/08/19  1116 04/08/19  1533   WBC 8.86 8.41   HGB 13.6* 14.6   HCT 39.7* 42.9    189     Recent Labs   Lab 04/08/19  1116 04/08/19  1533    138   K 4.2 3.6    101   CO2 30* 26   BUN 18 18   CREATININE 1.1 1.0    109   CALCIUM 9.7 9.9     Recent Labs   Lab 04/08/19  1116 04/08/19  1533   ALKPHOS 109 122   ALT 12 12   AST 15 17   ALBUMIN 3.3* 3.6   PROT 6.9 7.5   BILITOT 1.6* 1.6*      No results for input(s): POCTGLUCOSE in the last 168 hours.  Recent Labs     04/08/19  1533   CPK 74       No results for input(s): LACTATE in the last 72 hours.       Assessment and Plan:    Mr. Trout Lakeanalisa Bragg Jr. is a 88 y.o. male who presented to [facility] on 4/8/2019 with an infected skin ulcer.    Infected Ulcer of Skin, Fat Layer Exposed  RLE Cellulitis  · No  SIRS criteria but with elevated  and normal ESR  · Check MRI to r/o osteo  · LE US negative for DVT  · Podiatry consulted for wound cultures vs biopsy.  Might need ID  · S/p Vanc x1 in the ED.  Will hold for now pending wound cultures/biopsy    Malignant Melanoma of Right Lower Leg  SCC of Right Leg  · History of melanoma of the left leg in 2017  · Biopsy of right leg with SCC  · S/p cycle 1 of Nivolumab  · Follows with Dr. Armando/Prudencio in Oncology    Lymphedema of RLE  · Start Lasix 20mg IV BID to help with edema  · Has Lasix at home that isn't compliant with    Essential HTN  · Chronic and stable  · Continue Losartan-HCTZ 50-12.5mg PO daily  · Continue Metoprolol 50/25mg daily/qHS    HLD  · Chronic and stable  · Continue Simvastatin 10mg PO qHS    BPH  · Chronic and stable  · Continue Flomax 0.4mg PO daily     Diet:  Regular after MRI  GI PPx:  Not indicated  DVT PPx:  Lovenox  Goals of Care:  Full      Disposition:  Pending Podiatry recs  Discharge Needs:  TBD      Amaris Barron MD  Hospital Medicine  Cell:  [phone]  Spectra:  [phone]  Pager:  [phone]

## 2019-04-09 NOTE — PLAN OF CARE
04/09/19 1244   Discharge Assessment   Assessment Type Discharge Planning Assessment   Confirmed/corrected address and phone number on facesheet? Yes   Assessment information obtained from? Patient;Medical Record   Expected Length of Stay (days) 4   Communicated expected length of stay with patient/caregiver yes   Prior to hospitilization cognitive status: Alert/Oriented;No Deficits   Prior to hospitalization functional status: Independent   Current cognitive status: Alert/Oriented;No Deficits   Current Functional Status: Independent   Facility Ar[facility] From: [facility]   Lives With spouse   Able to Return to Prior Arrangements yes   Is patient able to care for self after discharge? Yes   Who are your caregiver(s) and their phone number(s)? Self Care   Patient's perception of discharge disposition home or selfcare;home health   Readmission Within the Last 30 Days no previous admission in last 30 days   Patient currently being followed by outpatient case management? No   Patient currently receives any other outside agency services? No   Equipment Currently Used at Home none   Do you have any problems affording any of your prescribed medications? No   Is the patient taking medications as prescribed? yes   Does the patient have transportation home? Yes   Transportation Anticipated car, drives self   Dialysis Name and Scheduled days N/A   Does the patient receive services at the [facility]? No   Discharge Plan A Home   Discharge Plan B Home;Home Health   DME Needed Upon Discharge  none   Patient/Family in Agreement with Plan yes       CM to the Bedside to see the patient at this time. He states that he resides with his Wife in a 2 Story Home without MERLIN. The patient denies the use of DME to assist with ADL's. He reports that he is independent and drives when needed. The patient states he drove himself to a clinic appointment and wound up admitted. He was notified that the CM team would be  following him throughout his hospital stay for D/C needs and/or recommendations. Current D/C plan is Home vs Home Health. The patient states he was D/C'd from [facility] 2 weeks ago. The patient states if HH is needed he would like to go with the same company. CM placed name and number on the board at the Bedside for the patient to call with D/C needs or questions. Understanding verbalized.

## 2019-04-09 NOTE — HPI
Shaq Bragg Jr. is a 88 y.o. male who  has a past medical history of COPD (chronic obstructive pulmonary disease), H/O asbestosis, Hearing loss, Hypercholesterolemia, Hypertension, Malignant melanoma of right lower leg, Melanoma, Skin cancer, and Squamous cell carcinoma.    Patient Admited for infected right ankle ulcer.  Consulted to Podiatry for evaluation.  Patient complains of ulcer  that started about 3 months ago.  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  Was seen by oncology yesterday who admitted him for concerns of wound infection.  Patient Denies F/C/N/V    .

## 2019-04-10 LAB
ALBUMIN SERPL BCP-MCNC: 3.1 G/DL (ref 3.5–5.2)
ALP SERPL-CCNC: 101 U/L (ref 55–135)
ALT SERPL W/O P-5'-P-CCNC: 10 U/L (ref 10–44)
ANION GAP SERPL CALC-SCNC: 8 MMOL/L (ref 8–16)
AST SERPL-CCNC: 14 U/L (ref 10–40)
BASOPHILS # BLD AUTO: 0.03 K/UL (ref 0–0.2)
BASOPHILS NFR BLD: 0.4 % (ref 0–1.9)
BILIRUB SERPL-MCNC: 1 MG/DL (ref 0.1–1)
BUN SERPL-MCNC: 18 MG/DL (ref 8–23)
CALCIUM SERPL-MCNC: 9.3 MG/DL (ref 8.7–10.5)
CHLORIDE SERPL-SCNC: 103 MMOL/L (ref 95–110)
CO2 SERPL-SCNC: 27 MMOL/L (ref 23–29)
CREAT SERPL-MCNC: 1.2 MG/DL (ref 0.5–1.4)
DIFFERENTIAL METHOD: ABNORMAL
EOSINOPHIL # BLD AUTO: 0.2 K/UL (ref 0–0.5)
EOSINOPHIL NFR BLD: 2.6 % (ref 0–8)
ERYTHROCYTE [DISTWIDTH] IN BLOOD BY AUTOMATED COUNT: 12.7 % (ref 11.5–14.5)
EST. GFR  (AFRICAN AMERICAN): >60 ML/MIN/1.73 M^2
EST. GFR  (NON AFRICAN AMERICAN): 53.7 ML/MIN/1.73 M^2
GLUCOSE SERPL-MCNC: 120 MG/DL (ref 70–110)
HCT VFR BLD AUTO: 37.7 % (ref 40–54)
HGB BLD-MCNC: 12.9 G/DL (ref 14–18)
IMM GRANULOCYTES # BLD AUTO: 0.02 K/UL (ref 0–0.04)
IMM GRANULOCYTES NFR BLD AUTO: 0.3 % (ref 0–0.5)
LYMPHOCYTES # BLD AUTO: 1 K/UL (ref 1–4.8)
LYMPHOCYTES NFR BLD: 14.6 % (ref 18–48)
MAGNESIUM SERPL-MCNC: 2 MG/DL (ref 1.6–2.6)
MCH RBC QN AUTO: 32 PG (ref 27–31)
MCHC RBC AUTO-ENTMCNC: 34.2 G/DL (ref 32–36)
MCV RBC AUTO: 94 FL (ref 82–98)
MONOCYTES # BLD AUTO: 0.7 K/UL (ref 0.3–1)
MONOCYTES NFR BLD: 10.3 % (ref 4–15)
NEUTROPHILS # BLD AUTO: 5 K/UL (ref 1.8–7.7)
NEUTROPHILS NFR BLD: 71.8 % (ref 38–73)
NRBC BLD-RTO: 0 /100 WBC
PHOSPHATE SERPL-MCNC: 3 MG/DL (ref 2.7–4.5)
PLATELET # BLD AUTO: 197 K/UL (ref 150–350)
PMV BLD AUTO: 10.2 FL (ref 9.2–12.9)
POTASSIUM SERPL-SCNC: 3.9 MMOL/L (ref 3.5–5.1)
PROT SERPL-MCNC: 6.4 G/DL (ref 6–8.4)
RBC # BLD AUTO: 4.03 M/UL (ref 4.6–6.2)
SODIUM SERPL-SCNC: 138 MMOL/L (ref 136–145)
WBC # BLD AUTO: 6.91 K/UL (ref 3.9–12.7)

## 2019-04-10 PROCEDURE — 99233 PR SUBSEQUENT HOSPITAL CARE,LEVL III: ICD-10-PCS | Mod: ,,, | Performed by: HOSPITALIST

## 2019-04-10 PROCEDURE — 25000003 PHARM REV CODE 250: Performed by: HOSPITALIST

## 2019-04-10 PROCEDURE — 11000001 HC ACUTE MED/SURG PRIVATE ROOM

## 2019-04-10 PROCEDURE — 84100 ASSAY OF PHOSPHORUS: CPT

## 2019-04-10 PROCEDURE — 63600175 PHARM REV CODE 636 W HCPCS: Performed by: HOSPITALIST

## 2019-04-10 PROCEDURE — 80053 COMPREHEN METABOLIC PANEL: CPT

## 2019-04-10 PROCEDURE — 36415 COLL VENOUS BLD VENIPUNCTURE: CPT

## 2019-04-10 PROCEDURE — G0378 HOSPITAL OBSERVATION PER HR: HCPCS

## 2019-04-10 PROCEDURE — 99233 SBSQ HOSP IP/OBS HIGH 50: CPT | Mod: ,,, | Performed by: PODIATRIST

## 2019-04-10 PROCEDURE — 83735 ASSAY OF MAGNESIUM: CPT

## 2019-04-10 PROCEDURE — 99233 SBSQ HOSP IP/OBS HIGH 50: CPT | Mod: ,,, | Performed by: HOSPITALIST

## 2019-04-10 PROCEDURE — 85025 COMPLETE CBC W/AUTO DIFF WBC: CPT

## 2019-04-10 PROCEDURE — 99233 PR SUBSEQUENT HOSPITAL CARE,LEVL III: ICD-10-PCS | Mod: ,,, | Performed by: PODIATRIST

## 2019-04-10 RX ADMIN — VANCOMYCIN HYDROCHLORIDE 1750 MG: 100 INJECTION, POWDER, LYOPHILIZED, FOR SOLUTION INTRAVENOUS at 06:04

## 2019-04-10 RX ADMIN — METOPROLOL TARTRATE 25 MG: 25 TABLET ORAL at 09:04

## 2019-04-10 RX ADMIN — CEFEPIME 2 G: 2 INJECTION, POWDER, FOR SOLUTION INTRAVENOUS at 05:04

## 2019-04-10 RX ADMIN — SIMVASTATIN 10 MG: 10 TABLET, FILM COATED ORAL at 09:04

## 2019-04-10 RX ADMIN — FUROSEMIDE 20 MG: 10 INJECTION, SOLUTION INTRAMUSCULAR; INTRAVENOUS at 08:04

## 2019-04-10 RX ADMIN — TAMSULOSIN HYDROCHLORIDE 0.4 MG: 0.4 CAPSULE ORAL at 06:04

## 2019-04-10 RX ADMIN — ENOXAPARIN SODIUM 40 MG: 100 INJECTION SUBCUTANEOUS at 05:04

## 2019-04-10 RX ADMIN — CEFEPIME 2 G: 2 INJECTION, POWDER, FOR SOLUTION INTRAVENOUS at 06:04

## 2019-04-10 RX ADMIN — METOPROLOL TARTRATE 50 MG: 50 TABLET ORAL at 08:04

## 2019-04-10 RX ADMIN — FUROSEMIDE 20 MG: 10 INJECTION, SOLUTION INTRAMUSCULAR; INTRAVENOUS at 05:04

## 2019-04-10 RX ADMIN — LOSARTAN POTASSIUM AND HYDROCHLOROTHIAZIDE 1 TABLET: 12.5; 5 TABLET ORAL at 06:04

## 2019-04-10 NOTE — SUBJECTIVE & OBJECTIVE
Interval Hx:  Patient Seen at bedside for dressing change.  Patient denies F/C/N/V.  Dressings clean, dry, and intact.  Patient does have some pain related to likely plantar fasciitis.    Scheduled Meds:   ceFEPime (MAXIPIME) IVPB  2 g Intravenous Q12H    enoxaparin  40 mg Subcutaneous Daily    furosemide  20 mg Intravenous BID    losartan-hydrochlorothiazide 50-12.5 mg  1 tablet Oral QAM    metoprolol tartrate  25 mg Oral QHS    metoprolol tartrate  50 mg Oral Daily    simvastatin  10 mg Oral QHS    tamsulosin  0.4 mg Oral QAM    vancomycin (VANCOCIN) IVPB  1,750 mg Intravenous Q24H     Continuous Infusions:  PRN Meds:acetaminophen, cadexomer iodine, dextrose 50%, dextrose 50%, glucagon (human recombinant), glucose, glucose, HYDROcodone-acetaminophen, HYDROcodone-acetaminophen, ondansetron, ramelteon, senna-docusate 8.6-50 mg, sodium chloride 0.9%, sodium chloride 0.9%    Review of patient's allergies indicates:  No Known Allergies     Past Medical History:   Diagnosis Date    COPD (chronic obstructive pulmonary disease)     H/O asbestosis     Hearing loss     Hypercholesterolemia     Hypertension     Malignant melanoma of right lower leg 5/11/2017    Melanoma     Approx. 2 years ago     Skin cancer     Squamous cell carcinoma      Past Surgical History:   Procedure Laterality Date    ADENOIDECTOMY      APPENDECTOMY      Carotid Stenosis Surgery      CHOLECYSTECTOMY  1965    LYMPHADENECTOMY, INGUINAL, Sartorious Flap Right 11/12/2018    Performed by [facility]re [facility] at [facility] OR [address] [place] [address]    TONSILLECTOMY         Family History     Problem Relation (Age of Onset)    Cancer Father    Coronary artery disease Mother    Diabetes Sister    Esophageal cancer Father    No Known Problems Maternal Aunt, Maternal Uncle, Paternal Aunt, Paternal Uncle, Maternal Grandmother, Maternal Grandfather, Paternal Grandmother, Paternal Grandfather        Tobacco Use    Smoking status: Former Smoker      Packs/day: 1.00     Years: 15.00     Pack years: 15.00     Types: Cigarettes     Last attempt to quit: 1965     Years since quittin.3    Smokeless tobacco: Never Used   Substance and Sexual Activity    Alcohol use: No     Frequency: Never    Drug use: No    Sexual activity: Never     Review of Systems   Constitutional: Negative for chills and fever.   Cardiovascular: Negative for leg swelling.   Gastrointestinal: Negative for nausea and vomiting.   Musculoskeletal: Negative for arthralgias and joint swelling.   Skin: Positive for wound. Negative for rash.   Psychiatric/Behavioral: Negative for agitation and confusion.     Objective:     Vital Signs (Most Recent):  Temp: 97.9 °F (36.6 °C) (04/10/19 1149)  Pulse: (!) 58 (04/10/19 1022)  Resp: 15 (04/10/19 1022)  BP: 123/66 (04/10/19 1022)  SpO2: (!) 92 % (04/10/19 0554) Vital Signs (24h Range):  Temp:  [97.6 °F (36.4 °C)-98.9 °F (37.2 °C)] 97.9 °F (36.6 °C)  Pulse:  [58-84] 58  Resp:  [15-19] 15  SpO2:  [92 %-96 %] 92 %  BP: (119-142)/(64-71) 123/66     Weight: 82.9 kg (182 lb 12.2 oz)  Body mass index is 24.79 kg/m².    Foot Exam    General  Orientation: alert and oriented to person, place, and time   Affect: appropriate       Right Foot/Ankle     Inspection and Palpation  Ecchymosis: none  Tenderness: none     Neurovascular  Dorsalis pedis: 1+  Posterior tibial: 1+  Saphenous nerve sensation: normal  Tibial nerve sensation: normal  Superficial peroneal nerve sensation: normal  Deep peroneal nerve sensation: normal  Sural nerve sensation: normal      Left Foot/Ankle      Inspection and Palpation  Ecchymosis: none  Tenderness: none   Swelling: none     Neurovascular  Dorsalis pedis: 1+  Posterior tibial: 1+  Saphenous nerve sensation: normal  Tibial nerve sensation: normal  Superficial peroneal nerve sensation: normal  Deep peroneal nerve sensation: normal  Sural nerve sensation: normal            Laboratory:  A1C: No results for input(s): HGBA1C in the last  4320 hours.  CBC:   Recent Labs   Lab 04/10/19  0651   WBC 6.91   RBC 4.03*   HGB 12.9*   HCT 37.7*      MCV 94   MCH 32.0*   MCHC 34.2     CMP:   Recent Labs   Lab 04/10/19  0651   *   CALCIUM 9.3   ALBUMIN 3.1*   PROT 6.4      K 3.9   CO2 27      BUN 18   CREATININE 1.2   ALKPHOS 101   ALT 10   AST 14   BILITOT 1.0     CRP:   Recent Labs   Lab 04/08/19  1533   .9*     ESR:   Recent Labs   Lab 04/08/19  1533   SEDRATE 16     Wound Cultures:   Recent Labs   Lab 04/09/19  0941   LABAERO STAPHYLOCOCCUS AUREUS  Moderate  Susceptibility pending    PRESUMPTIVE PSEUDOMONAS SPECIES  Many  Identification and susceptibility pending       Microbiology Results (last 7 days)     Procedure Component Value Units Date/Time    Aerobic culture [[id number]] Collected:  04/09/19 0941    Order Status:  Completed Specimen:  Wound from Ankle, Right Updated:  04/10/19 1048     Aerobic Bacterial Culture --     STAPHYLOCOCCUS AUREUS  Moderate  Susceptibility pending       Aerobic Bacterial Culture --     PRESUMPTIVE PSEUDOMONAS SPECIES  Many  Identification and susceptibility pending      Culture, Anaerobe [[id number]] Collected:  04/09/19 0941    Order Status:  Completed Specimen:  Wound from Ankle, Right Updated:  04/10/19 0745     Anaerobic Culture Culture in progress    Blood culture #2 **CANNOT BE ORDERED STAT** [[id number]] Collected:  04/08/19 1552    Order Status:  Completed Specimen:  Blood from Peripheral, Wrist, Right Updated:  04/09/19 1812     Blood Culture, Routine No Growth to date     Blood Culture, Routine No Growth to date    Blood culture #1 **CANNOT BE ORDERED STAT** [[id number]] Collected:  04/08/19 1533    Order Status:  Completed Specimen:  Blood from Peripheral, Wrist, Right Updated:  04/09/19 1812     Blood Culture, Routine No Growth to date     Blood Culture, Routine No Growth to date        Specimen (12h ago, onward)    None          Diagnostic Results:  MRI: negative for abscess or  osteo    Venous US: negative for DVT  X-ray: negative for osteo    BASILIA's: ordered    Clinical Findings:  Ulcer Location: rightposterior ankle  Measurements:approximately 2.0x2.0x0.4  Periwound: rolled irregular edges with some dark discoloration.  Undermining and tracking along tendon.  Drainage: serosanguinous.  Base:  100% granular. 0% fibrin.   Signs of infection: slough.   Exposed achilles tendon.  Tendon appears healthy and viable        4/10      Erythema appears improved.

## 2019-04-10 NOTE — PROGRESS NOTES
[facility]  Podiatry  Progress Note    Patient Name: Shaq Bragg Jr.  MRN: [id number]  Admission Date: 4/8/2019  Hospital Length of Stay: 2 days  Attending Physician: Grant Amor MD  Primary Care Provider: tAa Jeter MD   Interval Hx:  Patient Seen at bedside for dressing change.  Patient denies F/C/N/V.  Dressings clean, dry, and intact.  Patient does have some pain related to likely plantar fasciitis.    Scheduled Meds:   ceFEPime (MAXIPIME) IVPB  2 g Intravenous Q12H    enoxaparin  40 mg Subcutaneous Daily    furosemide  20 mg Intravenous BID    losartan-hydrochlorothiazide 50-12.5 mg  1 tablet Oral QAM    metoprolol tartrate  25 mg Oral QHS    metoprolol tartrate  50 mg Oral Daily    simvastatin  10 mg Oral QHS    tamsulosin  0.4 mg Oral QAM    vancomycin (VANCOCIN) IVPB  1,750 mg Intravenous Q24H     Continuous Infusions:  PRN Meds:acetaminophen, cadexomer iodine, dextrose 50%, dextrose 50%, glucagon (human recombinant), glucose, glucose, HYDROcodone-acetaminophen, HYDROcodone-acetaminophen, ondansetron, ramelteon, senna-docusate 8.6-50 mg, sodium chloride 0.9%, sodium chloride 0.9%    Review of patient's allergies indicates:  No Known Allergies     Past Medical History:   Diagnosis Date    COPD (chronic obstructive pulmonary disease)     H/O asbestosis     Hearing loss     Hypercholesterolemia     Hypertension     Malignant melanoma of right lower leg 5/11/2017    Melanoma     Approx. 2 years ago     Skin cancer     Squamous cell carcinoma      Past Surgical History:   Procedure Laterality Date    ADENOIDECTOMY      APPENDECTOMY      Carotid Stenosis Surgery      CHOLECYSTECTOMY  1965    LYMPHADENECTOMY, INGUINAL, Sartorious Flap Right 11/12/2018    Performed by Yair [facility]re [facility] at [facility] OR [address] [place] [address]    TONSILLECTOMY         Family History     Problem Relation (Age of Onset)    Cancer Father    Coronary artery disease Mother    Diabetes Sister     Esophageal cancer Father    No Known Problems Maternal Aunt, Maternal Uncle, Paternal Aunt, Paternal Uncle, Maternal Grandmother, Maternal Grandfather, Paternal Grandmother, Paternal Grandfather        Tobacco Use    Smoking status: Former Smoker     Packs/day: 1.00     Years: 15.00     Pack years: 15.00     Types: Cigarettes     Last attempt to quit: 1965     Years since quittin.3    Smokeless tobacco: Never Used   Substance and Sexual Activity    Alcohol use: No     Frequency: Never    Drug use: No    Sexual activity: Never     Review of Systems   Constitutional: Negative for chills and fever.   Cardiovascular: Negative for leg swelling.   Gastrointestinal: Negative for nausea and vomiting.   Musculoskeletal: Negative for arthralgias and joint swelling.   Skin: Positive for wound. Negative for rash.   Psychiatric/Behavioral: Negative for agitation and confusion.     Objective:     Vital Signs (Most Recent):  Temp: 97.9 °F (36.6 °C) (04/10/19 1149)  Pulse: (!) 58 (04/10/19 1022)  Resp: 15 (04/10/19 1022)  BP: 123/66 (04/10/19 1022)  SpO2: (!) 92 % (04/10/19 0554) Vital Signs (24h Range):  Temp:  [97.6 °F (36.4 °C)-98.9 °F (37.2 °C)] 97.9 °F (36.6 °C)  Pulse:  [58-84] 58  Resp:  [15-19] 15  SpO2:  [92 %-96 %] 92 %  BP: (119-142)/(64-71) 123/66     Weight: 82.9 kg (182 lb 12.2 oz)  Body mass index is 24.79 kg/m².    Foot Exam    General  Orientation: alert and oriented to person, place, and time   Affect: appropriate       Right Foot/Ankle     Inspection and Palpation  Ecchymosis: none  Tenderness: none     Neurovascular  Dorsalis pedis: 1+  Posterior tibial: 1+  Saphenous nerve sensation: normal  Tibial nerve sensation: normal  Superficial peroneal nerve sensation: normal  Deep peroneal nerve sensation: normal  Sural nerve sensation: normal      Left Foot/Ankle      Inspection and Palpation  Ecchymosis: none  Tenderness: none   Swelling: none     Neurovascular  Dorsalis pedis: 1+  Posterior tibial:  1+  Saphenous nerve sensation: normal  Tibial nerve sensation: normal  Superficial peroneal nerve sensation: normal  Deep peroneal nerve sensation: normal  Sural nerve sensation: normal            Laboratory:  A1C: No results for input(s): HGBA1C in the last 4320 hours.  CBC:   Recent Labs   Lab 04/10/19  0651   WBC 6.91   RBC 4.03*   HGB 12.9*   HCT 37.7*      MCV 94   MCH 32.0*   MCHC 34.2     CMP:   Recent Labs   Lab 04/10/19  0651   *   CALCIUM 9.3   ALBUMIN 3.1*   PROT 6.4      K 3.9   CO2 27      BUN 18   CREATININE 1.2   ALKPHOS 101   ALT 10   AST 14   BILITOT 1.0     CRP:   Recent Labs   Lab 04/08/19  1533   .9*     ESR:   Recent Labs   Lab 04/08/19  1533   SEDRATE 16     Wound Cultures:   Recent Labs   Lab 04/09/19  0941   LABAERO STAPHYLOCOCCUS AUREUS  Moderate  Susceptibility pending    PRESUMPTIVE PSEUDOMONAS SPECIES  Many  Identification and susceptibility pending       Microbiology Results (last 7 days)     Procedure Component Value Units Date/Time    Aerobic culture [[id number]] Collected:  04/09/19 0941    Order Status:  Completed Specimen:  Wound from Ankle, Right Updated:  04/10/19 1048     Aerobic Bacterial Culture --     STAPHYLOCOCCUS AUREUS  Moderate  Susceptibility pending       Aerobic Bacterial Culture --     PRESUMPTIVE PSEUDOMONAS SPECIES  Many  Identification and susceptibility pending      Culture, Anaerobe [[id number]] Collected:  04/09/19 0941    Order Status:  Completed Specimen:  Wound from Ankle, Right Updated:  04/10/19 0745     Anaerobic Culture Culture in progress    Blood culture #2 **CANNOT BE ORDERED STAT** [[id number]] Collected:  04/08/19 1552    Order Status:  Completed Specimen:  Blood from Peripheral, Wrist, Right Updated:  04/09/19 1812     Blood Culture, Routine No Growth to date     Blood Culture, Routine No Growth to date    Blood culture #1 **CANNOT BE ORDERED STAT** [[id number]] Collected:  04/08/19 1533    Order Status:  Completed  Specimen:  Blood from Peripheral, Wrist, Right Updated:  04/09/19 1812     Blood Culture, Routine No Growth to date     Blood Culture, Routine No Growth to date        Specimen (12h ago, onward)    None          Diagnostic Results:  MRI: negative for abscess or osteo    Venous US: negative for DVT  X-ray: negative for osteo    BASILIA's: ordered    Clinical Findings:  Ulcer Location: rightposterior ankle  Measurements:approximately 2.0x2.0x0.4  Periwound: rolled irregular edges with some dark discoloration.  Undermining and tracking along tendon.  Drainage: serosanguinous.  Base:  100% granular. 0% fibrin.   Signs of infection: slough.   Exposed achilles tendon.  Tendon appears healthy and viable        4/10      Erythema appears improved.      Assessment/Plan:     Venous stasis ulcer of right ankle with muscle involvement without evidence of necrosis without varicose veins  Shaq Bragg Jr. is a 88 y.o. 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  Recommend 14 day course of PO antibiotics for suspicious soft tissue infection with follow up in clinic.    -Punch biopsy sites are stable: awaiting results.  -Patient would benefit from OR debridement with graft application or skin flap. This can be done as an outpatient after pathology results return.   -Will order CAM boot for ambulation to decrease motion of the Achilles tendon.  -Will order multi-podus boot to offload the Achilles and decrease motion of the Achilles during sleep.      DC Instructions:  Patient is to follow up with podiatry within 10 days of discharge.  Home health to do dressing changes 3 times a week as follows:  Rinse wound with saline, pat dry.   Apply iodosorb to wound, dress with xeroform, 4x4's, ABD, then wrap with cast padding and ace up to knee.      SCC (squamous cell carcinoma), leg, right  As above    Malignant melanoma of right lower leg  Per primary          Kendall Jones MD  Podiatry  [facility]

## 2019-04-10 NOTE — ASSESSMENT & PLAN NOTE
Shaq Bragg Jr. is a 88 y.o. 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  Recommend 14 day course of PO antibiotics for suspicious soft tissue infection with follow up in clinic.    -Punch biopsy sites are stable: awaiting results.  -Patient would benefit from OR debridement with graft application or skin flap. This can be done as an outpatient after pathology results return.   -Will order CAM boot for ambulation to decrease motion of the Achilles tendon.  -Will order multi-podus boot to offload the Achilles and decrease motion of the Achilles during sleep.      DC Instructions:  Patient is to follow up with podiatry within 10 days of discharge.  Home health to do dressing changes 3 times a week as follows:  Rinse wound with saline, pat dry.  Apply iodosorb to wound, dress with xeroform, 4x4's, ABD, then wrap with cast padding and ace up to knee.

## 2019-04-10 NOTE — PLAN OF CARE
Problem: Adult Inpatient Plan of Care  Goal: Absence of Hospital-Acquired Illness or Injury  Outcome: Ongoing (interventions implemented as appropriate)  Pt remained free of falls, injuries, or trauma during shift. Fall precautions maintained throughout shift. Bed kept in lowest position, locked. Call light within reach. Fall risk band onPt showed no new s/s of skin breakdown during shift. Pt up ad moon, AAOx4, and up in chair most of the shift. Pt anxious to be discharged. Dressing changed by Podiatry. Boot delivered but unable to be placed due to bulky dressing. Will place tomorrow with less dressing. Pt was able to reposition independently. Pt rounded on hourly with a purposeful manner.. No acute events throughout the shift. VS and assessment performed per orders. VSS Patient progressing towards goals as tolerated. Plan of care reviewed with pt, all concerns addressed. Will continue to monitor.    Problem: Infection  Goal: Infection Symptom Resolution  Outcome: Ongoing (interventions implemented as appropriate)  Consulted Infectious Disease today. Pt receiving dressing changes and antibiotics

## 2019-04-10 NOTE — PLAN OF CARE
Problem: Adult Inpatient Plan of Care  Goal: Plan of Care Review  Outcome: Ongoing (interventions implemented as appropriate)  No acute events throughout shift. VS and assessment performed per orders. IV antibiotics as ordered. Dressing to foot C/D/I.  Plan of care reviewed with pt, all concerns addressed. Pt free from injury or any falls.

## 2019-04-10 NOTE — PROGRESS NOTES
Hospital Medicine  Progress note    Team: [facility] HOSP MED A Grant Amor MD  Admit Date: 4/8/2019  SONIA 4/12/2019  Length of Stay:  LOS: 2 days   Code status: Full Code    Principal Problem:  Infected ulcer of skin, with fat layer exposed    Overview:    Interval hx:  Patient seen and examined at bedside, no acute events overnight. Cultures positive for Pseudomonas and Staph aureus.     ROS     Constitutional: Negative for chills, fatigue, fever.   HENT: Negative for sore throat, trouble swallowing.    Eyes: Negative for photophobia, visual disturbance.   Respiratory: Negative for cough, shortness of breath.    Cardiovascular: Negative for chest pain, palpitations, leg swelling.   Gastrointestinal: Negative for abdominal pain, constipation, diarrhea, nausea, vomiting.   Endocrine: Negative for cold intolerance, heat intolerance.   Genitourinary: Negative for dysuria, frequency.   Musculoskeletal: + heel ulcer  Skin: Negative for rash, wound, erythema   Neurological: Negative for dizziness, syncope, weakness, light-headedness.   Psychiatric/Behavioral: Negative for confusion, hallucinations, anxiety  All other systems reviewed and are negative.        PEx  Temp:  [97.6 °F (36.4 °C)-98.9 °F (37.2 °C)]   Pulse:  [58-84]   Resp:  [15-19]   BP: (119-142)/(64-71)   SpO2:  [92 %-96 %]   No intake or output data in the 24 hours ending 04/10/19 1222    Constitutional: Appears well developed and well nourished  Head: Normocephalic and atraumatic. Facial plethora  Mouth/Throat: Oropharynx is clear and moist.   Eyes: EOM are normal. Pupils are equal, round, and reactive to light. No scleral icterus.   Neck: Normal range of motion. Neck supple.   Cardiovascular: Normal heart rate.  Regular heart rhythm.  No murmur heard.  Pulmonary/Chest: Effort normal. No respiratory distress. No wheezes, rales, or rhonchi  Abdominal: Soft. Bowel sounds are normal.  No distension.  No tenderness  Musculoskeletal: Normal range of motion. No  edema.   Neurological: Alert and oriented to person, place, and time.   Skin: Skin is warm and dry. RLE with mild edema with warmth and erythema up to the knee.  Psychiatric: Normal mood and affect. Behavior is normal.         Recent Labs   Lab 04/08/19  1533 04/09/19  0518 04/10/19  0651   WBC 8.41 6.65 6.91   HGB 14.6 13.0* 12.9*   HCT 42.9 38.2* 37.7*    197 197     Recent Labs   Lab 04/08/19  1533 04/09/19  0518 04/10/19  0651    138 138   K 3.6 3.9 3.9    102 103   CO2 26 30* 27   BUN 18 15 18   CREATININE 1.0 1.0 1.2    95 120*   CALCIUM 9.9 9.2 9.3   MG  --  2.0 2.0   PHOS  --  3.4 3.0     Recent Labs   Lab 04/08/19  1533 04/09/19  0518 04/10/19  0651   ALKPHOS 122 107 101   ALT 12 11 10   AST 17 15 14   ALBUMIN 3.6 3.1* 3.1*   PROT 7.5 6.3 6.4   BILITOT 1.6* 1.3* 1.0        Recent Labs     04/08/19  1533   CPK 74     No results for input(s): POCTGLUCOSE in the last 168 hours.  No results found for: HGBA1C    Scheduled Meds:   ceFEPime (MAXIPIME) IVPB  2 g Intravenous Q12H    enoxaparin  40 mg Subcutaneous Daily    furosemide  20 mg Intravenous BID    losartan-hydrochlorothiazide 50-12.5 mg  1 tablet Oral QAM    metoprolol tartrate  25 mg Oral QHS    metoprolol tartrate  50 mg Oral Daily    simvastatin  10 mg Oral QHS    tamsulosin  0.4 mg Oral QAM    vancomycin (VANCOCIN) IVPB  1,750 mg Intravenous Q24H     Continuous Infusions:  As Needed:  acetaminophen, cadexomer iodine, dextrose 50%, dextrose 50%, glucagon (human recombinant), glucose, glucose, HYDROcodone-acetaminophen, HYDROcodone-acetaminophen, ondansetron, ramelteon, senna-docusate 8.6-50 mg, sodium chloride 0.9%, sodium chloride 0.9%    Active Hospital Problems    Diagnosis  POA    *Infected ulcer of skin, with fat layer exposed [L98.492, L08.9]  Yes    Venous stasis ulcer of right ankle with muscle involvement without evidence of necrosis without varicose veins [I87.2, L97.315]  Unknown    Essential  hypertension [I10]  Yes    BPH (benign prostatic hyperplasia) [N40.0]  Yes    HLD (hyperlipidemia) [E78.5]  Yes    SCC (squamous cell carcinoma), leg, right [C44.722]  Yes    Lymphedema of right lower extremity [I89.0]  Yes    Malignant melanoma of right lower leg [C43.71]  Yes      Resolved Hospital Problems   No resolved problems to display.         Assessment and Plan  Mr. Shaq Bragg Jr. is a 88 y.o. male who presented to [facility] on 4/8/2019 with an infected skin ulcer.     Infected Ulcer of Skin, Fat Layer Exposed  RLE Cellulitis  · No SIRS criteria but with elevated  and normal ESR  · MRI to r/o osteo unremarkable  · LE US negative for DVT  · Podiatry consulted for wound cultures vs biopsy.  · S/p Vanc x1 in the ED.  Will hold for now pending wound cultures/biopsy  · Started on cefepime and vancomycin  · ID consulted for antibiotic guidance     Malignant Melanoma of Right Lower Leg  SCC of Right Leg  · History of melanoma of the left leg in 2017  · Biopsy of right leg with SCC  · S/p cycle 1 of Nivolumab  · Follows with Dr. Armando/Prudencio in Oncology     Lymphedema of RLE  · Start Lasix 20mg IV BID to help with edema  · Has Lasix at home that isn't compliant with     Essential HTN  · Chronic and stable  · Continue Losartan-HCTZ 50-12.5mg PO daily  · Continue Metoprolol 50/25mg daily/qHS     HLD  · Chronic and stable  · Continue Simvastatin 10mg PO qHS     BPH  · Chronic and stable  · Continue Flomax 0.4mg PO daily      High Risk Conditions  Melanoma of the left leg, chronic lymphedema      Diet:  Regular diet   GI PPx:   DVT PPx:    Anticoagulants   Medication Route Frequency    enoxaparin injection 40 mg Subcutaneous Daily       Goals of Care: Full code  Discharge plan: pending Biopsy results and podiatry recs     Time (minutes) spent in care of the patient (Greater than 1/2 spent in direct face-to-face contact) 35 minutes    Grant Smith MD  Staff Hospitalist  Department of Hospital  Medicine  [facility]   [phone]

## 2019-04-11 VITALS
HEIGHT: 72 IN | BODY MASS INDEX: 24.75 KG/M2 | WEIGHT: 182.75 LBS | DIASTOLIC BLOOD PRESSURE: 70 MMHG | HEART RATE: 64 BPM | OXYGEN SATURATION: 95 % | RESPIRATION RATE: 16 BRPM | TEMPERATURE: 98 F | SYSTOLIC BLOOD PRESSURE: 123 MMHG

## 2019-04-11 LAB
ALBUMIN SERPL BCP-MCNC: 2.9 G/DL (ref 3.5–5.2)
ALP SERPL-CCNC: 95 U/L (ref 55–135)
ALT SERPL W/O P-5'-P-CCNC: 11 U/L (ref 10–44)
ANION GAP SERPL CALC-SCNC: 6 MMOL/L (ref 8–16)
AST SERPL-CCNC: 18 U/L (ref 10–40)
BACTERIA SPEC AEROBE CULT: NORMAL
BACTERIA SPEC AEROBE CULT: NORMAL
BASOPHILS # BLD AUTO: 0.04 K/UL (ref 0–0.2)
BASOPHILS NFR BLD: 0.7 % (ref 0–1.9)
BILIRUB SERPL-MCNC: 1 MG/DL (ref 0.1–1)
BUN SERPL-MCNC: 22 MG/DL (ref 8–23)
CALCIUM SERPL-MCNC: 8.9 MG/DL (ref 8.7–10.5)
CHLORIDE SERPL-SCNC: 101 MMOL/L (ref 95–110)
CO2 SERPL-SCNC: 30 MMOL/L (ref 23–29)
CREAT SERPL-MCNC: 1.2 MG/DL (ref 0.5–1.4)
DIFFERENTIAL METHOD: ABNORMAL
EOSINOPHIL # BLD AUTO: 0.3 K/UL (ref 0–0.5)
EOSINOPHIL NFR BLD: 4.4 % (ref 0–8)
ERYTHROCYTE [DISTWIDTH] IN BLOOD BY AUTOMATED COUNT: 12.7 % (ref 11.5–14.5)
EST. GFR  (AFRICAN AMERICAN): >60 ML/MIN/1.73 M^2
EST. GFR  (NON AFRICAN AMERICAN): 53.7 ML/MIN/1.73 M^2
GLUCOSE SERPL-MCNC: 107 MG/DL (ref 70–110)
HCT VFR BLD AUTO: 36.7 % (ref 40–54)
HGB BLD-MCNC: 12.5 G/DL (ref 14–18)
IMM GRANULOCYTES # BLD AUTO: 0.02 K/UL (ref 0–0.04)
IMM GRANULOCYTES NFR BLD AUTO: 0.3 % (ref 0–0.5)
LYMPHOCYTES # BLD AUTO: 1.1 K/UL (ref 1–4.8)
LYMPHOCYTES NFR BLD: 18.7 % (ref 18–48)
MAGNESIUM SERPL-MCNC: 1.9 MG/DL (ref 1.6–2.6)
MCH RBC QN AUTO: 31.9 PG (ref 27–31)
MCHC RBC AUTO-ENTMCNC: 34.1 G/DL (ref 32–36)
MCV RBC AUTO: 94 FL (ref 82–98)
MONOCYTES # BLD AUTO: 0.6 K/UL (ref 0.3–1)
MONOCYTES NFR BLD: 10.4 % (ref 4–15)
NEUTROPHILS # BLD AUTO: 3.9 K/UL (ref 1.8–7.7)
NEUTROPHILS NFR BLD: 65.5 % (ref 38–73)
NRBC BLD-RTO: 0 /100 WBC
PHOSPHATE SERPL-MCNC: 3.3 MG/DL (ref 2.7–4.5)
PLATELET # BLD AUTO: 186 K/UL (ref 150–350)
PMV BLD AUTO: 10.5 FL (ref 9.2–12.9)
POTASSIUM SERPL-SCNC: 3.2 MMOL/L (ref 3.5–5.1)
PROT SERPL-MCNC: 6 G/DL (ref 6–8.4)
RBC # BLD AUTO: 3.92 M/UL (ref 4.6–6.2)
SODIUM SERPL-SCNC: 137 MMOL/L (ref 136–145)
WBC # BLD AUTO: 5.95 K/UL (ref 3.9–12.7)

## 2019-04-11 PROCEDURE — 84100 ASSAY OF PHOSPHORUS: CPT

## 2019-04-11 PROCEDURE — 36415 COLL VENOUS BLD VENIPUNCTURE: CPT

## 2019-04-11 PROCEDURE — 25000003 PHARM REV CODE 250: Performed by: HOSPITALIST

## 2019-04-11 PROCEDURE — 85025 COMPLETE CBC W/AUTO DIFF WBC: CPT

## 2019-04-11 PROCEDURE — 83735 ASSAY OF MAGNESIUM: CPT

## 2019-04-11 PROCEDURE — 97165 OT EVAL LOW COMPLEX 30 MIN: CPT

## 2019-04-11 PROCEDURE — 80053 COMPREHEN METABOLIC PANEL: CPT

## 2019-04-11 PROCEDURE — 99223 PR INITIAL HOSPITAL CARE,LEVL III: ICD-10-PCS | Mod: ,,, | Performed by: INTERNAL MEDICINE

## 2019-04-11 PROCEDURE — 63600175 PHARM REV CODE 636 W HCPCS: Performed by: HOSPITALIST

## 2019-04-11 PROCEDURE — G0378 HOSPITAL OBSERVATION PER HR: HCPCS

## 2019-04-11 PROCEDURE — 99238 PR HOSPITAL DISCHARGE DAY,<30 MIN: ICD-10-PCS | Mod: ,,, | Performed by: HOSPITALIST

## 2019-04-11 PROCEDURE — 99238 HOSP IP/OBS DSCHRG MGMT 30/<: CPT | Mod: ,,, | Performed by: HOSPITALIST

## 2019-04-11 PROCEDURE — 99223 1ST HOSP IP/OBS HIGH 75: CPT | Mod: ,,, | Performed by: INTERNAL MEDICINE

## 2019-04-11 PROCEDURE — 97161 PT EVAL LOW COMPLEX 20 MIN: CPT

## 2019-04-11 RX ORDER — CIPROFLOXACIN 500 MG/1
500 TABLET ORAL 2 TIMES DAILY
Qty: 28 TABLET | Refills: 0 | Status: SHIPPED | OUTPATIENT
Start: 2019-04-11 | End: 2019-04-25

## 2019-04-11 RX ORDER — CEFADROXIL 500 MG/1
500 CAPSULE ORAL EVERY 12 HOURS
Qty: 28 CAPSULE | Refills: 0 | Status: SHIPPED | OUTPATIENT
Start: 2019-04-11 | End: 2019-04-25

## 2019-04-11 RX ORDER — AMOXICILLIN 250 MG
1 CAPSULE ORAL 2 TIMES DAILY PRN
COMMUNITY
Start: 2019-04-11 | End: 2019-06-24

## 2019-04-11 RX ADMIN — LOSARTAN POTASSIUM AND HYDROCHLOROTHIAZIDE 1 TABLET: 12.5; 5 TABLET ORAL at 06:04

## 2019-04-11 RX ADMIN — CEFEPIME 2 G: 2 INJECTION, POWDER, FOR SOLUTION INTRAVENOUS at 06:04

## 2019-04-11 RX ADMIN — FUROSEMIDE 20 MG: 10 INJECTION, SOLUTION INTRAMUSCULAR; INTRAVENOUS at 08:04

## 2019-04-11 RX ADMIN — HYDROCODONE BITARTRATE AND ACETAMINOPHEN 1 TABLET: 10; 325 TABLET ORAL at 01:04

## 2019-04-11 RX ADMIN — TAMSULOSIN HYDROCHLORIDE 0.4 MG: 0.4 CAPSULE ORAL at 06:04

## 2019-04-11 RX ADMIN — HYDROCODONE BITARTRATE AND ACETAMINOPHEN 1 TABLET: 10; 325 TABLET ORAL at 05:04

## 2019-04-11 RX ADMIN — HYDROCODONE BITARTRATE AND ACETAMINOPHEN 1 TABLET: 10; 325 TABLET ORAL at 09:04

## 2019-04-11 RX ADMIN — POTASSIUM BICARBONATE 50 MEQ: 977.5 TABLET, EFFERVESCENT ORAL at 10:04

## 2019-04-11 RX ADMIN — METOPROLOL TARTRATE 50 MG: 50 TABLET ORAL at 08:04

## 2019-04-11 NOTE — PLAN OF CARE
Problem: Occupational Therapy Goal  Goal: Occupational Therapy Goal  Pt is not currently displaying a need for acute OT services. D/C acute OT services and recommend pt D/C home.  D/C acute OT services     Comments: Francisco Upton OTR/L  4/11/2019

## 2019-04-11 NOTE — PLAN OF CARE
04/11/19 1304   Discharge Reassessment   Assessment Type Discharge Planning Reassessment   Provided patient/caregiver education on the expected discharge date and the discharge plan Yes   Do you have any problems affording any of your prescribed medications? No   Discharge Plan A Home   Discharge Plan B Home;Home Health   DME Needed Upon Discharge  none   Patient choice form signed by patient/caregiver N/A   Anticipated Discharge Disposition Home   Can the patient answer the patient profile reliably? Yes, cognitively intact   How does the patient rate their overall health at the present time? Good       CM to the Bedside at this time to see the patient. He remains aware that the CM team continues to follow for D/C needs and/or recommendations. Current D/C plan is Home vs Home with Home Health. Patient is in agreement. CM name and number remain on the board at the Bedside for the patient to call with D/C needs or questions. Understanding verbalized.     Follow-up With  Details  Why  Contact Info   Ata Jeter MD  On 4/15/2019  Hospital Follow Up: 2:30pm  [address] [facility] [place] [address]  [phone]

## 2019-04-11 NOTE — ASSESSMENT & PLAN NOTE
Pt is an 87 y/o male with a pmhx of malignant melanoma of the right leg with mets to right groin in 2017 and recent dx of SCC to right leg in 2/2019 recently started on nivolumab in 3/2019 with three year hx of venostasis of the right lower leg. Pt states he began to develop a venous stasis ulcer that has rapidly progressed despite wound care. Pt has been without systemic signs of infection such as fever, rigors or malaise. Physical exam reveals a3-5 cm ulcer with yellow wound base and exposed tendon. Wound cultures positive for MSSA and Pseudomonas aeruginosa. MRI negative for osteomyelitis.     Recommendations  - pt will need surgical debridement and flap placement over wound  - would treat as a soft tissue infection  - please discontinue vancomycin and cefepime at this point  - would treat with a two week course of ciprofloxacin 500 mg po q 12 hours and cefadroxil 1000 mg q 12. Projected end date will be 4/23/2019  - will sign off at this time, please call with further questions.

## 2019-04-11 NOTE — PHYSICIAN QUERY
PT Name: Shaq Bragg Jr.  MR #: [id number]    Physician Query Form - Cause and Effect Relationship Clarification      CDS/: Mary Sparks RN          Contact information:[email or web address]    This form is a permanent document in the medical record.     Query Date: April 11, 2019    By submitting this query, we are merely seeking further clarification of documentation. Please utilize your independent clinical judgment when addressing the question(s) below.    The Medical record contains the following:  Supporting Clinical Findings   Location in record     Infected Ulcer of Skin, Fat Layer Exposed  RLE Cellulitis  · No SIRS criteria but with elevated  and normal ESR  · MRI to r/o osteo unremarkable  · LE US negative for DVT  · Podiatry consulted for wound cultures vs biopsy.  · S/p Vanc x1 in the ED.  Will hold for now pending wound cultures/biopsy  · Started on cefepime and vancomycin  · ID consulted for antibiotic guidance                                                                                                                                                                                    Hospital medicine 4/10    Aerobic Bacterial Culture   STAPHYLOCOCCUS AUREUS   Moderate   Susceptibility pending  P    Aerobic Bacterial Culture   PRESUMPTIVE PSEUDOMONAS SPECIES   Many   Identification and susceptibility pending                                                                                                                                                                                              R ankle wound culture 4/9         Provider, please clarify if there is any correlation between __Staphlyococcus Aureus____ and ____RLE infected ulcer of the skin.         Are the conditions:      [ x ] Due to or associated with each other   [  ] Unrelated to each other   [  ] Other (Please Specify): _________________________   [  ] Clinically Undetermined

## 2019-04-11 NOTE — CONSULTS
[facility]  Infectious Disease  Consult Note    Patient Name: Shaq Bragg Jr.  MRN: [id number]  Admission Date: 4/8/2019  Hospital Length of Stay: 3 days  Attending Physician: Grant Amor MD  Primary Care Provider: Ata Jeter MD     Isolation Status: No active isolations    Patient information was obtained from patient, past medical records and ER records.      Consults  Assessment/Plan:     Venous stasis ulcer of right ankle with muscle involvement without evidence of necrosis without varicose veins  Pt is an 89 y/o male with a pmhx of malignant melanoma of the right leg with mets to right groin in 2017 and recent dx of SCC to right leg in 2/2019 recently started on nivolumab in 3/2019 with three year hx of venostasis of the right lower leg. Pt states he began to develop a venous stasis ulcer that has rapidly progressed despite wound care. Pt has been without systemic signs of infection such as fever, rigors or malaise. Physical exam reveals a3-5 cm ulcer with yellow wound base and exposed tendon. Wound cultures positive for MSSA and Pseudomonas aeruginosa. MRI negative for osteomyelitis.     Recommendations  - pt will need surgical debridement and flap placement over wound  - would treat as a soft tissue infection  - please discontinue vancomycin and cefepime at this point  - would treat with a two week course of ciprofloxacin 500 mg po q 12 hours and cefadroxil 1000 mg q 12. Projected end date will be 4/23/2019  - will sign off at this time, please call with further questions.         Thank you for your consult. I will sign off. Please contact us if you have any additional questions.    Gabriele Pineda MD, PhD  Infectious Disease  [facility]    Subjective:     Principal Problem: Infected ulcer of skin, with fat layer exposed    HPI: Pt is an 89 y/o male with a pmhx of malignant melanoma of the right leg with mets to right groin in 2017 and recent dx of SCC to  right leg in 2/2019 recently started on nivolumab in 3/2019. Pt has a three year hx of chronic venous stasis of the right lower leg that had been treated with compression stockings. Pt states that shortly after starting the nivolumab he began to develop a rapid progressing ulcer on the back of his right lower leg. Pt state approximately two weeks ago he started to notice a some scant purulence in the wound bed. Pt denies any systemic complaints such as fever, chills, or night sweats. Denies any severe pain at the site, states only that it makes his compression stockings uncomfortable. Pt lives in [place] with his spouse, denies any sick contacts, denies recent travel, no pets.        Past Medical History:   Diagnosis Date    COPD (chronic obstructive pulmonary disease)     H/O asbestosis     Hearing loss     Hypercholesterolemia     Hypertension     Malignant melanoma of right lower leg 5/11/2017    Melanoma     Approx. 2 years ago     Skin cancer     Squamous cell carcinoma        Past Surgical History:   Procedure Laterality Date    ADENOIDECTOMY      APPENDECTOMY      Carotid Stenosis Surgery      CHOLECYSTECTOMY  1965    LYMPHADENECTOMY, INGUINAL, Sartorious Flap Right 11/12/2018    Performed by Yair Alatorre MD at [facility] OR [address] [place] [address]    TONSILLECTOMY         Review of patient's allergies indicates:  No Known Allergies    Medications:  Medications Prior to Admission   Medication Sig    aspirin (ECOTRIN) 81 MG EC tablet Take 81 mg by mouth every morning.     losartan-hydrochlorothiazide 50-12.5 mg (HYZAAR) 50-12.5 mg per tablet Take 1 tablet by mouth every morning.     metoprolol tartrate (LOPRESSOR) 50 MG tablet Takes 50 mg in morning and half tablet (25 mg) in evening    simvastatin (ZOCOR) 10 MG tablet Take 10 mg by mouth every evening.    tamsulosin (FLOMAX) 0.4 mg Cp24 Take 0.4 mg by mouth every morning.     [DISCONTINUED] metoprolol tartrate (LOPRESSOR) 50 MG tablet Take 25 mg  by mouth every evening. Dose is 1/2 tab at night    finasteride (PROSCAR) 5 mg tablet Take 5 mg by mouth every morning.     furosemide (LASIX) 20 MG tablet Take 20 mg by mouth every other day.     polyethylene glycol (GLYCOLAX) 17 gram/dose powder Take 17 g by mouth once daily.    potassium chloride (MICRO-K) 8 mEq CpSR Take 8 mEq by mouth every other day.      Antibiotics (From admission, onward)    Start     Stop Route Frequency Ordered    19 1900  vancomycin 1750 mg in 0.9% sodium chloride 500 mL IVPB  (Vancomycin IVPB with levels panel)      -- IV Every 24 hours (non-standard times) 19 1556    19 1700  ceFEPIme injection 2 g      -- IV Every 12 hours (non-standard times) 19 1556        Antifungals (From admission, onward)    None        Antivirals (From admission, onward)    None             There is no immunization history on file for this patient.    Family History     Problem Relation (Age of Onset)    Cancer Father    Coronary artery disease Mother    Diabetes Sister    Esophageal cancer Father    No Known Problems Maternal Aunt, Maternal Uncle, Paternal Aunt, Paternal Uncle, Maternal Grandmother, Maternal Grandfather, Paternal Grandmother, Paternal Grandfather        Social History     Socioeconomic History    Marital status:      Spouse name: Not on file    Number of children: Not on file    Years of education: Not on file    Highest education level: Not on file   Occupational History    Not on file   Social Needs    Financial resource strain: Not on file    Food insecurity:     Worry: Not on file     Inability: Not on file    Transportation needs:     Medical: Not on file     Non-medical: Not on file   Tobacco Use    Smoking status: Former Smoker     Packs/day: 1.00     Years: 15.00     Pack years: 15.00     Types: Cigarettes     Last attempt to quit: 1965     Years since quittin.3    Smokeless tobacco: Never Used   Substance and Sexual Activity     Alcohol use: No     Frequency: Never    Drug use: No    Sexual activity: Never   Lifestyle    Physical activity:     Days per week: Not on file     Minutes per session: Not on file    Stress: Not on file   Relationships    Social connections:     Talks on phone: Not on file     Gets together: Not on file     Attends [employer]ism service: Not on file     Active member of club or organization: Not on file     Attends meetings of clubs or organizations: Not on file     Relationship status: Not on file   Other Topics Concern    Not on file   Social History Narrative    Not on file     Review of Systems   Constitutional: Negative for activity change, appetite change, chills and fever.   HENT: Negative for congestion and sore throat.    Respiratory: Negative for cough and shortness of breath.    Cardiovascular: Negative for chest pain.   Gastrointestinal: Negative for abdominal distention, abdominal pain, constipation, diarrhea, nausea and vomiting.   Endocrine: Negative for polyuria.   Genitourinary: Negative for difficulty urinating, dysuria and urgency.   Musculoskeletal: Negative for arthralgias and myalgias.   Skin: Positive for wound. Negative for color change and rash.   Allergic/Immunologic: Positive for immunocompromised state.   Neurological: Negative for dizziness and weakness.   Psychiatric/Behavioral: Negative for agitation and behavioral problems.     Objective:     Vital Signs (Most Recent):  Temp: 98.1 °F (36.7 °C) (04/11/19 1617)  Pulse: 64 (04/11/19 1617)  Resp: 16 (04/11/19 1617)  BP: 123/70 (04/11/19 1617)  SpO2: 95 % (04/11/19 1617) Vital Signs (24h Range):  Temp:  [97.8 °F (36.6 °C)-98.4 °F (36.9 °C)] 98.1 °F (36.7 °C)  Pulse:  [60-73] 64  Resp:  [12-20] 16  SpO2:  [92 %-98 %] 95 %  BP: (119-162)/(61-70) 123/70     Weight: 82.9 kg (182 lb 12.2 oz)  Body mass index is 24.79 kg/m².    Estimated Creatinine Clearance: 46.7 mL/min (based on SCr of 1.2 mg/dL).    Physical Exam   Constitutional: He is  oriented to person, place, and time. He appears well-developed and well-nourished. No distress.   HENT:   Head: Normocephalic.   Mouth/Throat: Oropharynx is clear and moist.   Eyes: Pupils are equal, round, and reactive to light. No scleral icterus.   Neck: No tracheal deviation present.   Cardiovascular: Normal rate and regular rhythm. Exam reveals no gallop and no friction rub.   No murmur heard.  Pulmonary/Chest: Effort normal and breath sounds normal. No respiratory distress.   Abdominal: Soft. Bowel sounds are normal. He exhibits no distension. There is no tenderness. There is no rebound and no guarding.   Musculoskeletal: Normal range of motion. He exhibits edema.   Pt's right lower leg is edematous, erythematous, and scaly. Posterior leg present with a 3.5-5 cm venous stasis wound with some purulence and yellow wound base.    Lymphadenopathy:     He has no cervical adenopathy.   Neurological: He is alert and oriented to person, place, and time.   Skin: Skin is warm and dry. Capillary refill takes less than 2 seconds.   Psychiatric: He has a normal mood and affect. His behavior is normal.       Significant Labs:   CBC:   Recent Labs   Lab 04/10/19  0651 04/11/19  0710   WBC 6.91 5.95   HGB 12.9* 12.5*   HCT 37.7* 36.7*    186     CMP:   Recent Labs   Lab 04/10/19  0651 04/11/19  0710    137   K 3.9 3.2*    101   CO2 27 30*   * 107   BUN 18 22   CREATININE 1.2 1.2   CALCIUM 9.3 8.9   PROT 6.4 6.0   ALBUMIN 3.1* 2.9*   BILITOT 1.0 1.0   ALKPHOS 101 95   AST 14 18   ALT 10 11   ANIONGAP 8 6*   EGFRNONAA 53.7* 53.7*     Microbiology Results (last 7 days)     Procedure Component Value Units Date/Time    Culture, Anaerobe [[id number]] Collected:  04/09/19 0941    Order Status:  Completed Specimen:  Wound from Ankle, Right Updated:  04/11/19 1445     Anaerobic Culture --     PREVOTELLA (B.) BIVIA  Many      Aerobic culture [[id number]]  (Susceptibility) Collected:  04/09/19 0941    Order  Status:  Completed Specimen:  Wound from Ankle, Right Updated:  04/11/19 1117     Aerobic Bacterial Culture --     STAPHYLOCOCCUS AUREUS  Moderate       Aerobic Bacterial Culture --     PSEUDOMONAS AERUGINOSA  Many      Blood culture #2 **CANNOT BE ORDERED STAT** [[id number]] Collected:  04/08/19 1552    Order Status:  Completed Specimen:  Blood from Peripheral, Wrist, Right Updated:  04/10/19 1812     Blood Culture, Routine No Growth to date     Blood Culture, Routine No Growth to date     Blood Culture, Routine No Growth to date    Blood culture #1 **CANNOT BE ORDERED STAT** [[id number]] Collected:  04/08/19 1533    Order Status:  Completed Specimen:  Blood from Peripheral, Wrist, Right Updated:  04/10/19 1812     Blood Culture, Routine No Growth to date     Blood Culture, Routine No Growth to date     Blood Culture, Routine No Growth to date        All pertinent labs within the past 24 hours have been reviewed.    Significant Imaging: I have reviewed all pertinent imaging results/findings within the past 24 hours.

## 2019-04-11 NOTE — CONSULTS
Consult acknowledged and reviewed. Formal note to follow.    Keri Del Rosario MD  Transplant ID Attending  [phone]

## 2019-04-11 NOTE — SUBJECTIVE & OBJECTIVE
Past Medical History:   Diagnosis Date    COPD (chronic obstructive pulmonary disease)     H/O asbestosis     Hearing loss     Hypercholesterolemia     Hypertension     Malignant melanoma of right lower leg 5/11/2017    Melanoma     Approx. 2 years ago     Skin cancer     Squamous cell carcinoma        Past Surgical History:   Procedure Laterality Date    ADENOIDECTOMY      APPENDECTOMY      Carotid Stenosis Surgery      CHOLECYSTECTOMY  1965    LYMPHADENECTOMY, INGUINAL, Sartorious Flap Right 11/12/2018    Performed by Yair Alatorre MD at [facility] OR [address] [place] [address]    TONSILLECTOMY         Review of patient's allergies indicates:  No Known Allergies    Medications:  Medications Prior to Admission   Medication Sig    aspirin (ECOTRIN) 81 MG EC tablet Take 81 mg by mouth every morning.     losartan-hydrochlorothiazide 50-12.5 mg (HYZAAR) 50-12.5 mg per tablet Take 1 tablet by mouth every morning.     metoprolol tartrate (LOPRESSOR) 50 MG tablet Takes 50 mg in morning and half tablet (25 mg) in evening    simvastatin (ZOCOR) 10 MG tablet Take 10 mg by mouth every evening.    tamsulosin (FLOMAX) 0.4 mg Cp24 Take 0.4 mg by mouth every morning.     [DISCONTINUED] metoprolol tartrate (LOPRESSOR) 50 MG tablet Take 25 mg by mouth every evening. Dose is 1/2 tab at night    finasteride (PROSCAR) 5 mg tablet Take 5 mg by mouth every morning.     furosemide (LASIX) 20 MG tablet Take 20 mg by mouth every other day.     polyethylene glycol (GLYCOLAX) 17 gram/dose powder Take 17 g by mouth once daily.    potassium chloride (MICRO-K) 8 mEq CpSR Take 8 mEq by mouth every other day.      Antibiotics (From admission, onward)    Start     Stop Route Frequency Ordered    04/09/19 1900  vancomycin 1750 mg in 0.9% sodium chloride 500 mL IVPB  (Vancomycin IVPB with levels panel)      -- IV Every 24 hours (non-standard times) 04/09/19 1556    04/09/19 1700  ceFEPIme injection 2 g      -- IV Every 12 hours  (non-standard times) 19 1556        Antifungals (From admission, onward)    None        Antivirals (From admission, onward)    None             There is no immunization history on file for this patient.    Family History     Problem Relation (Age of Onset)    Cancer Father    Coronary artery disease Mother    Diabetes Sister    Esophageal cancer Father    No Known Problems Maternal Aunt, Maternal Uncle, Paternal Aunt, Paternal Uncle, Maternal Grandmother, Maternal Grandfather, Paternal Grandmother, Paternal Grandfather        Social History     Socioeconomic History    Marital status:      Spouse name: Not on file    Number of children: Not on file    Years of education: Not on file    Highest education level: Not on file   Occupational History    Not on file   Social Needs    Financial resource strain: Not on file    Food insecurity:     Worry: Not on file     Inability: Not on file    Transportation needs:     Medical: Not on file     Non-medical: Not on file   Tobacco Use    Smoking status: Former Smoker     Packs/day: 1.00     Years: 15.00     Pack years: 15.00     Types: Cigarettes     Last attempt to quit: 1965     Years since quittin.3    Smokeless tobacco: Never Used   Substance and Sexual Activity    Alcohol use: No     Frequency: Never    Drug use: No    Sexual activity: Never   Lifestyle    Physical activity:     Days per week: Not on file     Minutes per session: Not on file    Stress: Not on file   Relationships    Social connections:     Talks on phone: Not on file     Gets together: Not on file     Attends Zoroastrianism service: Not on file     Active member of club or organization: Not on file     Attends meetings of clubs or organizations: Not on file     Relationship status: Not on file   Other Topics Concern    Not on file   Social History Narrative    Not on file     Review of Systems   Constitutional: Negative for activity change, appetite change, chills and  fever.   HENT: Negative for congestion and sore throat.    Respiratory: Negative for cough and shortness of breath.    Cardiovascular: Negative for chest pain.   Gastrointestinal: Negative for abdominal distention, abdominal pain, constipation, diarrhea, nausea and vomiting.   Endocrine: Negative for polyuria.   Genitourinary: Negative for difficulty urinating, dysuria and urgency.   Musculoskeletal: Negative for arthralgias and myalgias.   Skin: Positive for wound. Negative for color change and rash.   Allergic/Immunologic: Positive for immunocompromised state.   Neurological: Negative for dizziness and weakness.   Psychiatric/Behavioral: Negative for agitation and behavioral problems.     Objective:     Vital Signs (Most Recent):  Temp: 98.1 °F (36.7 °C) (04/11/19 1617)  Pulse: 64 (04/11/19 1617)  Resp: 16 (04/11/19 1617)  BP: 123/70 (04/11/19 1617)  SpO2: 95 % (04/11/19 1617) Vital Signs (24h Range):  Temp:  [97.8 °F (36.6 °C)-98.4 °F (36.9 °C)] 98.1 °F (36.7 °C)  Pulse:  [60-73] 64  Resp:  [12-20] 16  SpO2:  [92 %-98 %] 95 %  BP: (119-162)/(61-70) 123/70     Weight: 82.9 kg (182 lb 12.2 oz)  Body mass index is 24.79 kg/m².    Estimated Creatinine Clearance: 46.7 mL/min (based on SCr of 1.2 mg/dL).    Physical Exam   Constitutional: He is oriented to person, place, and time. He appears well-developed and well-nourished. No distress.   HENT:   Head: Normocephalic.   Mouth/Throat: Oropharynx is clear and moist.   Eyes: Pupils are equal, round, and reactive to light. No scleral icterus.   Neck: No tracheal deviation present.   Cardiovascular: Normal rate and regular rhythm. Exam reveals no gallop and no friction rub.   No murmur heard.  Pulmonary/Chest: Effort normal and breath sounds normal. No respiratory distress.   Abdominal: Soft. Bowel sounds are normal. He exhibits no distension. There is no tenderness. There is no rebound and no guarding.   Musculoskeletal: Normal range of motion. He exhibits edema.   Pt's  right lower leg is edematous, erythematous, and scaly. Posterior leg present with a 3.5-5 cm venous stasis wound with some purulence and yellow wound base.    Lymphadenopathy:     He has no cervical adenopathy.   Neurological: He is alert and oriented to person, place, and time.   Skin: Skin is warm and dry. Capillary refill takes less than 2 seconds.   Psychiatric: He has a normal mood and affect. His behavior is normal.       Significant Labs:   CBC:   Recent Labs   Lab 04/10/19  0651 04/11/19  0710   WBC 6.91 5.95   HGB 12.9* 12.5*   HCT 37.7* 36.7*    186     CMP:   Recent Labs   Lab 04/10/19  0651 04/11/19  0710    137   K 3.9 3.2*    101   CO2 27 30*   * 107   BUN 18 22   CREATININE 1.2 1.2   CALCIUM 9.3 8.9   PROT 6.4 6.0   ALBUMIN 3.1* 2.9*   BILITOT 1.0 1.0   ALKPHOS 101 95   AST 14 18   ALT 10 11   ANIONGAP 8 6*   EGFRNONAA 53.7* 53.7*     Microbiology Results (last 7 days)     Procedure Component Value Units Date/Time    Culture, Anaerobe [[id number]] Collected:  04/09/19 0941    Order Status:  Completed Specimen:  Wound from Ankle, Right Updated:  04/11/19 1445     Anaerobic Culture --     PREVOTELLA (B.) BIVIA  Many      Aerobic culture [[id number]]  (Susceptibility) Collected:  04/09/19 0941    Order Status:  Completed Specimen:  Wound from Ankle, Right Updated:  04/11/19 1117     Aerobic Bacterial Culture --     STAPHYLOCOCCUS AUREUS  Moderate       Aerobic Bacterial Culture --     PSEUDOMONAS AERUGINOSA  Many      Blood culture #2 **CANNOT BE ORDERED STAT** [[id number]] Collected:  04/08/19 1552    Order Status:  Completed Specimen:  Blood from Peripheral, Wrist, Right Updated:  04/10/19 1812     Blood Culture, Routine No Growth to date     Blood Culture, Routine No Growth to date     Blood Culture, Routine No Growth to date    Blood culture #1 **CANNOT BE ORDERED STAT** [[id number]] Collected:  04/08/19 1533    Order Status:  Completed Specimen:  Blood from Peripheral, Wrist,  Right Updated:  04/10/19 1812     Blood Culture, Routine No Growth to date     Blood Culture, Routine No Growth to date     Blood Culture, Routine No Growth to date        All pertinent labs within the past 24 hours have been reviewed.    Significant Imaging: I have reviewed all pertinent imaging results/findings within the past 24 hours.

## 2019-04-11 NOTE — PLAN OF CARE
Problem: Physical Therapy Goal  Goal: Physical Therapy Goal  Pt does not require additional acute PT services at this time d/t baseline c functional mobility. Pt amb 150 ft c no AD (S).    Pt educated on asking medical staff for PT consult if changes in functional status occurs. - v/u        Outcome: Outcome(s) achieved Date Met: 04/11/19  Maryal and D/C from acute PT services    Fransisco Ponce, PT,DPT  4/11/2019

## 2019-04-11 NOTE — HPI
Pt is an 89 y/o male with a pmhx of malignant melanoma of the right leg with mets to right groin in 2017 and recent dx of SCC to right leg in 2/2019 recently started on nivolumab in 3/2019. Pt has a three year hx of chronic venous stasis of the right lower leg that had been treated with compression stockings. Pt states that shortly after starting the nivolumab he began to develop a rapid progressing ulcer on the back of his right lower leg. Pt state approximately two weeks ago he started to notice a some scant purulence in the wound bed. Pt denies any systemic complaints such as fever, chills, or night sweats. Denies any severe pain at the site, states only that it makes his compression stockings uncomfortable. Pt lives in [facility] with his spouse, denies any sick contacts, denies recent travel, no pets.

## 2019-04-11 NOTE — PT/OT/SLP EVAL
Occupational Therapy   Evaluation and Discharge Note    Name: Shaq Bragg Jr.  MRN: [id number]  Admitting Diagnosis:  Infected ulcer of skin, with fat layer exposed      Recommendations:     Discharge Recommendations: home  Discharge Equipment Recommendations:  none  Barriers to discharge:  None    Assessment:     Shaq Bragg Jr. is a 88 y.o. male with a medical diagnosis of Infected ulcer of skin, with fat layer exposed. At this time, patient is functioning at their prior level of function and does not require further acute OT services.     Plan:     During this hospitalization, patient does not require further acute OT services.  Please re-consult if situation changes.    · Plan of Care Reviewed with: patient    Subjective     Chief Complaint: no complaints   Patient/Family Comments/goals: return home    Occupational Profile:  Living Environment: Pt lives in a 2sh w/ spouse w/ TH to enter. Pt has 14 stairs to bed room w/ HR. No concerns  Previous level of function: Indep  Roles and Routines: N/A   Equipment Used at home:  none  Assistance upon Discharge: Pt has assistance from spouse upon D/C.     Pain/Comfort:  · Pain Rating 1: 0/10  · Location - Orientation 2: upper    Patients cultural, spiritual, Advent conflicts given the current situation:      Objective:     Communicated with: RN prior to session.  Patient found HOB elevated with   upon OT entry to room.    General Precautions: Standard, fall   Orthopedic Precautions:N/A   Braces: (CAM boot)     Occupational Performance:    Bed Mobility:    · Patient completed Scooting/Bridging with independence  · Patient completed Supine to Sit with independence  · Patient completed Sit to Supine with independence    Functional Mobility/Transfers:  · Patient completed Sit <> Stand Transfer with supervision  with  no assistive device   · Functional Mobility: Pt ambualted in hallway at spv and ascended/descended a flight of stairs. Refer to PT Javier for  details.     Activities of Daily Living:  · Lower Body Dressing: set-up assistance pt donned pants initially seated EOB and completed in standing   · Toileting: supervision pt voided while standing at toielt    Cognitive/Visual Perceptual:  Cognitive/Psychosocial Skills:     -       Oriented to: Person, Place, Time and Situation   -       Follows Commands/attention:Follows multistep  commands  -       Communication: clear/fluent  -       Memory: No Deficits noted  -       Safety awareness/insight to disability: intact   -       Mood/Affect/Coping skills/emotional control: Appropriate to situation  Visual/Perceptual:      -Intact      Physical Exam:  Balance:    -       Pt w/ no noted deficits   Postural examination/scapula alignment:    -       Rounded shoulders  Skin integrity: Visible skin intact and heavy bandaging to RLE for heel wound  Upper Extremity Range of Motion:     -       Right Upper Extremity: WFL  -       Left Upper Extremity: WFL  Upper Extremity Strength:    -       Right Upper Extremity: WFL  -       Left Upper Extremity: WFL   Strength:    -       Right Upper Extremity: WFL  -       Left Upper Extremity: WFL  Fine Motor Coordination:    -       Intact  Gross motor coordination:   WFL    AMPAC 6 Click ADL:  AMPAC Total Score: 24    Treatment & Education:  Pt educated on donning RLE boot.  Pt educated on POC.  Education:    Patient left HOB elevated with call button in reach    GOALS:   Multidisciplinary Problems     Occupational Therapy Goals        Problem: Occupational Therapy Goal    Goal Priority Disciplines Outcome Interventions   Occupational Therapy Goal     OT, PT/OT     Description:  Pt is not currently displaying a need for acute OT services. D/C acute OT services and recommend pt D/C home.                    History:     Past Medical History:   Diagnosis Date    COPD (chronic obstructive pulmonary disease)     H/O asbestosis     Hearing loss     Hypercholesterolemia      Hypertension     Malignant melanoma of right lower leg 5/11/2017    Melanoma     Approx. 2 years ago     Skin cancer     Squamous cell carcinoma        Past Surgical History:   Procedure Laterality Date    ADENOIDECTOMY      APPENDECTOMY      Carotid Stenosis Surgery      CHOLECYSTECTOMY  1965    LYMPHADENECTOMY, INGUINAL, Sartorious Flap Right 11/12/2018    Performed by Yair Alatorre MD at [facility] OR [address] [place] [address]    TONSILLECTOMY         Time Tracking:     OT Date of Treatment: 04/11/19  OT Start Time: 0923  OT Stop Time: 0940  OT Total Time (min): 17 min    Billable Minutes:Evaluation 17 minutes    Francisco Upton, OT  4/11/2019

## 2019-04-11 NOTE — PLAN OF CARE
Problem: Adult Inpatient Plan of Care  Goal: Plan of Care Review  Outcome: Ongoing (interventions implemented as appropriate)  No acute events throughout shift. VS and assessment performed per orders.  IV antibiotics as ordered. Patient progressing toward goals as tolerated. Plan of care reviewed with pt, all concerns addressed. Will continue to monitor

## 2019-04-11 NOTE — PT/OT/SLP EVAL
"Physical Therapy Evaluation and Discharge Note    Patient Name:  Shaq Bragg Jr.   MRN:  [id number]    Recommendations:     Discharge Recommendations:  home   Discharge Equipment Recommendations: none   Barriers to discharge: None    Assessment:     Shaq Bragg Jr. is a 88 y.o. male admitted with a medical diagnosis of Infected ulcer of skin, with fat layer exposed. .  At this time, patient is functioning at their prior level of function and does not require further acute PT services.     Recent Surgery: * No surgery found *      Plan:     During this hospitalization, patient does not require further acute PT services.  Please re-consult if situation changes.      Subjective     Chief Complaint: none  Patient/Family Comments/goals: "I need to use the restroom."  Pain/Comfort:  · Pain Rating 1: 0/10    Patients cultural, spiritual, Lutheran conflicts given the current situation: no    Living Environment:  Pt lives c wife in 2SH c TH. Living quarters on 2nd floor c 14STE 1xHR.  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  Prior to admission, patients level of function was independent.  Equipment used at home: none.  DME owned (not currently used): none.  Upon discharge, patient will have assistance from family.    Objective:     Communicated with RN and OT prior to session.  Patient found HOB elevated with telemetry, pulse ox (continuous), peripheral IV upon PT entry to room.    General Precautions: Standard, fall   Orthopedic Precautions:N/A   Braces: (CAM boot)     Exams:  · Cognitive Exam:  Patient is oriented to Person, Place, Time and Situation  · RLE ROM: WFL - ankle NT d/t dressing  · RLE Strength: WFL - ankle NT d/t dressing  · LLE ROM: WFL  · LLE Strength: WFL    Functional Mobility:  · Bed Mobility:     · Rolling Left:  independence  · Scooting: independence  · Supine to Sit: independence  · Sit to Supine: independence  · Transfers:     · Sit to Stand:  supervision with no AD  · Gait: 150ft c no AD " (S)  · Steady gait, CAM boot donned on RLE  · Balance: standing (S)   · Stairs: 9 steps c LHR no AD (SBA)  · Step-to gait pattern, steady    AM-PAC 6 CLICK MOBILITY  Total Score:22       Therapeutic Activities and Exercises:  Pt educated on: PT role/POC; safety c mobility; benefits of OOB activities; performing therex; d/c recs - v/u      AM-PAC 6 CLICK MOBILITY  Total Score:22     Patient left HOB elevated with all lines intact, call button in reach and RN notified.    GOALS:   Multidisciplinary Problems     Physical Therapy Goals     Not on file          Multidisciplinary Problems (Resolved)        Problem: Physical Therapy Goal    Goal Priority Disciplines Outcome Goal Variances Interventions   Physical Therapy Goal   (Resolved)     PT, PT/OT Outcome(s) achieved     Description:  Pt does not require additional acute PT services at this time d/t baseline c functional mobility. Pt amb 150 ft c no AD (S).    Pt educated on asking medical staff for PT consult if changes in functional status occurs. - v/u                          History:     Past Medical History:   Diagnosis Date    COPD (chronic obstructive pulmonary disease)     H/O asbestosis     Hearing loss     Hypercholesterolemia     Hypertension     Malignant melanoma of right lower leg 5/11/2017    Melanoma     Approx. 2 years ago     Skin cancer     Squamous cell carcinoma        Past Surgical History:   Procedure Laterality Date    ADENOIDECTOMY      APPENDECTOMY      Carotid Stenosis Surgery      CHOLECYSTECTOMY  1965    LYMPHADENECTOMY, INGUINAL, Sartorious Flap Right 11/12/2018    Performed by Yair Alatorre MD at [facility] OR [address] [place] [address]    TONSILLECTOMY         Time Tracking:     PT Received On: 04/11/19  PT Start Time: 0923     PT Stop Time: 0940  PT Total Time (min): 17 min     Billable Minutes: Evaluation 17 min      Fransisco Ponce PT  04/11/2019

## 2019-04-12 NOTE — PLAN OF CARE
04/12/19 0848   Final Note   Assessment Type Final Discharge Note   Anticipated Discharge Disposition Home   What phone number can be called within the next 1-3 days to see how you are doing after discharge? [phone]   Hospital Follow Up  Appt(s) scheduled? Yes   Discharge plans and expectations educations in teach back method with documentation complete? Yes     Follow-up With   Details   Why   Contact Info   Ata Jeter MD   On 4/15/2019   Hospital Follow Up: 2:30pm   [address] [facility] [address]  [phone]

## 2019-04-12 NOTE — DISCHARGE SUMMARY
Discharge Summary  Hospital Medicine    Attending Provider on Discharge: Grant Amor MD  [facility] Medicine Team: [facility] MED A  Date of Admission:  4/8/2019     Date of Discharge:  4/11/2019  Length of Stay:  LOS: 3 days   Code status: Full Code    Active Hospital Problems    Diagnosis  POA    *Infected ulcer of skin, with fat layer exposed [L98.492, L08.9]  Yes    Venous stasis ulcer of right ankle with muscle involvement without evidence of necrosis without varicose veins [I87.2, L97.315]  Unknown    Essential hypertension [I10]  Yes    BPH (benign prostatic hyperplasia) [N40.0]  Yes    HLD (hyperlipidemia) [E78.5]  Yes    SCC (squamous cell carcinoma), leg, right [C44.722]  Yes    Lymphedema of right lower extremity [I89.0]  Yes    Malignant melanoma of right lower leg [C43.71]  Yes      Resolved Hospital Problems   No resolved problems to display.        HPI    88 y.o. male with malignant melanoma of the left leg (2017), recently diagnosed malignant melanoma of the right leg (biopsy with SCC), and chronic lymphedema s/p lymphadenectomy, who presents to the ED from Oncology clinic for evaluation of a right heel foot ulcer.  He endorses having this heel ulcer for months, and feels like it has gotten progressively worse and isn't heeling.  He was evaluated by Wound Care, who gave him a cream that he feels has not been effective.  He endorses foul smelling drainage as well as worsening lymphedema and erythema of the leg.  He has been ambulating with a boot, but feels like his ambulation has become progressively more difficult.  He denies any fevers or chills.  Of note, he completed 1 cycle of Nivolumab, and was supposed to receive his second cycle on 4/8, but was sent to the ER with concern for osteomyelitis of the right heel with possible cellulitis.     In the ED, labs were notable for ESR 16 and .  US was negative for DVT.  He was given IV Vancomycin and was admitted to Hospital Medicine for  further management.      Hospital Course    89yo man w/a history of spinal stenosis (L3-L4), R leg malignant melanoma (stage IIIB dx 3/2017 s/p excisional biopsy and MOS with SLN biopsy/resection of 2 positive nodes 4/2017 with subsequent lymphedema), recently diagnosed R leg SCC (s/p local resection; PET 2/2019 with uptake in the T11 spine, RLE, and R groin consistent with metastatic disease; s/p nivolumab x1 in 3/2019), and R heel ulcer (noted late 3/2019) who was admitted on 4/8/2019 with progressive worsening of R heel ulceration (exposing achilles tendon) with associated surrounding cellulitis. MRI was without evidence of underlying osteomyelitis. Tissue cx obtained by podiatry grew MSSA and Pseudomonas (largely sensitive species). ID consulted for recommendations for antibiotic treatment for his cellulitis, recommended a 14 day course of antibiotics with PO Cefadroxil and ciprofloxacin. Will follow up with podiatry as outpatient. BASILIA within normal limits     Recent Labs   Lab 04/09/19  0518 04/10/19  0651 04/11/19  0710   WBC 6.65 6.91 5.95   HGB 13.0* 12.9* 12.5*   HCT 38.2* 37.7* 36.7*    197 186     Recent Labs   Lab 04/09/19  0518 04/10/19  0651 04/11/19  0710    138 137   K 3.9 3.9 3.2*    103 101   CO2 30* 27 30*   BUN 15 18 22   CREATININE 1.0 1.2 1.2   GLU 95 120* 107   CALCIUM 9.2 9.3 8.9   MG 2.0 2.0 1.9   PHOS 3.4 3.0 3.3     Recent Labs   Lab 04/09/19  0518 04/10/19  0651 04/11/19  0710   ALKPHOS 107 101 95   ALT 11 10 11   AST 15 14 18   ALBUMIN 3.1* 3.1* 2.9*   PROT 6.3 6.4 6.0   BILITOT 1.3* 1.0 1.0      No results for input(s): CPK, CPKMB, MB, TROPONINI in the last 72 hours.  No results for input(s): LACTATE in the last 72 hours.    No results for input(s): POCTGLUCOSE in the last 168 hours.   No results found for: HGBA1C  US lower ext:     No evidence of deep venous thrombosis.    Soft tissue thickening and edema within the subcutaneous tissue of the right calf.    US lower  Extremities, Arteries bilateral            Ankle-brachial index of 1.1 on the right and 1.2 on the left.    No hemodynamically significant stenosis demonstrated in the right or left lower extremity arterial system.       MRI Tibia Fibula    1.  Focal full-thickness soft tissue wound or ulcer of the posteromedial aspect of the distal foreleg extending to the Achilles tendon fascia.  Achilles tendon itself is intact without evidence of associated abscess or rupture.    2.  No evidence to suggest osteomyelitis of the right foreleg.    3.  Nonspecific diffuse subcutaneous soft tissue edema and skin thickening of the right lower extremity in this patient with reported history of chronic lymphedema.  Clinical correlation is advised.  Correlation with physical exam is recommended given patient's reported history of melanoma.    4.  Nonspecific patchy edema and enhancement within the medial gastrocnemius which could relate to underlying myositis.    Procedures: none     Consultants:Podiatry, Infectious diseases     Discharge Medication List as of 4/11/2019  5:19 PM      START taking these medications    Details   cadexomer iodine (IODOSORB) 0.9 % gel Apply topically daily as needed for Wound Care., Starting Thu 4/11/2019, Normal      cefadroxil (DURICEF) 500 MG Cap Take 1 capsule (500 mg total) by mouth every 12 (twelve) hours. for 14 days, Starting Thu 4/11/2019, Until Thu 4/25/2019, Normal      ciprofloxacin HCl (CIPRO) 500 MG tablet Take 1 tablet (500 mg total) by mouth 2 (two) times daily. for 14 days, Starting Thu 4/11/2019, Until Thu 4/25/2019, Normal      senna-docusate 8.6-50 mg (PERICOLACE) 8.6-50 mg per tablet Take 1 tablet by mouth 2 (two) times daily as needed for Constipation., Starting Thu 4/11/2019, OTC         CONTINUE these medications which have NOT CHANGED    Details   aspirin (ECOTRIN) 81 MG EC tablet Take 81 mg by mouth every morning. , Historical Med      finasteride (PROSCAR) 5 mg tablet Take 5 mg by  mouth every morning. , Historical Med      furosemide (LASIX) 20 MG tablet Take 20 mg by mouth every other day. , Starting Mon 9/24/2018, Historical Med      losartan-hydrochlorothiazide 50-12.5 mg (HYZAAR) 50-12.5 mg per tablet Take 1 tablet by mouth every morning. , Historical Med      metoprolol tartrate (LOPRESSOR) 50 MG tablet Takes 50 mg in morning and half tablet (25 mg) in evening, Historical Med      polyethylene glycol (GLYCOLAX) 17 gram/dose powder Take 17 g by mouth once daily., Starting Wed 3/27/2019, Until Mon 9/23/2019, Normal      potassium chloride (MICRO-K) 8 mEq CpSR Take 8 mEq by mouth every other day. , Starting Mon 9/24/2018, Historical Med      simvastatin (ZOCOR) 10 MG tablet Take 10 mg by mouth every evening., Until Discontinued, Historical Med      tamsulosin (FLOMAX) 0.4 mg Cp24 Take 0.4 mg by mouth every morning. , Historical Med             Discharge Diet:regular diet with Normal Fluid intake of 1500 - 2000 mL per day    Activity: activity as tolerated    Discharge Condition: Good    Disposition: Home or Self Care    Tests pending at the time of discharge: none     Time spent  on the discharge of the patient including review of hospital course with the patient. reviewing discharge medications and arranging follow-up care 35 minutes     Discharge examination Patient was seen and examined on the date of discharge and determined to be suitable for discharge.    Discharge plan     Future Appointments   Date Time Provider Department [place]   4/24/2019 12:00 PM VASCULAR, LAB [facility] VASCLAB [facility]   4/24/2019  1:40 PM Malka Kumar NP [facility] WOUND [facility]   4/24/2019  3:00 PM Dang Elena NP [facility] GASTRO [facility]   5/10/2019 11:00 AM Zay Ruiz MD [facility] SPINE [facility]       Grant Smith MD  Staff Hospitalist  Department of Hospital Medicine  [facility]   [phone]

## 2019-04-13 LAB
BACTERIA BLD CULT: NORMAL
BACTERIA BLD CULT: NORMAL

## 2019-04-15 ENCOUNTER — TELEPHONE (OUTPATIENT)
Dept: PODIATRY | Facility: CLINIC | Age: 84
End: 2019-04-15

## 2019-04-15 LAB — BACTERIA SPEC ANAEROBE CULT: NORMAL

## 2019-04-18 ENCOUNTER — TELEPHONE (OUTPATIENT)
Dept: HEMATOLOGY/ONCOLOGY | Facility: CLINIC | Age: 84
End: 2019-04-18

## 2019-04-18 ENCOUNTER — TELEPHONE (OUTPATIENT)
Dept: PODIATRY | Facility: CLINIC | Age: 84
End: 2019-04-18

## 2019-04-18 ENCOUNTER — TELEPHONE (OUTPATIENT)
Dept: PODIATRY | Facility: HOSPITAL | Age: 84
End: 2019-04-18

## 2019-04-18 DIAGNOSIS — D89.89 OTHER SPECIFIED DISORDERS INVOLVING THE IMMUNE MECHANISM, NOT ELSEWHERE CLASSIFIED: ICD-10-CM

## 2019-04-18 DIAGNOSIS — C43.71 MALIGNANT MELANOMA OF RIGHT LOWER LEG: Primary | ICD-10-CM

## 2019-04-18 NOTE — TELEPHONE ENCOUNTER
The patient's wife called me stating that she was recently discharged from the hospital without any instruction on the care of her 's right heal wound.  I instructed her that I would reach out to the podiatrist she and her  is to see on 4/29/19 Dr Villegas.  I also informed her that we would set the patient up for a return appt for his next cycle of Nivolumab.  She expressed understanding.  All questions were answered to her satisfaction.    Prince Armando MD  Hematology and Oncology Fellow PGY-VI  Pager:[phone]

## 2019-04-18 NOTE — TELEPHONE ENCOUNTER
----- Message from Aroldo Curiel MD sent at 4/18/2019 10:30 AM CDT -----  Please move the above patients appointment with Dr. Villegas to Monday or Tuesday of next week.  Thank you.

## 2019-04-18 NOTE — TELEPHONE ENCOUNTER
Podiatry was paged by Oncologist stating patient had concerns about wound care and follow up.  Spoke to patients wife, told her she is ok to change dressings with betadine, keep wound dry.  Messaged MA to move appointment to early next week.

## 2019-04-18 NOTE — TELEPHONE ENCOUNTER
tried reaching out to pt to discuss upcoming appointments, pt didn't answer, but a detail message was left on v/m with date /time.

## 2019-04-22 ENCOUNTER — TELEPHONE (OUTPATIENT)
Dept: PODIATRY | Facility: CLINIC | Age: 84
End: 2019-04-22

## 2019-04-22 NOTE — TELEPHONE ENCOUNTER
Spoke to pat wife and explained to her what a BASILIA is and that she has enough time to do both appt.

## 2019-04-22 NOTE — TELEPHONE ENCOUNTER
----- Message from Anette Burden sent at 4/22/2019 10:00 AM CDT -----  Contact: [phone]  Needs Advice    Reason for call:patient has questions about a vascular test.        Communication Preference:    Additional Information:

## 2019-04-23 ENCOUNTER — TELEPHONE (OUTPATIENT)
Dept: PODIATRY | Facility: CLINIC | Age: 84
End: 2019-04-23

## 2019-04-23 NOTE — TELEPHONE ENCOUNTER
----- Message from Marcel Dockery sent at 4/22/2019  6:05 PM CDT -----  Contact: Pt's Wife  Pt's would like to be called back regarding issues about rescheduling appt. on 4/24/2019.    Pt's wifecan be reached at [phone].    Thank You.

## 2019-04-24 ENCOUNTER — HOSPITAL ENCOUNTER (OUTPATIENT)
Dept: VASCULAR SURGERY | Facility: CLINIC | Age: 84
Discharge: HOME | End: 2019-04-24
Attending: NURSE PRACTITIONER
Payer: MEDICARE

## 2019-04-24 ENCOUNTER — OFFICE VISIT (OUTPATIENT)
Dept: PODIATRY | Facility: CLINIC | Age: 84
End: 2019-04-24
Payer: MEDICARE

## 2019-04-24 VITALS
HEIGHT: 72 IN | WEIGHT: 182 LBS | BODY MASS INDEX: 24.65 KG/M2 | HEART RATE: 60 BPM | DIASTOLIC BLOOD PRESSURE: 67 MMHG | SYSTOLIC BLOOD PRESSURE: 148 MMHG

## 2019-04-24 DIAGNOSIS — I73.9 PERIPHERAL VASCULAR DISEASE: ICD-10-CM

## 2019-04-24 DIAGNOSIS — L97.319 CHRONIC ULCER OF RIGHT ANKLE, UNSPECIFIED ULCER STAGE: Primary | ICD-10-CM

## 2019-04-24 PROCEDURE — 93923 UPR/LXTR ART STDY 3+ LVLS: CPT | Mod: S$GLB,,, | Performed by: SURGERY

## 2019-04-24 PROCEDURE — 29580 STRAPPING UNNA BOOT: CPT | Mod: 59,RT,S$GLB, | Performed by: PODIATRIST

## 2019-04-24 PROCEDURE — 99214 OFFICE O/P EST MOD 30 MIN: CPT | Mod: 25,S$GLB,, | Performed by: PODIATRIST

## 2019-04-24 PROCEDURE — 99999 PR PBB SHADOW E&M-EST. PATIENT-LVL III: ICD-10-PCS | Mod: PBBFAC,,, | Performed by: PODIATRIST

## 2019-04-24 PROCEDURE — 1101F PR PT FALLS ASSESS DOC 0-1 FALLS W/OUT INJ PAST YR: ICD-10-PCS | Mod: CPTII,S$GLB,, | Performed by: PODIATRIST

## 2019-04-24 PROCEDURE — 99999 PR PBB SHADOW E&M-EST. PATIENT-LVL III: CPT | Mod: PBBFAC,,, | Performed by: PODIATRIST

## 2019-04-24 PROCEDURE — 93923 PR NON-INVASIVE PHYSIOLOGIC STUDY EXTREMITY 3 LEVELS: ICD-10-PCS | Mod: S$GLB,,, | Performed by: SURGERY

## 2019-04-24 PROCEDURE — 99214 PR OFFICE/OUTPT VISIT, EST, LEVL IV, 30-39 MIN: ICD-10-PCS | Mod: 25,S$GLB,, | Performed by: PODIATRIST

## 2019-04-24 PROCEDURE — 29580 PR STRAPPING UNNA BOOT: ICD-10-PCS | Mod: 59,RT,S$GLB, | Performed by: PODIATRIST

## 2019-04-24 PROCEDURE — 11042 PR DEBRIDEMENT, SKIN, SUB-Q TISSUE,=<20 SQ CM: ICD-10-PCS | Mod: S$GLB,,, | Performed by: PODIATRIST

## 2019-04-24 PROCEDURE — 1101F PT FALLS ASSESS-DOCD LE1/YR: CPT | Mod: CPTII,S$GLB,, | Performed by: PODIATRIST

## 2019-04-24 PROCEDURE — 11042 DBRDMT SUBQ TIS 1ST 20SQCM/<: CPT | Mod: S$GLB,,, | Performed by: PODIATRIST

## 2019-04-24 NOTE — PROGRESS NOTES
Subjective:      Patient ID: Shaq Bragg Jr. is a 88 y.o. male.    Chief Complaint: Foot Problem (lrg pain)    Shaq is a 88 y.o. male who presents to the clinic for evaluation and treatment of high risk feet. Shaq has a past medical history of COPD (chronic obstructive pulmonary disease), H/O asbestosis, Hearing loss, Hypercholesterolemia, Hypertension, Malignant melanoma of right lower leg (5/11/2017), Melanoma, Skin cancer, and Squamous cell carcinoma. The patient's chief complaint is foot ulcer, right ankle/Achilles tendon. This patient has documented high risk feet requiring routine maintenance secondary to peripheral vascular disease.    PCP: Ata Jeter MD    Date Last Seen by PCP:   Chief Complaint   Patient presents with    Foot Problem     lrg pain         Current shoe gear:  Affected Foot: Football and Darco shoe on the affected foot     Unaffected Foot: Slip-on shoes    History of Trauma: negative  Sign of Infection: none    No results found for: HGBA1C      ROS        Objective:      Physical Exam          Assessment:       Encounter Diagnosis   Name Primary?    Chronic ulcer of right ankle, unspecified ulcer stage Yes         Plan:       Shaq was seen today for foot problem.    Diagnoses and all orders for this visit:    Chronic ulcer of right ankle, unspecified ulcer stage  -     Wound care routine (specify)     Dressings consisted of Hydrogel, Adaptic, foam and an Unna boot.  Follow up in 1 week.  He is to have vascular studies done as well.  With patient's permission, we applied an Unna boot dressing (bi-layered pink Coflex brand)  using a spiral technique beginning with the foam layer followed by the cohesive bandage avoiding creases or folds.  The wrap was started behind the first metatarsal and ended below the tibial tubercle of the knee.  There was overlap of each turn half the width of the previous turn in order to better control edema. Patient tolerated well and related comfort  "to the right lower extremity .   I counseled the patient on his conditions, their implications and medical management.    Wound Debridement    Performed by: Sekou Villegas DPM   Authorized by: Patient    Time out: Immediately prior to procedure a "time out" was called to verify the correct patient, procedure, equipment, support staff and site/side marked as required.   Consent Done?:  Yes (Verbal)  Local anesthesia used?: No       Wound Details:    Location:   right Achilles tendon     Type of Debridement:  Excisional       Length (cm):   5       Width (cm):   3       Depth (cm):   0.2       Percent Debrided (%):  100             Depth of debridement:  Subcutaneous tissue with tendon exposure    Tissue debrided:  Dermis, Epidermis and Subcutaneous    Devitalized tissue debrided:  Biofilm, Callus and Necrotic/Eschar    Instruments:  Blade, Curette and Nippers     Bleeding:  Minimal  Hemostasis Achieved: Yes    Method Used:  Pressure  Patient tolerance:  Patient tolerated the procedure well with no immediate complications    ....  Dr dressings consisted of  Dressings    Dressings consisted of consisted of essings consisted of Hydrogel, Adaptic, foam a and an Unna boot.  Follow up in 1 week.          "

## 2019-04-26 ENCOUNTER — TELEPHONE (OUTPATIENT)
Dept: PODIATRY | Facility: CLINIC | Age: 84
End: 2019-04-26

## 2019-04-28 NOTE — PROGRESS NOTES
PATIENT: Shaq Bragg Jr.  MRN: [id number]  DATE: 4/29/2019      Diagnosis:   1. Malignant melanoma of right lower leg    2. Lymphedema of right lower extremity    3. Pressure injury of right heel, stage 4        Chief Complaint: Malignant melanoma of right lower leg      Oncologic History:      Oncologic History 3/21/17 Skin Excisional biopsy right leg  4/18/17 MOS with SLN biopsy  1/04/18 PET/CT - no recurrent or metastatic disease  10/30/19 PET/CT - 2 hypermetabolic LN's in right groin  11/03/18 MRI brain  11/12/18 Completion Lymphadenectomy  2/15/19 PET/CT     Oncologic Treatment 3/21/17 Skin Excisional biopsy  4/18/17 MOS with SLN biopsy  11/12/18 Completion Lymphadenectomy  3/11/19 Nivolumab    Pathology 3/21/17 - 1.4mm malignant melanoma Alexis level IV, no ulceration, perineural invasion seen, mitotic rate 6/mm2  4/18/17 -  no residual melanoma; microscopic mets in 2 sentinel LNs  11/12/18 - ¾ LNs positive for metastatic melanoma with the largest deposit being 2.4cm an positive extranodal extension      Oncologic History:  Pt initially presented with a lesion on his right proximal calf in March 2017.  He underwent a biopsy on 3/21/17 with pathology showing a 1.4mm malignant melanoma Alexis level IV, no ulceration, perineural invasion seen, mitotic rate 6/mm2.  The patient then underwent  a MOS procedure on 4/18/17 and right inguinal LN biopsy on 4/18/17 under the care of Dr Joelle Pardo at [facility].  The pathology from the procedure initially showed no residual melanoma and no LN involvement ; however, on reevaluation showed microscopic mets in 2 sentinel LNs.  The patient established care with Medical Oncologist Dr Olivier Mchugh.  The surgical oncologist Dr. Alatorre offered Lymphadenectomy on 5/24/17; however, the patient elected to have observation.  PET/CT was performed on 1/04/18 showing no evidence of residual or recurrent disease.  On 10/16/18 the patient called Dr Holland office with complaint of 2  lumps in the right leg.  PET/CT was performed on 10/30/19 showing 2 new hypermetabolic lymph nodes right groin with the index lymph node lateral SUV max 30.02.  The patient had an appointment with Dr Alatorre on 10/30/19 at which point it was decided to proceed with a completion lymphadenectomy.   An MRI of the brain was performed on 11/03/18 showing no evidence for intracranial metastatic disease but did show a 4mm T1 hyperintense occipital calvarial focus which was read as likely benign.  The patient underwent completion LAD on 11/12/18 with pathology showing ¾ LNs positive for metastatic melanoma with the largest deposit being 2.4cm an positive extranodal extension. Currently the patient complains of chronic lower back pain for which he underwent an MRI of the lumbar spine on 11/03/18 which showed severe central canal stenosis related to a disk bulge at L3-L4.  The patient endorses some LE weakness on occasion.  He denies bowel or bladder incontinence.  The patient states he sees a deramtologist semi annually Dr Sun at [facility] with last clinic visit reportedly several months ago.  The patient underwent a PET/CT on 2/15/19 showing a focus of increased tracer uptake within the T 11 vertebral body with max SUV of 4.1 that was not visualized on prior exam, increased radiotracer uptake in the right groin max SUV 5.3 and a subcutaneous focus of increased tracer uptake within the anterior portion of the distal right leg showing a max SUV of 3.7.   The patient was seen by dermatology on 2/22/19 and underwent a shave biopsy of the right lower leg lesion showing SCC.  On 3/12/19 the patient contacted our office stating he had a large amount of BRBPR.  The patient was seen in the ER on 3/12/19 with relatively stable hemoglobin and was deferred for outpatient GI evaluation.  On 3/25/19 Dr. Bowman in derm decided to postpone a Mohs procedure for SCC of the right lower leg until the swelling in the right leg had  decreased.  The patient was seen on 3/27/19 by wound care for an ulcer on his right heel at which point recommendations were made for bandaging of the right heel.  The patient saw GI on 3/27/19 at which point the patient decided not to pursue a colonoscopy.     Subjective:    Interval History: Mr. Bragg is a 88 y.o. male who presents for follow up on recently diagnosed malignant melanoma.  Since the last visit the patient missed his scheduled treatment with Nivolumab on 4/08/19 after being admitted to the hospital from 4/08/19-4/11/19 for a stage IV ulcer of the right heel with surrounding cellulitis growing MSSA and Pseudomonas on biopsy treated with 14 day course of antibiotics with PO Cefadroxil and ciprofloxacin completed last week.  The patient was seen by podiatry today for wound care and given instructions on care for his right foot.  Currently the patient states he is feeling well.  He denies fever, chills, CP, SOB, N/V, constipation, diarrhea.  He continues to have right leg swelling.      May 13 podiatry.      Past Medical History:   Past Medical History:   Diagnosis Date    COPD (chronic obstructive pulmonary disease)     H/O asbestosis     Hearing loss     Hypercholesterolemia     Hypertension     Malignant melanoma of right lower leg 5/11/2017    Melanoma     Approx. 2 years ago     Skin cancer     Squamous cell carcinoma        Past Surgical HIstory:   Past Surgical History:   Procedure Laterality Date    ADENOIDECTOMY      APPENDECTOMY      Carotid Stenosis Surgery      CHOLECYSTECTOMY  1965    LYMPHADENECTOMY, INGUINAL, Sart[place] Flap Right 11/12/2018    Performed by Yair Alatorre MD at [facility] OR 2ND FLR    TONSILLECTOMY         Family History:   Family History   Problem Relation Age of Onset    Coronary artery disease Mother     Cancer Father         Esophageal    Esophageal cancer Father     Diabetes Sister     No Known Problems Maternal Aunt     No Known Problems  Maternal Uncle     No Known Problems Paternal Aunt     No Known Problems Paternal Uncle     No Known Problems Maternal Grandmother     No Known Problems Maternal Grandfather     No Known Problems Paternal Grandmother     No Known Problems Paternal Grandfather     Melanoma Neg Hx     Psoriasis Neg Hx     Lupus Neg Hx        Social History:  reports that he quit smoking about 54 years ago. His smoking use included cigarettes. He has a 15.00 pack-year smoking history. He has never used smokeless tobacco. He reports that he does not drink alcohol or use drugs.    Allergies:  Review of patient's allergies indicates:  No Known Allergies    Medications:  Current Outpatient Medications   Medication Sig Dispense Refill    aspirin (ECOTRIN) 81 MG EC tablet Take 81 mg by mouth every morning.       cadexomer iodine (IODOSORB) 0.9 % gel Apply topically daily as needed for Wound Care. 10 g 3    finasteride (PROSCAR) 5 mg tablet Take 5 mg by mouth every morning.       furosemide (LASIX) 20 MG tablet Take 20 mg by mouth every other day.       HYDROcodone-acetaminophen (NORCO) 5-325 mg per tablet TK 1 T PO  Q 12 H PRN P  0    losartan-hydrochlorothiazide 50-12.5 mg (HYZAAR) 50-12.5 mg per tablet Take 1 tablet by mouth every morning.       metoprolol tartrate (LOPRESSOR) 50 MG tablet Takes 50 mg in morning and half tablet (25 mg) in evening      polyethylene glycol (GLYCOLAX) 17 gram/dose powder Take 17 g by mouth once daily. 3060 g 0    potassium chloride (MICRO-K) 8 mEq CpSR Take 8 mEq by mouth every other day.       senna-docusate 8.6-50 mg (PERICOLACE) 8.6-50 mg per tablet Take 1 tablet by mouth 2 (two) times daily as needed for Constipation.      simvastatin (ZOCOR) 10 MG tablet Take 10 mg by mouth every evening.      tamsulosin (FLOMAX) 0.4 mg Cp24 Take 0.4 mg by mouth every morning.        No current facility-administered medications for this visit.        Review of Systems   Constitutional: Negative for  "chills, diaphoresis, fatigue, fever and unexpected weight change.   Respiratory: Negative for cough and shortness of breath.    Cardiovascular: Positive for leg swelling (right leg ). Negative for chest pain and palpitations.   Gastrointestinal: Negative for abdominal pain, constipation, diarrhea, nausea and vomiting.   Musculoskeletal: Positive for back pain.   Skin: Positive for wound (right heel, imroving). Negative for color change and rash.   Neurological: Negative for headaches.   Hematological: Negative for adenopathy. Does not bruise/bleed easily.       ECOG Performance Status: 1   Objective:      Vitals:   Vitals:    04/29/19 1525   BP: (!) 180/77   BP Location: Left arm   Patient Position: Sitting   BP Method: Large (Automatic)   Pulse: 60   Resp: 18   Temp: 97.6 °F (36.4 °C)   TempSrc: Oral   SpO2: 99%   Weight: 81.6 kg (179 lb 14.3 oz)   Height: 6' 1" (1.854 m)     BMI: Body mass index is 23.73 kg/m².    Physical Exam   Constitutional: He is oriented to person, place, and time. He appears well-developed and well-nourished. No distress.   HENT:   Head: Normocephalic and atraumatic.   Cardiovascular: Normal rate, regular rhythm, normal heart sounds and intact distal pulses. Exam reveals no gallop and no friction rub.   No murmur heard.  Pulmonary/Chest: Effort normal and breath sounds normal. No respiratory distress. He has no wheezes. He has no rales. He exhibits no tenderness.   Abdominal: Soft. Bowel sounds are normal. He exhibits no distension and no mass. There is no tenderness. There is no rebound.   Musculoskeletal: Normal range of motion. He exhibits edema (right leg).   Lymphadenopathy:        Head (right side): No submental adenopathy present.        Head (left side): No submental adenopathy present.     He has no cervical adenopathy.     He has no axillary adenopathy.        Right: No inguinal and no supraclavicular adenopathy present.        Left: No inguinal and no supraclavicular adenopathy " present.   Neurological: He is alert and oriented to person, place, and time.   Skin: Skin is dry. No rash noted. He is not diaphoretic. No erythema. No pallor.   Right leg and ankle wrapped       Laboratory Data:  Lab Visit on 04/29/2019   Component Date Value Ref Range Status    WBC 04/29/2019 7.21  3.90 - 12.70 K/uL Final    RBC 04/29/2019 4.17* 4.60 - 6.20 M/uL Final    Hemoglobin 04/29/2019 13.5* 14.0 - 18.0 g/dL Final    Hematocrit 04/29/2019 40.7  40.0 - 54.0 % Final    Mean Corpuscular Volume 04/29/2019 98  82 - 98 fL Final    Mean Corpuscular Hemoglobin 04/29/2019 32.4* 27.0 - 31.0 pg Final    Mean Corpuscular Hemoglobin Conc 04/29/2019 33.2  32.0 - 36.0 g/dL Final    RDW 04/29/2019 12.9  11.5 - 14.5 % Final    Platelets 04/29/2019 208  150 - 350 K/uL Final    MPV 04/29/2019 10.4  9.2 - 12.9 fL Final    Gran # (ANC) 04/29/2019 5.6  1.8 - 7.7 K/uL Final    Comment: The ANC is based on a white cell differential from an   automated cell counter. It has not been microscopically   reviewed for the presence of abnormal cells. Clinical   correlation is required.      Immature Grans (Abs) 04/29/2019 0.01  0.00 - 0.04 K/uL Final    Comment: Mild elevation in immature granulocytes is non specific and   can be seen in a variety of conditions including stress response,   acute inflammation, trauma and pregnancy. Correlation with other   laboratory and clinical findings is essential.      Sodium 04/29/2019 140  136 - 145 mmol/L Final    Potassium 04/29/2019 4.1  3.5 - 5.1 mmol/L Final    Chloride 04/29/2019 102  95 - 110 mmol/L Final    CO2 04/29/2019 27  23 - 29 mmol/L Final    Glucose 04/29/2019 101  70 - 110 mg/dL Final    BUN, Bld 04/29/2019 18  8 - 23 mg/dL Final    Creatinine 04/29/2019 1.2  0.5 - 1.4 mg/dL Final    Calcium 04/29/2019 9.4  8.7 - 10.5 mg/dL Final    Total Protein 04/29/2019 6.9  6.0 - 8.4 g/dL Final    Albumin 04/29/2019 3.6  3.5 - 5.2 g/dL Final    Total Bilirubin 04/29/2019  1.5* 0.1 - 1.0 mg/dL Final    Comment: For infants and newborns, interpretation of results should be based  on gestational age, weight and in agreement with clinical  observations.  Premature Infant recommended reference ranges:  Up to 24 hours.............<8.0 mg/dL  Up to 48 hours............<12.0 mg/dL  3-5 days..................<15.0 mg/dL  6-29 days.................<15.0 mg/dL      Alkaline Phosphatase 04/29/2019 96  55 - 135 U/L Final    AST 04/29/2019 21  10 - 40 U/L Final    ALT 04/29/2019 15  10 - 44 U/L Final    Anion Gap 04/29/2019 11  8 - 16 mmol/L Final    eGFR if African American 04/29/2019 >60.0  >60 mL/min/1.73 m^2 Final    eGFR if non African American 04/29/2019 53.7* >60 mL/min/1.73 m^2 Final    Comment: Calculation used to obtain the estimated glomerular filtration  rate (eGFR) is the CKD-EPI equation.            Imaging:  PET/CT 2/15/19  Since the prior exam the patient has undergone right groin surgery.  There is moderate uptake seen in this region possibly postsurgical in nature however residual or recurrent disease cannot be excluded.    There is a new focus of increased tracer uptake within the T11 vertebral body concerning for metastatic lesion.    There is a focus of uptake within the subcutaneous tissues of the right anterior lower leg possibly representing subcutaneous lesion however developing metastatic lesion cannot be excluded.    MRI Brain 2/11/19  No evidence of intracranial metastatic disease      Assessment:       1. Malignant melanoma of right lower leg    2. Lymphedema of right lower extremity    3. Pressure injury of right heel, stage 4           Plan:     Malignant Melanoma of the right leg Stage IIIB -  The patient has a h/o melanoma of the left leg originally diagnosed in 2017  -Lymphadenectomy showed two positive LN's  -Patient's tumor is BRAF [id number] wild type  -MRI of the brain performed at [facility] showed no evidence of metastatic disease  -[facility]cent PET/CT on 2/05/19 showed  uptake in the T11 spine, right lower leg and right groin  -Recent skin biopsy showed SCC  -Pt is s/p cycle 1 of nivolumab  -Cycle 2 delayed due to admission for cellulitis and stage IV right heel ulcer  -Will proceed with cycle 2 today  -Will cehck TSH with next labs as Nivolumab can cause thyroiditis    Lymphedema - The patient has chronic lymphedema s/p lymphadenectomy.  -Edema may be contributing to poor wound healing of the right heel ulcer  -Right leg wrapped today    Stage V ulcer of the right heel - patient has a stage IV ulcer on the right heel currently being managed by podiatry and home health  -will monitor    -Discussed with Dr Irving Armando MD PGY-VI  Hematology and Oncology Fellow  Pager:[phone]    Distress Screening Results: Psychosocial Distress screening score of Distress Score: 0 noted and reviewed. No intervention indicated.

## 2019-04-29 ENCOUNTER — OFFICE VISIT (OUTPATIENT)
Dept: HEMATOLOGY/ONCOLOGY | Facility: CLINIC | Age: 84
End: 2019-04-29
Payer: MEDICARE

## 2019-04-29 ENCOUNTER — OFFICE VISIT (OUTPATIENT)
Dept: PODIATRY | Facility: CLINIC | Age: 84
End: 2019-04-29
Payer: MEDICARE

## 2019-04-29 ENCOUNTER — INFUSION (OUTPATIENT)
Dept: INFUSION THERAPY | Facility: HOSPITAL | Age: 84
End: 2019-04-29
Payer: MEDICARE

## 2019-04-29 VITALS
RESPIRATION RATE: 18 BRPM | HEART RATE: 55 BPM | SYSTOLIC BLOOD PRESSURE: 158 MMHG | DIASTOLIC BLOOD PRESSURE: 71 MMHG | TEMPERATURE: 98 F

## 2019-04-29 VITALS
OXYGEN SATURATION: 99 % | HEART RATE: 60 BPM | SYSTOLIC BLOOD PRESSURE: 180 MMHG | BODY MASS INDEX: 23.84 KG/M2 | TEMPERATURE: 98 F | RESPIRATION RATE: 18 BRPM | DIASTOLIC BLOOD PRESSURE: 77 MMHG | WEIGHT: 179.88 LBS | HEIGHT: 73 IN

## 2019-04-29 VITALS
HEIGHT: 72 IN | SYSTOLIC BLOOD PRESSURE: 146 MMHG | DIASTOLIC BLOOD PRESSURE: 67 MMHG | BODY MASS INDEX: 24.65 KG/M2 | HEART RATE: 60 BPM | WEIGHT: 182 LBS

## 2019-04-29 DIAGNOSIS — D89.89 OTHER SPECIFIED DISORDERS INVOLVING THE IMMUNE MECHANISM, NOT ELSEWHERE CLASSIFIED: ICD-10-CM

## 2019-04-29 DIAGNOSIS — L89.614 PRESSURE INJURY OF RIGHT HEEL, STAGE 4: ICD-10-CM

## 2019-04-29 DIAGNOSIS — L97.319 CHRONIC ULCER OF RIGHT ANKLE, UNSPECIFIED ULCER STAGE: Primary | ICD-10-CM

## 2019-04-29 DIAGNOSIS — C43.71 MALIGNANT MELANOMA OF RIGHT LOWER LEG: Primary | ICD-10-CM

## 2019-04-29 DIAGNOSIS — I89.0 LYMPHEDEMA OF RIGHT LOWER EXTREMITY: ICD-10-CM

## 2019-04-29 PROCEDURE — 1101F PR PT FALLS ASSESS DOC 0-1 FALLS W/OUT INJ PAST YR: ICD-10-PCS | Mod: CPTII,GC,S$GLB, | Performed by: INTERNAL MEDICINE

## 2019-04-29 PROCEDURE — 97597 PR DEBRIDEMENT OPEN WOUND 20 SQ CM<: ICD-10-PCS | Mod: S$GLB,,, | Performed by: PODIATRIST

## 2019-04-29 PROCEDURE — 99999 PR PBB SHADOW E&M-EST. PATIENT-LVL III: ICD-10-PCS | Mod: PBBFAC,GC,,

## 2019-04-29 PROCEDURE — 97597 DBRDMT OPN WND 1ST 20 CM/<: CPT | Mod: S$GLB,,, | Performed by: PODIATRIST

## 2019-04-29 PROCEDURE — 99999 PR PBB SHADOW E&M-EST. PATIENT-LVL III: CPT | Mod: PBBFAC,,, | Performed by: PODIATRIST

## 2019-04-29 PROCEDURE — 99999 PR PBB SHADOW E&M-EST. PATIENT-LVL III: CPT | Mod: PBBFAC,GC,,

## 2019-04-29 PROCEDURE — 1101F PR PT FALLS ASSESS DOC 0-1 FALLS W/OUT INJ PAST YR: ICD-10-PCS | Mod: CPTII,S$GLB,, | Performed by: PODIATRIST

## 2019-04-29 PROCEDURE — 99214 PR OFFICE/OUTPT VISIT, EST, LEVL IV, 30-39 MIN: ICD-10-PCS | Mod: GC,S$GLB,, | Performed by: INTERNAL MEDICINE

## 2019-04-29 PROCEDURE — 99213 OFFICE O/P EST LOW 20 MIN: CPT | Mod: 25,S$GLB,, | Performed by: PODIATRIST

## 2019-04-29 PROCEDURE — 99214 OFFICE O/P EST MOD 30 MIN: CPT | Mod: GC,S$GLB,, | Performed by: INTERNAL MEDICINE

## 2019-04-29 PROCEDURE — 1101F PT FALLS ASSESS-DOCD LE1/YR: CPT | Mod: CPTII,GC,S$GLB, | Performed by: INTERNAL MEDICINE

## 2019-04-29 PROCEDURE — 63600175 PHARM REV CODE 636 W HCPCS: Mod: JG | Performed by: INTERNAL MEDICINE

## 2019-04-29 PROCEDURE — 96413 CHEMO IV INFUSION 1 HR: CPT

## 2019-04-29 PROCEDURE — 25000003 PHARM REV CODE 250: Performed by: INTERNAL MEDICINE

## 2019-04-29 PROCEDURE — 99213 PR OFFICE/OUTPT VISIT, EST, LEVL III, 20-29 MIN: ICD-10-PCS | Mod: 25,S$GLB,, | Performed by: PODIATRIST

## 2019-04-29 PROCEDURE — 99999 PR PBB SHADOW E&M-EST. PATIENT-LVL III: ICD-10-PCS | Mod: PBBFAC,,, | Performed by: PODIATRIST

## 2019-04-29 PROCEDURE — 1101F PT FALLS ASSESS-DOCD LE1/YR: CPT | Mod: CPTII,S$GLB,, | Performed by: PODIATRIST

## 2019-04-29 RX ORDER — HEPARIN 100 UNIT/ML
500 SYRINGE INTRAVENOUS
Status: CANCELLED | OUTPATIENT
Start: 2019-04-29

## 2019-04-29 RX ORDER — SODIUM CHLORIDE 0.9 % (FLUSH) 0.9 %
10 SYRINGE (ML) INJECTION
Status: CANCELLED | OUTPATIENT
Start: 2019-04-29

## 2019-04-29 RX ORDER — HEPARIN 100 UNIT/ML
500 SYRINGE INTRAVENOUS
Status: DISCONTINUED | OUTPATIENT
Start: 2019-04-29 | End: 2019-04-29 | Stop reason: HOSPADM

## 2019-04-29 RX ORDER — SODIUM CHLORIDE 0.9 % (FLUSH) 0.9 %
10 SYRINGE (ML) INJECTION
Status: DISCONTINUED | OUTPATIENT
Start: 2019-04-29 | End: 2019-04-29 | Stop reason: HOSPADM

## 2019-04-29 RX ORDER — HYDROCODONE BITARTRATE AND ACETAMINOPHEN 5; 325 MG/1; MG/1
TABLET ORAL
Refills: 0 | COMMUNITY
Start: 2019-03-29 | End: 2019-06-18

## 2019-04-29 RX ADMIN — SODIUM CHLORIDE: 0.9 INJECTION, SOLUTION INTRAVENOUS at 04:04

## 2019-04-29 RX ADMIN — SODIUM CHLORIDE 480 MG: 9 INJECTION, SOLUTION INTRAVENOUS at 05:04

## 2019-04-29 NOTE — Clinical Note
Temi Sharp.Please schedule the patient to see me on 5/27/19 with CBC, CMP and TSH prior to the appt.  He will need to be scheduled for Nivolumab that day.Thanks.Prince Armando MD PGY-VIHematology and OncologyPager:[phone]

## 2019-04-30 NOTE — PROGRESS NOTES
Subjective:      Patient ID: Shaq Bragg Jr. is a 88 y.o. male.    Chief Complaint: Foot Ulcer (right ankle)    Shaq is a 88 y.o. male who presents to the clinic for evaluation and treatment of high risk feet. Shaq has a past medical history of COPD (chronic obstructive pulmonary disease), H/O asbestosis, Hearing loss, Hypercholesterolemia, Hypertension, Malignant melanoma of right lower leg (5/11/2017), Melanoma, Skin cancer, and Squamous cell carcinoma. The patient's chief complaint is foot ulcer, right ankle/Achilles tendon. This patient has documented high risk feet requiring routine maintenance secondary to peripheral vascular disease.  Home health nursing has been visiting him twice a week for bandage changes.  He has no complaints.    PCP: Ata Jeter MD    Date Last Seen by PCP:   Chief Complaint   Patient presents with    Foot Ulcer     right ankle         Current shoe gear:  Affected Foot: Football and Darco shoe on the affected foot     Unaffected Foot: Slip-on shoes    History of Trauma: negative  Sign of Infection: none    No results found for: HGBA1C      Review of Systems   Constitution: Negative for chills and fever.   HENT: Negative for congestion and tinnitus.    Eyes: Negative for double vision and visual disturbance.   Cardiovascular: Negative for chest pain and claudication.   Respiratory: Negative for hemoptysis and shortness of breath.    Endocrine: Negative for cold intolerance and heat intolerance.   Hematologic/Lymphatic: Negative for adenopathy and bleeding problem.   Skin: Positive for color change, dry skin and nail changes.   Musculoskeletal: Positive for stiffness. Negative for myalgias.   Gastrointestinal: Negative for nausea and vomiting.   Genitourinary: Negative for dysuria and hematuria.   Neurological: Positive for numbness and paresthesias.   Psychiatric/Behavioral: Negative for altered mental status and suicidal ideas.   Allergic/Immunologic: Negative for  environmental allergies and persistent infections.           Objective:      Physical Exam   Constitutional: He is oriented to person, place, and time. He appears well-developed and well-nourished.   Cardiovascular:   Pulses:       Dorsalis pedis pulses are 1+ on the right side, and 1+ on the left side.        Posterior tibial pulses are 1+ on the right side, and 1+ on the left side.   Pulmonary/Chest: Effort normal.   Musculoskeletal: Normal range of motion.   Anterior, lateral, and posterior muscle groups bilateral lower extremities show strength 4 over 5 symmetrically. Inspection and palpation of the joints and bones reveal no crepitus or joint effusion. No tenderness upon palpation. Mild plantar flexor contractures noted to digits 2 through 5 bilaterally.  Angle and base of gait are normal.   Feet:   Right Foot:   Skin Integrity: Positive for callus and dry skin.   Left Foot:   Skin Integrity: Positive for callus and dry skin.   Neurological: He is alert and oriented to person, place, and time. He displays atrophy and abnormal reflex. A sensory deficit is present.   Reflex Scores:       Patellar reflexes are 1+ on the right side and 1+ on the left side.       Achilles reflexes are 1+ on the right side and 1+ on the left side.  Sharp, dull, light touch, and vibratory sensation are diminished bilaterally. Proprioceptive sensation is intact to both lower extremities. Bouse Rebecca monofilament exam shows loss of protective sensation to plantar toes 1 through 5 bilaterally. Deep tendon reflexes to the patellar tendons is 1 over 4 bilaterally symmetrical. Deep tendon reflexes to the Achilles tendon is 0 over 4 bilaterally symmetrical. No ankle clonus or Babinski reflex noted bilaterally. Coordination is fair to both lower extremities.  Patient admits to intermittent burning and tingling in the feet.   Skin: Skin is warm and dry. Capillary refill takes 2 to 3 seconds. There is pallor.   Posterior right leg  "overlying the Achilles tendon demonstrates a full-thickness ulceration with granular tissue and exposed Achilles tendon measuring 4 cm x 3 cm x 0.2 cm.  No gross signs of infection noted.  Toenails thickened, discolored, with subungual fungal debris and tenderness noted.  Hyperkeratotic lesions noted to both feet plantarly with tenderness.   Psychiatric: He has a normal mood and affect.   Vitals reviewed.            Assessment:       No diagnosis found.      Plan:       There are no diagnoses linked to this encounter. Dressings consisted of Hydrogel, Adaptic, foam and an Unna boot.  Follow up in 1 week.  He is to have vascular studies done as well.  With patient's permission, we applied an Unna boot dressing (bi-layered pink Coflex brand)  using a spiral technique beginning with the foam layer followed by the cohesive bandage avoiding creases or folds.  The wrap was started behind the first metatarsal and ended below the tibial tubercle of the knee.  There was overlap of each turn half the width of the previous turn in order to better control edema. Patient tolerated well and related comfort to the right lower extremity .   I counseled the patient on his conditions, their implications and medical management.  Will attempt to schedule a plastic surgery consultation in his area, [place].  Otherwise we may consider a graft here in the operating room.  He needs to cover the wound to initiate healing and Achilles tendon coverage in order to reinstated his cancer treatment.  Wound Debridement    Performed by: Sekou Villegas DPM   Authorized by: Patient    Time out: Immediately prior to procedure a "time out" was called to verify the correct patient, procedure, equipment, support staff and site/side marked as required.   Consent Done?:  Yes (Verbal)  Local anesthesia used?: No       Wound Details:    Location:   right Achilles tendon     Type of Debridement:  Excisional       Length (cm):   4       Width (cm):   3   "     Depth (cm):   0.2, exposed Achilles tendon       Percent Debrided (%):  100             Depth of debridement:  Subcutaneous tissue with tendon exposure    Tissue debrided:  Dermis, Epidermis and Subcutaneous    Devitalized tissue debrided:  Biofilm, Callus and Necrotic/Eschar    Instruments:  Blade, Curette and Nippers     Bleeding:  Minimal  Hemostasis Achieved: Yes    Method Used:  Pressure  Patient tolerance:  Patient tolerated the procedure well with no immediate complications    ....  Dr dressings consisted of  Dressings  Attempt to arrange a plastic surgery consult in his area.    Will attempt to schedule  Dressings consisted of consisted of essings consisted of Hydrogel, Adaptic, foam a and an Unna boot.  Follow up in 1 week.

## 2019-05-02 ENCOUNTER — TELEPHONE (OUTPATIENT)
Dept: PODIATRY | Facility: CLINIC | Age: 84
End: 2019-05-02

## 2019-05-02 NOTE — TELEPHONE ENCOUNTER
----- Message from Nabeel Dale sent at 5/1/2019  9:59 AM CDT -----  Contact: [facility]  Needs Advice    Reason for call: Need updated wound care orders        Communication Preference: Phone     Additional Information: n/a

## 2019-05-07 ENCOUNTER — TELEPHONE (OUTPATIENT)
Dept: PODIATRY | Facility: CLINIC | Age: 84
End: 2019-05-07

## 2019-05-07 NOTE — TELEPHONE ENCOUNTER
Extended conversation with patient and wife about potential grafting, inability of him driving once grafts begin, need for edema control and distance they travel.  They do not want to go to Wound center in [place], [place].  Are open to going to [place], they will contact a transportation service in their area and see if transportation can be arranged for weekly visits either here or [place].  Contacted [facility] and based on insurance, pt has a $300 copay for any procedures done there.  Investigating other options.

## 2019-05-10 ENCOUNTER — TELEPHONE (OUTPATIENT)
Dept: PODIATRY | Facility: CLINIC | Age: 84
End: 2019-05-10

## 2019-05-10 NOTE — TELEPHONE ENCOUNTER
Call from [facility] reporting deteroriation of wound.  + odor, increase slough, slight dessication of tendon.  Appt for Monday with DPM

## 2019-05-13 ENCOUNTER — OFFICE VISIT (OUTPATIENT)
Dept: PODIATRY | Facility: CLINIC | Age: 84
End: 2019-05-13
Payer: MEDICARE

## 2019-05-13 VITALS
HEART RATE: 57 BPM | SYSTOLIC BLOOD PRESSURE: 134 MMHG | HEIGHT: 73 IN | WEIGHT: 179 LBS | DIASTOLIC BLOOD PRESSURE: 50 MMHG | BODY MASS INDEX: 23.72 KG/M2

## 2019-05-13 DIAGNOSIS — L97.319 CHRONIC ULCER OF RIGHT ANKLE, UNSPECIFIED ULCER STAGE: Primary | ICD-10-CM

## 2019-05-13 PROCEDURE — 99999 PR PBB SHADOW E&M-EST. PATIENT-LVL III: ICD-10-PCS | Mod: PBBFAC,,, | Performed by: PODIATRIST

## 2019-05-13 PROCEDURE — 99999 PR PBB SHADOW E&M-EST. PATIENT-LVL III: CPT | Mod: PBBFAC,,, | Performed by: PODIATRIST

## 2019-05-13 PROCEDURE — 1101F PR PT FALLS ASSESS DOC 0-1 FALLS W/OUT INJ PAST YR: ICD-10-PCS | Mod: CPTII,S$GLB,, | Performed by: PODIATRIST

## 2019-05-13 PROCEDURE — 99214 OFFICE O/P EST MOD 30 MIN: CPT | Mod: S$GLB,,, | Performed by: PODIATRIST

## 2019-05-13 PROCEDURE — 1101F PT FALLS ASSESS-DOCD LE1/YR: CPT | Mod: CPTII,S$GLB,, | Performed by: PODIATRIST

## 2019-05-13 PROCEDURE — 99214 PR OFFICE/OUTPT VISIT, EST, LEVL IV, 30-39 MIN: ICD-10-PCS | Mod: S$GLB,,, | Performed by: PODIATRIST

## 2019-05-13 RX ORDER — CIPROFLOXACIN 500 MG/1
250 TABLET ORAL 2 TIMES DAILY
Qty: 14 TABLET | Refills: 0 | Status: SHIPPED | OUTPATIENT
Start: 2019-05-13 | End: 2019-06-21

## 2019-05-14 NOTE — PROGRESS NOTES
Subjective:      Patient ID: Shaq Bragg Jr. is a 88 y.o. male.    Chief Complaint: Chronic ulcer of right ankle (rt ankle )    Shaq is a 88 y.o. male who presents to the clinic for evaluation and treatment of high risk feet. Shaq has a past medical history of COPD (chronic obstructive pulmonary disease), H/O asbestosis, Hearing loss, Hypercholesterolemia, Hypertension, Malignant melanoma of right lower leg (5/11/2017), Melanoma, Skin cancer, and Squamous cell carcinoma. The patient's chief complaint is foot ulcer, right ankle/Achilles tendon. This patient has documented high risk feet requiring routine maintenance secondary to peripheral vascular disease.  Home health nursing has been visiting him twice a week for bandage changes.  He has no complaints, he is to see [facility] next week.    PCP: Ata Jeter MD    Date Last Seen by PCP:   Chief Complaint   Patient presents with    Chronic ulcer of right ankle     rt ankle          Current shoe gear:  Affected Foot: Football and Darco shoe on the affected foot     Unaffected Foot: Slip-on shoes    History of Trauma: negative  Sign of Infection: none    No results found for: HGBA1C      Review of Systems   Constitution: Negative for chills and fever.   HENT: Negative for congestion and tinnitus.    Eyes: Negative for double vision and visual disturbance.   Cardiovascular: Negative for chest pain and claudication.   Respiratory: Negative for hemoptysis and shortness of breath.    Endocrine: Negative for cold intolerance and heat intolerance.   Hematologic/Lymphatic: Negative for adenopathy and bleeding problem.   Skin: Positive for color change, dry skin and nail changes.   Musculoskeletal: Positive for stiffness. Negative for myalgias.   Gastrointestinal: Negative for nausea and vomiting.   Genitourinary: Negative for dysuria and hematuria.   Neurological: Positive for numbness and paresthesias.   Psychiatric/Behavioral: Negative for altered  mental status and suicidal ideas.   Allergic/Immunologic: Negative for environmental allergies and persistent infections.           Objective:      Physical Exam   Constitutional: He is oriented to person, place, and time. He appears well-developed and well-nourished.   Cardiovascular:   Pulses:       Dorsalis pedis pulses are 1+ on the right side, and 1+ on the left side.        Posterior tibial pulses are 1+ on the right side, and 1+ on the left side.   Pulmonary/Chest: Effort normal.   Musculoskeletal: Normal range of motion.   Anterior, lateral, and posterior muscle groups bilateral lower extremities show strength 4 over 5 symmetrically. Inspection and palpation of the joints and bones reveal no crepitus or joint effusion. No tenderness upon palpation. Mild plantar flexor contractures noted to digits 2 through 5 bilaterally.  Angle and base of gait are normal.   Feet:   Right Foot:   Skin Integrity: Positive for callus and dry skin.   Left Foot:   Skin Integrity: Positive for callus and dry skin.   Neurological: He is alert and oriented to person, place, and time. He displays atrophy and abnormal reflex. A sensory deficit is present.   Reflex Scores:       Patellar reflexes are 1+ on the right side and 1+ on the left side.       Achilles reflexes are 1+ on the right side and 1+ on the left side.  Sharp, dull, light touch, and vibratory sensation are diminished bilaterally. Proprioceptive sensation is intact to both lower extremities. [place] Rebecca monofilament exam shows loss of protective sensation to plantar toes 1 through 5 bilaterally. Deep tendon reflexes to the patellar tendons is 1 over 4 bilaterally symmetrical. Deep tendon reflexes to the Achilles tendon is 0 over 4 bilaterally symmetrical. No ankle clonus or Babinski reflex noted bilaterally. Coordination is fair to both lower extremities.  Patient admits to intermittent burning and tingling in the feet.   Skin: Skin is warm and dry. Capillary  refill takes 2 to 3 seconds. There is pallor.   Posterior right leg overlying the Achilles tendon demonstrates a full-thickness ulceration with granular tissue and exposed Achilles tendon measuring 4 cm x 3 cm x 0.2 cm.  No gross signs of infection noted.  Toenails thickened, discolored, with subungual fungal debris and tenderness noted.  Hyperkeratotic lesions noted to both feet plantarly with tenderness.   Psychiatric: He has a normal mood and affect.   Vitals reviewed.            Assessment:       Encounter Diagnosis   Name Primary?    Chronic ulcer of right ankle, unspecified ulcer stage Yes         Plan:       Shaq was seen today for chronic ulcer of right ankle.    Diagnoses and all orders for this visit:    Chronic ulcer of right ankle, unspecified ulcer stage    Other orders  -     ciprofloxacin HCl (CIPRO) 500 MG tablet; Take 0.5 tablets (250 mg total) by mouth 2 (two) times daily.     Dressings consisted of Hydrogel, Adaptic, foam and an Unna boot.   With patient's permission, we applied an Unna boot dressing (bi-layered pink Coflex brand)  using a spiral technique beginning with the foam layer followed by the cohesive bandage avoiding creases or folds.  The wrap was started behind the first metatarsal and ended below the tibial tubercle of the knee.  There was overlap of each turn half the width of the previous turn in order to better control edema. Patient tolerated well and related comfort to the right lower extremity .   I counseled the patient on his conditions, their implications and medical management.  Follow-up with [facility] wound [facility] in [place],  He needs to cover the wound to initiate healing and Achilles tendon coverage in order to reinstated his cancer treatment.      ....   dressings consisted of  Dressings  Attempt to arrange a plastic surgery consult in his area.    Will attempt to schedule  Dressings consisted of consisted of essings consisted of Hydrogel, Adaptic, foam a and an  Ene jerez.  Follow up in 1 week.

## 2019-05-20 ENCOUNTER — OFFICE VISIT (OUTPATIENT)
Dept: PODIATRY | Facility: CLINIC | Age: 84
End: 2019-05-20
Payer: MEDICARE

## 2019-05-20 VITALS — BODY MASS INDEX: 23.72 KG/M2 | RESPIRATION RATE: 20 BRPM | WEIGHT: 179 LBS | HEIGHT: 73 IN

## 2019-05-20 DIAGNOSIS — L97.319 CHRONIC ULCER OF RIGHT ANKLE, UNSPECIFIED ULCER STAGE: Primary | ICD-10-CM

## 2019-05-20 DIAGNOSIS — L92.9 HYPERGRANULATION: ICD-10-CM

## 2019-05-20 PROCEDURE — 99499 NO LOS: ICD-10-PCS | Mod: S$GLB,,, | Performed by: PODIATRIST

## 2019-05-20 PROCEDURE — 11043 DBRDMT MUSC&/FSCA 1ST 20/<: CPT | Mod: S$GLB,,, | Performed by: PODIATRIST

## 2019-05-20 PROCEDURE — 11043 WOUND DEBRIDEMENT: ICD-10-PCS | Mod: S$GLB,,, | Performed by: PODIATRIST

## 2019-05-20 PROCEDURE — 99999 PR PBB SHADOW E&M-EST. PATIENT-LVL III: CPT | Mod: PBBFAC,,, | Performed by: PODIATRIST

## 2019-05-20 PROCEDURE — 99999 PR PBB SHADOW E&M-EST. PATIENT-LVL III: ICD-10-PCS | Mod: PBBFAC,,, | Performed by: PODIATRIST

## 2019-05-20 PROCEDURE — 99499 UNLISTED E&M SERVICE: CPT | Mod: S$GLB,,, | Performed by: PODIATRIST

## 2019-05-21 DIAGNOSIS — L97.319 CHRONIC ULCER OF RIGHT ANKLE, UNSPECIFIED ULCER STAGE: Primary | ICD-10-CM

## 2019-05-21 NOTE — PROCEDURES
"Wound Debridement  Date/Time: 5/20/2019 8:58 PM  Performed by: Morgan Soni DPM  Authorized by: Morgan Soni DPM     Time out: Immediately prior to procedure a "time out" was called to verify the correct patient, procedure, equipment, support staff and site/side marked as required.    Consent Done?:  Yes (Written)    Preparation: Patient was prepped and draped in usual sterile fashion    Local anesthesia used?: Yes    Local anesthetic:  Topical anesthetic    Wound Details:    Location:  Right foot    Location:  Right Ankle    Type of Debridement:  Excisional       Length (cm):  5       Area (sq cm):  20       Width (cm):  4       Percent Debrided (%):  100       Depth (cm):  0.4       Total Area Debrided (sq cm):  20    Depth of debridement:  Muscle/fascia/tendon    Tissue debrided:  Subcutaneous, Dermis, Epidermis and Hypergranulation    Devitalized tissue debrided:  Fibrin and Slough    Instruments:  Curette, Forceps and Scissors    Bleeding:  Minimal  Hemostasis Achieved: Yes    Method Used:  Pressure and Silver Nitrate  Patient tolerance:  Patient tolerated the procedure well with no immediate complications      "

## 2019-05-21 NOTE — PROGRESS NOTES
Subjective:      Patient ID: Shaq Bragg Jr. is a 88 y.o. male.    Chief Complaint: Wound Check (right wound achilles ulcer seen by Dr. Villegas 5/13/19 ) and Other Misc (PCP: [place] 4/17/19)  Patient presents to clinic on referral from [facility] for evaluation and treatment of a chronic wound of the Rt. Posterior ankle.   Relates having the wound present for > 3 months.  States that upon being placed in compressive wraps to address lower extremity edema secondary to lymphedema, he suspects the tightness of the compressive dressing resulted in the current wound.  Describes pain from the wound as sharp and rates as a 5/10.  Symptoms are exacerbated only with pressure.  Symptoms are alleviated with rest.  Has been seeing Dr. Villegas, who placed the patient on Cipro to address possible localized infection at the site.  Relates having only 1-2 days worth of antibiotics remaining.  Also, he notes being seen twice weekly by Home Health.  Denies noticing drainage from the wound at dressing changes. Denies having recent N/V/F/C/D.  Denies any additional pedal complaints.      Past Medical History:   Diagnosis Date    COPD (chronic obstructive pulmonary disease)     H/O asbestosis     Hearing loss     Hypercholesterolemia     Hypertension     Malignant melanoma of right lower leg 5/11/2017    Melanoma     Approx. 2 years ago     Skin cancer     Squamous cell carcinoma        Past Surgical History:   Procedure Laterality Date    ADENOIDECTOMY      APPENDECTOMY      Carotid Stenosis Surgery      CHOLECYSTECTOMY  1965    LYMPHADENECTOMY, INGUINAL, [place] Flap Right 11/12/2018    [facility]    TONSILLECTOMY         Family History   Problem Relation Age of Onset    Coronary artery disease Mother     Cancer Father         Esophageal    Esophageal cancer Father     Diabetes Sister     No Known Problems Maternal Aunt     No Known Problems Maternal Uncle      No Known Problems Paternal Aunt     No Known Problems Paternal Uncle     No Known Problems Maternal Grandmother     No Known Problems Maternal Grandfather     No Known Problems Paternal Grandmother     No Known Problems Paternal Grandfather     Melanoma Neg Hx     Psoriasis Neg Hx     Lupus Neg Hx        Social History     Socioeconomic History    Marital status:      Spouse name: Not on file    Number of children: Not on file    Years of education: Not on file    Highest education level: Not on file   Occupational History    Not on file   Social Needs    Financial resource strain: Not on file    Food insecurity:     Worry: Not on file     Inability: Not on file    Transportation needs:     Medical: Not on file     Non-medical: Not on file   Tobacco Use    Smoking status: Former Smoker     Packs/day: 1.00     Years: 15.00     Pack years: 15.00     Types: Cigarettes     Last attempt to quit: 1965     Years since quittin.4    Smokeless tobacco: Never Used   Substance and Sexual Activity    Alcohol use: No     Frequency: Never    Drug use: No    Sexual activity: Never   Lifestyle    Physical activity:     Days per week: Not on file     Minutes per session: Not on file    Stress: Not on file   Relationships    Social connections:     Talks on phone: Not on file     Gets together: Not on file     Attends Hinduism service: Not on file     Active member of club or organization: Not on file     Attends meetings of clubs or organizations: Not on file     Relationship status: Not on file   Other Topics Concern    Not on file   Social History Narrative    Not on file       Current Outpatient Medications   Medication Sig Dispense Refill    aspirin (ECOTRIN) 81 MG EC tablet Take 81 mg by mouth every morning.       cadexomer iodine (IODOSORB) 0.9 % gel Apply topically daily as needed for Wound Care. 10 g 3    ciprofloxacin HCl (CIPRO) 500 MG tablet Take 0.5 tablets (250 mg total) by  mouth 2 (two) times daily. 14 tablet 0    finasteride (PROSCAR) 5 mg tablet Take 5 mg by mouth every morning.       furosemide (LASIX) 20 MG tablet Take 20 mg by mouth every other day.       HYDROcodone-acetaminophen (NORCO) 5-325 mg per tablet TK 1 T PO  Q 12 H PRN P  0    losartan-hydrochlorothiazide 50-12.5 mg (HYZAAR) 50-12.5 mg per tablet Take 1 tablet by mouth every morning.       metoprolol tartrate (LOPRESSOR) 50 MG tablet Takes 50 mg in morning and half tablet (25 mg) in evening      polyethylene glycol (GLYCOLAX) 17 gram/dose powder Take 17 g by mouth once daily. 3060 g 0    potassium chloride (MICRO-K) 8 mEq CpSR Take 8 mEq by mouth every other day.       senna-docusate 8.6-50 mg (PERICOLACE) 8.6-50 mg per tablet Take 1 tablet by mouth 2 (two) times daily as needed for Constipation.      simvastatin (ZOCOR) 10 MG tablet Take 10 mg by mouth every evening.      tamsulosin (FLOMAX) 0.4 mg Cp24 Take 0.4 mg by mouth every morning.        No current facility-administered medications for this visit.        Review of patient's allergies indicates:  No Known Allergies    Review of Systems   Constitution: Negative for chills and fever.   HENT: Negative for congestion and tinnitus.    Cardiovascular: Negative for chest pain and claudication.   Hematologic/Lymphatic: Negative for bleeding problem.   Skin: Positive for color change, dry skin and nail changes.   Musculoskeletal: Positive for myalgias and stiffness.   Gastrointestinal: Negative for nausea and vomiting.   Genitourinary: Negative for dysuria.   Neurological: Negative for numbness and paresthesias.   Psychiatric/Behavioral: Negative for altered mental status and suicidal ideas.   Allergic/Immunologic: Negative for environmental allergies and persistent infections.           Objective:      Physical Exam   Constitutional: He is oriented to person, place, and time. He appears well-developed and well-nourished.   Cardiovascular:   Pulses:        Dorsalis pedis pulses are 2+ on the right side, and 2+ on the left side.        Posterior tibial pulses are 1+ on the right side, and 1+ on the left side.   CFT is < 3 seconds bilateral.  Pedal hair growth is decreased bilateral.  Mild varicosities noted bilateral.  Moderate nonpitting edema noted to bilateral lower extremity.  Toes are warm to touch bilateral.     Musculoskeletal: He exhibits edema and tenderness.   Muscle strength 4/5 in all muscle groups on the Rt. And 5/5 on the.   No tenderness nor crepitation with ROM of foot/ankle joints bilateral.  Pain with palpation to the Rt. Posterior ankle wound.     Neurological: He is alert and oriented to person, place, and time. He has normal strength. No sensory deficit.   Light touch intact bilateral.     Skin: Skin is warm and dry. Capillary refill takes less than 2 seconds. Lesion noted. No abrasion, no bruising, no burn, no ecchymosis, no laceration and no petechiae noted. No erythema. No pallor.   Location: Open wound noted to the Rt. Posterior ankle.  Base: Down to exposed Achilles tendon and sub Q and comprised of mostly slough/fibrin.  Circumferential hypergranulation noted.    Drainage: Scant serous  Caterina wound: Devoid of erythema, fluctuance, purulence, and malodor. Moderate caterina wound edema noted.  Pre-debridement measurement:5 x 4 x 0.4cm.   Post-debridement measurement: 5.2 x 4.2 x 0.4cm.             Assessment:       Encounter Diagnoses   Name Primary?    Chronic ulcer of right ankle, unspecified ulcer stage Yes    Hypergranulation          Plan:       Shaq was seen today for wound check and other misc.    Diagnoses and all orders for this visit:    Chronic ulcer of right ankle, unspecified ulcer stage  -     Wound Debridement    Hypergranulation      I counseled the patient on his conditions, their implications and medical management.    Discussed with patient the associated risks and benefits associated with a selective excisional debridement of  the Rt. foot/ankle.  Consent was read, signed, and witnessed.  See attached procedure note.      The wound base was covered with wound gel, offloaded with several jos foam with a centralized aperture, and a modified football dressing was applied..     Advised to finish the current course of Cipro.    Advised to keep the dressing CDI x 4 days.  Will provide Home Health with new wound care orders.    Advised to rest and elevate the affected extremity.    Instructed to minimize weight bearing to facilitate wound healing.    Advised to ambulate only in the postoperative shoe/boot.    Discussed optimal hydration and protein consumption and how they facilitate wound healing.      RTC in 1 week for possible application of skin substitute.      Morgan Soni DPM

## 2019-05-23 ENCOUNTER — TELEPHONE (OUTPATIENT)
Dept: PODIATRY | Facility: CLINIC | Age: 84
End: 2019-05-23

## 2019-05-23 NOTE — TELEPHONE ENCOUNTER
----- Message from Alvina Reaves sent at 5/23/2019 11:02 AM CDT -----  Contact: Nahomi  Home Health Nurse  Type: Needs Medical Advice    Who Called:  Libby  Symptoms (please be specific):  Pt seen on Monday. She needs new wound care orders  How long has patient had these symptoms:  n/a  Best Call Back Number: [phone]  Additional Information: Pls call nahomi regarding the order so she knows what supplies are needed for visit tomorrow

## 2019-05-24 ENCOUNTER — TELEPHONE (OUTPATIENT)
Dept: HEMATOLOGY/ONCOLOGY | Facility: CLINIC | Age: 84
End: 2019-05-24

## 2019-05-24 DIAGNOSIS — C43.71 MALIGNANT MELANOMA OF RIGHT LOWER LEG: Primary | ICD-10-CM

## 2019-05-24 NOTE — TELEPHONE ENCOUNTER
Called patient, no answer left message asking pt to call back to confirm or reschedule PET scan apt.

## 2019-05-24 NOTE — TELEPHONE ENCOUNTER
I called Mrs Patel and asked her to call me back at ext [phone], [phone].    Prince Armando MD PGY-VI  Hematology and Oncology Fellow  Pager:[phone]

## 2019-05-24 NOTE — TELEPHONE ENCOUNTER
----- Message from Prince Armando MD sent at 5/24/2019  2:57 PM CDT -----  NO.  He has a treatment scheduled that day.  His insurance will not keep approving the medication unless he has a scan before 6/19/19.    ----- Message -----  From: Temi Garvey  Sent: 5/24/2019   2:28 PM  To: Prince Armando MD    The apt with you on Monday 5/27 should be cancelled and rescheduled ?    Patient Calls   Prince Armando MD  You Just now (2:28 PM)    Temi Sharp.     Please schedule the patient for a PET/CT in the next week or two.  He is scheduled to see me on 5/27/19.     Thanks.   Prince Armando MD PGY-VI   Hematology and Oncology   Pager:[phone]

## 2019-05-26 NOTE — PROGRESS NOTES
PATIENT: Shaq Bragg Jr.  MRN: [id number]  DATE: 5/27/2019      Diagnosis:   1. Malignant melanoma of right lower leg    2. Lymphedema of right lower extremity    3. Pressure injury of right heel, stage 4    4. Other specified disorders involving the immune mechanism, not elsewhere classified         Chief Complaint: Malignant melanoma of right lower leg      Oncologic History:      Oncologic History 3/21/17 Skin Excisional biopsy right leg  4/18/17 MOS with SLN biopsy  1/04/18 PET/CT - no recurrent or metastatic disease  10/30/19 PET/CT - 2 hypermetabolic LN's in right groin  11/03/18 MRI brain  11/12/18 Completion Lymphadenectomy  2/15/19 PET/CT     Oncologic Treatment 3/21/17 Skin Excisional biopsy  4/18/17 MOS with SLN biopsy  11/12/18 Completion Lymphadenectomy  3/11/19 Nivolumab    Pathology 3/21/17 - 1.4mm malignant melanoma Alexis level IV, no ulceration, perineural invasion seen, mitotic rate 6/mm2  4/18/17 -  no residual melanoma; microscopic mets in 2 sentinel LNs  11/12/18 - ¾ LNs positive for metastatic melanoma with the largest deposit being 2.4cm an positive extranodal extension      Oncologic History:  Pt initially presented with a lesion on his right proximal calf in March 2017.  He underwent a biopsy on 3/21/17 with pathology showing a 1.4mm malignant melanoma Alexis level IV, no ulceration, perineural invasion seen, mitotic rate 6/mm2.  The patient then underwent  a MOS procedure on 4/18/17 and right inguinal LN biopsy on 4/18/17 under the care of Dr Joelle Pardo at [facility].  The pathology from the procedure initially showed no residual melanoma and no LN involvement ; however, on reevaluation showed microscopic mets in 2 sentinel LNs.  The patient established care with Medical Oncologist Dr Olivier Mchugh.  The surgical oncologist Dr. Alatorre offered Lymphadenectomy on 5/24/17; however, the patient elected to have observation.  PET/CT was performed on 1/04/18 showing no evidence of residual or  recurrent disease.  On 10/16/18 the patient called Dr Holland office with complaint of 2 lumps in the right leg.  PET/CT was performed on 10/30/19 showing 2 new hypermetabolic lymph nodes right groin with the index lymph node lateral SUV max 30.02.  The patient had an appointment with Dr Alatorre on 10/30/19 at which point it was decided to proceed with a completion lymphadenectomy.   An MRI of the brain was performed on 11/03/18 showing no evidence for intracranial metastatic disease but did show a 4mm T1 hyperintense occipital calvarial focus which was read as likely benign.  The patient underwent completion LAD on 11/12/18 with pathology showing ¾ LNs positive for metastatic melanoma with the largest deposit being 2.4cm an positive extranodal extension. Currently the patient complains of chronic lower back pain for which he underwent an MRI of the lumbar spine on 11/03/18 which showed severe central canal stenosis related to a disk bulge at L3-L4.  The patient endorses some LE weakness on occasion.  He denies bowel or bladder incontinence.  The patient states he sees a deramtologist semi annually Dr Sun at [facility] with last clinic visit reportedly several months ago.  The patient underwent a PET/CT on 2/15/19 showing a focus of increased tracer uptake within the T 11 vertebral body with max SUV of 4.1 that was not visualized on prior exam, increased radiotracer uptake in the right groin max SUV 5.3 and a subcutaneous focus of increased tracer uptake within the anterior portion of the distal right leg showing a max SUV of 3.7.   The patient was seen by dermatology on 2/22/19 and underwent a shave biopsy of the right lower leg lesion showing SCC.  On 3/12/19 the patient contacted our office stating he had a large amount of BRBPR.  The patient was seen in the ER on 3/12/19 with relatively stable hemoglobin and was deferred for outpatient GI evaluation.  On 3/25/19 Dr. Bowman in derm decided to postpone  a Mohs procedure for SCC of the right lower leg until the swelling in the right leg had decreased.  The patient was seen on 3/27/19 by wound care for an ulcer on his right heel at which point recommendations were made for bandaging of the right heel.  The patient saw GI on 3/27/19 at which point the patient decided not to pursue a colonoscopy.   The patient missed his scheduled treatment with Nivolumab on 4/08/19 after being admitted to the hospital from 4/08/19-4/11/19 for a stage IV ulcer of the right heel with surrounding cellulitis growing MSSA and Pseudomonas on biopsy treated with 14 day course of antibiotics with PO Cefadroxil and ciprofloxacin completed last week.  The patient was seen by podiatry today for wound care and given instructions on care for his right foot.    Subjective:    Interval History: Mr. Bragg is a 88 y.o. male who presents for follow up on recently diagnosed malignant melanoma.  Since the last visit the patient continues to follow with podiatry.  He had his right heel debrided on 5/2019 with continued exposed achilles tendon.  He is on cipro and is to take his last pill tonight. Currently the patient states he feels well.  He denies CP, SOB, N/V, constipation, diarrhea.  He continues to complain of swelling in the right leg.    Past Medical History:   Past Medical History:   Diagnosis Date    COPD (chronic obstructive pulmonary disease)     H/O asbestosis     Hearing loss     Hypercholesterolemia     Hypertension     Malignant melanoma of right lower leg 5/11/2017    Melanoma     Approx. 2 years ago     Skin cancer     Squamous cell carcinoma        Past Surgical HIstory:   Past Surgical History:   Procedure Laterality Date    ADENOIDECTOMY      APPENDECTOMY      Carotid Stenosis Surgery      CHOLECYSTECTOMY  1965    LYMPHADENECTOMY, INGUINAL, [place] Flap Right 11/12/2018    Performed by Yair Alatorre MD at [facility] OR [address] [place] [address]    TONSILLECTOMY         Family  History:   Family History   Problem Relation Age of Onset    Coronary artery disease Mother     Cancer Father         Esophageal    Esophageal cancer Father     Diabetes Sister     No Known Problems Maternal Aunt     No Known Problems Maternal Uncle     No Known Problems Paternal Aunt     No Known Problems Paternal Uncle     No Known Problems Maternal Grandmother     No Known Problems Maternal Grandfather     No Known Problems Paternal Grandmother     No Known Problems Paternal Grandfather     Melanoma Neg Hx     Psoriasis Neg Hx     Lupus Neg Hx        Social History:  reports that he quit smoking about 54 years ago. His smoking use included cigarettes. He has a 15.00 pack-year smoking history. He has never used smokeless tobacco. He reports that he does not drink alcohol or use drugs.    Allergies:  Review of patient's allergies indicates:  No Known Allergies    Medications:  Current Outpatient Medications   Medication Sig Dispense Refill    aspirin (ECOTRIN) 81 MG EC tablet Take 81 mg by mouth every morning.       cadexomer iodine (IODOSORB) 0.9 % gel Apply topically daily as needed for Wound Care. 10 g 3    ciprofloxacin HCl (CIPRO) 500 MG tablet Take 0.5 tablets (250 mg total) by mouth 2 (two) times daily. 14 tablet 0    finasteride (PROSCAR) 5 mg tablet Take 5 mg by mouth every morning.       furosemide (LASIX) 20 MG tablet Take 20 mg by mouth every other day.       HYDROcodone-acetaminophen (NORCO) 5-325 mg per tablet TK 1 T PO  Q 12 H PRN P  0    losartan-hydrochlorothiazide 50-12.5 mg (HYZAAR) 50-12.5 mg per tablet Take 1 tablet by mouth every morning.       metoprolol tartrate (LOPRESSOR) 50 MG tablet Takes 50 mg in morning and half tablet (25 mg) in evening      polyethylene glycol (GLYCOLAX) 17 gram/dose powder Take 17 g by mouth once daily. 3060 g 0    potassium chloride (MICRO-K) 8 mEq CpSR Take 8 mEq by mouth every other day.       senna-docusate 8.6-50 mg (PERICOLACE) 8.6-50  mg per tablet Take 1 tablet by mouth 2 (two) times daily as needed for Constipation.      simvastatin (ZOCOR) 10 MG tablet Take 10 mg by mouth every evening.      tamsulosin (FLOMAX) 0.4 mg Cp24 Take 0.4 mg by mouth every morning.        No current facility-administered medications for this visit.      Facility-Administered Medications Ordered in Other Visits   Medication Dose Route Frequency Provider Last Rate Last Dose    alteplase injection 2 mg  2 mg Intra-Catheter PRN Myla Villatoro MD        heparin, porcine (PF) 100 unit/mL injection flush 500 Units  500 Units Intravenous PRN Myla Villatoro MD        sodium chloride 0.9% flush 10 mL  10 mL Intravenous PRN Myla Villatoro MD           Review of Systems   Constitutional: Negative for chills, diaphoresis, fatigue, fever and unexpected weight change.   Respiratory: Negative for cough and shortness of breath.    Cardiovascular: Positive for leg swelling (right leg ). Negative for chest pain and palpitations.   Gastrointestinal: Negative for abdominal pain, constipation, diarrhea, nausea and vomiting.   Musculoskeletal: Negative for back pain.   Skin: Positive for wound (right heel). Negative for color change and rash.   Neurological: Negative for headaches.   Hematological: Negative for adenopathy. Does not bruise/bleed easily.       ECOG Performance Status: 1   Objective:      Vitals:   Vitals:    05/27/19 1435   BP: (!) 143/65   BP Location: Right arm   Patient Position: Sitting   BP Method: Medium (Automatic)   Pulse: 67   Resp: 20   Temp: 98 °F (36.7 °C)   TempSrc: Oral   SpO2: (!) 93%   Weight: 82.9 kg (182 lb 12.2 oz)     BMI: Body mass index is 24.11 kg/m².    Physical Exam   Constitutional: He is oriented to person, place, and time. He appears well-developed and well-nourished. No distress.   HENT:   Head: Normocephalic and atraumatic.   Cardiovascular: Normal rate, regular rhythm, normal heart sounds and intact distal pulses. Exam reveals no gallop  and no friction rub.   No murmur heard.  Pulmonary/Chest: Effort normal and breath sounds normal. No respiratory distress. He has no wheezes. He has no rales. He exhibits no tenderness.   Abdominal: Soft. Bowel sounds are normal. He exhibits no distension and no mass. There is no tenderness. There is no rebound.   Musculoskeletal: Normal range of motion.   Lymphadenopathy:        Head (right side): No submental adenopathy present.        Head (left side): No submental adenopathy present.     He has no cervical adenopathy.     He has no axillary adenopathy. No inguinal adenopathy noted on the right or left side.        Right: No supraclavicular adenopathy present.        Left: No supraclavicular adenopathy present.   Neurological: He is alert and oriented to person, place, and time.   Skin: Skin is dry. No rash noted. He is not diaphoretic. No erythema. No pallor.   Right leg and ankle wrapped       Laboratory Data:  Lab Visit on 05/27/2019   Component Date Value Ref Range Status    WBC 05/27/2019 8.38  3.90 - 12.70 K/uL Final    RBC 05/27/2019 4.18* 4.60 - 6.20 M/uL Final    Hemoglobin 05/27/2019 13.3* 14.0 - 18.0 g/dL Final    Hematocrit 05/27/2019 38.4* 40.0 - 54.0 % Final    Mean Corpuscular Volume 05/27/2019 92  82 - 98 fL Final    Mean Corpuscular Hemoglobin 05/27/2019 31.8* 27.0 - 31.0 pg Final    Mean Corpuscular Hemoglobin Conc 05/27/2019 34.6  32.0 - 36.0 g/dL Final    RDW 05/27/2019 12.6  11.5 - 14.5 % Final    Platelets 05/27/2019 187  150 - 350 K/uL Final    MPV 05/27/2019 10.4  9.2 - 12.9 fL Final    Gran # (ANC) 05/27/2019 6.4  1.8 - 7.7 K/uL Final    Comment: The ANC is based on a white cell differential from an   automated cell counter. It has not been microscopically   reviewed for the presence of abnormal cells. Clinical   correlation is required.      Immature Grans (Abs) 05/27/2019 0.07* 0.00 - 0.04 K/uL Final    Comment: Mild elevation in immature granulocytes is non specific and    can be seen in a variety of conditions including stress response,   acute inflammation, trauma and pregnancy. Correlation with other   laboratory and clinical findings is essential.      Sodium 05/27/2019 138  136 - 145 mmol/L Final    Potassium 05/27/2019 3.6  3.5 - 5.1 mmol/L Final    Chloride 05/27/2019 103  95 - 110 mmol/L Final    CO2 05/27/2019 27  23 - 29 mmol/L Final    Glucose 05/27/2019 97  70 - 110 mg/dL Final    BUN, Bld 05/27/2019 18  8 - 23 mg/dL Final    Creatinine 05/27/2019 1.3  0.5 - 1.4 mg/dL Final    Calcium 05/27/2019 9.7  8.7 - 10.5 mg/dL Final    Total Protein 05/27/2019 6.6  6.0 - 8.4 g/dL Final    Albumin 05/27/2019 3.5  3.5 - 5.2 g/dL Final    Total Bilirubin 05/27/2019 0.9  0.1 - 1.0 mg/dL Final    Comment: For infants and newborns, interpretation of results should be based  on gestational age, weight and in agreement with clinical  observations.  Premature Infant recommended reference ranges:  Up to 24 hours.............<8.0 mg/dL  Up to 48 hours............<12.0 mg/dL  3-5 days..................<15.0 mg/dL  6-29 days.................<15.0 mg/dL      Alkaline Phosphatase 05/27/2019 95  55 - 135 U/L Final    AST 05/27/2019 18  10 - 40 U/L Final    ALT 05/27/2019 12  10 - 44 U/L Final    Anion Gap 05/27/2019 8  8 - 16 mmol/L Final    eGFR if  05/27/2019 56.3* >60 mL/min/1.73 m^2 Final    eGFR if non African American 05/27/2019 48.7* >60 mL/min/1.73 m^2 Final    Comment: Calculation used to obtain the estimated glomerular filtration  rate (eGFR) is the CKD-EPI equation.       TSH 05/27/2019 1.761  0.400 - 4.000 uIU/mL Final         Imaging:  PET/CT 2/15/19  Since the prior exam the patient has undergone right groin surgery.  There is moderate uptake seen in this region possibly postsurgical in nature however residual or recurrent disease cannot be excluded.    There is a new focus of increased tracer uptake within the T11 vertebral body concerning for  metastatic lesion.    There is a focus of uptake within the subcutaneous tissues of the right anterior lower leg possibly representing subcutaneous lesion however developing metastatic lesion cannot be excluded.    MRI Brain 2/11/19  No evidence of intracranial metastatic disease      Assessment:       1. Malignant melanoma of right lower leg    2. Lymphedema of right lower extremity    3. Pressure injury of right heel, stage 4    4. Other specified disorders involving the immune mechanism, not elsewhere classified            Plan:     Malignant Melanoma of the right leg Stage IIIB -  The patient has a h/o melanoma of the left leg originally diagnosed in 2017  -Lymphadenectomy showed two positive LN's  -Patient's tumor is BRAF [id number] wild type  -MRI of the brain performed at [facility] showed no evidence of metastatic disease  -PET/CT on 2/05/19 showed uptake in the T11 spine, right lower leg and right groin  -Recent skin biopsy showed SCC  -Pt is s/p 2 cycles of nivolumab  -Cycle 2 delayed due to admission for cellulitis and stage IV right heel ulcer  -Will proceed with cycle 3 today  -Repeat PET/CT scheduled for 6/03/19  -Pt to follow up on 6/24/19 with repeat CBC, CMP and TSH  -TSH checked as Nivolumab can cause thyroiditis    Lymphedema - The patient has chronic lymphedema s/p lymphadenectomy.  -Edema may be contributing to poor wound healing of the right heel ulcer  -Right leg wrapped today    Stage V ulcer of the right heel - patient has a stage IV ulcer on the right heel currently being managed by podiatry and home health  -will monitor    -Discussed with Dr Irving Armando MD PGY-VI  Hematology and Oncology Fellow  Pager:[phone]

## 2019-05-27 ENCOUNTER — OFFICE VISIT (OUTPATIENT)
Dept: HEMATOLOGY/ONCOLOGY | Facility: CLINIC | Age: 84
End: 2019-05-27
Payer: MEDICARE

## 2019-05-27 ENCOUNTER — INFUSION (OUTPATIENT)
Dept: INFUSION THERAPY | Facility: HOSPITAL | Age: 84
End: 2019-05-27
Attending: INTERNAL MEDICINE
Payer: MEDICARE

## 2019-05-27 VITALS
TEMPERATURE: 98 F | SYSTOLIC BLOOD PRESSURE: 143 MMHG | BODY MASS INDEX: 24.11 KG/M2 | RESPIRATION RATE: 20 BRPM | WEIGHT: 182.75 LBS | HEART RATE: 67 BPM | DIASTOLIC BLOOD PRESSURE: 65 MMHG | OXYGEN SATURATION: 93 %

## 2019-05-27 VITALS
RESPIRATION RATE: 18 BRPM | TEMPERATURE: 98 F | SYSTOLIC BLOOD PRESSURE: 149 MMHG | DIASTOLIC BLOOD PRESSURE: 69 MMHG | HEART RATE: 56 BPM

## 2019-05-27 DIAGNOSIS — D89.89 OTHER SPECIFIED DISORDERS INVOLVING THE IMMUNE MECHANISM, NOT ELSEWHERE CLASSIFIED: ICD-10-CM

## 2019-05-27 DIAGNOSIS — I89.0 LYMPHEDEMA OF RIGHT LOWER EXTREMITY: ICD-10-CM

## 2019-05-27 DIAGNOSIS — C43.71 MALIGNANT MELANOMA OF RIGHT LOWER LEG: Primary | ICD-10-CM

## 2019-05-27 DIAGNOSIS — L89.614 PRESSURE INJURY OF RIGHT HEEL, STAGE 4: ICD-10-CM

## 2019-05-27 PROCEDURE — 99999 PR PBB SHADOW E&M-EST. PATIENT-LVL IV: ICD-10-PCS | Mod: PBBFAC,GC,,

## 2019-05-27 PROCEDURE — 99999 PR PBB SHADOW E&M-EST. PATIENT-LVL IV: CPT | Mod: PBBFAC,GC,,

## 2019-05-27 PROCEDURE — 1101F PT FALLS ASSESS-DOCD LE1/YR: CPT | Mod: CPTII,GC,S$GLB, | Performed by: INTERNAL MEDICINE

## 2019-05-27 PROCEDURE — 63600175 PHARM REV CODE 636 W HCPCS: Mod: JG | Performed by: INTERNAL MEDICINE

## 2019-05-27 PROCEDURE — 25000003 PHARM REV CODE 250: Performed by: INTERNAL MEDICINE

## 2019-05-27 PROCEDURE — 1101F PR PT FALLS ASSESS DOC 0-1 FALLS W/OUT INJ PAST YR: ICD-10-PCS | Mod: CPTII,GC,S$GLB, | Performed by: INTERNAL MEDICINE

## 2019-05-27 PROCEDURE — 99214 PR OFFICE/OUTPT VISIT, EST, LEVL IV, 30-39 MIN: ICD-10-PCS | Mod: GC,S$GLB,, | Performed by: INTERNAL MEDICINE

## 2019-05-27 PROCEDURE — 96413 CHEMO IV INFUSION 1 HR: CPT

## 2019-05-27 PROCEDURE — 99214 OFFICE O/P EST MOD 30 MIN: CPT | Mod: GC,S$GLB,, | Performed by: INTERNAL MEDICINE

## 2019-05-27 RX ORDER — SODIUM CHLORIDE 0.9 % (FLUSH) 0.9 %
10 SYRINGE (ML) INJECTION
Status: CANCELLED | OUTPATIENT
Start: 2019-05-27

## 2019-05-27 RX ORDER — HEPARIN 100 UNIT/ML
500 SYRINGE INTRAVENOUS
Status: CANCELLED | OUTPATIENT
Start: 2019-05-27

## 2019-05-27 RX ORDER — HEPARIN 100 UNIT/ML
500 SYRINGE INTRAVENOUS
Status: DISCONTINUED | OUTPATIENT
Start: 2019-05-27 | End: 2019-05-27 | Stop reason: HOSPADM

## 2019-05-27 RX ORDER — SODIUM CHLORIDE 0.9 % (FLUSH) 0.9 %
10 SYRINGE (ML) INJECTION
Status: DISCONTINUED | OUTPATIENT
Start: 2019-05-27 | End: 2019-05-27 | Stop reason: HOSPADM

## 2019-05-27 RX ADMIN — SODIUM CHLORIDE: 9 INJECTION, SOLUTION INTRAVENOUS at 04:05

## 2019-05-27 RX ADMIN — SODIUM CHLORIDE 480 MG: 9 INJECTION, SOLUTION INTRAVENOUS at 04:05

## 2019-05-27 NOTE — PLAN OF CARE
Problem: Adult Inpatient Plan of Care  Goal: Plan of Care Review  Outcome: Ongoing (interventions implemented as appropriate)  Patient tolerated treatment well.  Vital signs stable.  No apparent distress noted.  Discharged without S/S of adverse effects.  AVS given.  Patient instructed to call provider with any questions or concerns.

## 2019-05-27 NOTE — Clinical Note
Temi Sharp.Please schedule the patient to see Dr Villatoro on 6/24/19.  He will need a CBC, CMP and TSH prior to the appt.  HE will also need to be scheduled for Nivolumab that day.Thanks.Prince Armando MD PGY-VIHematology and OncologyPager:[phone]

## 2019-05-27 NOTE — PLAN OF CARE
Problem: Adult Inpatient Plan of Care  Goal: Patient-Specific Goal (Individualization)  Outcome: Ongoing (interventions implemented as appropriate)  Patient's labs, history, allergies, and medication reviewed.  Seen by MD prior to visit.  Assessment complete.  Vital signs stable.  Patient to receive Optivo.  Discussed plan of care with patient.  Patient in agreement. Chair reclined.  Warm blanket and snack offered.  Will monitor.

## 2019-05-28 ENCOUNTER — TELEPHONE (OUTPATIENT)
Dept: SURGERY | Facility: CLINIC | Age: 84
End: 2019-05-28

## 2019-05-28 NOTE — TELEPHONE ENCOUNTER
Writer spoke to pt and scheduled him f/u appt on 05/31/19 @ 10:15 am. Pt expressed verbal understanding.

## 2019-05-28 NOTE — TELEPHONE ENCOUNTER
----- Message from Syeda Dong sent at 5/28/2019  2:45 PM CDT -----  Type: Needs Medical Advice    Who Called:  patient  Best Call Back Number: [phone]    Additional Information: patient had surgery yesterday he needs a call schedule a f/u asap

## 2019-05-29 ENCOUNTER — OFFICE VISIT (OUTPATIENT)
Dept: PODIATRY | Facility: CLINIC | Age: 84
End: 2019-05-29
Payer: MEDICARE

## 2019-05-29 VITALS — WEIGHT: 182.75 LBS | HEIGHT: 73 IN | BODY MASS INDEX: 24.22 KG/M2

## 2019-05-29 DIAGNOSIS — T14.8XXD DELAYED WOUND HEALING: ICD-10-CM

## 2019-05-29 DIAGNOSIS — L92.9 HYPERGRANULATION: ICD-10-CM

## 2019-05-29 DIAGNOSIS — L97.319 CHRONIC ULCER OF RIGHT ANKLE, UNSPECIFIED ULCER STAGE: Primary | ICD-10-CM

## 2019-05-29 PROCEDURE — 11043 WOUND DEBRIDEMENT: ICD-10-PCS | Mod: S$GLB,,, | Performed by: PODIATRIST

## 2019-05-29 PROCEDURE — 99499 UNLISTED E&M SERVICE: CPT | Mod: S$GLB,,, | Performed by: PODIATRIST

## 2019-05-29 PROCEDURE — 99999 PR PBB SHADOW E&M-EST. PATIENT-LVL III: CPT | Mod: PBBFAC,,, | Performed by: PODIATRIST

## 2019-05-29 PROCEDURE — 99499 NO LOS: ICD-10-PCS | Mod: S$GLB,,, | Performed by: PODIATRIST

## 2019-05-29 PROCEDURE — 99999 PR PBB SHADOW E&M-EST. PATIENT-LVL III: ICD-10-PCS | Mod: PBBFAC,,, | Performed by: PODIATRIST

## 2019-05-29 PROCEDURE — 11043 DBRDMT MUSC&/FSCA 1ST 20/<: CPT | Mod: S$GLB,,, | Performed by: PODIATRIST

## 2019-05-30 NOTE — PROGRESS NOTES
Subjective:      Patient ID: Shaq Bragg Jr. is a 88 y.o. male.    Chief Complaint: Wound Care (graft)  Patient presents to clinic for a 1 week follow up for a wound of the Rt. Posterior ankle.  Denies pain from the wound with today's exam.  Continues to receive dressing changes per Home Health.  Relates minimizing physical activity since our last exam.  Also, relates finishing the prior course of oral antibiotics.  Denies noticing drainage from the wound.  Denies having N/V/F/C/D.  Denies any additional pedal complaints.      Past Medical History:   Diagnosis Date    COPD (chronic obstructive pulmonary disease)     H/O asbestosis     Hearing loss     Hypercholesterolemia     Hypertension     Malignant melanoma of right lower leg 5/11/2017    Melanoma     Approx. 2 years ago     Skin cancer     Squamous cell carcinoma        Past Surgical History:   Procedure Laterality Date    ADENOIDECTOMY      APPENDECTOMY      Carotid Stenosis Surgery      CHOLECYSTECTOMY  1965    LYMPHADENECTOMY, INGUINAL, Sartorious Flap Right 11/12/2018    [facility]    TONSILLECTOMY         Family History   Problem Relation Age of Onset    Coronary artery disease Mother     Cancer Father         Esophageal    Esophageal cancer Father     Diabetes Sister     No Known Problems Maternal Aunt     No Known Problems Maternal Uncle     No Known Problems Paternal Aunt     No Known Problems Paternal Uncle     No Known Problems Maternal Grandmother     No Known Problems Maternal Grandfather     No Known Problems Paternal Grandmother     No Known Problems Paternal Grandfather     Melanoma Neg Hx     Psoriasis Neg Hx     Lupus Neg Hx        Social History     Socioeconomic History    Marital status:      Spouse name: Not on file    Number of children: Not on file    Years of education: Not on file    Highest education level: Not on file   Occupational  History    Not on file   Social Needs    Financial resource strain: Not on file    Food insecurity:     Worry: Not on file     Inability: Not on file    Transportation needs:     Medical: Not on file     Non-medical: Not on file   Tobacco Use    Smoking status: Former Smoker     Packs/day: 1.00     Years: 15.00     Pack years: 15.00     Types: Cigarettes     Last attempt to quit: 1965     Years since quittin.4    Smokeless tobacco: Never Used   Substance and Sexual Activity    Alcohol use: No     Frequency: Never    Drug use: No    Sexual activity: Never   Lifestyle    Physical activity:     Days per week: Not on file     Minutes per session: Not on file    Stress: Not on file   Relationships    Social connections:     Talks on phone: Not on file     Gets together: Not on file     Attends Voodoo service: Not on file     Active member of club or organization: Not on file     Attends meetings of clubs or organizations: Not on file     Relationship status: Not on file   Other Topics Concern    Not on file   Social History Narrative    Not on file       Current Outpatient Medications   Medication Sig Dispense Refill    aspirin (ECOTRIN) 81 MG EC tablet Take 81 mg by mouth every morning.       cadexomer iodine (IODOSORB) 0.9 % gel Apply topically daily as needed for Wound Care. 10 g 3    finasteride (PROSCAR) 5 mg tablet Take 5 mg by mouth every morning.       losartan-hydrochlorothiazide 50-12.5 mg (HYZAAR) 50-12.5 mg per tablet Take 1 tablet by mouth every morning.       metoprolol tartrate (LOPRESSOR) 50 MG tablet Takes 50 mg in morning and half tablet (25 mg) in evening      polyethylene glycol (GLYCOLAX) 17 gram/dose powder Take 17 g by mouth once daily. 3060 g 0    simvastatin (ZOCOR) 10 MG tablet Take 10 mg by mouth every evening.      tamsulosin (FLOMAX) 0.4 mg Cp24 Take 0.4 mg by mouth every morning.       ciprofloxacin HCl (CIPRO) 500 MG tablet Take 0.5 tablets (250 mg total) by  mouth 2 (two) times daily. 14 tablet 0    furosemide (LASIX) 20 MG tablet Take 20 mg by mouth every other day.       HYDROcodone-acetaminophen (NORCO) 5-325 mg per tablet TK 1 T PO  Q 12 H PRN P  0    potassium chloride (MICRO-K) 8 mEq CpSR Take 8 mEq by mouth every other day.       senna-docusate 8.6-50 mg (PERICOLACE) 8.6-50 mg per tablet Take 1 tablet by mouth 2 (two) times daily as needed for Constipation.       No current facility-administered medications for this visit.        Review of patient's allergies indicates:  No Known Allergies    Review of Systems   Constitution: Negative for chills and fever.   HENT: Negative for congestion and tinnitus.    Cardiovascular: Positive for leg swelling. Negative for chest pain and claudication.   Hematologic/Lymphatic: Negative for bleeding problem.   Skin: Positive for color change, nail changes and poor wound healing.   Musculoskeletal: Positive for myalgias and stiffness.   Gastrointestinal: Negative for nausea and vomiting.   Genitourinary: Negative for dysuria.   Neurological: Negative for numbness and paresthesias.   Psychiatric/Behavioral: Negative for altered mental status and suicidal ideas.   Allergic/Immunologic: Negative for environmental allergies and persistent infections.           Objective:      Physical Exam   Constitutional: He is oriented to person, place, and time. He appears well-developed and well-nourished.   Cardiovascular:   Pulses:       Dorsalis pedis pulses are 2+ on the right side, and 2+ on the left side.        Posterior tibial pulses are 1+ on the right side, and 1+ on the left side.   CFT is < 3 seconds bilateral.  Pedal hair growth is decreased bilateral.  Mild varicosities noted bilateral.  Moderate nonpitting edema noted to bilateral lower extremity.  Toes are warm to touch bilateral.     Musculoskeletal: He exhibits edema and tenderness.   Muscle strength 4/5 in all muscle groups on the Rt. And 5/5 on the.   No tenderness nor  crepitation with ROM of foot/ankle joints bilateral.  Pain with palpation to the Rt. Posterior ankle wound.     Neurological: He is alert and oriented to person, place, and time. He has normal strength. No sensory deficit.   Light touch intact bilateral.     Skin: Skin is warm and dry. Capillary refill takes less than 2 seconds. Lesion noted. No abrasion, no bruising, no burn, no ecchymosis, no laceration and no petechiae noted. No erythema. No pallor.   Location: Open wound noted to the Rt. Posterior ankle.  Base: Down to exposed Achilles tendon and sub Q and comprised of mostly slough/fibrin.  Less hypergranulation noted.    Drainage: None  Caterina wound: Devoid of erythema, fluctuance, purulence, and malodor. Moderate caterina wound edema noted.  Pre-debridement measurement:5 x 3.4 x 0.3cm.   Post-debridement measurement: 5.2 x 3.6 x 0.3cm.             Assessment:       Encounter Diagnosis   Name Primary?    Chronic ulcer of right ankle, unspecified ulcer stage Yes         Plan:       Shaq was seen today for wound care.    Diagnoses and all orders for this visit:    Chronic ulcer of right ankle, unspecified ulcer stage      I counseled the patient on his conditions, their implications and medical management.    Performed a selective excisional debridement of the Rt. Foot/ankle.  See attached procedure note.     Applied a 2x2cm sheet of Neox skin substitute to the Rt. Posterior ankle wound.  This was secured with steri strips, adaptic, and a modified football wrap was applied.    Patient Name: Shaq Bragg Jr.  Patient MRN: [id number]  Patient YOB: 1930  [facility]: [id number]  Date: 05/29/2019  Provider:   Morgan Soni    Application for Assessment:  Rt. Posterior ankle  Skin Substitute: Neox  Performed By: Morgan Soni  Time-out Taken: Yes  Location:     [] Genitalia/Hands/Feet/Multiple Digits    [] Scalp/Face/Neck/Ears    [x]Trunk/Arms/Legs  Application Area: (sq cm): 15+ sq cm   Product Applied: (sq  cm): 4 sq cm  Product Waste (sq cm): 0  Fenestrated: No   Instrument:    [] Blade [] Curette  []Forceps  []Nippers    [] Rongeur [] Scissors  []Others:   Secured: yes   Secured with: Steri-strips  Dressing Applied: Yes  Response to Treatment:Well tolerated by patient.  Lot#: [id number]  Exp. Date: 1/15/21    Advised to keep the dressing CDI x 1 week.    Will suspend Home health for the time being.    Advised to rest and elevate the affected extremity.    Instructed to minimize weight bearing to facilitate wound healing.    Advised to ambulate only in the postoperative shoe/boot.    RTC in 1 week for 2nd graft application.    Morgan Soni DPM

## 2019-05-30 NOTE — PROCEDURES
"Wound Debridement  Date/Time: 5/29/2019 9:32 PM  Performed by: Morgan Soni DPM  Authorized by: Morgan Soni DPM     Time out: Immediately prior to procedure a "time out" was called to verify the correct patient, procedure, equipment, support staff and site/side marked as required.    Consent Done?:  Yes (Verbal)    Preparation: Patient was prepped and draped in usual sterile fashion    Local anesthesia used?: No      Wound Details:    Location:  Right foot    Location:  Right Ankle    Type of Debridement:  Excisional       Length (cm):  5       Area (sq cm):  17       Width (cm):  3.4       Percent Debrided (%):  100       Depth (cm):  0.3       Total Area Debrided (sq cm):  17    Depth of debridement:  Muscle/fascia/tendon    Tissue debrided:  Tendon, Subcutaneous, Dermis and Epidermis    Devitalized tissue debrided:  Fibrin    Instruments:  Forceps, Nippers and Scissors    Bleeding:  Minimal  Hemostasis Achieved: Yes    Method Used:  Pressure  Patient tolerance:  Patient tolerated the procedure well with no immediate complications      "

## 2019-06-03 ENCOUNTER — HOSPITAL ENCOUNTER (OUTPATIENT)
Dept: RADIOLOGY | Facility: HOSPITAL | Age: 84
Discharge: HOME | End: 2019-06-03
Attending: INTERNAL MEDICINE
Payer: MEDICARE

## 2019-06-03 DIAGNOSIS — C43.71 MALIGNANT MELANOMA OF RIGHT LOWER LEG: ICD-10-CM

## 2019-06-03 LAB — POCT GLUCOSE: 98 MG/DL (ref 70–110)

## 2019-06-03 PROCEDURE — 78816 PET IMAGE W/CT FULL BODY: CPT | Mod: TC

## 2019-06-03 PROCEDURE — 78816 NM PET CT WHOLE BODY: ICD-10-PCS | Mod: 26,PS,, | Performed by: RADIOLOGY

## 2019-06-03 PROCEDURE — 78816 PET IMAGE W/CT FULL BODY: CPT | Mod: 26,PS,, | Performed by: RADIOLOGY

## 2019-06-05 ENCOUNTER — OFFICE VISIT (OUTPATIENT)
Dept: PODIATRY | Facility: CLINIC | Age: 84
End: 2019-06-05
Payer: MEDICARE

## 2019-06-05 ENCOUNTER — TELEPHONE (OUTPATIENT)
Dept: HEMATOLOGY/ONCOLOGY | Facility: CLINIC | Age: 84
End: 2019-06-05

## 2019-06-05 VITALS — WEIGHT: 182 LBS | BODY MASS INDEX: 24.12 KG/M2 | HEIGHT: 73 IN | RESPIRATION RATE: 20 BRPM

## 2019-06-05 DIAGNOSIS — T14.8XXD DELAYED WOUND HEALING: ICD-10-CM

## 2019-06-05 DIAGNOSIS — L92.9 HYPERGRANULATION: ICD-10-CM

## 2019-06-05 DIAGNOSIS — L97.319 CHRONIC ULCER OF RIGHT ANKLE, UNSPECIFIED ULCER STAGE: Primary | ICD-10-CM

## 2019-06-05 PROCEDURE — 99999 PR PBB SHADOW E&M-EST. PATIENT-LVL III: CPT | Mod: PBBFAC,,, | Performed by: PODIATRIST

## 2019-06-05 PROCEDURE — 99499 UNLISTED E&M SERVICE: CPT | Mod: S$GLB,,, | Performed by: PODIATRIST

## 2019-06-05 PROCEDURE — 99999 PR PBB SHADOW E&M-EST. PATIENT-LVL III: ICD-10-PCS | Mod: PBBFAC,,, | Performed by: PODIATRIST

## 2019-06-05 PROCEDURE — 99499 NO LOS: ICD-10-PCS | Mod: S$GLB,,, | Performed by: PODIATRIST

## 2019-06-05 NOTE — TELEPHONE ENCOUNTER
I called the patient and let him know that his scan showed resolution of the areas concerning for melanoma.  I informed him that there was an area on the left lateral arm that was avid on the scan.  He let me know that he had a sore on his left arm.  I informed him that we would set him up to see dermatology.  The patient expressed understanding.  All questions were answered to his satisfaction.    Prince Armando MD  Hematology and Oncology Fellow PGY-VI  Pager:[phone]

## 2019-06-05 NOTE — PROGRESS NOTES
Subjective:      Patient ID: Shaq Bragg Jr. is a 88 y.o. male.    Chief Complaint: No chief complaint on file.  Patient presents to clinic for a 1 week follow up for a wound of the Rt. Posterior ankle.  Relates having moderate pain, 5/10, with today's exam.  Symptoms are partially present while at rest and fully exacerbated with pressure to the wound.  Notes some concern, as there was drainage through the exterior bandage following last week's application of the Neox skin substitute.  Has been taking it easy and keeping the limb elevated while at rest.   Denies having N/V/F/C/D.  Denies any additional pedal complaints.      Past Medical History:   Diagnosis Date    COPD (chronic obstructive pulmonary disease)     H/O asbestosis     Hearing loss     Hypercholesterolemia     Hypertension     Malignant melanoma of right lower leg 5/11/2017    Melanoma     Approx. 2 years ago     Skin cancer     Squamous cell carcinoma        Past Surgical History:   Procedure Laterality Date    ADENOIDECTOMY      APPENDECTOMY      Carotid Stenosis Surgery      CHOLECYSTECTOMY  1965    LYMPHADENECTOMY, INGUINAL, Sartorious Flap Right 11/12/2018    Performed by Yair Alatorre MD at [facility] OR [address] [place] [address]    TONSILLECTOMY         Family History   Problem Relation Age of Onset    Coronary artery disease Mother     Cancer Father         Esophageal    Esophageal cancer Father     Diabetes Sister     No Known Problems Maternal Aunt     No Known Problems Maternal Uncle     No Known Problems Paternal Aunt     No Known Problems Paternal Uncle     No Known Problems Maternal Grandmother     No Known Problems Maternal Grandfather     No Known Problems Paternal Grandmother     No Known Problems Paternal Grandfather     Melanoma Neg Hx     Psoriasis Neg Hx     Lupus Neg Hx        Social History     Socioeconomic History    Marital status:      Spouse name: Not on file    Number of children: Not on  file    Years of education: Not on file    Highest education level: Not on file   Occupational History    Not on file   Social Needs    Financial resource strain: Not on file    Food insecurity:     Worry: Not on file     Inability: Not on file    Transportation needs:     Medical: Not on file     Non-medical: Not on file   Tobacco Use    Smoking status: Former Smoker     Packs/day: 1.00     Years: 15.00     Pack years: 15.00     Types: Cigarettes     Last attempt to quit: 1965     Years since quittin.4    Smokeless tobacco: Never Used   Substance and Sexual Activity    Alcohol use: No     Frequency: Never    Drug use: No    Sexual activity: Never   Lifestyle    Physical activity:     Days per week: Not on file     Minutes per session: Not on file    Stress: Not on file   Relationships    Social connections:     Talks on phone: Not on file     Gets together: Not on file     Attends Mormon service: Not on file     Active member of club or organization: Not on file     Attends meetings of clubs or organizations: Not on file     Relationship status: Not on file   Other Topics Concern    Not on file   Social History Narrative    Not on file       Current Outpatient Medications   Medication Sig Dispense Refill    aspirin (ECOTRIN) 81 MG EC tablet Take 81 mg by mouth every morning.       cadexomer iodine (IODOSORB) 0.9 % gel Apply topically daily as needed for Wound Care. 10 g 3    ciprofloxacin HCl (CIPRO) 500 MG tablet Take 0.5 tablets (250 mg total) by mouth 2 (two) times daily. 14 tablet 0    finasteride (PROSCAR) 5 mg tablet Take 5 mg by mouth every morning.       furosemide (LASIX) 20 MG tablet Take 20 mg by mouth every other day.       HYDROcodone-acetaminophen (NORCO) 5-325 mg per tablet TK 1 T PO  Q 12 H PRN P  0    losartan-hydrochlorothiazide 50-12.5 mg (HYZAAR) 50-12.5 mg per tablet Take 1 tablet by mouth every morning.       metoprolol tartrate (LOPRESSOR) 50 MG tablet Takes 50  mg in morning and half tablet (25 mg) in evening      polyethylene glycol (GLYCOLAX) 17 gram/dose powder Take 17 g by mouth once daily. 3060 g 0    potassium chloride (MICRO-K) 8 mEq CpSR Take 8 mEq by mouth every other day.       senna-docusate 8.6-50 mg (PERICOLACE) 8.6-50 mg per tablet Take 1 tablet by mouth 2 (two) times daily as needed for Constipation.      simvastatin (ZOCOR) 10 MG tablet Take 10 mg by mouth every evening.      tamsulosin (FLOMAX) 0.4 mg Cp24 Take 0.4 mg by mouth every morning.        No current facility-administered medications for this visit.        Review of patient's allergies indicates:  No Known Allergies    Review of Systems   Constitution: Negative for chills and fever.   HENT: Negative for congestion and tinnitus.    Cardiovascular: Positive for leg swelling. Negative for chest pain and claudication.   Hematologic/Lymphatic: Negative for bleeding problem.   Skin: Positive for color change, nail changes and poor wound healing.   Musculoskeletal: Positive for myalgias and stiffness.   Gastrointestinal: Negative for nausea and vomiting.   Genitourinary: Negative for dysuria.   Neurological: Negative for numbness and paresthesias.   Psychiatric/Behavioral: Negative for altered mental status and suicidal ideas.   Allergic/Immunologic: Negative for environmental allergies and persistent infections.           Objective:      Physical Exam   Constitutional: He is oriented to person, place, and time. He appears well-developed and well-nourished.   Cardiovascular:   Pulses:       Dorsalis pedis pulses are 2+ on the right side, and 2+ on the left side.        Posterior tibial pulses are 1+ on the right side, and 1+ on the left side.   CFT is < 3 seconds bilateral.  Pedal hair growth is decreased bilateral.  Mild varicosities noted bilateral.  Moderate nonpitting edema noted to bilateral lower extremity.  Toes are warm to touch bilateral.     Musculoskeletal: He exhibits edema and  tenderness.   Muscle strength 4/5 in all muscle groups on the Rt. And 5/5 on the.   No tenderness nor crepitation with ROM of foot/ankle joints bilateral.  Pain with palpation to the Rt. Posterior ankle wound.     Neurological: He is alert and oriented to person, place, and time. He has normal strength. No sensory deficit.   Light touch intact bilateral.     Skin: Skin is warm and dry. Capillary refill takes less than 2 seconds. Lesion noted. No abrasion, no bruising, no burn, no ecchymosis, no laceration and no petechiae noted. No erythema. No pallor.   Location: Open wound noted to the Rt. Posterior ankle.  Base: Down to a centralized area of exposed Achilles tendon and sub Q and comprised of mostly slough/fibrin. Heavy hypergranulation noted the periphery of the wound.    Drainage: scant serous.  Caterina wound: Devoid of erythema, fluctuance, purulence, and malodor. Moderate caterina wound edema noted.  Pre-debridement measurement:4.9 x 3.2 x 0.3cm.   Post-debridement measurement: 5.1 x 3.4 x 0.3cm.             Assessment:       Encounter Diagnoses   Name Primary?    Delayed wound healing     Chronic ulcer of right ankle, unspecified ulcer stage Yes         Plan:       Diagnoses and all orders for this visit:    Chronic ulcer of right ankle, unspecified ulcer stage    Delayed wound healing      I counseled the patient on his conditions, their implications and medical management.    Performed a selective excisional debridement of the Rt. Foot/ankle.  See attached procedure note.     Applied a 2x2cm sheet of Neox skin substitute to the Rt. Posterior ankle wound.  This was secured with steri strips, adaptic, and a modified football wrap was applied.    Patient Name: Shaq Bragg Jr.  Patient MRN: [id number]  Patient YOB: 1930  CSN: [id number]  Date: 06/05/2019  Provider:   Morgan Soni    Application for Assessment:  Rt. Posterior ankle  Skin Substitute: Neox  Performed By: Morgan Soni  Time-out  Taken: Yes  Location:     [] Genitalia/Hands/Feet/Multiple Digits    [] Scalp/Face/Neck/Ears    [x]Trunk/Arms/Legs  Application Area: (sq cm): 15+ sq cm   Product Applied: (sq cm): 4 sq cm  Product Waste (sq cm): 0  Fenestrated: No   Instrument:    [] Blade [] Curette  []Forceps  []Nippers    [] Rongeur [] Scissors  []Others:   Secured: yes   Secured with: Steri-strips  Dressing Applied: Yes  Response to Treatment:Well tolerated by patient.  Lot#: [id number]  Exp. Date: 2/1/21    Advised to keep the dressing CDI x 1 week.    Advised to rest and elevate the affected extremity.    Instructed to minimize weight bearing to facilitate wound healing.    Advised to ambulate only in the postoperative shoe/boot.    RTC in 1 week for 3rd graft application.    Morgan Soni DPM

## 2019-06-06 ENCOUNTER — TELEPHONE (OUTPATIENT)
Dept: PODIATRY | Facility: CLINIC | Age: 84
End: 2019-06-06

## 2019-06-06 NOTE — TELEPHONE ENCOUNTER
----- Message from Marissa Hernandez sent at 6/6/2019  3:04 PM CDT -----  Contact: Jennifer Richardell (Spouse)  Type: Needs Medical Advice    Who Called:  Jennifer Bragg (Spouse)  Best Call Back Number: [phone]  Additional Information: would like for Nayeli to give her a call back in regards to a dermatology appointment

## 2019-06-06 NOTE — PROCEDURES
"Wound Debridement  Date/Time: 6/5/2019 9:33 PM  Performed by: Morgan Soni DPM  Authorized by: Morgan Soni DPM     Time out: Immediately prior to procedure a "time out" was called to verify the correct patient, procedure, equipment, support staff and site/side marked as required.    Consent Done?:  Yes (Verbal)    Preparation: Patient was prepped and draped in usual sterile fashion    Local anesthesia used?: No      Wound Details:    Location:  Right leg    Type of Debridement:  Excisional       Length (cm):  4.9       Area (sq cm):  15.68       Width (cm):  3.2       Percent Debrided (%):  100       Depth (cm):  0.3       Total Area Debrided (sq cm):  15.68    Depth of debridement:  Subcutaneous tissue    Tissue debrided:  Subcutaneous and Hypergranulation    Devitalized tissue debrided:  Fibrin and Slough    Instruments:  Curette (silver nitrate)    Bleeding:  Minimal  Hemostasis Achieved: Yes    Method Used:  Pressure  Patient tolerance:  Patient tolerated the procedure well with no immediate complications      "

## 2019-06-06 NOTE — TELEPHONE ENCOUNTER
Patient's wife requesting a referral for Dermatology, and wants to know how long she should wait before she makes an appointment.  Also wants Nayeli to call her tomorrow with names and to assist her.

## 2019-06-07 ENCOUNTER — TELEPHONE (OUTPATIENT)
Dept: SURGERY | Facility: CLINIC | Age: 84
End: 2019-06-07

## 2019-06-07 NOTE — TELEPHONE ENCOUNTER
I spoke to ISELA Alicia and she stated that the patient needs to see a Moes surgeron.  She'll put in the referral for Dr. Adorno.  Juvencio

## 2019-06-07 NOTE — TELEPHONE ENCOUNTER
Spoke with patient wife was told that he has skin cancer on his arm as well as the foot we are treating for wound care. Patient seen Dr. Bowman on [facility] 3/25/19. They held off on getting anything done to the leg area due to the wound being there. They wanted to wait until it was healed first, but since having a PET scan done on 5/24/19 something came up on his arm. Long story short they need an appt to be seen ASAP by Dr. Castro.

## 2019-06-07 NOTE — TELEPHONE ENCOUNTER
Skin cancer removal on the arm, first. They were told they needing to see a Derm Surgeon not regular Derm per patient. Then skin cancer removal on the foot we are doing wound care on.

## 2019-06-10 ENCOUNTER — TELEPHONE (OUTPATIENT)
Dept: DERMATOLOGY | Facility: CLINIC | Age: 84
End: 2019-06-10

## 2019-06-10 DIAGNOSIS — L97.319 CHRONIC ULCER OF RIGHT ANKLE, UNSPECIFIED ULCER STAGE: ICD-10-CM

## 2019-06-10 DIAGNOSIS — T14.8XXD DELAYED WOUND HEALING: Primary | ICD-10-CM

## 2019-06-10 NOTE — TELEPHONE ENCOUNTER
----- Message from Frank Mejia sent at 6/10/2019  2:36 PM CDT -----  LM on wife's cell # for patient to make consult appt per Dr solis staff patient does not want to see her. per Dr. Anthony. bx date?      Preferred Name:   Shaq Bragg Jr.  Male, 88 y.o., 9/27/1930  Phone:   [phone] (H)  PCP:   Ata Jeter MD  Language:   English  Need Interp:   No  Allergies Last Reviewed:   06/05/19  Allergies:   No Known Allergies  Health Maintenance:   Due  Primary Ins.:   [employer] MANAGED MEDICARE  MRN:   [id number]  Pt Comm Pref:   Patient Portal, Mail  Next Appt:   With Morgan Soni DPM  06/12/2019 at 3:20 PM  My Sticky Note:     Specialty Comments:     Message   Received: Today   Message Contents   Gissel Richard MA  Frank Mejia  Caller: Unspecified (4 days ago,  4:58 PM)         Aron Mclain,     I already spoke with Ronnell last week about this patient. She does NOT want to see Dr. Solis, they want to see Dr. Adorno even though we saw them in consult already. They also live in [place] so they prefer to stay on the [facility].   Previous Messages          Patient Calls     You  Gissel Richard MA 4 minutes ago (2:31 PM)     I asked ronnell and she said she never heard of him. I will call them right now to make appt. If ronnell does not have the paperwork can you send it over. Thank you   Routing comment     Gissel Richard MA  You 54 minutes ago (1:40 PM)     I also spoke with the patient's wife last week and told her that y'all would be in contact with them to schedule a consult.    Routing comment     Gissel Richard MA  You 59 minutes ago (1:35 PM)     Aron Mclain     I already spoke with Ronnell last week about this patient. She does NOT want to see Dr. Solis, they want to see Dr. Adorno even though we saw them in consult already. They also live in [place] so they prefer to stay on the [facility].    Routing comment     Wilian Devries LPN 5 hours ago (9:11 AM)     This patient is a former patient of Dr Solis. We  have no referral for him and as her has never been seen in our clinic, I forwarded to message to Dr. Bowman's Staff.   Thank you   Routing comment     You  Gissel Richard MA 5 hours ago (9:09 AM)     This is a former patient of Dr. Bowman. We have never seen him, and have no referral papers.   thanks   Routing comment     ISELA Owens Staff 3 days ago          Sorry this was sent to the wrong office needs to go to Derm Surgery. Thanks.       Documentation     LIV Fernandez LPN 3 days ago     Maral,  I'm forwarding this message back to you to put in for Dr. Adorno.  Thanks,  Juvencio   Routing comment     ISELA Owens LPN 3 days ago          Skin cancer removal on the arm, first. They were told they needing to see a Derm Surgeon not regular Derm per patient. Then skin cancer removal on the foot we are doing wound care on.       Documentation     ISELA Mc LPN 3 days ago     Hi,   What is the reason for the visit?   Routing comment     Maral Devries LPN routed conversation to Matthew VICK Staff 3 days ago    Maral Devries LPN 3 days ago          Spoke with patient wife was told that he has skin cancer on his arm as well as the foot we are treating for wound care. Patient seen Dr. Bowman on [facility] 3/25/19. They held off on getting anything done to the leg area due to the wound being there. They wanted to wait until it was healed first, but since having a PET scan done on 5/24/19 something came up on his arm. Long story short they need an appt to be seen ASAP by Dr. Castro.       Documentation     ISELA OwensellJennifer 3 days ago    Morgan Soni DPM routed conversation to Maral Devries LPN 3 days ago    Faith Winter LPN routed conversation to Morgan Soni DPM 4 days ago    Faith Winter LPN 4 days ago          Patient's wife requesting a referral for Dermatology, and wants to know how  long she should wait before she makes an appointment.  Also wants Nayeli to call her tomorrow with names and to assist her.        Documentation     Faith Winter LPN  Bragg,Jennifer 4 days ago    Faith Winter LPN 4 days ago          ----- Message from Marissa Hernandez sent at 6/6/2019  3:04 PM CDT -----  Contact: Jennifer Richardell (Spouse)  Type: Needs Medical Advice     Who Called:  Jennifer Bragg (Spouse)  Best Call Back Number: [phone]  Additional Information: would like for Nayeli to give her a call back in regards to a dermatology appointment            Documentation

## 2019-06-10 NOTE — TELEPHONE ENCOUNTER
This patient is a former patient of Dr Bowman. We have no referral for him and as her has never been seen in our clinic, I forwarded to message to Dr. Bowman's Staff.

## 2019-06-11 ENCOUNTER — INITIAL CONSULT (OUTPATIENT)
Dept: DERMATOLOGY | Facility: CLINIC | Age: 84
End: 2019-06-11
Payer: MEDICARE

## 2019-06-11 VITALS
BODY MASS INDEX: 24.38 KG/M2 | HEIGHT: 72 IN | DIASTOLIC BLOOD PRESSURE: 57 MMHG | WEIGHT: 180 LBS | SYSTOLIC BLOOD PRESSURE: 131 MMHG | HEART RATE: 56 BPM

## 2019-06-11 DIAGNOSIS — R60.9 POSTOPERATIVE EDEMA: ICD-10-CM

## 2019-06-11 DIAGNOSIS — C44.722 SQUAMOUS CELL CARCINOMA OF LEG, RIGHT: Primary | ICD-10-CM

## 2019-06-11 DIAGNOSIS — Z85.820 HISTORY OF MALIGNANT MELANOMA OF SKIN: ICD-10-CM

## 2019-06-11 DIAGNOSIS — L97.319 CHRONIC ULCER OF RIGHT ANKLE, UNSPECIFIED ULCER STAGE: ICD-10-CM

## 2019-06-11 DIAGNOSIS — D48.5 NEOPLASM OF UNCERTAIN BEHAVIOR OF SKIN: ICD-10-CM

## 2019-06-11 DIAGNOSIS — T14.8XXD DELAYED WOUND HEALING: Primary | ICD-10-CM

## 2019-06-11 PROCEDURE — 1101F PR PT FALLS ASSESS DOC 0-1 FALLS W/OUT INJ PAST YR: ICD-10-PCS | Mod: CPTII,S$GLB,, | Performed by: DERMATOLOGY

## 2019-06-11 PROCEDURE — 88305 TISSUE EXAM BY PATHOLOGIST: CPT | Performed by: PATHOLOGY

## 2019-06-11 PROCEDURE — 99999 PR PBB SHADOW E&M-EST. PATIENT-LVL III: CPT | Mod: PBBFAC,,, | Performed by: DERMATOLOGY

## 2019-06-11 PROCEDURE — 99214 PR OFFICE/OUTPT VISIT, EST, LEVL IV, 30-39 MIN: ICD-10-PCS | Mod: 25,S$GLB,, | Performed by: DERMATOLOGY

## 2019-06-11 PROCEDURE — 88342 IMHCHEM/IMCYTCHM 1ST ANTB: CPT | Mod: 26,,, | Performed by: PATHOLOGY

## 2019-06-11 PROCEDURE — 99214 OFFICE O/P EST MOD 30 MIN: CPT | Mod: 25,S$GLB,, | Performed by: DERMATOLOGY

## 2019-06-11 PROCEDURE — 11102 PR TANGENTIAL BIOPSY, SKIN, SINGLE LESION: ICD-10-PCS | Mod: S$GLB,,, | Performed by: DERMATOLOGY

## 2019-06-11 PROCEDURE — 11102 TANGNTL BX SKIN SINGLE LES: CPT | Mod: S$GLB,,, | Performed by: DERMATOLOGY

## 2019-06-11 PROCEDURE — 88305 TISSUE SPECIMEN TO PATHOLOGY, DERMATOLOGY: ICD-10-PCS | Mod: 26,,, | Performed by: PATHOLOGY

## 2019-06-11 PROCEDURE — 99999 PR PBB SHADOW E&M-EST. PATIENT-LVL III: ICD-10-PCS | Mod: PBBFAC,,, | Performed by: DERMATOLOGY

## 2019-06-11 PROCEDURE — 88342 TISSUE SPECIMEN TO PATHOLOGY, DERMATOLOGY: ICD-10-PCS | Mod: 26,,, | Performed by: PATHOLOGY

## 2019-06-11 PROCEDURE — 1101F PT FALLS ASSESS-DOCD LE1/YR: CPT | Mod: CPTII,S$GLB,, | Performed by: DERMATOLOGY

## 2019-06-11 NOTE — LETTER
June 14, 2019      Amber Anthony MD  [address]  [facility] [address]           [facility]  [address] [facility] [address] [place] [address]  Phone: [phone]          Patient: Shaq Bragg Jr.   MR Number: [id number]   YOB: 1930   Date of Visit: 6/11/2019       Dear Dr. Amber Anthony:    Thank you for referring Shaq Bragg to me for evaluation. Attached you will find relevant portions of my assessment and plan of care.    If you have questions, please do not hesitate to call me. I look forward to following Shaq Bragg along with you.    Sincerely,    Jose Adorno MD    Enclosure  CC:  No Recipients    If you would like to receive this communication electronically, please contact [email or web address] or [phone] to request more information on [employer] access.    For providers and/or their staff who would like to refer a patient to [facility], please contact us through our one-stop-shop provider referral line, [facility] [place], at [phone].    If you feel you have received this communication in error or would no longer like to receive these types of communications, please e-mail [email or web address]

## 2019-06-11 NOTE — PROGRESS NOTES
ALLERGIES:  Patient has no known allergies.    CHIEF COMPLAINT:  This 88 y.o. 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 Consultation requested by Tony Bowman MD    The patient is accompanied to this visit by his wife.    HISTORY OF PRESENT ILLNESS:   Location: right lower leg  Duration: unknown or more  Quality: currently better  Context: status post biopsy on 2/22/19 by Amber Anthony M.D; path = squamous cell carcinoma; pathology accession #[id number],  Pathology [facility]    Prior Treatment: none  Dr. Bowman saw him in consultation regarding this lesion on 03/25; she decided to defer treatment given the swelling of the right lower extremity which is a consequence of his previous wide local excision of a melanoma, 1.4 mm in thickness, and lymph node dissection, in 2017 on this extremity  He now also has a persistent ulcer on the right posterior heel  Has started Nivolumab monthly infusions    See also the handwritten notes/diagrams scanned to chart for additional details.    Defibrillator: No  Pacemaker: No  Artificial heart valves: No  Artificial joints: No    REVIEW OF SYSTEMS:   General: general health fair  Skin: previous skin cancer(s) Yes   If yes, details:Melanaoma right leg  Relevant other: Has a new growth/spot on the left arm; this apparently was noted on a recent PET scan  Prior melanoma on the right leg; status post excision with lymph node dissection; as noted above   Cardiovascular:   High Blood Pressure: Yes   Chest Pain:  No   Defibrillator: as above  Pacemaker: as above  Artificial heart valves: as above  Prior Endocarditis: No   Prior Heart Attack/MI: No     If yes, when:   Prior Cardiac Bypass or Stents:  No   If yes, when:   Mitral Valve Prolapse: No   Relevant other:  No   Respiratory:   Shortness of breath:  No   Relevant other:  No   Endocrine:   Diabetes:  No   Relevant  other:  No   Hem/Lymph:   Taking Prescribed Blood Thinners:  No   Easy Bleeding:  No   Relevant other:  No   Allergy/Immuno: as noted above  Relevant other:  No   GI:   Prior Hepatitis:  No     If yes, details:   Relevant other:  No   Musculoskeletal:   Artificial joints: as above  Relevant other:  No   Neurologic:   Prior Stroke:  No     If yes, details:   Relevant other:  No   Relevant other info:  No      PET scan (highlights added)  EXAMINATION:  NM PET CT WHOLE BODY    CLINICAL HISTORY:  melanoma staging; Malignant melanoma of right lower limb, including hip    TECHNIQUE:  11.1 mCi of F18-FDG was administered intravenously in the right wrist after initial attempt to inject at the right antecubital vein.  After an approximately 60 min distribution time, PET/CT images were acquired from the skull vertex through the feet. Transmission images were acquired to correct for attenuation using a whole body low-dose CT scan without contrast with the arms positioned along the side of the torso. Glycemia at the time of injection was 98 mg/dL.    COMPARISON:  MRI of the right tibia fibula 04/09/2019, MRI of the thoracic spine 02/22/2019, and FDG PET-CT 02/15/2019    FINDINGS:  Quality of the study: Degraded by partial infiltration of the radiopharmaceutical at the initial attempt at site of injection at the right antecubital fossa as well as diffuse soft tissue retention which may be related to cardiac and/or renal function.  This may reduce sensitivity for less metabolically active lesions.    The hypermetabolic focus along the right shin is no longer appreciated, and hypermetabolic focus in the T11 vertebral body is not observed.    In the extremities, there is a new hypermetabolic focus superficially in the lateral aspect of the proximal left upper extremity with a maximum SUV of 1.7 without discrete correlate and which may not be included on CT.  There is new hypermetabolic thickening of the skin and subcutaneous  tissues of the right heel in keeping with known cellulitis.  There are no pathologically enlarged or hypermetabolic lymph nodes.  There is redemonstration of non hypermetabolic right inguinal lymph nodes including index 1.2 cm rounded node on image 180.  Mild postsurgical uptake in the right inguinal lymphadenectomy bed has resolved.    Physiologic uptake of the tracer is present within the brain, salivary glands, myocardium, GI and  tracts.    Incidental CT findings: There is redemonstration of bilateral rounded atelectasis in the lower lobes.      Impression       1.  New hypermetabolic focus in the proximal left upper extremity.  Recommend physical examination.    2.  No other findings suggestive of malignancy.    3.  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    4.  Active cellulitis of the right heel.    5.  Other findings as above.         PAST MEDICAL HISTORY:  Past Medical History:   Diagnosis Date    COPD (chronic obstructive pulmonary disease)     H/O asbestosis     Hearing loss     Hypercholesterolemia     Hypertension     Malignant melanoma of right lower leg 2017    Melanoma     Approx. 2 years ago     Skin cancer     Squamous cell carcinoma        PAST SURGICAL HISTORY:  Past Surgical History:   Procedure Laterality Date    ADENOIDECTOMY      APPENDECTOMY      Carotid Stenosis Surgery      CHOLECYSTECTOMY  1965    LYMPHADENECTOMY, INGUINAL, Sartorious Flap Right 2018    Performed by Yair Alatorre MD at [facility] OR [address] [place] [address]    TONSILLECTOMY          SOCIAL HISTORY:  Dependencies: smoking status as noted below  Social History     Tobacco Use    Smoking status: Former Smoker     Packs/day: 1.00     Years: 15.00     Pack years: 15.00     Types: Cigarettes     Last attempt to quit: 1965     Years since quittin.4    Smokeless tobacco: Never Used   Substance Use Topics    Alcohol use: No     Frequency: Never    Drug use: No       PERTINENT  MEDICATIONS:  See medications list.    Current Outpatient Medications:     aspirin (ECOTRIN) 81 MG EC tablet, Take 81 mg by mouth every morning. , Disp: , Rfl:     cadexomer iodine (IODOSORB) 0.9 % gel, Apply topically daily as needed for Wound Care., Disp: 10 g, Rfl: 3    ciprofloxacin HCl (CIPRO) 500 MG tablet, Take 0.5 tablets (250 mg total) by mouth 2 (two) times daily., Disp: 14 tablet, Rfl: 0    finasteride (PROSCAR) 5 mg tablet, Take 5 mg by mouth every morning. , Disp: , Rfl:     furosemide (LASIX) 20 MG tablet, Take 20 mg by mouth every other day. , Disp: , Rfl:     HYDROcodone-acetaminophen (NORCO) 5-325 mg per tablet, TK 1 T PO  Q 12 H PRN P, Disp: , Rfl: 0    losartan-hydrochlorothiazide 50-12.5 mg (HYZAAR) 50-12.5 mg per tablet, Take 1 tablet by mouth every morning. , Disp: , Rfl:     metoprolol tartrate (LOPRESSOR) 50 MG tablet, Takes 50 mg in morning and half tablet (25 mg) in evening, Disp: , Rfl:     polyethylene glycol (GLYCOLAX) 17 gram/dose powder, Take 17 g by mouth once daily., Disp: 3060 g, Rfl: 0    potassium chloride (MICRO-K) 8 mEq CpSR, Take 8 mEq by mouth every other day. , Disp: , Rfl:     senna-docusate 8.6-50 mg (PERICOLACE) 8.6-50 mg per tablet, Take 1 tablet by mouth 2 (two) times daily as needed for Constipation., Disp: , Rfl:     simvastatin (ZOCOR) 10 MG tablet, Take 10 mg by mouth every evening., Disp: , Rfl:     tamsulosin (FLOMAX) 0.4 mg Cp24, Take 0.4 mg by mouth every morning. , Disp: , Rfl:     ALLERGIES:  Patient has no known allergies.    EXAM:  See also the handwritten notes/diagrams scanned to chart for additional details.  Constitutional  General appearance: well-developed, well-nourished, well-kempt elderly white male    Eyes  Inspection of conjunctivae and lids reveals no abnormalities; sclerae anicteric  Neurologic/Psychiatric  Alert,  normal orientation to time, place, person  Normal mood and affect with no evidence of depression, anxiety,  agitation  Skin: see photo(s)  Head: background marked solar damage to exposed areas of skin  Neck: examination reveals marked chronic solar damage  Right upper extremity: examination reveals marked chronic solar damage  Left upper extremity: examination reveals marked chronic solar damage; on his left deltoid area there is an approximately 2 cm area of erythema and scaling/crusting, which is clinically suggestive of a 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  Right lower extremity:  There is a boot with dressing on his right distal lower extremity  There is edema of the right lower extremity below the knee; with some chronic stasis changes  Examination of the site of previous biopsy, which was confirmed by reference to the previous photographs, shows an approximately 1 cm pink, sclerotic plaque which is not indurated and shows no evidence of residual squamous cell carcinoma at this time    Photo(s) from biopsy visit:                  ASSESSMENT: status post biopsy, squamous cell carcinoma, right leg; now clinically clear   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, left arm deltoid area  chronic solar damage to areas as noted above  Personal history of melanoma, with metastatic lesions; on Nivolumab  personal history of non-melanoma skin cancer  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 In particular, the discussion included, but was not limited to, the following:    One alternative at this point would be to defer further treatment and observe the lesion(s). With small skin  cancers of this kind, it is possible that a biopsy can be sufficient to definitively treat a small skin cancer of this kind. Alternatively, some skin cancers are slow growing and do not require immediate treatment. The potential advantage of this choice would be to avoid the need for possibly unnecessary additional surgery. Among the potential disadvantages of this would be the possibility of enlargement of the lesion, more extensive spread of the lesion or recurrence at a later date, which might necessitate a larger and more complex surgery.    Radiation treatment can be an effective treatment for this type of skin cancer. The usual course of treatment is every weekday for several weeks. Local irritation will result from treatment, although no systemic side effects are expected. The potential advantage of radiation treatment is that it avoids the need for surgery. Among the disadvantages of radiation treatment are the length of treatment, the local inflammatory response, the absence of pathologic confirmation of the removal of the skin cancer, a possible increased risk of additional skin cancer in the treated area in later years, and a somewhat increased risk of recurrence at a later date.     Excisional surgery can be an effective treatment for this type of skin cancer. This would involve excision of the lesion with margin evaluation by submitting the specimen to a pathologist for either immediate marginal assessment via frozen section processing, or delayed marginal assessment by fixed-tissue processing. The potential advantage of this technique is that it offers a way of treating the lesion with some degree of histologic confirmation of tumor removal. Among the disadvantages of this treatment are the possible need for re-excision if marginal involvement is identified, a somewhat greater likelihood of recurrence as compared to Mohs' surgery because of the less comprehensive margin evaluation inherent in the  technique, and the general potential risks of surgery, including allergic reactions to the anesthetic and other materials used, infection, injury to nerves in the area with consequent loss of sensation or muscle function, and scarring or distortion of surrounding structures.    Mohs' surgery is a very effective treatment for this type of skin cancer. The potential advantage of Mohs' surgery is that this technique offers the greatest possible certainty of knowing that the skin cancer has been completely removed, with the removal of the least amount of normal tissue. The potential disadvantages of Mohs' surgery include the duration of the surgery, the possible need for a separate surgery for reconstruction following tumor removal, and scarring as a result. In addition, general potential risks of surgery as noted above also apply to treatment via Mohs' surgery.    In light of the absence of any evidence of residual tumor to the site of the previous biopsy on his right leg, we will defer treatment pro tem.    We also discussed the lesion on the left arm.  In light of the uncertain nature of this lesion, a shave biopsy will be performed to establish a definitive diagnosis and guide further therapy.  See the procedure note below.    Sufficient time was available for questions, and all questions were answered to his satisfaction.     Discussion of the above issues constituted greater than 50% of the face-to-face encounter, the duration of which was 30 minutes.    An appointment will be made for him in two weeks. He is to call to be seen sooner if any changes or signs of recurrence, which were reviewed, should arise in the meantime.    --------------------------------------  Note: Some or all of this note may have been generated using voice recognition software. There may be voice recognition errors including grammatical and/or spelling errors found in the text. Attempts were made to correct these errors prior to signature.      PROCEDURE NOTE: SHAVE BIOPSY    The diagnosis and management options and risk, benefits and alternatives were discussed with the patient. All questions were answered. Verbal consent was obtained.    Site: left arm  Indication: clinically suspicious lesion; rule out malignancy  Prep: Alcohol  Anesthesia: 2% lidocaine plain, less than 2 mL  Shave biopsy performed  Hemostasis with electrodesiccation  Dressed with petrolatum and bandaid  Specimen placed in formalin to be submitted to pathology    Routine care instructions given  Followup: 2 weeks to discuss biopsy results and coordinate any further treatment indicated  --------------------------------------  Note: Some or all of this note may have been generated using voice recognition software. There may be voice recognition errors including grammatical and/or spelling errors found in the text. Attempts were made to correct these errors prior to signature.

## 2019-06-14 ENCOUNTER — TELEPHONE (OUTPATIENT)
Dept: PODIATRY | Facility: CLINIC | Age: 84
End: 2019-06-14

## 2019-06-14 NOTE — TELEPHONE ENCOUNTER
----- Message from Bharathi Ford sent at 6/14/2019 10:06 AM CDT -----  Contact: [facility] home health nurse  Type: Needs Medical Advice    Who Called:  [facility]  Symptoms (please be specific):  Black drainage, Same dressing for a week, foul smell, location is painful  How long has patient had these symptoms:  10 days for same dressing  Pharmacy name and phone #:    [facility] [place], [place] - [address]  [address]  [place] [address]  Phone: [phone] Fax: [phone]    Best Call Back Number: [phone] [facility]  Additional Information: tried to call off but no answer

## 2019-06-14 NOTE — TELEPHONE ENCOUNTER
Called and spoke to [facility] per Dr baires Remove bandage cover wound bed with Silver alginate then cover with foam wrap with sharri padding and then ace bandage. [facility] gave verbal understanding.

## 2019-06-14 NOTE — TELEPHONE ENCOUNTER
----- Message from Aleena March sent at 6/14/2019  9:14 AM CDT -----  Contact: wife, Jennifer Bragg  Type: Needs Medical Advice    Who Called:  self  Symptoms (please be specific):  bandage is dark and wet around heel skin krysta  How long has patient had these symptoms:  4 or 5 days  Pharmacy name and phone #:    Best Call Back Number: [phone] or [phone]  Additional Information: Patient's wife states the patient is in a lot of pain and feels he can not wait until Tuesday 06/18/19 to be seen. Please call wife. Thanks!

## 2019-06-14 NOTE — TELEPHONE ENCOUNTER
I did let her know the bandage is dark because of the type of graft. That it is not unusual for it to drain and turn dark.  I told her spoke to home health [facility], and they are going to come and change the bandage. She said he is having a lot of pain. Patient has been walking on it to go to the bathroom and in the house. I did inform her, he needs to stay off of it.  I did let her know if the the pain gets worst to go the the ED.

## 2019-06-18 ENCOUNTER — OFFICE VISIT (OUTPATIENT)
Dept: PODIATRY | Facility: CLINIC | Age: 84
End: 2019-06-18
Payer: MEDICARE

## 2019-06-18 DIAGNOSIS — M79.671 FOOT PAIN, RIGHT: ICD-10-CM

## 2019-06-18 DIAGNOSIS — L97.309: Primary | ICD-10-CM

## 2019-06-18 DIAGNOSIS — T14.8XXD DELAYED WOUND HEALING: ICD-10-CM

## 2019-06-18 DIAGNOSIS — M25.571 ACUTE RIGHT ANKLE PAIN: ICD-10-CM

## 2019-06-18 DIAGNOSIS — L92.9 HYPERGRANULATION: ICD-10-CM

## 2019-06-18 DIAGNOSIS — L98.8 MACERATION OF SKIN: ICD-10-CM

## 2019-06-18 PROCEDURE — 15271 SKIN SUB GRAFT TRNK/ARM/LEG: CPT | Mod: S$GLB,,, | Performed by: PODIATRIST

## 2019-06-18 PROCEDURE — 99999 PR PBB SHADOW E&M-EST. PATIENT-LVL I: CPT | Mod: PBBFAC,,, | Performed by: PODIATRIST

## 2019-06-18 PROCEDURE — 99999 PR PBB SHADOW E&M-EST. PATIENT-LVL I: ICD-10-PCS | Mod: PBBFAC,,, | Performed by: PODIATRIST

## 2019-06-18 PROCEDURE — 99499 UNLISTED E&M SERVICE: CPT | Mod: S$GLB,,, | Performed by: PODIATRIST

## 2019-06-18 PROCEDURE — 87077 CULTURE AEROBIC IDENTIFY: CPT | Mod: 59

## 2019-06-18 PROCEDURE — 99499 NO LOS: ICD-10-PCS | Mod: S$GLB,,, | Performed by: PODIATRIST

## 2019-06-18 PROCEDURE — 87070 CULTURE OTHR SPECIMN AEROBIC: CPT

## 2019-06-18 PROCEDURE — 87186 SC STD MICRODIL/AGAR DIL: CPT | Mod: 59

## 2019-06-18 PROCEDURE — 87075 CULTR BACTERIA EXCEPT BLOOD: CPT

## 2019-06-18 PROCEDURE — 15271 PR SKIN SUB GRAFT TRNK/ARM/LEG: ICD-10-PCS | Mod: S$GLB,,, | Performed by: PODIATRIST

## 2019-06-18 RX ORDER — HYDROCODONE BITARTRATE AND ACETAMINOPHEN 5; 325 MG/1; MG/1
1 TABLET ORAL EVERY 6 HOURS PRN
Qty: 30 TABLET | Refills: 0 | Status: SHIPPED | OUTPATIENT
Start: 2019-06-18 | End: 2019-07-03 | Stop reason: SDUPTHER

## 2019-06-18 NOTE — PROGRESS NOTES
Subjective:      Patient ID: Shaq Bragg Jr. is a 88 y.o. male.    Chief Complaint: No chief complaint on file.  Patient presents to clinic for a 2 week follow up for a wound of the Rt. Posterior ankle.  Relates having aching pain from the affected extremity, 7/10, with today's exam.   Relates having more tenderness along the lateral aspect of the heel, however, denies having pain from the wound itself.  Admits to ambulating more than he should and occasionally has been elevating the limb.  Has kept the previous bandage by Home Health clean, dry, and intact.  Denies having N/V/F/C/D.  Denies any additional pedal complaints.      Past Medical History:   Diagnosis Date    COPD (chronic obstructive pulmonary disease)     H/O asbestosis     Hearing loss     Hypercholesterolemia     Hypertension     Malignant melanoma of right lower leg 5/11/2017    Melanoma     Approx. 2 years ago     Skin cancer     Squamous cell carcinoma        Past Surgical History:   Procedure Laterality Date    ADENOIDECTOMY      APPENDECTOMY      Carotid Stenosis Surgery      CHOLECYSTECTOMY  1965    LYMPHADENECTOMY, INGUINAL, Sartorious Flap Right 11/12/2018    Performed by Yair Alatorre MD at [facility] OR [address] [place] [address]    TONSILLECTOMY         Family History   Problem Relation Age of Onset    Coronary artery disease Mother     Cancer Father         Esophageal    Esophageal cancer Father     Diabetes Sister     No Known Problems Maternal Aunt     No Known Problems Maternal Uncle     No Known Problems Paternal Aunt     No Known Problems Paternal Uncle     No Known Problems Maternal Grandmother     No Known Problems Maternal Grandfather     No Known Problems Paternal Grandmother     No Known Problems Paternal Grandfather     Melanoma Neg Hx     Psoriasis Neg Hx     Lupus Neg Hx        Social History     Socioeconomic History    Marital status:      Spouse name: Not on file    Number of children: Not on  file    Years of education: Not on file    Highest education level: Not on file   Occupational History    Not on file   Social Needs    Financial resource strain: Not on file    Food insecurity:     Worry: Not on file     Inability: Not on file    Transportation needs:     Medical: Not on file     Non-medical: Not on file   Tobacco Use    Smoking status: Former Smoker     Packs/day: 1.00     Years: 15.00     Pack years: 15.00     Types: Cigarettes     Last attempt to quit: 1965     Years since quittin.4    Smokeless tobacco: Never Used   Substance and Sexual Activity    Alcohol use: No     Frequency: Never    Drug use: No    Sexual activity: Never   Lifestyle    Physical activity:     Days per week: Not on file     Minutes per session: Not on file    Stress: Not on file   Relationships    Social connections:     Talks on phone: Not on file     Gets together: Not on file     Attends [employer] service: Not on file     Active member of club or organization: Not on file     Attends meetings of clubs or organizations: Not on file     Relationship status: Not on file   Other Topics Concern    Not on file   Social History Narrative    Not on file       Current Outpatient Medications   Medication Sig Dispense Refill    aspirin (ECOTRIN) 81 MG EC tablet Take 81 mg by mouth every morning.       cadexomer iodine (IODOSORB) 0.9 % gel Apply topically daily as needed for Wound Care. 10 g 3    ciprofloxacin HCl (CIPRO) 500 MG tablet Take 0.5 tablets (250 mg total) by mouth 2 (two) times daily. 14 tablet 0    finasteride (PROSCAR) 5 mg tablet Take 5 mg by mouth every morning.       furosemide (LASIX) 20 MG tablet Take 20 mg by mouth every other day.       HYDROcodone-acetaminophen (NORCO) 5-325 mg per tablet Take 1 tablet by mouth every 6 (six) hours as needed for Pain. 30 tablet 0    losartan-hydrochlorothiazide 50-12.5 mg (HYZAAR) 50-12.5 mg per tablet Take 1 tablet by mouth every morning.        metoprolol tartrate (LOPRESSOR) 50 MG tablet Takes 50 mg in morning and half tablet (25 mg) in evening      polyethylene glycol (GLYCOLAX) 17 gram/dose powder Take 17 g by mouth once daily. 3060 g 0    potassium chloride (MICRO-K) 8 mEq CpSR Take 8 mEq by mouth every other day.       senna-docusate 8.6-50 mg (PERICOLACE) 8.6-50 mg per tablet Take 1 tablet by mouth 2 (two) times daily as needed for Constipation.      simvastatin (ZOCOR) 10 MG tablet Take 10 mg by mouth every evening.      tamsulosin (FLOMAX) 0.4 mg Cp24 Take 0.4 mg by mouth every morning.        No current facility-administered medications for this visit.        Review of patient's allergies indicates:  No Known Allergies    Review of Systems   Constitution: Negative for chills and fever.   HENT: Negative for congestion and tinnitus.    Cardiovascular: Positive for leg swelling. Negative for chest pain and claudication.   Hematologic/Lymphatic: Negative for bleeding problem.   Skin: Positive for color change, nail changes and poor wound healing.   Musculoskeletal: Positive for myalgias and stiffness.   Gastrointestinal: Negative for nausea and vomiting.   Genitourinary: Negative for dysuria.   Neurological: Negative for numbness and paresthesias.   Psychiatric/Behavioral: Negative for altered mental status and suicidal ideas.   Allergic/Immunologic: Negative for environmental allergies and persistent infections.           Objective:      Physical Exam   Constitutional: He is oriented to person, place, and time. He appears well-developed and well-nourished.   Cardiovascular:   Pulses:       Dorsalis pedis pulses are 2+ on the right side, and 2+ on the left side.        Posterior tibial pulses are 1+ on the right side, and 1+ on the left side.   CFT is < 3 seconds bilateral.  Pedal hair growth is decreased bilateral.  Mild varicosities noted bilateral.  Moderate nonpitting edema noted to bilateral lower extremity.  Toes are warm to touch  bilateral.     Musculoskeletal: He exhibits edema and tenderness.   Muscle strength 4/5 in all muscle groups on the Rt. And 5/5 on the.   No tenderness nor crepitation with ROM of foot/ankle joints bilateral.  Pain with palpation to an area of desquamation and maceration about the Rt. Lateral calcaneus.     Neurological: He is alert and oriented to person, place, and time. He has normal strength. No sensory deficit.   Light touch intact bilateral.     Skin: Skin is warm and dry. Capillary refill takes less than 2 seconds. Lesion noted. No abrasion, no bruising, no burn, no ecchymosis, no laceration and no petechiae noted. There is erythema. No pallor.   Location: Open wound noted to the Rt. Posterior ankle.  Base: Down to a centralized area of exposed Achilles tendon and sub Q and comprised of slough.  Continued hypergranulation noted the periphery of the wound.    Drainage: Serous.  Caterina wound: Devoid of erythema, fluctuance, purulence, and malodor. Moderate caterina wound edema noted.  Pre-debridement measurement:4.8 x 2.7 x 0.3cm.   Post-debridement measurement: 5 x 2.9 x 0.3cm.    Area of desquamation noted to the Rt. Lateral heel in conjunction with mild erythema and maceration.  Site is separate from the aforementioned wound.  No fluctuance or purulence noted.  Slight malodor is present.               Assessment:       Encounter Diagnoses   Name Primary?    Delayed wound healing     Hypergranulation     Chronic ulcer of ankle, unspecified laterality, unspecified ulcer stage Yes    Foot pain, right     Acute right ankle pain     Maceration of skin          Plan:       Diagnoses and all orders for this visit:    Chronic ulcer of ankle, unspecified laterality, unspecified ulcer stage  -     HYDROcodone-acetaminophen (NORCO) 5-325 mg per tablet; Take 1 tablet by mouth every 6 (six) hours as needed for Pain.  -     Wound Debridement    Delayed wound healing    Hypergranulation    Foot pain, right    Acute right  ankle pain  -     HYDROcodone-acetaminophen (NORCO) 5-325 mg per tablet; Take 1 tablet by mouth every 6 (six) hours as needed for Pain.    Maceration of skin  -     Aerobic culture  -     Culture, Anaerobic      I counseled the patient on his conditions, their implications and medical management.    Cultures were taken from the site of desquamation.      Performed a selective excisional debridement of the Rt. Foot/ankle.  See attached procedure note.     Applied a 6x3cm sheet of Neox skin substitute to the Rt. Posterior ankle wound.  This was secured with steri strips, adaptic.  Betadine soaked 4x4 gauze was applied to areas of maceration, and a modified football wrap was applied.    Patient Name: Shaq Bragg Jr.  Patient MRN: [id number]  Patient YOB: 1930  CSN: [id number]  Date: 06/18/2019  Provider:   Morgan Soni    Application for Assessment:  Rt. Posterior ankle  Skin Substitute: Neox  Performed By: Morgan Soni  Time-out Taken: Yes  Location:     [] Genitalia/Hands/Feet/Multiple Digits    [] Scalp/Face/Neck/Ears    [x]Trunk/Arms/Legs  Application Area: (sq cm): 15+ sq cm   Product Applied: (sq cm): 18 sq cm  Product Waste (sq cm): 0  Fenestrated: No   Instrument:    [] Blade [] Curette  []Forceps  []Nippers    [] Rongeur [] Scissors  []Others:   Secured: yes   Secured with: Steri-strips  Dressing Applied: Yes  Response to Treatment:Well tolerated by patient.  Lot#: [id number]  Exp. Date: 4/2/21    Advised to keep the dressing CDI x 1 week.    Advised to rest and elevate the affected extremity.    Instructed to minimize weight bearing to facilitate wound healing.    Advised to ambulate only in the postoperative shoe/boot.    RTC in 1 week for repeat graft application.    Morgan Soni DPM

## 2019-06-19 NOTE — PROCEDURES
"Wound Debridement  Date/Time: 6/18/2019 9:44 PM  Performed by: Morgan Soni DPM  Authorized by: Morgan Soni DPM     Time out: Immediately prior to procedure a "time out" was called to verify the correct patient, procedure, equipment, support staff and site/side marked as required.    Consent Done?:  Yes (Verbal)    Preparation: Patient was prepped and draped in usual sterile fashion    Local anesthesia used?: No      Wound Details:    Location:  Right foot    Location:  Right Ankle    Type of Debridement:  Excisional       Length (cm):  4.8       Area (sq cm):  12.96       Width (cm):  2.7       Percent Debrided (%):  100       Depth (cm):  0.3       Total Area Debrided (sq cm):  12.96    Depth of debridement:  Subcutaneous tissue    Tissue debrided:  Subcutaneous    Devitalized tissue debrided:  Fibrin and Slough    Instruments:  Curette    Bleeding:  Minimal  Hemostasis Achieved: Yes    Method Used:  Pressure  Patient tolerance:  Patient tolerated the procedure well with no immediate complications      "

## 2019-06-20 DIAGNOSIS — L97.319 CHRONIC ULCER OF RIGHT ANKLE, UNSPECIFIED ULCER STAGE: Primary | ICD-10-CM

## 2019-06-20 DIAGNOSIS — T14.8XXD DELAYED WOUND HEALING: ICD-10-CM

## 2019-06-21 LAB
BACTERIA SPEC AEROBE CULT: NORMAL
BACTERIA SPEC AEROBE CULT: NORMAL

## 2019-06-24 ENCOUNTER — OFFICE VISIT (OUTPATIENT)
Dept: HEMATOLOGY/ONCOLOGY | Facility: CLINIC | Age: 84
DRG: 602 | End: 2019-06-24
Attending: INTERNAL MEDICINE
Payer: MEDICARE

## 2019-06-24 ENCOUNTER — HOSPITAL ENCOUNTER (INPATIENT)
Facility: HOSPITAL | Age: 84
LOS: 2 days | Discharge: HOME HEALTH | DRG: 602 | End: 2019-06-26
Attending: EMERGENCY MEDICINE | Admitting: EMERGENCY MEDICINE
Payer: MEDICARE

## 2019-06-24 VITALS
HEIGHT: 72 IN | HEART RATE: 61 BPM | BODY MASS INDEX: 24.01 KG/M2 | WEIGHT: 177.25 LBS | RESPIRATION RATE: 18 BRPM | SYSTOLIC BLOOD PRESSURE: 141 MMHG | TEMPERATURE: 98 F | OXYGEN SATURATION: 96 % | DIASTOLIC BLOOD PRESSURE: 63 MMHG

## 2019-06-24 DIAGNOSIS — M86.271 SUBACUTE OSTEOMYELITIS OF RIGHT FOOT: ICD-10-CM

## 2019-06-24 DIAGNOSIS — C43.71 MALIGNANT MELANOMA OF RIGHT LOWER LEG: Primary | ICD-10-CM

## 2019-06-24 DIAGNOSIS — L89.614 STAGE IV PRESSURE ULCER OF RIGHT HEEL: Primary | ICD-10-CM

## 2019-06-24 DIAGNOSIS — L08.9 WOUND INFECTION: ICD-10-CM

## 2019-06-24 DIAGNOSIS — L98.492 INFECTED ULCER OF SKIN, WITH FAT LAYER EXPOSED: ICD-10-CM

## 2019-06-24 DIAGNOSIS — N18.3 ACUTE RENAL FAILURE SUPERIMPOSED ON STAGE 3 CHRONIC KIDNEY DISEASE, UNSPECIFIED ACUTE RENAL FAILURE TYPE: ICD-10-CM

## 2019-06-24 DIAGNOSIS — T14.8XXA WOUND INFECTION: ICD-10-CM

## 2019-06-24 DIAGNOSIS — I50.9 CHF (CONGESTIVE HEART FAILURE): ICD-10-CM

## 2019-06-24 DIAGNOSIS — L08.9 INFECTED ULCER OF SKIN, WITH FAT LAYER EXPOSED: ICD-10-CM

## 2019-06-24 DIAGNOSIS — R07.9 CHEST PAIN: ICD-10-CM

## 2019-06-24 DIAGNOSIS — N17.9 ACUTE RENAL FAILURE SUPERIMPOSED ON STAGE 3 CHRONIC KIDNEY DISEASE, UNSPECIFIED ACUTE RENAL FAILURE TYPE: ICD-10-CM

## 2019-06-24 DIAGNOSIS — N17.9 AKI (ACUTE KIDNEY INJURY): ICD-10-CM

## 2019-06-24 DIAGNOSIS — A49.8 PSEUDOMONAS INFECTION: ICD-10-CM

## 2019-06-24 PROBLEM — N18.30 ACUTE RENAL FAILURE SUPERIMPOSED ON STAGE 3 CHRONIC KIDNEY DISEASE: Status: ACTIVE | Noted: 2019-06-24

## 2019-06-24 LAB
ALBUMIN SERPL BCP-MCNC: 3.3 G/DL (ref 3.5–5.2)
ALP SERPL-CCNC: 113 U/L (ref 55–135)
ALT SERPL W/O P-5'-P-CCNC: 20 U/L (ref 10–44)
ANION GAP SERPL CALC-SCNC: 10 MMOL/L (ref 8–16)
AST SERPL-CCNC: 26 U/L (ref 10–40)
BACTERIA SPEC ANAEROBE CULT: NORMAL
BASOPHILS # BLD AUTO: 0.07 K/UL (ref 0–0.2)
BASOPHILS NFR BLD: 1 % (ref 0–1.9)
BILIRUB SERPL-MCNC: 0.8 MG/DL (ref 0.1–1)
BILIRUB UR QL STRIP: NEGATIVE
BUN SERPL-MCNC: 14 MG/DL (ref 8–23)
CALCIUM SERPL-MCNC: 9.9 MG/DL (ref 8.7–10.5)
CHLORIDE SERPL-SCNC: 99 MMOL/L (ref 95–110)
CLARITY UR REFRACT.AUTO: CLEAR
CO2 SERPL-SCNC: 26 MMOL/L (ref 23–29)
COLOR UR AUTO: YELLOW
CREAT SERPL-MCNC: 1.5 MG/DL (ref 0.5–1.4)
CREAT UR-MCNC: 101 MG/DL (ref 23–375)
CREAT UR-MCNC: 101 MG/DL (ref 23–375)
CRP SERPL-MCNC: 111.9 MG/L (ref 0–8.2)
DIFFERENTIAL METHOD: ABNORMAL
EOSINOPHIL # BLD AUTO: 0.1 K/UL (ref 0–0.5)
EOSINOPHIL NFR BLD: 1.5 % (ref 0–8)
ERYTHROCYTE [DISTWIDTH] IN BLOOD BY AUTOMATED COUNT: 13.1 % (ref 11.5–14.5)
ERYTHROCYTE [SEDIMENTATION RATE] IN BLOOD BY WESTERGREN METHOD: 47 MM/HR (ref 0–23)
EST. GFR  (AFRICAN AMERICAN): 47.4 ML/MIN/1.73 M^2
EST. GFR  (NON AFRICAN AMERICAN): 41 ML/MIN/1.73 M^2
GLUCOSE SERPL-MCNC: 104 MG/DL (ref 70–110)
GLUCOSE UR QL STRIP: NEGATIVE
HCT VFR BLD AUTO: 41.3 % (ref 40–54)
HGB BLD-MCNC: 14.2 G/DL (ref 14–18)
HGB UR QL STRIP: NEGATIVE
IMM GRANULOCYTES # BLD AUTO: 0.04 K/UL (ref 0–0.04)
IMM GRANULOCYTES NFR BLD AUTO: 0.6 % (ref 0–0.5)
INR PPP: 1 (ref 0.8–1.2)
KETONES UR QL STRIP: NEGATIVE
LACTATE SERPL-SCNC: 2 MMOL/L (ref 0.5–2.2)
LEUKOCYTE ESTERASE UR QL STRIP: NEGATIVE
LYMPHOCYTES # BLD AUTO: 0.9 K/UL (ref 1–4.8)
LYMPHOCYTES NFR BLD: 11.9 % (ref 18–48)
MCH RBC QN AUTO: 32.7 PG (ref 27–31)
MCHC RBC AUTO-ENTMCNC: 34.4 G/DL (ref 32–36)
MCV RBC AUTO: 95 FL (ref 82–98)
MONOCYTES # BLD AUTO: 0.5 K/UL (ref 0.3–1)
MONOCYTES NFR BLD: 6.3 % (ref 4–15)
NEUTROPHILS # BLD AUTO: 5.6 K/UL (ref 1.8–7.7)
NEUTROPHILS NFR BLD: 78.7 % (ref 38–73)
NITRITE UR QL STRIP: NEGATIVE
NRBC BLD-RTO: 1 /100 WBC
OSMOLALITY UR: 463 MOSM/KG (ref 50–1200)
PH UR STRIP: 7 [PH] (ref 5–8)
PLATELET # BLD AUTO: 200 K/UL (ref 150–350)
PMV BLD AUTO: 12.3 FL (ref 9.2–12.9)
POCT GLUCOSE: 101 MG/DL (ref 70–110)
POTASSIUM SERPL-SCNC: 4.2 MMOL/L (ref 3.5–5.1)
POTASSIUM UR-SCNC: 78 MMOL/L (ref 15–95)
PROT SERPL-MCNC: 7.4 G/DL (ref 6–8.4)
PROT UR QL STRIP: NEGATIVE
PROT UR-MCNC: 17 MG/DL (ref 0–15)
PROT/CREAT UR: 0.17 MG/G{CREAT} (ref 0–0.2)
PROTHROMBIN TIME: 10.6 SEC (ref 9–12.5)
RBC # BLD AUTO: 4.34 M/UL (ref 4.6–6.2)
SODIUM SERPL-SCNC: 135 MMOL/L (ref 136–145)
SODIUM UR-SCNC: 69 MMOL/L (ref 20–250)
SP GR UR STRIP: 1.01 (ref 1–1.03)
URN SPEC COLLECT METH UR: NORMAL
UUN UR-MCNC: 470 MG/DL (ref 140–1050)
WBC # BLD AUTO: 7.14 K/UL (ref 3.9–12.7)

## 2019-06-24 PROCEDURE — 86140 C-REACTIVE PROTEIN: CPT

## 2019-06-24 PROCEDURE — 84133 ASSAY OF URINE POTASSIUM: CPT

## 2019-06-24 PROCEDURE — 99214 OFFICE O/P EST MOD 30 MIN: CPT | Mod: S$GLB,,, | Performed by: INTERNAL MEDICINE

## 2019-06-24 PROCEDURE — 83605 ASSAY OF LACTIC ACID: CPT

## 2019-06-24 PROCEDURE — 85610 PROTHROMBIN TIME: CPT

## 2019-06-24 PROCEDURE — 93010 EKG 12-LEAD: ICD-10-PCS | Mod: ,,, | Performed by: INTERNAL MEDICINE

## 2019-06-24 PROCEDURE — 84156 ASSAY OF PROTEIN URINE: CPT

## 2019-06-24 PROCEDURE — 83935 ASSAY OF URINE OSMOLALITY: CPT

## 2019-06-24 PROCEDURE — 20600001 HC STEP DOWN PRIVATE ROOM

## 2019-06-24 PROCEDURE — 25000003 PHARM REV CODE 250: Performed by: EMERGENCY MEDICINE

## 2019-06-24 PROCEDURE — 1101F PR PT FALLS ASSESS DOC 0-1 FALLS W/OUT INJ PAST YR: ICD-10-PCS | Mod: CPTII,S$GLB,, | Performed by: INTERNAL MEDICINE

## 2019-06-24 PROCEDURE — 81003 URINALYSIS AUTO W/O SCOPE: CPT

## 2019-06-24 PROCEDURE — 93010 ELECTROCARDIOGRAM REPORT: CPT | Mod: ,,, | Performed by: INTERNAL MEDICINE

## 2019-06-24 PROCEDURE — 99285 EMERGENCY DEPT VISIT HI MDM: CPT

## 2019-06-24 PROCEDURE — 82962 GLUCOSE BLOOD TEST: CPT

## 2019-06-24 PROCEDURE — 99214 PR OFFICE/OUTPT VISIT, EST, LEVL IV, 30-39 MIN: ICD-10-PCS | Mod: S$GLB,,, | Performed by: INTERNAL MEDICINE

## 2019-06-24 PROCEDURE — 80053 COMPREHEN METABOLIC PANEL: CPT

## 2019-06-24 PROCEDURE — 96367 TX/PROPH/DG ADDL SEQ IV INF: CPT

## 2019-06-24 PROCEDURE — 99999 PR PBB SHADOW E&M-EST. PATIENT-LVL IV: CPT | Mod: PBBFAC,,, | Performed by: INTERNAL MEDICINE

## 2019-06-24 PROCEDURE — 85652 RBC SED RATE AUTOMATED: CPT

## 2019-06-24 PROCEDURE — 87040 BLOOD CULTURE FOR BACTERIA: CPT

## 2019-06-24 PROCEDURE — 85025 COMPLETE CBC W/AUTO DIFF WBC: CPT

## 2019-06-24 PROCEDURE — 99999 PR PBB SHADOW E&M-EST. PATIENT-LVL IV: ICD-10-PCS | Mod: PBBFAC,,, | Performed by: INTERNAL MEDICINE

## 2019-06-24 PROCEDURE — 84540 ASSAY OF URINE/UREA-N: CPT

## 2019-06-24 PROCEDURE — 93005 ELECTROCARDIOGRAM TRACING: CPT

## 2019-06-24 PROCEDURE — 99285 PR EMERGENCY DEPT VISIT,LEVEL V: ICD-10-PCS | Mod: ,,, | Performed by: EMERGENCY MEDICINE

## 2019-06-24 PROCEDURE — 25000003 PHARM REV CODE 250: Performed by: STUDENT IN AN ORGANIZED HEALTH CARE EDUCATION/TRAINING PROGRAM

## 2019-06-24 PROCEDURE — 96365 THER/PROPH/DIAG IV INF INIT: CPT

## 2019-06-24 PROCEDURE — 84300 ASSAY OF URINE SODIUM: CPT

## 2019-06-24 PROCEDURE — 1101F PT FALLS ASSESS-DOCD LE1/YR: CPT | Mod: CPTII,S$GLB,, | Performed by: INTERNAL MEDICINE

## 2019-06-24 PROCEDURE — 63600175 PHARM REV CODE 636 W HCPCS: Performed by: EMERGENCY MEDICINE

## 2019-06-24 PROCEDURE — 99285 EMERGENCY DEPT VISIT HI MDM: CPT | Mod: ,,, | Performed by: EMERGENCY MEDICINE

## 2019-06-24 RX ORDER — INSULIN ASPART 100 [IU]/ML
0-5 INJECTION, SOLUTION INTRAVENOUS; SUBCUTANEOUS
Status: DISCONTINUED | OUTPATIENT
Start: 2019-06-24 | End: 2019-06-26 | Stop reason: HOSPADM

## 2019-06-24 RX ORDER — GADOBUTROL 604.72 MG/ML
9 INJECTION INTRAVENOUS
Status: COMPLETED | OUTPATIENT
Start: 2019-06-25 | End: 2019-06-24

## 2019-06-24 RX ORDER — SODIUM CHLORIDE 0.9 % (FLUSH) 0.9 %
10 SYRINGE (ML) INJECTION
Status: DISCONTINUED | OUTPATIENT
Start: 2019-06-24 | End: 2019-06-26 | Stop reason: HOSPADM

## 2019-06-24 RX ORDER — TAMSULOSIN HYDROCHLORIDE 0.4 MG/1
0.4 CAPSULE ORAL EVERY MORNING
Status: DISCONTINUED | OUTPATIENT
Start: 2019-06-25 | End: 2019-06-26 | Stop reason: HOSPADM

## 2019-06-24 RX ORDER — FINASTERIDE 5 MG/1
5 TABLET, FILM COATED ORAL EVERY MORNING
Status: DISCONTINUED | OUTPATIENT
Start: 2019-06-25 | End: 2019-06-26 | Stop reason: HOSPADM

## 2019-06-24 RX ORDER — HEPARIN SODIUM 5000 [USP'U]/ML
5000 INJECTION, SOLUTION INTRAVENOUS; SUBCUTANEOUS EVERY 8 HOURS
Status: DISCONTINUED | OUTPATIENT
Start: 2019-06-24 | End: 2019-06-24

## 2019-06-24 RX ORDER — SIMVASTATIN 10 MG/1
10 TABLET, FILM COATED ORAL NIGHTLY
Status: DISCONTINUED | OUTPATIENT
Start: 2019-06-24 | End: 2019-06-26 | Stop reason: HOSPADM

## 2019-06-24 RX ORDER — IPRATROPIUM BROMIDE AND ALBUTEROL SULFATE 2.5; .5 MG/3ML; MG/3ML
3 SOLUTION RESPIRATORY (INHALATION)
Status: DISCONTINUED | OUTPATIENT
Start: 2019-06-25 | End: 2019-06-25

## 2019-06-24 RX ORDER — ENOXAPARIN SODIUM 100 MG/ML
40 INJECTION SUBCUTANEOUS EVERY 24 HOURS
Status: DISCONTINUED | OUTPATIENT
Start: 2019-06-24 | End: 2019-06-24

## 2019-06-24 RX ORDER — IBUPROFEN 200 MG
16 TABLET ORAL
Status: DISCONTINUED | OUTPATIENT
Start: 2019-06-24 | End: 2019-06-26 | Stop reason: HOSPADM

## 2019-06-24 RX ORDER — AMOXICILLIN 250 MG
1 CAPSULE ORAL 2 TIMES DAILY PRN
Status: DISCONTINUED | OUTPATIENT
Start: 2019-06-24 | End: 2019-06-26 | Stop reason: HOSPADM

## 2019-06-24 RX ORDER — HYDROCODONE BITARTRATE AND ACETAMINOPHEN 5; 325 MG/1; MG/1
1 TABLET ORAL
Status: COMPLETED | OUTPATIENT
Start: 2019-06-24 | End: 2019-06-24

## 2019-06-24 RX ORDER — METOPROLOL TARTRATE 25 MG/1
25 TABLET, FILM COATED ORAL DAILY
Status: DISCONTINUED | OUTPATIENT
Start: 2019-06-25 | End: 2019-06-24

## 2019-06-24 RX ORDER — IBUPROFEN 200 MG
24 TABLET ORAL
Status: DISCONTINUED | OUTPATIENT
Start: 2019-06-24 | End: 2019-06-26 | Stop reason: HOSPADM

## 2019-06-24 RX ORDER — ACETAMINOPHEN 325 MG/1
650 TABLET ORAL EVERY 8 HOURS PRN
Status: DISCONTINUED | OUTPATIENT
Start: 2019-06-24 | End: 2019-06-26 | Stop reason: HOSPADM

## 2019-06-24 RX ORDER — GLUCAGON 1 MG
1 KIT INJECTION
Status: DISCONTINUED | OUTPATIENT
Start: 2019-06-24 | End: 2019-06-26 | Stop reason: HOSPADM

## 2019-06-24 RX ORDER — LOSARTAN POTASSIUM AND HYDROCHLOROTHIAZIDE 12.5; 5 MG/1; MG/1
TABLET ORAL
Status: ON HOLD | COMMUNITY
Start: 2019-06-15 | End: 2019-06-26 | Stop reason: SDUPTHER

## 2019-06-24 RX ORDER — METOPROLOL TARTRATE 25 MG/1
25 TABLET ORAL DAILY
Status: DISCONTINUED | OUTPATIENT
Start: 2019-06-25 | End: 2019-06-25

## 2019-06-24 RX ORDER — HYDROCODONE BITARTRATE AND ACETAMINOPHEN 5; 325 MG/1; MG/1
1 TABLET ORAL EVERY 6 HOURS PRN
Status: DISCONTINUED | OUTPATIENT
Start: 2019-06-24 | End: 2019-06-26 | Stop reason: HOSPADM

## 2019-06-24 RX ORDER — METOPROLOL TARTRATE 50 MG/1
50 TABLET ORAL DAILY
Status: DISCONTINUED | OUTPATIENT
Start: 2019-06-25 | End: 2019-06-25

## 2019-06-24 RX ORDER — ASPIRIN 81 MG/1
81 TABLET ORAL EVERY MORNING
Status: DISCONTINUED | OUTPATIENT
Start: 2019-06-25 | End: 2019-06-26 | Stop reason: HOSPADM

## 2019-06-24 RX ORDER — LOSARTAN POTASSIUM AND HYDROCHLOROTHIAZIDE 12.5; 5 MG/1; MG/1
1 TABLET ORAL DAILY
Status: DISCONTINUED | OUTPATIENT
Start: 2019-06-25 | End: 2019-06-25

## 2019-06-24 RX ADMIN — PIPERACILLIN AND TAZOBACTAM 4.5 G: 4; .5 INJECTION, POWDER, LYOPHILIZED, FOR SOLUTION INTRAVENOUS; PARENTERAL at 04:06

## 2019-06-24 RX ADMIN — VANCOMYCIN HYDROCHLORIDE 1250 MG: 1.25 INJECTION, POWDER, LYOPHILIZED, FOR SOLUTION INTRAVENOUS at 05:06

## 2019-06-24 RX ADMIN — HYDROCODONE BITARTRATE AND ACETAMINOPHEN 1 TABLET: 5; 325 TABLET ORAL at 04:06

## 2019-06-24 RX ADMIN — GADOBUTROL 9 ML: 604.72 INJECTION INTRAVENOUS at 11:06

## 2019-06-24 RX ADMIN — SIMVASTATIN 10 MG: 10 TABLET, FILM COATED ORAL at 09:06

## 2019-06-24 NOTE — ED TRIAGE NOTES
Patient with chronic wound to the right achilles tendon was sent from clinic to the ED for drainage and smell to wound.

## 2019-06-24 NOTE — PROGRESS NOTES
Subjective:       Patient ID: Shaq Bragg Jr. is a 88 y.o. male.    Chief Complaint: No chief complaint on file.    HPI         Diagnosis:   1. Malignant melanoma of right lower leg    2. Lymphedema of right lower extremity    3. Pressure injury of right heel, stage 4    4. Other specified disorders involving the immune mechanism, not elsewhere classified           Chief Complaint: Malignant melanoma of right lower leg        Oncologic History:       Oncologic History 3/21/17 Skin Excisional biopsy right leg  4/18/17 MOS with SLN biopsy  1/04/18 PET/CT - no recurrent or metastatic disease  10/30/19 PET/CT - 2 hypermetabolic LN's in right groin  11/03/18 MRI brain  11/12/18 Completion Lymphadenectomy  2/15/19 PET/CT     Oncologic Treatment 3/21/17 Skin Excisional biopsy  4/18/17 MOS with SLN biopsy  11/12/18 Completion Lymphadenectomy  3/11/19 Nivolumab    Pathology 3/21/17 - 1.4mm malignant melanoma Alexis level IV, no ulceration, perineural invasion seen, mitotic rate 6/mm2  4/18/17 -  no residual melanoma; microscopic mets in 2 sentinel LNs  11/12/18 - ¾ LNs positive for metastatic melanoma with the largest deposit being 2.4cm an positive extranodal extension      Oncologic History:  Pt initially presented with a lesion on his right proximal calf in March 2017.  He underwent a biopsy on 3/21/17 with pathology showing a 1.4mm malignant melanoma Alexis level IV, no ulceration, perineural invasion seen, mitotic rate 6/mm2.  The patient then underwent  a MOS procedure on 4/18/17 and right inguinal LN biopsy on 4/18/17 under the care of Dr Joelle Pardo at [facility].  The pathology from the procedure initially showed no residual melanoma and no LN involvement ; however, on reevaluation showed microscopic mets in 2 sentinel LNs.  The patient established care with Medical Oncologist Dr Olivier Mchugh.  The surgical oncologist Dr. Alatorre offered Lymphadenectomy on 5/24/17; however, the patient elected to have  observation.  PET/CT was performed on 1/04/18 showing no evidence of residual or recurrent disease.  On 10/16/18 the patient called Dr Holland office with complaint of 2 lumps in the right leg.  PET/CT was performed on 10/30/19 showing 2 new hypermetabolic lymph nodes right groin with the index lymph node lateral SUV max 30.02.  The patient had an appointment with Dr Alatorre on 10/30/19 at which point it was decided to proceed with a completion lymphadenectomy.   An MRI of the brain was performed on 11/03/18 showing no evidence for intracranial metastatic disease but did show a 4mm T1 hyperintense occipital calvarial focus which was read as likely benign.  The patient underwent completion LAD on 11/12/18 with pathology showing ¾ LNs positive for metastatic melanoma with the largest deposit being 2.4cm an positive extranodal extension. Currently the patient complains of chronic lower back pain for which he underwent an MRI of the lumbar spine on 11/03/18 which showed severe central canal stenosis related to a disk bulge at L3-L4.  The patient endorses some LE weakness on occasion.  He denies bowel or bladder incontinence.  The patient states he sees a deramtologist semi annually Dr Sun at [facility] with last clinic visit reportedly several months ago.  The patient underwent a PET/CT on 2/15/19 showing a focus of increased tracer uptake within the T 11 vertebral body with max SUV of 4.1 that was not visualized on prior exam, increased radiotracer uptake in the right groin max SUV 5.3 and a subcutaneous focus of increased tracer uptake within the anterior portion of the distal right leg showing a max SUV of 3.7.   The patient was seen by dermatology on 2/22/19 and underwent a shave biopsy of the right lower leg lesion showing SCC.  On 3/12/19 the patient contacted our office stating he had a large amount of BRBPR.  The patient was seen in the ER on 3/12/19 with relatively stable hemoglobin and was  deferred for outpatient GI evaluation.  On 3/25/19 Dr. Bowman in derm decided to postpone a Mohs procedure for SCC of the right lower leg until the swelling in the right leg had decreased.  The patient was seen on 3/27/19 by wound care for an ulcer on his right heel at which point recommendations were made for bandaging of the right heel.  The patient saw GI on 3/27/19 at which point the patient decided not to pursue a colonoscopy.   The patient missed his scheduled treatment with Nivolumab on 4/08/19 after being admitted to the hospital from 4/08/19-4/11/19 for a stage IV ulcer of the right heel with surrounding cellulitis growing MSSA and Pseudomonas on biopsy treated with 14 day course of antibiotics with PO Cefadroxil and ciprofloxacin completed last week.  The patient was seen by podiatry today for wound care and given instructions on care for his right foot.     Subjective:    Interval History: Mr. Bragg is a 88 y.o. male who presents for follow up on recently diagnosed malignant melanoma.  Since the last visit the patient continues to follow with podiatry.  He had his right heel debrided on 5/2019 with continued exposed achilles tendon.  He is on cipro and is to take his last pill tonight. Currently the patient states he feels well.  He denies CP, SOB, N/V, constipation, diarrhea.  He continues to complain of swelling in the right leg.     Past Medical History:        Past Medical History:   Diagnosis Date    COPD (chronic obstructive pulmonary disease)      H/O asbestosis      Hearing loss      Hypercholesterolemia      Hypertension      Malignant melanoma of right lower leg 5/11/2017    Melanoma       Approx. 2 years ago     Skin cancer      Squamous cell carcinoma           Past Surgical HIstory:         Past Surgical History:   Procedure Laterality Date    ADENOIDECTOMY        APPENDECTOMY        Carotid Stenosis Surgery        CHOLECYSTECTOMY   1965    LYMPHADENECTOMY, INGUINAL, Sartorious  Flap Right 11/12/2018     Performed by Yair Alatorre MD at [facility] OR [facility]    TONSILLECTOMY             Family History:         Family History   Problem Relation Age of Onset    Coronary artery disease Mother      Cancer Father           Esophageal    Esophageal cancer Father      Diabetes Sister      No Known Problems Maternal Aunt      No Known Problems Maternal Uncle      No Known Problems Paternal Aunt      No Known Problems Paternal Uncle      No Known Problems Maternal Grandmother      No Known Problems Maternal Grandfather      No Known Problems Paternal Grandmother      No Known Problems Paternal Grandfather      Melanoma Neg Hx      Psoriasis Neg Hx      Lupus Neg Hx           Social History:  reports that he quit smoking about 54 years ago. His smoking use included cigarettes. He has a 15.00 pack-year smoking history. He has never used smokeless tobacco. He reports that he does not drink alcohol or use drugs.     Allergies:  Review of patient's allergies indicates:  No Known Allergies     Medications:  Current Medications         Review of Systems   Constitutional: Positive for activity change and fatigue. Negative for chills, diaphoresis, fever and unexpected weight change.   Respiratory: Negative for cough and shortness of breath.    Cardiovascular: Positive for leg swelling (right leg ). Negative for chest pain and palpitations.   Gastrointestinal: Negative for abdominal pain, constipation, diarrhea, nausea and vomiting.   Musculoskeletal: Positive for arthralgias and joint swelling. Negative for back pain.   Skin: Positive for wound (worse). Negative for color change and rash.   Neurological: Negative for headaches.   Hematological: Negative for adenopathy. Does not bruise/bleed easily.       Objective:      Physical Exam   Constitutional: He is oriented to person, place, and time. He appears well-developed and well-nourished. No distress.   HENT:   Head: Normocephalic and  atraumatic.   Eyes: Pupils are equal, round, and reactive to light. Conjunctivae and EOM are normal. No scleral icterus.   Cardiovascular: Normal rate, regular rhythm, normal heart sounds and intact distal pulses. Exam reveals no gallop and no friction rub.   No murmur heard.  Pulmonary/Chest: Effort normal and breath sounds normal. No respiratory distress. He has no wheezes. He has no rales. He exhibits no tenderness.   Abdominal: Soft. Bowel sounds are normal. He exhibits no distension and no mass. There is no tenderness. There is no rebound.   Musculoskeletal: Normal range of motion. He exhibits edema.   Lymphadenopathy:        Head (right side): No submental adenopathy present.        Head (left side): No submental adenopathy present.     He has no cervical adenopathy.     He has no axillary adenopathy. No inguinal adenopathy noted on the right or left side.        Right: No supraclavicular adenopathy present.        Left: No supraclavicular adenopathy present.   Neurological: He is alert and oriented to person, place, and time.   Skin: Skin is dry. No rash noted. He is not diaphoretic. No erythema. No pallor.   Right leg and ankle wrapped, draining   Nursing note and vitals reviewed.   Labs- reviewed   Assessment:       1. Malignant melanoma of right lower leg    2. Infected ulcer of skin, with fat layer exposed    3. Pseudomonas infection    4. CHANTEL (acute kidney injury)        Plan:     1. On Opdivo - hold with below  2-3. Was placed on Bactrim outpatient but has CHANTEL and concerned that he needs further imaging and IV antibiotics  4. Stop Bactrim  Bring to ER      Distress Screening Results: Psychosocial Distress screening score of Distress Score: 0 noted and reviewed. No intervention indicated.

## 2019-06-24 NOTE — ED PROVIDER NOTES
Encounter Date: 6/24/2019    SCRIBE #1 NOTE: I, Pallavi Reyest, am scribing for, and in the presence of,  Dr. Tobias. I have scribed the entire note.       History     Chief Complaint   Patient presents with    Wound Infection     L lower leg, + pseudomonas on bactrim, chemo pt     Time patient was seen by the provider: 2:56 PM      The patient is a 88 y.o. male with co-morbidities including: hypertension and malignant melanoma of the right lower leg who presents to the ED with a complaint of infected wound on his right lower leg. The home health nurse noticed that the wound smelled different one week ago. Pt saw his podiatrist one week ago and started on antibiotics three days ago. He states that the antibiotics have not changed the pain or the infection. Pt has noticed drainage from the wound.    The history is provided by the patient, medical records and the spouse.     Review of patient's allergies indicates:  No Known Allergies  Past Medical History:   Diagnosis Date    COPD (chronic obstructive pulmonary disease)     H/O asbestosis     Hearing loss     Hypercholesterolemia     Hypertension     Malignant melanoma of right lower leg 5/11/2017    Melanoma     Approx. 2 years ago     Skin cancer     Squamous cell carcinoma      Past Surgical History:   Procedure Laterality Date    ADENOIDECTOMY      APPENDECTOMY      Carotid Stenosis Surgery      CHOLECYSTECTOMY  1965    LYMPHADENECTOMY, INGUINAL, Sartorious Flap Right 11/12/2018    Performed by Yair Alatorre MD at [facility] OR [address] [place] [address]    TONSILLECTOMY       Family History   Problem Relation Age of Onset    Coronary artery disease Mother     Cancer Father         Esophageal    Esophageal cancer Father     Diabetes Sister     No Known Problems Maternal Aunt     No Known Problems Maternal Uncle     No Known Problems Paternal Aunt     No Known Problems Paternal Uncle     No Known Problems Maternal Grandmother     No Known Problems Maternal  Grandfather     No Known Problems Paternal Grandmother     No Known Problems Paternal Grandfather     Melanoma Neg Hx     Psoriasis Neg Hx     Lupus Neg Hx      Social History     Tobacco Use    Smoking status: Former Smoker     Packs/day: 1.00     Years: 15.00     Pack years: 15.00     Types: Cigarettes     Last attempt to quit: 1965     Years since quittin.5    Smokeless tobacco: Never Used   Substance Use Topics    Alcohol use: No     Frequency: Never    Drug use: No     Review of Systems   Constitutional: Negative for fever.   HENT: Negative for sore throat.    Eyes: Negative for visual disturbance.   Respiratory: Negative for shortness of breath.    Cardiovascular: Negative for chest pain.   Gastrointestinal: Negative for nausea.   Genitourinary: Negative for dysuria.   Musculoskeletal: Negative for back pain.   Skin: Positive for wound (right lower extremity). Negative for rash.   Neurological: Negative for weakness.       Physical Exam     Initial Vitals [19 1432]   BP Pulse Resp Temp SpO2   (!) 147/68 61 18 97.5 °F (36.4 °C) 97 %      MAP       --         Physical Exam    Nursing note and vitals reviewed.  Constitutional: He appears well-developed and well-nourished. He is not diaphoretic. No distress.   HENT:   Head: Normocephalic and atraumatic.   Mouth/Throat: Oropharynx is clear and moist.   Eyes: Conjunctivae and EOM are normal. Pupils are equal, round, and reactive to light.   Neck: Normal range of motion. Neck supple. No JVD present.   Cardiovascular: Normal rate, regular rhythm and normal heart sounds.   No murmur heard.  Pulmonary/Chest: Breath sounds normal. No respiratory distress. He has no wheezes. He has no rhonchi. He has no rales.   Abdominal: Soft. Bowel sounds are normal. He exhibits no distension and no mass. There is no tenderness. There is no rebound and no guarding.   Musculoskeletal: Normal range of motion. He exhibits no edema or tenderness.   Neurological: He is  alert and oriented to person, place, and time. He has normal strength. No cranial nerve deficit or sensory deficit.   Skin: Skin is warm and dry. No rash noted.   Right heel: foul-smelling wound with drainage. Strong dorsalis pedis pulse.   Psychiatric: He has a normal mood and affect.         ED Course   Procedures  Labs Reviewed   CBC W/ AUTO DIFFERENTIAL - Abnormal; Notable for the following components:       Result Value    RBC 4.34 (*)     Mean Corpuscular Hemoglobin 32.7 (*)     Immature Granulocytes 0.6 (*)     Lymph # 0.9 (*)     nRBC 1 (*)     Gran% 78.7 (*)     Lymph% 11.9 (*)     All other components within normal limits   COMPREHENSIVE METABOLIC PANEL - Abnormal; Notable for the following components:    Sodium 135 (*)     Creatinine 1.5 (*)     Albumin 3.3 (*)     eGFR if  47.4 (*)     eGFR if non  41.0 (*)     All other components within normal limits   SEDIMENTATION RATE - Abnormal; Notable for the following components:    Sed Rate 47 (*)     All other components within normal limits   C-REACTIVE PROTEIN - Abnormal; Notable for the following components:    .9 (*)     All other components within normal limits   CULTURE, BLOOD   CULTURE, BLOOD   LACTIC ACID, PLASMA   PROTIME-INR   URINALYSIS, REFLEX TO URINE CULTURE          Imaging Results          X-Ray Ankle Complete Right (In process)  Result time 06/24/19 17:22:04               X-Ray Foot Complete Right (In process)                X-Ray Chest PA And Lateral (Final result)  Result time 06/24/19 16:02:51    Final result by Kamran Morton III, MD (06/24/19 16:02:51)                 Impression:      Mild CHF.      Electronically signed by: Kamran Morton MD  Date:    06/24/2019  Time:    16:02             Narrative:    EXAMINATION:  XR CHEST PA AND LATERAL    CLINICAL HISTORY:  Chest Pain;    FINDINGS:  Two views: There is cardiomegaly aortic plaque and DJD.  There is mild bibasilar edema and small pleural  effusions.                                 Medical Decision Making:   History:   Old Medical Records: I decided to obtain old medical records.  Initial Assessment:   I reviewed his medical records. He is followed by Dr. Soni in [place]. Will do an infectious workup. I discussed with his oncologist Dr. Villatoro who recommended that he be admitted to internal medicine.  Clinical Tests:   Lab Tests: Ordered and Reviewed  Radiological Study: Ordered and Reviewed  Other:   I have discussed this case with another health care provider.       <> Summary of the Discussion: 3:17 PM  I discussed with his oncologist Dr. Villatoro who recommended that he be admitted to internal medicine.            Scribe Attestation:   Scribe #1: I performed the above scribed service and the documentation accurately describes the services I performed. I attest to the accuracy of the note.               Clinical Impression:       ICD-10-CM ICD-9-CM   1. Wound infection T14.8XXA 958.3    L08.9          Disposition:   Disposition: Admitted                        Eran Tobias MD  06/24/19 5881

## 2019-06-25 PROBLEM — L97.909 LEG ULCER: Status: ACTIVE | Noted: 2019-06-25

## 2019-06-25 PROBLEM — M86.271 SUBACUTE OSTEOMYELITIS OF RIGHT FOOT: Status: RESOLVED | Noted: 2019-06-24 | Resolved: 2019-06-25

## 2019-06-25 LAB
ANION GAP SERPL CALC-SCNC: 14 MMOL/L (ref 8–16)
ASCENDING AORTA: 3.05 CM
AV INDEX (PROSTH): 0.73
AV MEAN GRADIENT: 6 MMHG
AV PEAK GRADIENT: 12 MMHG
AV VALVE AREA: 2.35 CM2
AV VELOCITY RATIO: 0.65
BASOPHILS # BLD AUTO: 0.05 K/UL (ref 0–0.2)
BASOPHILS NFR BLD: 0.7 % (ref 0–1.9)
BSA FOR ECHO PROCEDURE: 2 M2
BUN SERPL-MCNC: 14 MG/DL (ref 8–23)
CALCIUM SERPL-MCNC: 8.8 MG/DL (ref 8.7–10.5)
CHLORIDE SERPL-SCNC: 102 MMOL/L (ref 95–110)
CO2 SERPL-SCNC: 18 MMOL/L (ref 23–29)
CREAT SERPL-MCNC: 1.3 MG/DL (ref 0.5–1.4)
CV ECHO LV RWT: 0.42 CM
DIFFERENTIAL METHOD: ABNORMAL
DOP CALC AO PEAK VEL: 1.75 M/S
DOP CALC AO VTI: 32.65 CM
DOP CALC LVOT AREA: 3.2 CM2
DOP CALC LVOT DIAMETER: 2.02 CM
DOP CALC LVOT PEAK VEL: 1.13 M/S
DOP CALC LVOT STROKE VOLUME: 76.68 CM3
DOP CALCLVOT PEAK VEL VTI: 23.94 CM
E WAVE DECELERATION TIME: 172.7 MSEC
E/A RATIO: 0.64
E/E' RATIO: 7.6 M/S
ECHO LV POSTERIOR WALL: 0.94 CM (ref 0.6–1.1)
EOSINOPHIL # BLD AUTO: 0.2 K/UL (ref 0–0.5)
EOSINOPHIL NFR BLD: 2.8 % (ref 0–8)
ERYTHROCYTE [DISTWIDTH] IN BLOOD BY AUTOMATED COUNT: 13 % (ref 11.5–14.5)
EST. GFR  (AFRICAN AMERICAN): 56.3 ML/MIN/1.73 M^2
EST. GFR  (NON AFRICAN AMERICAN): 48.7 ML/MIN/1.73 M^2
ESTIMATED AVG GLUCOSE: 91 MG/DL (ref 68–131)
FRACTIONAL SHORTENING: 31 % (ref 28–44)
GLUCOSE SERPL-MCNC: 87 MG/DL (ref 70–110)
HBA1C MFR BLD HPLC: 4.8 % (ref 4–5.6)
HCT VFR BLD AUTO: 37.2 % (ref 40–54)
HGB BLD-MCNC: 12.1 G/DL (ref 14–18)
IMM GRANULOCYTES # BLD AUTO: 0.03 K/UL (ref 0–0.04)
IMM GRANULOCYTES NFR BLD AUTO: 0.4 % (ref 0–0.5)
INTERVENTRICULAR SEPTUM: 1.07 CM (ref 0.6–1.1)
IVRT: 0.08 MSEC
LA MAJOR: 5.52 CM
LA MINOR: 5.37 CM
LA WIDTH: 3.84 CM
LEFT ATRIUM SIZE: 3.93 CM
LEFT ATRIUM VOLUME INDEX: 34.9 ML/M2
LEFT ATRIUM VOLUME: 69.83 CM3
LEFT INTERNAL DIMENSION IN SYSTOLE: 3.1 CM (ref 2.1–4)
LEFT VENTRICLE DIASTOLIC VOLUME INDEX: 46.14 ML/M2
LEFT VENTRICLE DIASTOLIC VOLUME: 92.42 ML
LEFT VENTRICLE MASS INDEX: 77 G/M2
LEFT VENTRICLE SYSTOLIC VOLUME INDEX: 18.9 ML/M2
LEFT VENTRICLE SYSTOLIC VOLUME: 37.95 ML
LEFT VENTRICULAR INTERNAL DIMENSION IN DIASTOLE: 4.5 CM (ref 3.5–6)
LEFT VENTRICULAR MASS: 154.33 G
LV LATERAL E/E' RATIO: 7.6 M/S
LV SEPTAL E/E' RATIO: 7.6 M/S
LYMPHOCYTES # BLD AUTO: 0.6 K/UL (ref 1–4.8)
LYMPHOCYTES NFR BLD: 8.5 % (ref 18–48)
MAGNESIUM SERPL-MCNC: 2.1 MG/DL (ref 1.6–2.6)
MCH RBC QN AUTO: 31.8 PG (ref 27–31)
MCHC RBC AUTO-ENTMCNC: 32.5 G/DL (ref 32–36)
MCV RBC AUTO: 98 FL (ref 82–98)
MONOCYTES # BLD AUTO: 0.6 K/UL (ref 0.3–1)
MONOCYTES NFR BLD: 8.8 % (ref 4–15)
MV PEAK A VEL: 1.19 M/S
MV PEAK E VEL: 0.76 M/S
NEUTROPHILS # BLD AUTO: 5.6 K/UL (ref 1.8–7.7)
NEUTROPHILS NFR BLD: 78.8 % (ref 38–73)
NRBC BLD-RTO: 0 /100 WBC
PHOSPHATE SERPL-MCNC: 3.3 MG/DL (ref 2.7–4.5)
PISA TR MAX VEL: 1.57 M/S
PLATELET # BLD AUTO: 169 K/UL (ref 150–350)
PMV BLD AUTO: 11.7 FL (ref 9.2–12.9)
POCT GLUCOSE: 105 MG/DL (ref 70–110)
POCT GLUCOSE: 114 MG/DL (ref 70–110)
POCT GLUCOSE: 99 MG/DL (ref 70–110)
POTASSIUM SERPL-SCNC: 4.3 MMOL/L (ref 3.5–5.1)
PULM VEIN S/D RATIO: 2.31
PV PEAK D VEL: 0.26 M/S
PV PEAK S VEL: 0.6 M/S
RA MAJOR: 5.59 CM
RA PRESSURE: 3 MMHG
RA WIDTH: 3.37 CM
RBC # BLD AUTO: 3.8 M/UL (ref 4.6–6.2)
RIGHT VENTRICULAR END-DIASTOLIC DIMENSION: 3.04 CM
RV TISSUE DOPPLER FREE WALL SYSTOLIC VELOCITY 1 (APICAL 4 CHAMBER VIEW): 14.08 CM/S
SINUS: 2.94 CM
SODIUM SERPL-SCNC: 134 MMOL/L (ref 136–145)
STJ: 2.31 CM
TDI LATERAL: 0.1 M/S
TDI SEPTAL: 0.1 M/S
TDI: 0.1 M/S
TR MAX PG: 10 MMHG
TRICUSPID ANNULAR PLANE SYSTOLIC EXCURSION: 2.23 CM
TV REST PULMONARY ARTERY PRESSURE: 13 MMHG
WBC # BLD AUTO: 7.05 K/UL (ref 3.9–12.7)

## 2019-06-25 PROCEDURE — 87076 CULTURE ANAEROBE IDENT EACH: CPT

## 2019-06-25 PROCEDURE — 20600001 HC STEP DOWN PRIVATE ROOM

## 2019-06-25 PROCEDURE — 99223 PR INITIAL HOSPITAL CARE,LEVL III: ICD-10-PCS | Mod: ,,, | Performed by: PODIATRIST

## 2019-06-25 PROCEDURE — 25000242 PHARM REV CODE 250 ALT 637 W/ HCPCS: Performed by: STUDENT IN AN ORGANIZED HEALTH CARE EDUCATION/TRAINING PROGRAM

## 2019-06-25 PROCEDURE — A9585 GADOBUTROL INJECTION: HCPCS | Performed by: HOSPITALIST

## 2019-06-25 PROCEDURE — 87075 CULTR BACTERIA EXCEPT BLOOD: CPT

## 2019-06-25 PROCEDURE — 63600175 PHARM REV CODE 636 W HCPCS: Performed by: INTERNAL MEDICINE

## 2019-06-25 PROCEDURE — 84100 ASSAY OF PHOSPHORUS: CPT

## 2019-06-25 PROCEDURE — 87186 SC STD MICRODIL/AGAR DIL: CPT

## 2019-06-25 PROCEDURE — 25500020 PHARM REV CODE 255: Performed by: HOSPITALIST

## 2019-06-25 PROCEDURE — 99223 1ST HOSP IP/OBS HIGH 75: CPT | Mod: AI,GC,, | Performed by: INTERNAL MEDICINE

## 2019-06-25 PROCEDURE — 25000003 PHARM REV CODE 250: Performed by: STUDENT IN AN ORGANIZED HEALTH CARE EDUCATION/TRAINING PROGRAM

## 2019-06-25 PROCEDURE — 87070 CULTURE OTHR SPECIMN AEROBIC: CPT

## 2019-06-25 PROCEDURE — 99223 1ST HOSP IP/OBS HIGH 75: CPT | Mod: ,,, | Performed by: PODIATRIST

## 2019-06-25 PROCEDURE — 99223 PR INITIAL HOSPITAL CARE,LEVL III: ICD-10-PCS | Mod: AI,GC,, | Performed by: INTERNAL MEDICINE

## 2019-06-25 PROCEDURE — 83036 HEMOGLOBIN GLYCOSYLATED A1C: CPT

## 2019-06-25 PROCEDURE — 36415 COLL VENOUS BLD VENIPUNCTURE: CPT

## 2019-06-25 PROCEDURE — 99223 PR INITIAL HOSPITAL CARE,LEVL III: ICD-10-PCS | Mod: ,,, | Performed by: INTERNAL MEDICINE

## 2019-06-25 PROCEDURE — 80048 BASIC METABOLIC PNL TOTAL CA: CPT

## 2019-06-25 PROCEDURE — 94640 AIRWAY INHALATION TREATMENT: CPT

## 2019-06-25 PROCEDURE — 63600175 PHARM REV CODE 636 W HCPCS: Performed by: STUDENT IN AN ORGANIZED HEALTH CARE EDUCATION/TRAINING PROGRAM

## 2019-06-25 PROCEDURE — 85025 COMPLETE CBC W/AUTO DIFF WBC: CPT

## 2019-06-25 PROCEDURE — 99223 1ST HOSP IP/OBS HIGH 75: CPT | Mod: ,,, | Performed by: INTERNAL MEDICINE

## 2019-06-25 PROCEDURE — 36410 VNPNXR 3YR/> PHY/QHP DX/THER: CPT

## 2019-06-25 PROCEDURE — C1751 CATH, INF, PER/CENT/MIDLINE: HCPCS

## 2019-06-25 PROCEDURE — 76937 US GUIDE VASCULAR ACCESS: CPT

## 2019-06-25 PROCEDURE — 87077 CULTURE AEROBIC IDENTIFY: CPT

## 2019-06-25 PROCEDURE — 83735 ASSAY OF MAGNESIUM: CPT

## 2019-06-25 PROCEDURE — 94761 N-INVAS EAR/PLS OXIMETRY MLT: CPT

## 2019-06-25 RX ORDER — CEFEPIME HYDROCHLORIDE 2 G/1
2 INJECTION, POWDER, FOR SOLUTION INTRAVENOUS
Status: DISCONTINUED | OUTPATIENT
Start: 2019-06-25 | End: 2019-06-26 | Stop reason: HOSPADM

## 2019-06-25 RX ORDER — METOPROLOL TARTRATE 25 MG/1
25 TABLET ORAL DAILY
Status: DISCONTINUED | OUTPATIENT
Start: 2019-06-25 | End: 2019-06-25

## 2019-06-25 RX ORDER — IPRATROPIUM BROMIDE AND ALBUTEROL SULFATE 2.5; .5 MG/3ML; MG/3ML
3 SOLUTION RESPIRATORY (INHALATION) EVERY 4 HOURS PRN
Status: DISCONTINUED | OUTPATIENT
Start: 2019-06-25 | End: 2019-06-26 | Stop reason: HOSPADM

## 2019-06-25 RX ORDER — LOSARTAN POTASSIUM AND HYDROCHLOROTHIAZIDE 12.5; 5 MG/1; MG/1
1 TABLET ORAL DAILY
Status: DISCONTINUED | OUTPATIENT
Start: 2019-06-25 | End: 2019-06-25

## 2019-06-25 RX ORDER — METOPROLOL TARTRATE 25 MG/1
25 TABLET ORAL DAILY
Status: DISCONTINUED | OUTPATIENT
Start: 2019-06-25 | End: 2019-06-26 | Stop reason: HOSPADM

## 2019-06-25 RX ORDER — HYDROCHLOROTHIAZIDE 12.5 MG/1
12.5 TABLET ORAL DAILY
Status: DISCONTINUED | OUTPATIENT
Start: 2019-06-25 | End: 2019-06-26

## 2019-06-25 RX ORDER — CEFEPIME HYDROCHLORIDE 2 G/1
2 INJECTION, POWDER, FOR SOLUTION INTRAVENOUS EVERY 12 HOURS
Qty: 56 G | Refills: 0
Start: 2019-06-25 | End: 2019-07-09

## 2019-06-25 RX ORDER — LOSARTAN POTASSIUM 50 MG/1
50 TABLET ORAL DAILY
Status: DISCONTINUED | OUTPATIENT
Start: 2019-06-25 | End: 2019-06-26

## 2019-06-25 RX ORDER — METOPROLOL TARTRATE 50 MG/1
50 TABLET ORAL DAILY
Status: DISCONTINUED | OUTPATIENT
Start: 2019-06-25 | End: 2019-06-26 | Stop reason: HOSPADM

## 2019-06-25 RX ADMIN — LOSARTAN POTASSIUM 50 MG: 50 TABLET, FILM COATED ORAL at 11:06

## 2019-06-25 RX ADMIN — HYDROCHLOROTHIAZIDE 12.5 MG: 12.5 TABLET ORAL at 11:06

## 2019-06-25 RX ADMIN — METOPROLOL TARTRATE 50 MG: 50 TABLET ORAL at 11:06

## 2019-06-25 RX ADMIN — SIMVASTATIN 10 MG: 10 TABLET, FILM COATED ORAL at 09:06

## 2019-06-25 RX ADMIN — ASPIRIN 81 MG: 81 TABLET, COATED ORAL at 08:06

## 2019-06-25 RX ADMIN — METOPROLOL TARTRATE 25 MG: 25 TABLET, FILM COATED ORAL at 09:06

## 2019-06-25 RX ADMIN — PIPERACILLIN AND TAZOBACTAM 4.5 G: 4; .5 INJECTION, POWDER, LYOPHILIZED, FOR SOLUTION INTRAVENOUS; PARENTERAL at 08:06

## 2019-06-25 RX ADMIN — HYDROCODONE BITARTRATE AND ACETAMINOPHEN 1 TABLET: 5; 325 TABLET ORAL at 02:06

## 2019-06-25 RX ADMIN — CEFEPIME 2 G: 2 INJECTION, POWDER, FOR SOLUTION INTRAVENOUS at 05:06

## 2019-06-25 RX ADMIN — IPRATROPIUM BROMIDE AND ALBUTEROL SULFATE 3 ML: .5; 3 SOLUTION RESPIRATORY (INHALATION) at 07:06

## 2019-06-25 RX ADMIN — TAMSULOSIN HYDROCHLORIDE 0.4 MG: 0.4 CAPSULE ORAL at 08:06

## 2019-06-25 RX ADMIN — FINASTERIDE 5 MG: 5 TABLET, FILM COATED ORAL at 08:06

## 2019-06-25 RX ADMIN — HYDROCODONE BITARTRATE AND ACETAMINOPHEN 1 TABLET: 5; 325 TABLET ORAL at 03:06

## 2019-06-25 RX ADMIN — PIPERACILLIN AND TAZOBACTAM 4.5 G: 4; .5 INJECTION, POWDER, LYOPHILIZED, FOR SOLUTION INTRAVENOUS; PARENTERAL at 01:06

## 2019-06-25 NOTE — SUBJECTIVE & OBJECTIVE
Past Medical History:   Diagnosis Date    COPD (chronic obstructive pulmonary disease)     H/O asbestosis     Hearing loss     Hypercholesterolemia     Hypertension     Malignant melanoma of right lower leg 5/11/2017    Melanoma     Approx. 2 years ago     Skin cancer     Squamous cell carcinoma        Past Surgical History:   Procedure Laterality Date    ADENOIDECTOMY      APPENDECTOMY      Carotid Stenosis Surgery      CHOLECYSTECTOMY  1965    LYMPHADENECTOMY, INGUINAL, Sartorious Flap Right 11/12/2018    Performed by Yair Al[facility]re MD at [facility] OR [address] [place] [address]    TONSILLECTOMY         Review of patient's allergies indicates:  No Known Allergies    No current facility-administered medications on file prior to encounter.      Current Outpatient Medications on File Prior to Encounter   Medication Sig    aspirin (ECOTRIN) 81 MG EC tablet Take 81 mg by mouth every morning.     cadexomer iodine (IODOSORB) 0.9 % gel Apply topically daily as needed for Wound Care.    finasteride (PROSCAR) 5 mg tablet Take 5 mg by mouth every morning.     HYDROcodone-acetaminophen (NORCO) 5-325 mg per tablet Take 1 tablet by mouth every 6 (six) hours as needed for Pain.    losartan-hydrochlorothiazide 50-12.5 mg (HYZAAR) 50-12.5 mg per tablet TAKE 1 TABLET EVERY DAY    metoprolol tartrate (LOPRESSOR) 50 MG tablet Takes 50 mg in morning and half tablet (25 mg) in evening    simvastatin (ZOCOR) 10 MG tablet Take 10 mg by mouth every evening.    sulfamethoxazole-trimethoprim 800-160mg (BACTRIM DS) 800-160 mg Tab Take 1 tablet by mouth 2 (two) times daily.    tamsulosin (FLOMAX) 0.4 mg Cp24 Take 0.4 mg by mouth every morning.     furosemide (LASIX) 20 MG tablet Take 20 mg by mouth every other day.     polyethylene glycol (GLYCOLAX) 17 gram/dose powder Take 17 g by mouth once daily.    potassium chloride (MICRO-K) 8 mEq CpSR Take 8 mEq by mouth every other day.     senna-docusate 8.6-50 mg (PERICOLACE) 8.6-50 mg  per tablet Take 1 tablet by mouth 2 (two) times daily as needed for Constipation.    [DISCONTINUED] losartan-hydrochlorothiazide 50-12.5 mg (HYZAAR) 50-12.5 mg per tablet Take 1 tablet by mouth every morning.      Family History     Problem Relation (Age of Onset)    Cancer Father    Coronary artery disease Mother    Diabetes Sister    Esophageal cancer Father    No Known Problems Maternal Aunt, Maternal Uncle, Paternal Aunt, Paternal Uncle, Maternal Grandmother, Maternal Grandfather, Paternal Grandmother, Paternal Grandfather        Tobacco Use    Smoking status: Former Smoker     Packs/day: 1.00     Years: 15.00     Pack years: 15.00     Types: Cigarettes     Last attempt to quit: 1965     Years since quittin.5    Smokeless tobacco: Never Used   Substance and Sexual Activity    Alcohol use: No     Frequency: Never    Drug use: No    Sexual activity: Never     Review of Systems   Constitutional: Negative for chills, fever and unexpected weight change.   HENT: Positive for sore throat. Negative for postnasal drip, rhinorrhea, sinus pressure, sinus pain and trouble swallowing.    Eyes: Negative for photophobia and visual disturbance.   Respiratory: Positive for cough (Dry intermittent cough). Negative for chest tightness, shortness of breath and wheezing.    Cardiovascular: Negative for chest pain, palpitations and leg swelling.   Gastrointestinal: Negative for abdominal distention, abdominal pain, constipation, diarrhea, nausea and vomiting.   Endocrine: Negative for cold intolerance, heat intolerance, polydipsia, polyphagia and polyuria.   Genitourinary: Negative for dysuria and hematuria.   Musculoskeletal: Negative for arthralgias, back pain, gait problem and neck pain.   Skin: Positive for color change, rash and wound. Negative for pallor.   Hematological: Negative for adenopathy. Does not bruise/bleed easily.     Objective:     Vital Signs (Most Recent):  Temp: 97.5 °F (36.4 °C) (19  1432)  Pulse: 61 (06/24/19 1432)  Resp: 18 (06/24/19 1432)  BP: (!) 147/68 (06/24/19 1432)  SpO2: 97 % (06/24/19 1432) Vital Signs (24h Range):  Temp:  [97.5 °F (36.4 °C)-97.8 °F (36.6 °C)] 97.5 °F (36.4 °C)  Pulse:  [61] 61  Resp:  [18] 18  SpO2:  [96 %-97 %] 97 %  BP: (141-147)/(63-68) 147/68     Weight: 82.6 kg (182 lb)  Body mass index is 24.68 kg/m².    Physical Exam   Constitutional: He is oriented to person, place, and time. No distress.   HENT:   Mouth/Throat: Oropharynx is clear and moist. No oropharyngeal exudate.   Eyes: Conjunctivae are normal. No scleral icterus.   Neck: Normal range of motion. Neck supple. No JVD present. No tracheal deviation present. No thyromegaly present.   Cardiovascular: Normal rate, regular rhythm, normal heart sounds and intact distal pulses. Exam reveals no gallop and no friction rub.   No murmur heard.  Pulmonary/Chest: Effort normal and breath sounds normal. No stridor. No respiratory distress. He has no wheezes. He has no rales.   Abdominal: Soft. Bowel sounds are normal. He exhibits no distension and no mass. There is no tenderness. There is no guarding.   Musculoskeletal: Normal range of motion. He exhibits edema (Right lower extremity edema; non pitting) and tenderness. He exhibits no deformity.    Dorsalis pedis pulses are 2+ on the right side, and 2+ on the left side.        Posterior tibial pulses are 1+ on the right side, and 1+ on the left side.   CFT is < 3 seconds bilateral.  Pedal hair growth is decreased bilateral.  Mild varicosities noted bilateral.  Moderate nonpitting edema noted to right lower extremity.  Toes are warm to touch bilateral.     Left lower extremity exhibits edema and tenderness.   Muscle strength 4/5 in all muscle groups on the Rt. And 5/5 on the.   No tenderness nor crepitation with ROM of foot/ankle joints bilateral.  Pain with palpation to an area of desquamation and maceration about the Rt. Lateral calcaneus.   No sensory deficit. Light  touch intact bilateral.     Location: Open wound noted to the Rt. Posterior ankle; Circumferential wound with irregular margins  Base: Down to a centralized area of exposed Achilles tendon and sub Q and comprised of slough.  Continued hypergranulation noted the periphery of the wound.    Drainage: Serous.  Area of desquamation noted to the Rt. Lateral heel in conjunction with mild erythema and maceration. Minimal fluctuance or purulence yellow green color noted.  Slight malodor is present.      Lymphadenopathy:     He has no cervical adenopathy.   Neurological: He is alert and oriented to person, place, and time. He displays normal reflexes. No cranial nerve deficit. He exhibits normal muscle tone. Coordination normal.   Skin: Skin is warm. Capillary refill takes less than 2 seconds. Rash noted. He is not diaphoretic. There is erythema.           Significant Labs:   CBC:   Recent Labs   Lab 06/24/19  1243 06/24/19  1540   WBC 8.27 7.14   HGB 13.0* 14.2   HCT 39.7* 41.3    200     CMP:   Recent Labs   Lab 06/24/19  1243 06/24/19  1540   * 135*   K 4.5 4.2   CL 99 99   CO2 30* 26    104   BUN 15 14   CREATININE 1.5* 1.5*   CALCIUM 9.4 9.9   PROT 6.8 7.4   ALBUMIN 3.1* 3.3*   BILITOT 0.8 0.8   ALKPHOS 105 113   AST 24 26   ALT 19 20   ANIONGAP 6* 10   EGFRNONAA 41.0* 41.0*       Significant Imaging: CXR: I have reviewed all pertinent imaging results/findings within the past 24 hours.    CXR (06/24):  FINDINGS:  Two views: There is cardiomegaly aortic plaque and DJD.  There is mild bibasilar edema and small pleural effusions.      Impression       Mild CHF     Foot XR (06/24):     FINDINGS:  Generalized osteopenia.  Alignment is satisfactory.  Mild degenerative changes, particularly at the midfoot dorsally and also involving some of the interphalangeal joints.  Calcaneal spur at the insertion of the plantar fascia.  No definite fracture or dislocation is seen.  No evidence of osseous destruction.   Soft tissue swelling at the dorsum of the foot and also at the ankle.      Impression       Negative for fracture.  DJD with soft tissue swelling.

## 2019-06-25 NOTE — SUBJECTIVE & OBJECTIVE
Scheduled Meds:   aspirin  81 mg Oral QAM    finasteride  5 mg Oral QAM    hydroCHLOROthiazide  12.5 mg Oral Daily    losartan  50 mg Oral Daily    metoprolol tartrate  25 mg Oral Daily    metoprolol tartrate  50 mg Oral Daily    piperacillin-tazobactam (ZOSYN) IVPB  4.5 g Intravenous Q8H    simvastatin  10 mg Oral QHS    tamsulosin  0.4 mg Oral QAM    vancomycin (VANCOCIN) IVPB  1,250 mg Intravenous Q24H     Continuous Infusions:  PRN Meds:acetaminophen, albuterol-ipratropium, Dextrose 10% Bolus, Dextrose 10% Bolus, glucagon (human recombinant), glucose, glucose, HYDROcodone-acetaminophen, insulin aspart U-100, senna-docusate 8.6-50 mg, sodium chloride 0.9%, sodium chloride 0.9%    Review of patient's allergies indicates:  No Known Allergies     Past Medical History:   Diagnosis Date    COPD (chronic obstructive pulmonary disease)     H/O asbestosis     Hearing loss     Hypercholesterolemia     Hypertension     Malignant melanoma of right lower leg 2017    Melanoma     Approx. 2 years ago     Skin cancer     Squamous cell carcinoma      Past Surgical History:   Procedure Laterality Date    ADENOIDECTOMY      APPENDECTOMY      Carotid Stenosis Surgery      CHOLECYSTECTOMY  1965    LYMPHADENECTOMY, INGUINAL, Sartorious Flap Right 2018    Performed by [facility] at [facility] OR [address] [place] [address]    TONSILLECTOMY         Family History     Problem Relation (Age of Onset)    Cancer Father    Coronary artery disease Mother    Diabetes Sister    Esophageal cancer Father    No Known Problems Maternal Aunt, Maternal Uncle, Paternal Aunt, Paternal Uncle, Maternal Grandmother, Maternal Grandfather, Paternal Grandmother, Paternal Grandfather        Tobacco Use    Smoking status: Former Smoker     Packs/day: 1.00     Years: 15.00     Pack years: 15.00     Types: Cigarettes     Last attempt to quit: 1965     Years since quittin.5    Smokeless tobacco: Never Used   Substance and Sexual  Activity    Alcohol use: No     Frequency: Never    Drug use: No    Sexual activity: Never     Review of Systems   Constitutional: Negative for chills and fever.   Cardiovascular: Negative for leg swelling.   Gastrointestinal: Negative for nausea and vomiting.   Musculoskeletal: Positive for myalgias. Negative for arthralgias and joint swelling.   Skin: Positive for wound. Negative for rash.   Psychiatric/Behavioral: Negative for agitation and confusion.     Objective:     Vital Signs (Most Recent):  Temp: 97.7 °F (36.5 °C) (06/25/19 1141)  Pulse: 62 (06/25/19 1141)  Resp: 16 (06/25/19 1141)  BP: 129/60 (06/25/19 1141)  SpO2: 96 % (06/25/19 1141) Vital Signs (24h Range):  Temp:  [97.5 °F (36.4 °C)-98.1 °F (36.7 °C)] 97.7 °F (36.5 °C)  Pulse:  [54-80] 62  Resp:  [11-23] 16  SpO2:  [93 %-99 %] 96 %  BP: (128-153)/(60-71) 129/60     Weight: 78.5 kg (173 lb)  Body mass index is 23.46 kg/m².    Foot Exam    General  Orientation: alert and oriented to person, place, and time   Affect: appropriate       Right Foot/Ankle     Inspection and Palpation  Ecchymosis: none    Neurovascular  Dorsalis pedis: 2+  Posterior tibial: 2+  Saphenous nerve sensation: normal  Tibial nerve sensation: normal  Superficial peroneal nerve sensation: normal  Deep peroneal nerve sensation: normal  Sural nerve sensation: normal      Left Foot/Ankle      Inspection and Palpation  Ecchymosis: none  Tenderness: none   Swelling: none     Neurovascular  Dorsalis pedis: 2+  Posterior tibial: 2+  Saphenous nerve sensation: normal  Tibial nerve sensation: normal  Superficial peroneal nerve sensation: normal  Deep peroneal nerve sensation: normal  Sural nerve sensation: normal            Laboratory:  A1C:   Recent Labs   Lab 06/25/19  0341   HGBA1C 4.8     CBC:   Recent Labs   Lab 06/25/19  0341   WBC 7.05   RBC 3.80*   HGB 12.1*   HCT 37.2*      MCV 98   MCH 31.8*   MCHC 32.5     CMP:   Recent Labs   Lab 06/24/19  1540 06/25/19  0341     87   CALCIUM 9.9 8.8   ALBUMIN 3.3*  --    PROT 7.4  --    * 134*   K 4.2 4.3   CO2 26 18*   CL 99 102   BUN 14 14   CREATININE 1.5* 1.3   ALKPHOS 113  --    ALT 20  --    AST 26  --    BILITOT 0.8  --      CRP:   Recent Labs   Lab 06/24/19  1540   .9*     ESR:   Recent Labs   Lab 06/24/19  1540   SEDRATE 47*     Prealbumin: No results for input(s): PREALBUMIN in the last 48 hours.  Wound Cultures:   Recent Labs   Lab 04/09/19  0941 06/18/19  1420   LABAERO STAPHYLOCOCCUS AUREUS  Moderate    PSEUDOMONAS AERUGINOSA  Many   PSEUDOMONAS AERUGINOSA  Moderate    STAPHYLOCOCCUS AUREUS  Many       Microbiology Results (last 7 days)     Procedure Component Value Units Date/Time    Aerobic culture [[id number]] Collected:  06/25/19 1210    Order Status:  Sent Specimen:  Wound from Leg, Right Updated:  06/25/19 1210    Culture, Anaerobe [[id number]] Collected:  06/25/19 1210    Order Status:  Sent Specimen:  Wound from Leg, Right Updated:  06/25/19 1210    Blood culture #1 [[id number]] Collected:  06/24/19 1545    Order Status:  Completed Specimen:  Blood from Peripheral, Antecubital, Right Updated:  06/25/19 0115     Blood Culture, Routine No Growth to date    Narrative:       Blood Culture #1    Blood culture #2 [[id number]] Collected:  06/24/19 1559    Order Status:  Completed Specimen:  Blood from Peripheral, Antecubital, Left Updated:  06/25/19 0115     Blood Culture, Routine No Growth to date    Narrative:       Blood Culture #2    Blood culture (site 1) [[id number]]     Order Status:  Canceled Specimen:  Blood     Blood culture (site 2) [[id number]]     Order Status:  Canceled Specimen:  Blood           Diagnostic Results:  MRI:    1. Examination limited by patient motion artifact.  2. Full-thickness soft tissue ulceration involving the posterior aspect of the distal right lower extremity without focal fluid collection to suggest abscess.  No evidence of underlying osteomyelitis.  3. Edema throughout the soft  tissues of the posterior ankle, similar to MRI 04/09/2019, with thickening and enhancement of the overlying skin which may reflect cellulitis.  No discrete abscess seen.  The infectious process/phlegmon appears worsened since prior study.  There is increased involvement of the Achilles tendon which appears irregular along its posteromedial aspect with abnormal signal suggesting infectious Achilles tendinitis with partial tear.  Recommend orthopedic consultation.  This report was flagged in Epic as abnormal.    Clinical Findings:  Ulcer Location: Right posterior distal leg  Measurements:4.8x2.7x0.3  Periwound: viable  Drainage: Serous/slough.  Base:  fibrogranular  Signs of infection: slough, malodor, probe to achilles tendon.  No pawan purulence, no crepitus or fluctuance.

## 2019-06-25 NOTE — ASSESSMENT & PLAN NOTE
Shaq Bragg Jr. is a 88 y.o. male with RLE ulcer with superficial infection, no clinical or MRI findings indicating deep abscess or osteo.  Positive probe to achilles with inflamation of achilles noted on MRI  -Wounds dressed with xeroform over achilles tendon,  betadine, 4x4's, kerlix, and ace.  Nursing to do dressing changes daily, orders in.  -Offload with heel protector boots  -prior arterial ultrasound without hemodynamically significant stenosis  -Wound washed at bedside.    -obtained cultures.  -Plan to treat with local wound care and antibiotics for superficial infection.  ID on board, Abx per ID, appreciate recs.      -Podiatry will follow at a distance    Weight bearing status:  WBAT  Offloading device: Darco shoe    DC Instructions:  Patient is to follow up with podiatry within 10 days of discharge.  Home health/facility to do dressing changes every MWF as follows:  Rinse with saline, pat dry, apply xeroform over tendon, apply iodosorb to wound, dress with 4x4's, ABD, Kerlix, and ace.

## 2019-06-25 NOTE — SUBJECTIVE & OBJECTIVE
Interval History: afebrile overnight, seen by podiatry no surgical intervention at this point.    Review of Systems   Constitutional: Negative for appetite change, chills and fever.   HENT: Negative for congestion.    Eyes: Negative for pain and itching.   Respiratory: Negative for cough, chest tightness and shortness of breath.    Cardiovascular: Negative for palpitations and leg swelling.   Gastrointestinal: Negative for abdominal pain and nausea.   Endocrine: Negative for polyphagia and polyuria.   Genitourinary: Negative for dysuria and frequency.   Musculoskeletal: Negative for back pain.   Neurological: Negative for numbness and headaches.   Psychiatric/Behavioral: Negative for agitation and behavioral problems.     Objective:     Vital Signs (Most Recent):  Temp: 97.7 °F (36.5 °C) (06/25/19 1141)  Pulse: 62 (06/25/19 1141)  Resp: 16 (06/25/19 1141)  BP: 129/60 (06/25/19 1141)  SpO2: 96 % (06/25/19 1141) Vital Signs (24h Range):  Temp:  [97.6 °F (36.4 °C)-98.1 °F (36.7 °C)] 97.7 °F (36.5 °C)  Pulse:  [54-80] 62  Resp:  [11-23] 16  SpO2:  [93 %-99 %] 96 %  BP: (128-153)/(60-71) 129/60     Weight: 78.5 kg (173 lb)  Body mass index is 23.46 kg/m².    Intake/Output Summary (Last 24 hours) at 6/25/2019 1512  Last data filed at 6/25/2019 1304  Gross per 24 hour   Intake 890 ml   Output 400 ml   Net 490 ml      Physical Exam   Constitutional: He is oriented to person, place, and time. He appears well-developed.   HENT:   Head: Normocephalic.   Cardiovascular: Normal rate, regular rhythm and normal heart sounds.   No murmur heard.  Pulmonary/Chest: Effort normal and breath sounds normal.   Abdominal: Soft. Bowel sounds are normal. He exhibits no distension. There is no tenderness.   Musculoskeletal: Normal range of motion.   Right leg heel ulceration and erythema    Neurological: He is alert and oriented to person, place, and time.   Psychiatric: He has a normal mood and affect. His behavior is normal.        Significant Labs:   Blood Culture:   Recent Labs   Lab 06/24/19  1545 06/24/19  1559   LABBLOO No Growth to date No Growth to date     BMP:   Recent Labs   Lab 06/25/19  0341   GLU 87   *   K 4.3      CO2 18*   BUN 14   CREATININE 1.3   CALCIUM 8.8   MG 2.1     CBC:   Recent Labs   Lab 06/24/19  1243 06/24/19  1540 06/25/19  0341   WBC 8.27 7.14 7.05   HGB 13.0* 14.2 12.1*   HCT 39.7* 41.3 37.2*    200 169     CMP:   Recent Labs   Lab 06/24/19  1243 06/24/19  1540 06/25/19  0341   * 135* 134*   K 4.5 4.2 4.3   CL 99 99 102   CO2 30* 26 18*    104 87   BUN 15 14 14   CREATININE 1.5* 1.5* 1.3   CALCIUM 9.4 9.9 8.8   PROT 6.8 7.4  --    ALBUMIN 3.1* 3.3*  --    BILITOT 0.8 0.8  --    ALKPHOS 105 113  --    AST 24 26  --    ALT 19 20  --    ANIONGAP 6* 10 14   EGFRNONAA 41.0* 41.0* 48.7*     Urine Studies:   Recent Labs   Lab 06/24/19  1917   COLORU Yellow   APPEARANCEUA Clear   PHUR 7.0   SPECGRAV 1.015   PROTEINUA Negative   GLUCUA Negative   KETONESU Negative   BILIRUBINUA Negative   OCCULTUA Negative   NITRITE Negative   LEUKOCYTESUR Negative     Recent Lab Results       06/25/19  1201   06/25/19  1148   06/25/19  0851   06/25/19  0341   06/24/19 2024        Albumin               Alkaline Phosphatase               ALT               Anion Gap       14       Ascending aorta   3.05           Ao peak mahad   1.75           Ao VTI   32.65           Appearance, UA               AST               AV valve area   2.35           AV mean gradient   6           AV index (prosthetic)   0.73           AV peak gradient   12           AV Velocity Ratio   0.65           Baso #       0.05       Basophil%       0.7       Bilirubin (UA)               BILIRUBIN TOTAL               Blood Culture, Routine               BSA   2           BUN, Bld       14       Calcium       8.8       Chloride       102       CO2       18       Color, UA               Creatinine       1.3       Creatinine, Random Ur                CRP               Left Ventricle Relative Wall Thickness   0.42           Differential Method       Automated       E/A ratio   0.64           E/E' ratio   7.60           eGFR if        56.3       eGFR if non        48.7  Comment:  Calculation used to obtain the estimated glomerular filtration  rate (eGFR) is the CKD-EPI equation.          Eos #       0.2       Eosinophil%       2.8       Estimated Avg Glucose       91       E wave decelartion time   172.70           FS   31           Glucose       87       Glucose, UA               Gran # (ANC)       5.6       Gran%       78.8       Hematocrit       37.2       Hemoglobin       12.1       Hemoglobin A1C External       4.8  Comment:  ADA Screening Guidelines:  5.7-6.4%  Consistent with prediabetes  >or=6.5%  Consistent with diabetes  High levels of fetal hemoglobin interfere with the HbA1C  assay. Heterozygous hemoglobin variants (HbS, HgC, etc)do  not significantly interfere with this assay.   However, presence of multiple variants may affect accuracy.         Immature Grans (Abs)       0.03  Comment:  Mild elevation in immature granulocytes is non specific and   can be seen in a variety of conditions including stress response,   acute inflammation, trauma and pregnancy. Correlation with other   laboratory and clinical findings is essential.         Immature Granulocytes       0.4       Coumadin Monitoring INR               IVRT   0.08           IVS   1.07           Ketones, UA               LA WIDTH   3.84           Lactate, Johnny               Left Atrium Major Axis   5.52           Left Atrium Minor Axis   5.37           LA size   3.93           LA volume   69.83           LA Volume Index   34.9           LVOT area   3.2           Leukocytes, UA               LV LATERAL E/E' RATIO   7.60           LV SEPTAL E/E' RATIO   7.60           LV Diastolic Volume   92.42           LV Diastolic Volume Index   46.14           LVIDD    4.50           LVIDS   3.10           LV mass   154.33           LV Mass Index   77           LVOT diameter   2.02           LVOT peak monster   1.13           LVOT stroke volume   76.68           LVOT peak VTI   23.94           LV Systolic Volume   37.95           LV Systolic Volume Index   18.9           Lymph #       0.6       Lymph%       8.5       Magnesium       2.1       MCH       31.8       MCHC       32.5       MCV       98       Mean e'   0.1           Mono #       0.6       Mono%       8.8       MPV       11.7       MV Peak A Monster   1.19           MV Peak E Monster   0.76           NITRITE UA               nRBC       0       Occult Blood UA               Osmolality, Ur               pH, UA               Phosphorus       3.3       Platelets       169       POCT Glucose 114   105   101     Potassium       4.3       Potassium Urine Random               Prot/Creat Ratio, Ur               PROTEIN TOTAL               Protein, UA               Protein, Urine Random               Protime               PV Peak D Monster   0.26           PV Peak S Monster   0.60           Pulm vein S/D ratio   2.31           PW   0.94           Right Atrial Pressure (from IVC)   3           RA Major Axis   5.59           RA Width   3.37           RBC       3.80       RDW       13.0       RV S'   14.08           RVDD   3.04           Sed Rate               Sinus   2.94           Sodium       134       Sodium Urine Random               Specific [place], UA               Specimen UA               STJ   2.31           TAPSE   2.23           TDI SEPTAL   0.1           TDI LATERAL   0.1           Triscuspid Valve Regurgitation Peak Gradient   10           TR Max Monster   1.57           TV rest pulmonary artery pressure   13           Urine Urea Nitrogen, Random               WBC       7.05                        06/24/19  1917   06/24/19  1559   06/24/19  1545   06/24/19  1540        Albumin       3.3     Alkaline Phosphatase       113     ALT       20      Anion Gap       10     Ascending aorta             Ao peak mahad             Ao VTI             Appearance, UA Clear           AST       26     AV valve area             AV mean gradient             AV index (prosthetic)             AV peak gradient             AV Velocity Ratio             Baso #       0.07     Basophil%       1.0     Bilirubin (UA) Negative           BILIRUBIN TOTAL       0.8  Comment:  For infants and newborns, interpretation of results should be based  on gestational age, weight and in agreement with clinical  observations.  Premature Infant recommended reference ranges:  Up to 24 hours.............<8.0 mg/dL  Up to 48 hours............<12.0 mg/dL  3-5 days..................<15.0 mg/dL  6-29 days.................<15.0 mg/dL       Blood Culture, Routine   No Growth to date[P] No Growth to date[P]       BSA             BUN, Bld       14     Calcium       9.9     Chloride       99     CO2       26     Color, UA Yellow           Creatinine       1.5     Creatinine, Random Ur 101.0  Comment:  The random urine reference ranges provided were established   for 24 hour urine collections.  No reference ranges exist for  random urine specimens.  Correlate clinically.              101.0  Comment:  The random urine reference ranges provided were established   for 24 hour urine collections.  No reference ranges exist for  random urine specimens.  Correlate clinically.             CRP       111.9     Left Ventricle Relative Wall Thickness             Differential Method       Automated     E/A ratio             E/E' ratio             eGFR if        47.4     eGFR if non        41.0  Comment:  Calculation used to obtain the estimated glomerular filtration  rate (eGFR) is the CKD-EPI equation.        Eos #       0.1     Eosinophil%       1.5     Estimated Avg Glucose             E wave decelartion time             FS             Glucose       104     Glucose, UA Negative            Gran # (ANC)       5.6     Gran%       78.7     Hematocrit       41.3     Hemoglobin       14.2     Hemoglobin A1C External             Immature Grans (Abs)       0.04  Comment:  Mild elevation in immature granulocytes is non specific and   can be seen in a variety of conditions including stress response,   acute inflammation, trauma and pregnancy. Correlation with other   laboratory and clinical findings is essential.       Immature Granulocytes       0.6     Coumadin Monitoring INR       1.0  Comment:  Coumadin Therapy:  2.0 - 3.0 for INR for all indicators except mechanical heart valves  and antiphospholipid syndromes which should use 2.5 - 3.5.       IVRT             IVS             Ketones, UA Negative           LA WIDTH             Lactate, Johnny       2.0  Comment:  Falsely low lactic acid results can be found in samples   containing >=13.0 mg/dL total bilirubin and/or >=3.5 mg/dL   direct bilirubin.       Left Atrium Major Axis             Left Atrium Minor Axis             LA size             LA volume             LA Volume Index             LVOT area             Leukocytes, UA Negative           LV LATERAL E/E' RATIO             LV SEPTAL E/E' RATIO             LV Diastolic Volume             LV Diastolic Volume Index             LVIDD             LVIDS             LV mass             LV Mass Index             LVOT diameter             LVOT peak monster             LVOT stroke volume             LVOT peak VTI             LV Systolic Volume             LV Systolic Volume Index             Lymph #       0.9     Lymph%       11.9     Magnesium             MCH       32.7     MCHC       34.4     MCV       95     Mean e'             Mono #       0.5     Mono%       6.3     MPV       12.3     MV Peak A Monster             MV Peak E Monster             NITRITE UA Negative           nRBC       1     Occult Blood UA Negative           Osmolality, Ur 463  Comment:  The random urine reference ranges provided were established   for  24 hour urine collections.  No reference ranges exist for  random urine specimens.  Correlate clinically.             pH, UA 7.0           Phosphorus             Platelets       200  Comment:  Platelets are clumped on smear.Platelet count may be affected.     POCT Glucose             Potassium       4.2     Potassium Urine Random 78  Comment:  The random urine reference ranges provided were established   for 24 hour urine collections.  No reference ranges exist for  random urine specimens.  Correlate clinically.             Prot/Creat Ratio, Ur 0.17           PROTEIN TOTAL       7.4     Protein, UA Negative  Comment:  Recommend a 24 hour urine protein or a urine   protein/creatinine ratio if globulin induced proteinuria is  clinically suspected.             Protein, Urine Random 17  Comment:  The random urine reference ranges provided were established   for 24 hour urine collections.  No reference ranges exist for  random urine specimens.  Correlate clinically.             Protime       10.6     PV Peak D Monster             PV Peak S Monster             Pulm vein S/D ratio             PW             Right Atrial Pressure (from IVC)             RA Major Axis             RA Width             RBC       4.34     RDW       13.1     RV S'             RVDD             Sed Rate       47     Sinus             Sodium       135     Sodium Urine Random 69  Comment:  The random urine reference ranges provided were established   for 24 hour urine collections.  No reference ranges exist for  random urine specimens.  Correlate clinically.             Specific [place], UA 1.015           Specimen UA Urine, Clean Catch           STJ             TAPSE             TDI SEPTAL             TDI LATERAL             Triscuspid Valve Regurgitation Peak Gradient             TR Max Monster             TV rest pulmonary artery pressure             Urine Urea Nitrogen, Random 470  Comment:  The random urine reference ranges provided were established   for  24 hour urine collections.  No reference ranges exist for  random urine specimens.  Correlate clinically.             WBC       7.14         All pertinent labs within the past 24 hours have been reviewed.    Significant Imaging: I have reviewed and interpreted all pertinent imaging results/findings within the past 24 hours.

## 2019-06-25 NOTE — H&P
[facility] Medicine  History & Physical    Patient Name: Shaq Bragg Jr.  MRN: [id number]  Admission Date: 6/24/2019  Attending Physician: Eran Tobias MD   Primary Care Provider: Ata Jeter MD    [facility] Medicine Team: [facility] MED 4 Anastasiia Reynolds MD     Patient information was obtained from patient, past medical records and ER records.     Subjective:     Principal Problem:Subacute osteomyelitis of right foot    Chief Complaint:   Chief Complaint   Patient presents with    Wound Infection     L lower leg, + pseudomonas on bactrim, chemo pt        HPI: Mr. Bragg is a 88 years old  male with spinal stenosis (L3-L4), R leg malignant melanoma (stage IIIB dx 3/2017 s/p excisional biopsy and MOS with SLN biopsy/resection of 2 positive nodes 4/2017 with subsequent lymphedema), recently diagnosed R leg SCC (s/p local resection; PET 6/2019 with uptake in the T11 spine, RLE, and R groin consistent with metastatic disease; s/p nivolumab x3 in 5/2019), and R heel ulcer presents with progressive worsening of right heel ulceration (exposing achilles tendon) with associated circumferential ulceration of lateral and medial malleolus of right lower foot.     Patient has chronic wounds of right lower extremity, had prior admission on 04/08 for similar complaint, had wound cultures positive for MSSA and pseudomonas aeruginosa (04/09/19) and was discharged home after completing 14 day course of ciprofloxacin and cefadroxil. After the discharge, patient continued to have home health care visits for wound care and he continued to follow up with podiatry as outpatient. His last visit home health nurse noticed that the wound smelled different one week ago. Pt saw his podiatrist one week ago and started on Bactrim three days ago. He states that the antibiotics have not changed the pain or the infection. Pt has noticed increased drainage; serous and some pus from the ankle wounds, and  increased foul smelling odor. Patient denies any systemic complaints such as fever, chills, or night sweats. Denies any severe pain at the site, states only that it makes his compression stockings uncomfortable. Pt lives in [place] with his spouse, denies any sick contacts, denies recent travel, no pets.        Oncologic History: Pt initially presented with a lesion on his right proximal calf in March 2017.  He underwent a biopsy on 3/21/17 with pathology showing a 1.4mm malignant melanoma Alexis level IV, no ulceration, perineural invasion seen, mitotic rate 6/mm2.  The patient then underwent  a MOS procedure on 4/18/17 and right inguinal LN biopsy on 4/18/17 under the care of Dr Joelle Pardo at [facility].  The pathology from the procedure initially showed no residual melanoma and no LN involvement ; however, on reevaluation showed microscopic mets in 2 sentinel LNs.  The patient established care with Medical Oncologist Dr Olivier Mchugh.  The surgical oncologist Dr. Alatorre offered Lymphadenectomy on 5/24/17; however, the patient elected to have observation.  PET/CT was performed on 1/04/18 showing no evidence of residual or recurrent disease.  On 10/16/18 the patient called Dr Holland office with complaint of 2 lumps in the right leg.  PET/CT was performed on 10/30/19 showing 2 new hypermetabolic lymph nodes right groin with the index lymph node lateral SUV max 30.02.  The patient had an appointment with Dr Alatorre on 10/30/19 at which point it was decided to proceed with a completion lymphadenectomy.   An MRI of the brain was performed on 11/03/18 showing no evidence for intracranial metastatic disease but did show a 4mm T1 hyperintense occipital calvarial focus which was read as likely benign.  The patient underwent completion LAD on 11/12/18 with pathology showing ¾ LNs positive for metastatic melanoma with the largest deposit being 2.4cm an positive extranodal extension. Currently the patient complains of chronic  lower back pain for which he underwent an MRI of the lumbar spine on 11/03/18 which showed severe central canal stenosis related to a disk bulge at L3-L4.  The patient endorses some LE weakness on occasion.  He denies bowel or bladder incontinence.  The patient states he sees a deramtologist semi annually Dr Sun at [facility] with last clinic visit reportedly several months ago.  The patient underwent a PET/CT on 2/15/19 showing a focus of increased tracer uptake within the T 11 vertebral body with max SUV of 4.1 that was not visualized on prior exam, increased radiotracer uptake in the right groin max SUV 5.3 and a subcutaneous focus of increased tracer uptake within the anterior portion of the distal right leg showing a max SUV of 3.7.   The patient was seen by dermatology on 2/22/19 and underwent a shave biopsy of the right lower leg lesion showing SCC.  On 3/12/19 the patient contacted our office stating he had a large amount of BRBPR.  The patient was seen in the ER on 3/12/19 with relatively stable hemoglobin and was deferred for outpatient GI evaluation.  On 3/25/19 Dr. Bowman in derm decided to postpone a Mohs procedure for SCC of the right lower leg until the swelling in the right leg had decreased.  The patient was seen on 3/27/19 by wound care for an ulcer on his right heel at which point recommendations were made for bandaging of the right heel.  The patient saw GI on 3/27/19 at which point the patient decided not to pursue a colonoscopy.   The patient missed his scheduled treatment with Nivolumab on 4/08/19 after being admitted to the hospital from 4/08/19-4/11/19 for a stage IV ulcer of the right heel with surrounding cellulitis. Cycle 3 received on 05/27/19. Repeat PET/CT 6/03/19: New hypermetabolic focus in the proximal left upper extremity. Patient was scheduled for cycle 4 today (06/24) Nivolumab; but did not receive chemo and was sent to ED from Doctor's office for acute infection of  right foot.       Past Medical History:   Diagnosis Date    COPD (chronic obstructive pulmonary disease)     H/O asbestosis     Hearing loss     Hypercholesterolemia     Hypertension     Malignant melanoma of right lower leg 5/11/2017    Melanoma     Approx. 2 years ago     Skin cancer     Squamous cell carcinoma        Past Surgical History:   Procedure Laterality Date    ADENOIDECTOMY      APPENDECTOMY      Carotid Stenosis Surgery      CHOLECYSTECTOMY  1965    LYMPHADENECTOMY, INGUINAL, Sartorious Flap Right 11/12/2018    Performed by Yair Alatorre MD at [facility] OR [address] [place] [address]    TONSILLECTOMY         Review of patient's allergies indicates:  No Known Allergies    No current facility-administered medications on file prior to encounter.      Current Outpatient Medications on File Prior to Encounter   Medication Sig    aspirin (ECOTRIN) 81 MG EC tablet Take 81 mg by mouth every morning.     cadexomer iodine (IODOSORB) 0.9 % gel Apply topically daily as needed for Wound Care.    finasteride (PROSCAR) 5 mg tablet Take 5 mg by mouth every morning.     HYDROcodone-acetaminophen (NORCO) 5-325 mg per tablet Take 1 tablet by mouth every 6 (six) hours as needed for Pain.    losartan-hydrochlorothiazide 50-12.5 mg (HYZAAR) 50-12.5 mg per tablet TAKE 1 TABLET EVERY DAY    metoprolol tartrate (LOPRESSOR) 50 MG tablet Takes 50 mg in morning and half tablet (25 mg) in evening    simvastatin (ZOCOR) 10 MG tablet Take 10 mg by mouth every evening.    sulfamethoxazole-trimethoprim 800-160mg (BACTRIM DS) 800-160 mg Tab Take 1 tablet by mouth 2 (two) times daily.    tamsulosin (FLOMAX) 0.4 mg Cp24 Take 0.4 mg by mouth every morning.     furosemide (LASIX) 20 MG tablet Take 20 mg by mouth every other day.     polyethylene glycol (GLYCOLAX) 17 gram/dose powder Take 17 g by mouth once daily.    potassium chloride (MICRO-K) 8 mEq CpSR Take 8 mEq by mouth every other day.     senna-docusate 8.6-50 mg  (PERICOLACE) 8.6-50 mg per tablet Take 1 tablet by mouth 2 (two) times daily as needed for Constipation.    [DISCONTINUED] losartan-hydrochlorothiazide 50-12.5 mg (HYZAAR) 50-12.5 mg per tablet Take 1 tablet by mouth every morning.      Family History     Problem Relation (Age of Onset)    Cancer Father    Coronary artery disease Mother    Diabetes Sister    Esophageal cancer Father    No Known Problems Maternal Aunt, Maternal Uncle, Paternal Aunt, Paternal Uncle, Maternal Grandmother, Maternal Grandfather, Paternal Grandmother, Paternal Grandfather        Tobacco Use    Smoking status: Former Smoker     Packs/day: 1.00     Years: 15.00     Pack years: 15.00     Types: Cigarettes     Last attempt to quit: 1965     Years since quittin.5    Smokeless tobacco: Never Used   Substance and Sexual Activity    Alcohol use: No     Frequency: Never    Drug use: No    Sexual activity: Never     Review of Systems   Constitutional: Negative for chills, fever and unexpected weight change.   HENT: Positive for sore throat. Negative for postnasal drip, rhinorrhea, sinus pressure, sinus pain and trouble swallowing.    Eyes: Negative for photophobia and visual disturbance.   Respiratory: Positive for cough (Dry intermittent cough). Negative for chest tightness, shortness of breath and wheezing.    Cardiovascular: Negative for chest pain, palpitations and leg swelling.   Gastrointestinal: Negative for abdominal distention, abdominal pain, constipation, diarrhea, nausea and vomiting.   Endocrine: Negative for cold intolerance, heat intolerance, polydipsia, polyphagia and polyuria.   Genitourinary: Negative for dysuria and hematuria.   Musculoskeletal: Negative for arthralgias, back pain, gait problem and neck pain.   Skin: Positive for color change, rash and wound. Negative for pallor.   Hematological: Negative for adenopathy. Does not bruise/bleed easily.     Objective:     Vital Signs (Most Recent):  Temp: 97.5 °F (36.4  °C) (06/24/19 1432)  Pulse: 61 (06/24/19 1432)  Resp: 18 (06/24/19 1432)  BP: (!) 147/68 (06/24/19 1432)  SpO2: 97 % (06/24/19 1432) Vital Signs (24h Range):  Temp:  [97.5 °F (36.4 °C)-97.8 °F (36.6 °C)] 97.5 °F (36.4 °C)  Pulse:  [61] 61  Resp:  [18] 18  SpO2:  [96 %-97 %] 97 %  BP: (141-147)/(63-68) 147/68     Weight: 82.6 kg (182 lb)  Body mass index is 24.68 kg/m².    Physical Exam   Constitutional: He is oriented to person, place, and time. No distress.   HENT:   Mouth/Throat: Oropharynx is clear and moist. No oropharyngeal exudate.   Eyes: Conjunctivae are normal. No scleral icterus.   Neck: Normal range of motion. Neck supple. No JVD present. No tracheal deviation present. No thyromegaly present.   Cardiovascular: Normal rate, regular rhythm, normal heart sounds and intact distal pulses. Exam reveals no gallop and no friction rub.   No murmur heard.  Pulmonary/Chest: Effort normal and breath sounds normal. No stridor. No respiratory distress. He has no wheezes. He has no rales.   Abdominal: Soft. Bowel sounds are normal. He exhibits no distension and no mass. There is no tenderness. There is no guarding.   Musculoskeletal: Normal range of motion. He exhibits edema (Right lower extremity edema; non pitting) and tenderness. He exhibits no deformity.    Dorsalis pedis pulses are 2+ on the right side, and 2+ on the left side.        Posterior tibial pulses are 1+ on the right side, and 1+ on the left side.   CFT is < 3 seconds bilateral.  Pedal hair growth is decreased bilateral.  Mild varicosities noted bilateral.  Moderate nonpitting edema noted to right lower extremity.  Toes are warm to touch bilateral.     Left lower extremity exhibits edema and tenderness.   Muscle strength 4/5 in all muscle groups on the Rt. And 5/5 on the.   No tenderness nor crepitation with ROM of foot/ankle joints bilateral.  Pain with palpation to an area of desquamation and maceration about the Rt. Lateral calcaneus.   No sensory  deficit. Light touch intact bilateral.     Location: Open wound noted to the Rt. Posterior ankle; Circumferential wound with irregular margins  Base: Down to a centralized area of exposed Achilles tendon and sub Q and comprised of slough.  Continued hypergranulation noted the periphery of the wound.    Drainage: Serous.  Area of desquamation noted to the Rt. Lateral heel in conjunction with mild erythema and maceration. Minimal fluctuance or purulence yellow green color noted.  Slight malodor is present.      Lymphadenopathy:     He has no cervical adenopathy.   Neurological: He is alert and oriented to person, place, and time. He displays normal reflexes. No cranial nerve deficit. He exhibits normal muscle tone. Coordination normal.   Skin: Skin is warm. Capillary refill takes less than 2 seconds. Rash noted. He is not diaphoretic. There is erythema.               Significant Labs:   CBC:   Recent Labs   Lab 06/24/19  1243 06/24/19  1540   WBC 8.27 7.14   HGB 13.0* 14.2   HCT 39.7* 41.3    200     CMP:   Recent Labs   Lab 06/24/19  1243 06/24/19  1540   * 135*   K 4.5 4.2   CL 99 99   CO2 30* 26    104   BUN 15 14   CREATININE 1.5* 1.5*   CALCIUM 9.4 9.9   PROT 6.8 7.4   ALBUMIN 3.1* 3.3*   BILITOT 0.8 0.8   ALKPHOS 105 113   AST 24 26   ALT 19 20   ANIONGAP 6* 10   EGFRNONAA 41.0* 41.0*       Significant Imaging: CXR: I have reviewed all pertinent imaging results/findings within the past 24 hours.    CXR (06/24):  FINDINGS:  Two views: There is cardiomegaly aortic plaque and DJD.  There is mild bibasilar edema and small pleural effusions.      Impression       Mild CHF     Foot XR (06/24):     FINDINGS:  Generalized osteopenia.  Alignment is satisfactory.  Mild degenerative changes, particularly at the midfoot dorsally and also involving some of the interphalangeal joints.  Calcaneal spur at the insertion of the plantar fascia.  No definite fracture or dislocation is seen.  No evidence of  osseous destruction.  Soft tissue swelling at the dorsum of the foot and also at the ankle.      Impression       Negative for fracture.  DJD with soft tissue swelling.     Assessment/Plan:     * Subacute osteomyelitis of right foot  88 years old male with chronic right foot infection, started on Bactrim 3 days ago outpatient from (06/24/19) presents from Dr. Villatoro office for worsening right foot infection. Noticed increased drainage, foul odor and tenderness at the wound for the past 2-3 days.  -Follows wound care and podiatry chronically  - was seen at Dr. Villatoro (Oncologist) office in am for chemotherapy for underlying melanoma of lower extremity of infected leg  (at another site) with metastasis to inguinal lymph nodes.   - Did not receive chemo in am because of the underlying infection  - Denies fever, chills, palpations, dizziness, nausea, vomiting, abdominal pain, neck pain, extremity tenderness  - , and ESR 49 consistent with subacute/acute infection  - afebrile, no leukocyte count    Plan:  - Patient received antibiotics in the ED: Vanc and Zosyn (Finished Cipro course with worsening of the wound and has history of pseudomonas a and staph aureus in previous wound cultures 04/09/19)  - will continue empiric coverage with dose adjusted Vanc and Zosyn  - MRI ordered (results pending)  - podiatry consult; infectious disease consult; reccs appreciated  - cbc in am    Stage IV pressure ulcer of right heel  patient has a stage IV ulcer on the right heel currently being managed by podiatry and home health  -wound care consulted; appreciate reccs  - routine wound care    Acute renal failure superimposed on stage 3 chronic kidney disease  Creatinine 1.5 (baseline 1.2 4 weeks ago)  Likely in setting of ischemic ATN in setting of hypovolemia; infection however, will evaluate    Plan:  Follow up urine Na, Urea, Creatinine  Follow protein/creatine ratio  Follow up Retroperitoneal ultrasound  BMP daily to monitor  progress  Hold lasix in setting of Acute renal failure      Malignant melanoma of right lower leg-Stage IIIB -    The patient has a h/o melanoma of the left leg originally diagnosed in 2017  -Lymphadenectomy showed two positive LN's  -Patient's tumor is BRAF V600E wild type  -MRI of the brain performed at [facility] showed no evidence of metastatic disease  -PET/CT on 2/05/19 showed uptake in the T11 spine, right lower leg and right groin  -Recent skin biopsy showed SCC  -Pt is s/p 2 cycles of nivolumab  -Cycle 2 delayed due to admission for cellulitis and stage IV right heel ulcer  -Cycle 3 received on 05/27/19  -Repeat PET/CT 6/03/19: New hypermetabolic focus in the proximal left upper extremity.    - Patient was scheduled for cycle 4 today (06/24) Nivolumab; but did not receive chemo and was sent to ED from Doctor's office for acute infection    Plan:  - Consider Med Oncology follow up upon discharge to resume chemotherapy once active infection resolves    Chronic CHF (congestive heart failure)  Patient is on lasix 20 mg daily for lymphedema  Patient has never been told off CHF; currently denies chest pain or dyspnea with rest or exertion  CXR consistent with bilateral infiltrates, cardiomegaly and b/l pleural effusion  EKG: Sinus belkis 60 bpm    Plan:  - GDMT as indicated: Metoprolol, losartan  - continue home asa 81 and simvastatin 20 mg daily  - will resume home lasix 20 mg as blood pressure tolerates PRN ( and as CHANTEL resolves)  - Follow up echocardiogram (pending)    Lymphedema of right lower extremity  The patient has chronic lymphedema s/p lymphadenectomy.  -Edema may be contributing to poor wound healing of the right heel ulcer  -wound care consulted; will appreciate assistance with wound wrapping once acute infection resolves    Essential hypertension  Currently controlled; /68 and HR 61 bpm on arrival    Plan:  Will resume home medications:  Metoprolol 50 mg in am daily; 25 mg in pm daily  Will resume  losartan-HCTZ 50-12.5 mg combination  Goal /80 mmHg  Low sodium diet    BPH (benign prostatic hyperplasia)  Will resume home medications:  Flomax 0.4 mg daily  Strict I and O  Q shift bladder scans to assess urinary retention  Straight Cath if volume > 400 mL    VTE Risk Mitigation (From admission, onward)        Ordered     IP VTE HIGH RISK PATIENT  Once      06/24/19 1848        DVT ppx: On hold as patient await bone biopsy and cultures   Diet: Low sodium diet  Disposition: TBD. Await wound cultures/bone biopsy. PT ordered. ID consultation reccs appreciated. Patient to continue home health services upon discharge, and podiatry and Oncology follow ups upon discharge.     Anastasiia Reynolds MD   Internal Medicine PGY-2   Department of [facility] Medicine   [facility]

## 2019-06-25 NOTE — ASSESSMENT & PLAN NOTE
Will resume home medications:  Flomax 0.4 mg daily  Strict I and O  Q shift bladder scans to assess urinary retention  Straight Cath if volume > 400 mL

## 2019-06-25 NOTE — PROGRESS NOTES
Pharmacokinetic Initial Assessment: IV Vancomycin    Assessment/Plan:    · Vancomycin 1250mg IV every 24 hours was initiated for patient.  · Pharmacy is now consulted to dose vancomycin   · Continue current dose of vancomycin 1250mg IV every 12 hours  · Desired empiric serum trough concentration is 15 to 20 mcg/mL for prior wound cultures growing MRSA in the setting of chemotherapy immunosuppression.   · Draw vancomycin trough level 30 min prior to third dose on 06/26/19 at approximately 17:00h.  · Pharmacy will continue to follow and monitor vancomycin.      Please contact pharmacy at extension [phone] with any questions regarding this assessment.     Thank you for the consult,   Dang Sanches, PharmD, BCPS, Internal Medicine Clinical Pharmacy Specialist  EXT [phone]     Patient brief summary:  Shaq Bragg Jr. is a 88 y.o. male initiated on antimicrobial therapy with IV Vancomycin for treatment of suspected skin & soft tissue    Drug Allergies:   Review of patient's allergies indicates:  No Known Allergies    Actual Body Weight:   78.8kg    Renal Function:   Estimated Creatinine Clearance: 43.1 mL/min (based on SCr of 1.3 mg/dL).,     Dialysis Method (if applicable):  No dialysis     CBC (last 72 hours):  Recent Labs   Lab Result Units 06/24/19  1243 06/24/19  1540 06/25/19  0341   WBC K/uL 8.27 7.14 7.05   Hemoglobin g/dL 13.0* 14.2 12.1*   Hemoglobin A1C %  --   --  4.8   Hematocrit % 39.7* 41.3 37.2*   Platelets K/uL 248 200 169   Gran% %  --  78.7* 78.8*   Lymph% %  --  11.9* 8.5*   Mono% %  --  6.3 8.8   Eosinophil% %  --  1.5 2.8   Basophil% %  --  1.0 0.7   Differential Method   --  Automated Automated       Metabolic Panel (last 72 hours):  Recent Labs   Lab Result Units 06/24/19  1243 06/24/19  1540 06/24/19  1917 06/25/19  0341   Sodium mmol/L 135* 135*  --  134*   Sodium Urine Random mmol/L  --   --  69  --    Potassium mmol/L 4.5 4.2  --  4.3   Potassium Urine Random mmol/L  --   --  78   --    Chloride mmol/L 99 99  --  102   CO2 mmol/L 30* 26  --  18*   Glucose mg/dL 104 104  --  87   Glucose, UA   --   --  Negative  --    BUN, Bld mg/dL 15 14  --  14   Creatinine mg/dL 1.5* 1.5*  --  1.3   Creatinine, Random Ur mg/dL  --   --  101.0  101.0  --    Albumin g/dL 3.1* 3.3*  --   --    Total Bilirubin mg/dL 0.8 0.8  --   --    Alkaline Phosphatase U/L 105 113  --   --    AST U/L 24 26  --   --    ALT U/L 19 20  --   --    Magnesium mg/dL  --   --   --  2.1   Phosphorus mg/dL  --   --   --  3.3       Drug levels (last 3 results):  No results for input(s): VANCOMYCINRA, VANCOMYCINPE, VANCOMYCINTR in the last 72 hours.    Microbiologic Results:  Microbiology Results (last 7 days)     Procedure Component Value Units Date/Time    Blood culture #1 [[id number]] Collected:  06/24/19 1545    Order Status:  Completed Specimen:  Blood from Peripheral, Antecubital, Right Updated:  06/25/19 0115     Blood Culture, Routine No Growth to date    Narrative:       Blood Culture #1    Blood culture #2 [[id number]] Collected:  06/24/19 1559    Order Status:  Completed Specimen:  Blood from Peripheral, Antecubital, Left Updated:  06/25/19 0115     Blood Culture, Routine No Growth to date    Narrative:       Blood Culture #2    Blood culture (site 1) [[id number]]     Order Status:  Canceled Specimen:  Blood     Blood culture (site 2) [[id number]]     Order Status:  Canceled Specimen:  Blood

## 2019-06-25 NOTE — ASSESSMENT & PLAN NOTE
Creatinine 1.5 (baseline 1.2 4 weeks ago)  Likely in setting of ischemic ATN in setting of hypovolemia; infection however, will evaluate    Plan:  Follow up urine Na, Urea, Creatinine  Follow protein/creatine ratio  Follow up Retroperitoneal ultrasound  BMP daily to monitor progress  Hold lasix in setting of Acute renal failure

## 2019-06-25 NOTE — HPI
Shaq Bragg Jr. is a 88 y.o. male who  has a past medical history of COPD (chronic obstructive pulmonary disease), H/O asbestosis, Hearing loss, Hypercholesterolemia, Hypertension, Malignant melanoma of right lower leg, Melanoma, Skin cancer, and Squamous cell carcinoma.    Patient Admited for Right Leg wound infection.  Patient was treated for pseudomonas and staph infection in Right leg with exposed achilles tendon.  He finished his course, is being treated for malignant melanoma.  He was recently had a neox graft placed on wound on 6/18 per Dr. Soni who took cultures also growing pseudo and staph.  He was admitted by his oncologist who didn't look at the wound, but had concerns about the smell.  Patient denies F/C/N/V.  Complains of pain, but states he has always had pain with the wound.

## 2019-06-25 NOTE — SUBJECTIVE & OBJECTIVE
Past Medical History:   Diagnosis Date    COPD (chronic obstructive pulmonary disease)     H/O asbestosis     Hearing loss     Hypercholesterolemia     Hypertension     Malignant melanoma of right lower leg 5/11/2017    Melanoma     Approx. 2 years ago     Skin cancer     Squamous cell carcinoma        Past Surgical History:   Procedure Laterality Date    ADENOIDECTOMY      APPENDECTOMY      Carotid Stenosis Surgery      CHOLECYSTECTOMY  1965    LYMPHADENECTOMY, INGUINAL, Sartorious Flap Right 11/12/2018    Performed by Yair Alatorre MD at [facility] OR [address] [place] [address]    TONSILLECTOMY         Review of patient's allergies indicates:  No Known Allergies    Medications:  Medications Prior to Admission   Medication Sig    aspirin (ECOTRIN) 81 MG EC tablet Take 81 mg by mouth every morning.     cadexomer iodine (IODOSORB) 0.9 % gel Apply topically daily as needed for Wound Care.    finasteride (PROSCAR) 5 mg tablet Take 5 mg by mouth every morning.     furosemide (LASIX) 20 MG tablet Take 20 mg by mouth every other day.     HYDROcodone-acetaminophen (NORCO) 5-325 mg per tablet Take 1 tablet by mouth every 6 (six) hours as needed for Pain.    losartan-hydrochlorothiazide 50-12.5 mg (HYZAAR) 50-12.5 mg per tablet TAKE 1 TABLET EVERY DAY    metoprolol tartrate (LOPRESSOR) 50 MG tablet Takes 50 mg in morning and half tablet (25 mg) in evening    potassium chloride (MICRO-K) 8 mEq CpSR Take 8 mEq by mouth every other day.     simvastatin (ZOCOR) 10 MG tablet Take 10 mg by mouth every evening.    tamsulosin (FLOMAX) 0.4 mg Cp24 Take 0.4 mg by mouth every morning.     [DISCONTINUED] sulfamethoxazole-trimethoprim 800-160mg (BACTRIM DS) 800-160 mg Tab Take 1 tablet by mouth 2 (two) times daily.    polyethylene glycol (GLYCOLAX) 17 gram/dose powder Take 17 g by mouth once daily.     Antibiotics (From admission, onward)    Start     Stop Route Frequency Ordered    06/25/19 1530  ceFEPIme injection 2 g       -- IV Every 12 hours (non-standard times) 19 1422        Antifungals (From admission, onward)    None        Antivirals (From admission, onward)    None             There is no immunization history on file for this patient.    Family History     Problem Relation (Age of Onset)    Cancer Father    Coronary artery disease Mother    Diabetes Sister    Esophageal cancer Father    No Known Problems Maternal Aunt, Maternal Uncle, Paternal Aunt, Paternal Uncle, Maternal Grandmother, Maternal Grandfather, Paternal Grandmother, Paternal Grandfather        Social History     Socioeconomic History    Marital status:      Spouse name: Not on file    Number of children: Not on file    Years of education: Not on file    Highest education level: Not on file   Occupational History    Not on file   Social Needs    Financial resource strain: Not on file    Food insecurity:     Worry: Not on file     Inability: Not on file    Transportation needs:     Medical: Not on file     Non-medical: Not on file   Tobacco Use    Smoking status: Former Smoker     Packs/day: 1.00     Years: 15.00     Pack years: 15.00     Types: Cigarettes     Last attempt to quit: 1965     Years since quittin.5    Smokeless tobacco: Never Used   Substance and Sexual Activity    Alcohol use: No     Frequency: Never    Drug use: No    Sexual activity: Never   Lifestyle    Physical activity:     Days per week: Not on file     Minutes per session: Not on file    Stress: Not on file   Relationships    Social connections:     Talks on phone: Not on file     Gets together: Not on file     Attends Anglican service: Not on file     Active member of club or organization: Not on file     Attends meetings of clubs or organizations: Not on file     Relationship status: Not on file   Other Topics Concern    Not on file   Social History Narrative    Not on file     Review of Systems   Constitutional: Negative for activity change, appetite  change, chills, fatigue and fever.   HENT: Negative for congestion, dental problem, mouth sores and sinus pressure.    Eyes: Negative for pain, redness and visual disturbance.   Respiratory: Negative for cough, shortness of breath and wheezing.    Cardiovascular: Negative for chest pain and leg swelling.   Gastrointestinal: Negative for abdominal distention, abdominal pain, diarrhea, nausea and vomiting.   Endocrine: Negative for polyuria.   Genitourinary: Negative for decreased urine volume, dysuria and frequency.   Musculoskeletal: Positive for joint swelling.   Skin: Positive for wound. Negative for color change.   Allergic/Immunologic: Negative for food allergies.   Neurological: Negative for dizziness, weakness and headaches.   Hematological: Negative for adenopathy.   Psychiatric/Behavioral: Negative for agitation and confusion. The patient is not nervous/anxious.      Objective:     Vital Signs (Most Recent):  Temp: 98 °F (36.7 °C) (06/25/19 1514)  Pulse: 63 (06/25/19 1514)  Resp: 16 (06/25/19 1514)  BP: (!) 114/57 (06/25/19 1514)  SpO2: 95 % (06/25/19 1514) Vital Signs (24h Range):  Temp:  [97.6 °F (36.4 °C)-98.1 °F (36.7 °C)] 98 °F (36.7 °C)  Pulse:  [54-80] 63  Resp:  [11-23] 16  SpO2:  [93 %-99 %] 95 %  BP: (114-153)/(57-71) 114/57     Weight: 78.5 kg (173 lb)  Body mass index is 23.46 kg/m².    Estimated Creatinine Clearance: 43.1 mL/min (based on SCr of 1.3 mg/dL).    Physical Exam   Constitutional: He is oriented to person, place, and time. He appears well-developed and well-nourished.   HENT:   Head: Normocephalic and atraumatic.   Mouth/Throat: Oropharynx is clear and moist.   Eyes: Conjunctivae are normal.   Neck: Neck supple.   Cardiovascular: Normal rate, regular rhythm and normal heart sounds.   No murmur heard.  Pulmonary/Chest: Effort normal and breath sounds normal. No respiratory distress. He has no wheezes.   Abdominal: Soft. Bowel sounds are normal. He exhibits no distension. There is no  tenderness.   Musculoskeletal: Normal range of motion. He exhibits no edema or tenderness.   Lymphadenopathy:     He has no cervical adenopathy.   Neurological: He is alert and oriented to person, place, and time. Coordination normal.   Skin: Skin is warm and dry. No rash noted.   RLE edema, erythema, wounds   Psychiatric: He has a normal mood and affect. His behavior is normal.               Significant Labs:   CBC:   Recent Labs   Lab 06/24/19  1243 06/24/19  1540 06/25/19  0341   WBC 8.27 7.14 7.05   HGB 13.0* 14.2 12.1*   HCT 39.7* 41.3 37.2*    200 169     CMP:   Recent Labs   Lab 06/24/19  1243 06/24/19  1540 06/25/19  0341   * 135* 134*   K 4.5 4.2 4.3   CL 99 99 102   CO2 30* 26 18*    104 87   BUN 15 14 14   CREATININE 1.5* 1.5* 1.3   CALCIUM 9.4 9.9 8.8   PROT 6.8 7.4  --    ALBUMIN 3.1* 3.3*  --    BILITOT 0.8 0.8  --    ALKPHOS 105 113  --    AST 24 26  --    ALT 19 20  --    ANIONGAP 6* 10 14   EGFRNONAA 41.0* 41.0* 48.7*       Significant Imaging: I have reviewed all pertinent imaging results/findings within the past 24 hours.

## 2019-06-25 NOTE — ED NOTES
Pt being transported to Ultrasound by pt transport. Pt leg wound wrapped with gauze and dressing prior to departure. No distress noted . No tele orders on pt

## 2019-06-25 NOTE — CONSULTS
[facility]  Podiatry  Consult Note    Patient Name: Shaq Bragg Jr.  MRN: [id number]  Admission Date: 6/24/2019  Hospital Length of Stay: 1 days  Attending Physician: Romi Reyna MD  Primary Care Provider: Ata Jeter MD     Inpatient consult to Podiatry  Consult performed by: Aroldo Curiel MD  Consult ordered by: Eran Tobias MD        Subjective:     History of Present Illness:  Shaq Bragg Jr. is a 88 y.o. male who  has a past medical history of COPD (chronic obstructive pulmonary disease), H/O asbestosis, Hearing loss, Hypercholesterolemia, Hypertension, Malignant melanoma of right lower leg, Melanoma, Skin cancer, and Squamous cell carcinoma.    Patient Admited for Right Leg wound infection.  Patient was treated for pseudomonas and staph infection in Right leg with exposed achilles tendon.  He finished his course, is being treated for malignant melanoma.  He was recently had a neox graft placed on wound on 6/18 per Dr. Soni who took cultures also growing pseudo and staph.  He was admitted by his oncologist who didn't look at the wound, but had concerns about the smell.  Patient denies F/C/N/V.  Complains of pain, but states he has always had pain with the wound.        Scheduled Meds:   aspirin  81 mg Oral QAM    finasteride  5 mg Oral QAM    hydroCHLOROthiazide  12.5 mg Oral Daily    losartan  50 mg Oral Daily    metoprolol tartrate  25 mg Oral Daily    metoprolol tartrate  50 mg Oral Daily    piperacillin-tazobactam (ZOSYN) IVPB  4.5 g Intravenous Q8H    simvastatin  10 mg Oral QHS    tamsulosin  0.4 mg Oral QAM    vancomycin (VANCOCIN) IVPB  1,250 mg Intravenous Q24H     Continuous Infusions:  PRN Meds:acetaminophen, albuterol-ipratropium, Dextrose 10% Bolus, Dextrose 10% Bolus, glucagon (human recombinant), glucose, glucose, HYDROcodone-acetaminophen, insulin aspart U-100, senna-docusate 8.6-50 mg, sodium chloride 0.9%, sodium chloride 0.9%    Review  of patient's allergies indicates:  No Known Allergies     Past Medical History:   Diagnosis Date    COPD (chronic obstructive pulmonary disease)     H/O asbestosis     Hearing loss     Hypercholesterolemia     Hypertension     Malignant melanoma of right lower leg 2017    Melanoma     Approx. 2 years ago     Skin cancer     Squamous cell carcinoma      Past Surgical History:   Procedure Laterality Date    ADENOIDECTOMY      APPENDECTOMY      Carotid Stenosis Surgery      CHOLECYSTECTOMY  1965    LYMPHADENECTOMY, INGUINAL, Sartorious Flap Right 2018    Performed by Yair Alatorre MD at [facility] OR [address] [place] [address]    TONSILLECTOMY         Family History     Problem Relation (Age of Onset)    Cancer Father    Coronary artery disease Mother    Diabetes Sister    Esophageal cancer Father    No Known Problems Maternal Aunt, Maternal Uncle, Paternal Aunt, Paternal Uncle, Maternal Grandmother, Maternal Grandfather, Paternal Grandmother, Paternal Grandfather        Tobacco Use    Smoking status: Former Smoker     Packs/day: 1.00     Years: 15.00     Pack years: 15.00     Types: Cigarettes     Last attempt to quit: 1965     Years since quittin.5    Smokeless tobacco: Never Used   Substance and Sexual Activity    Alcohol use: No     Frequency: Never    Drug use: No    Sexual activity: Never     Review of Systems   Constitutional: Negative for chills and fever.   Cardiovascular: Negative for leg swelling.   Gastrointestinal: Negative for nausea and vomiting.   Musculoskeletal: Positive for myalgias. Negative for arthralgias and joint swelling.   Skin: Positive for wound. Negative for rash.   Psychiatric/Behavioral: Negative for agitation and confusion.     Objective:     Vital Signs (Most Recent):  Temp: 97.7 °F (36.5 °C) (19 1141)  Pulse: 62 (19 1141)  Resp: 16 (19 1141)  BP: 129/60 (19 1141)  SpO2: 96 % (19 1141) Vital Signs (24h Range):  Temp:  [97.5 °F (36.4  °C)-98.1 °F (36.7 °C)] 97.7 °F (36.5 °C)  Pulse:  [54-80] 62  Resp:  [11-23] 16  SpO2:  [93 %-99 %] 96 %  BP: (128-153)/(60-71) 129/60     Weight: 78.5 kg (173 lb)  Body mass index is 23.46 kg/m².    Foot Exam    General  Orientation: alert and oriented to person, place, and time   Affect: appropriate       Right Foot/Ankle     Inspection and Palpation  Ecchymosis: none    Neurovascular  Dorsalis pedis: 2+  Posterior tibial: 2+  Saphenous nerve sensation: normal  Tibial nerve sensation: normal  Superficial peroneal nerve sensation: normal  Deep peroneal nerve sensation: normal  Sural nerve sensation: normal      Left Foot/Ankle      Inspection and Palpation  Ecchymosis: none  Tenderness: none   Swelling: none     Neurovascular  Dorsalis pedis: 2+  Posterior tibial: 2+  Saphenous nerve sensation: normal  Tibial nerve sensation: normal  Superficial peroneal nerve sensation: normal  Deep peroneal nerve sensation: normal  Sural nerve sensation: normal            Laboratory:  A1C:   Recent Labs   Lab 06/25/19  0341   HGBA1C 4.8     CBC:   Recent Labs   Lab 06/25/19  0341   WBC 7.05   RBC 3.80*   HGB 12.1*   HCT 37.2*      MCV 98   MCH 31.8*   MCHC 32.5     CMP:   Recent Labs   Lab 06/24/19  1540 06/25/19  0341    87   CALCIUM 9.9 8.8   ALBUMIN 3.3*  --    PROT 7.4  --    * 134*   K 4.2 4.3   CO2 26 18*   CL 99 102   BUN 14 14   CREATININE 1.5* 1.3   ALKPHOS 113  --    ALT 20  --    AST 26  --    BILITOT 0.8  --      CRP:   Recent Labs   Lab 06/24/19  1540   .9*     ESR:   Recent Labs   Lab 06/24/19  1540   SEDRATE 47*     Prealbumin: No results for input(s): PREALBUMIN in the last 48 hours.  Wound Cultures:   Recent Labs   Lab 04/09/19  0941 06/18/19  1420   LABAERO STAPHYLOCOCCUS AUREUS  Moderate    PSEUDOMONAS AERUGINOSA  Many   PSEUDOMONAS AERUGINOSA  Moderate    STAPHYLOCOCCUS AUREUS  Many       Microbiology Results (last 7 days)     Procedure Component Value Units Date/Time    Aerobic  culture [[id number]] Collected:  06/25/19 1210    Order Status:  Sent Specimen:  Wound from Leg, Right Updated:  06/25/19 1210    Culture, Anaerobe [[id number]] Collected:  06/25/19 1210    Order Status:  Sent Specimen:  Wound from Leg, Right Updated:  06/25/19 1210    Blood culture #1 [[id number]] Collected:  06/24/19 1545    Order Status:  Completed Specimen:  Blood from Peripheral, Antecubital, Right Updated:  06/25/19 0115     Blood Culture, Routine No Growth to date    Narrative:       Blood Culture #1    Blood culture #2 [[id number]] Collected:  06/24/19 1559    Order Status:  Completed Specimen:  Blood from Peripheral, Antecubital, Left Updated:  06/25/19 0115     Blood Culture, Routine No Growth to date    Narrative:       Blood Culture #2    Blood culture (site 1) [[id number]]     Order Status:  Canceled Specimen:  Blood     Blood culture (site 2) [[id number]]     Order Status:  Canceled Specimen:  Blood           Diagnostic Results:  MRI:    1. Examination limited by patient motion artifact.  2. Full-thickness soft tissue ulceration involving the posterior aspect of the distal right lower extremity without focal fluid collection to suggest abscess.  No evidence of underlying osteomyelitis.  3. Edema throughout the soft tissues of the posterior ankle, similar to MRI 04/09/2019, with thickening and enhancement of the overlying skin which may reflect cellulitis.  No discrete abscess seen.  The infectious process/phlegmon appears worsened since prior study.  There is increased involvement of the Achilles tendon which appears irregular along its posteromedial aspect with abnormal signal suggesting infectious Achilles tendinitis with partial tear.  Recommend orthopedic consultation.  This report was flagged in Epic as abnormal.    Clinical Findings:  Ulcer Location: Right posterior distal leg  Measurements:4.8x2.7x0.3  Periwound: viable  Drainage: Serous/slough.  Base:  fibrogranular  Signs of infection: slough,  malodor, probe to achilles tendon.  No pawan purulence, no crepitus or fluctuance.       Assessment/Plan:     Leg ulcer  Shaq Bragg Jr. is a 88 y.o. male with RLE ulcer with superficial infection, no clinical or MRI findings indicating deep abscess or osteo.  Positive probe to achilles with inflamation of achilles noted on MRI  -Wounds dressed with xeroform over achilles tendon,  betadine, 4x4's, kerlix, and ace.  Nursing to do dressing changes daily, orders in.  -Offload with heel protector boots  -prior arterial ultrasound without hemodynamically significant stenosis  -Wound washed at bedside.    -obtained cultures.  -Plan to treat with local wound care and antibiotics for superficial infection.  ID on board, Abx per ID, appreciate recs.      -Podiatry will follow at a distance    Weight bearing status:  WBAT  Offloading device: Darco shoe    DC Instructions:  Patient is to follow up with podiatry within 10 days of discharge.  Home health/facility to do dressing changes every MWF as follows:  Rinse with saline, pat dry, apply xeroform over tendon, apply iodosorb to wound, dress with 4x4's, ABD, Kerlix, and ace.              Chronic CHF (congestive heart failure)  Per primary      Acute renal failure superimposed on stage 3 chronic kidney disease  Per primary      BPH (benign prostatic hyperplasia)  Per primary      Malignant melanoma of right lower leg  Per Oncology        Thank you for your consult. I will follow-up with patient. Please contact us if you have any additional questions.    Aroldo Loco MD  Podiatry  [facility]

## 2019-06-25 NOTE — PLAN OF CARE
[facility]    HOME HEALTH ORDERS  FACE TO FACE ENCOUNTER    Patient Name: Shaq Bragg Jr.  YOB: 1930    PCP: Ata Jeter MD   PCP Address: [address] [place] [address] / [facility] [address]  PCP Phone Number: [phone]  PCP Fax: [phone]    Encounter Date: 06/25/2019    Admit to Home Health    Diagnoses:  Active Hospital Problems    Diagnosis  POA    Leg ulcer [L97.909]  Yes    Acute renal failure superimposed on stage 3 chronic kidney disease [N17.9, N18.3]  Yes    Chronic CHF (congestive heart failure) [I50.9]  Unknown    Stage IV pressure ulcer of right heel [L89.614]  Unknown    Essential hypertension [I10]  Yes    BPH (benign prostatic hyperplasia) [N40.0]  Yes    Lymphedema of right lower extremity [I89.0]  Yes    Malignant melanoma of right lower leg [C43.71]  Yes      Resolved Hospital Problems    Diagnosis Date Resolved POA    *Subacute osteomyelitis of right foot [M86.271] 06/25/2019 Unknown       Future Appointments   Date Time Provider Department Center   6/27/2019  3:45 PM Jsoe Adorno MD [facility] DERMSUR [place]   7/3/2019  1:00 PM Morgan Soni DPM [facility] POD [place]   7/10/2019 10:20 AM Morgan Soni DPM [facility] POD [place]   7/17/2019  1:20 PM Morgan Soni DPM [facility] POD [place]           I have seen and examined this patient face to face today. My clinical findings that support the need for the home health skilled services and home bound status are the following:  Weakness/numbness causing balance and gait disturbance due to Malignancy/Cancer making it taxing to leave home.    Allergies:Review of patient's allergies indicates:  No Known Allergies    Diet: 2 gram sodium diet    Activities: activity as tolerated    Nursing:   SN to complete comprehensive assessment including routine vital signs. Instruct on disease process and s/s of complications to report to MD. Review/verify medication list sent home with the patient at time of  discharge  and instruct patient/caregiver as needed. Frequency may be adjusted depending on start of care date.    Notify MD if SBP > 160 or < 90; DBP > 90 or < 50; HR > 120 or < 50; Temp > 101; Other:         CONSULTS:    Physical Therapy to evaluate and treat. Evaluate for home safety and equipment needs; Establish/upgrade home exercise program. Perform / instruct on therapeutic exercises, gait training, transfer training, and Range of Motion.  Occupational Therapy to evaluate and treat. Evaluate home environment for safety and equipment needs. Perform/Instruct on transfers, ADL training, ROM, and therapeutic exercises.  Aide to provide assistance with personal care, ADLs, and vital signs.      WOUND CARE ORDERS  Wound Care Orders:  yes:  Foot Ulcer:  Location: right heel     Home health/facility to do dressing changes every MWF as follows:  Rinse with saline, pat dry, apply xeroform over tendon, apply iodosorb to wound, dress with 4x4's, ABD, Kerlix, and ace.     Labs:    Please- Follow BMP within 1 week and follow up with Primary care.    Please have the following outpatient labs drawn WEEKLY and faxed to Infectious Diseases clinic at [phone]:  - CBC with diff  - CMP  - ESR  - CRP        Medications: Review discharge medications with patient and family and provide education.      Current Discharge Medication List      START taking these medications    Details   ceFEPIme (MAXIPIME) 2 gram injection Inject 2 g into the vein every 12 (twelve) hours. for 14 days  Qty: 56 g, Refills: 0   Last Dose END date 07/09/2019      CONTINUE these medications which have NOT CHANGED    Details   aspirin (ECOTRIN) 81 MG EC tablet Take 81 mg by mouth every morning.       cadexomer iodine (IODOSORB) 0.9 % gel Apply topically daily as needed for Wound Care.  Qty: 10 g, Refills: 3      finasteride (PROSCAR) 5 mg tablet Take 5 mg by mouth every morning.       furosemide (LASIX) 20 MG tablet  HOLD UNTIL FOLLOW UP with PCP Take  20 mg by mouth every other day.       HYDROcodone-acetaminophen (NORCO) 5-325 mg per tablet Take 1 tablet by mouth every 6 (six) hours as needed for Pain.  Qty: 30 tablet, Refills: 0    Associated Diagnoses: Chronic ulcer of ankle, unspecified laterality, unspecified ulcer stage; Acute right ankle pain      losartan-hydrochlorothiazide 50-12.5 mg (HYZAAR) 50-12.5 mg per tablet  HOLD until FOLLOW WITH PCP TAKE 1 TABLET EVERY DAY      metoprolol tartrate (LOPRESSOR) 50 MG tablet Takes 50 mg in morning and half tablet (25 mg) in evening      potassium chloride (MICRO-K) 8 mEq CpSR  HOLD UNTIL seen by PCP Take 8 mEq by mouth every other day.       simvastatin (ZOCOR) 10 MG tablet Take 10 mg by mouth every evening.      tamsulosin (FLOMAX) 0.4 mg Cp24 Take 0.4 mg by mouth every morning.       polyethylene glycol (GLYCOLAX) 17 gram/dose powder Take 17 g by mouth once daily.  Qty: 3060 g, Refills: 0         STOP taking these medications       sulfamethoxazole-trimethoprim 800-160mg (BACTRIM DS) 800-160 mg Tab Comments:   Reason for Stopping:         senna-docusate 8.6-50 mg (PERICOLACE) 8.6-50 mg per tablet Comments:   Reason for Stopping:               I certify that this patient is confined to his home and needs intermittent skilled nursing care, physical therapy and occupational therapy.

## 2019-06-25 NOTE — PLAN OF CARE
Problem: Adult Inpatient Plan of Care  Goal: Plan of Care Review  Outcome: Ongoing (interventions implemented as appropriate)  Patient is AAOx4, up independently, fall precautions maintained. Wife at bedside. Patient c/o pain, PRN pain meds given. PICC consult placed today. Accucheck AC/HS completed with no coverage given.  Patient voiding in urinal. Bed locked in lowest position. Side rails x2. Non -skid footwear on. No falls or injuries this shift. VSS. Afebrile. Will continue to monitor.

## 2019-06-25 NOTE — PHARMACY MED REC
"Admission Medication Reconciliation - Pharmacy Consult Note    The home medication history was taken by Azeb Reyes, student pharmacist.  Based on information gathered and subsequent review by the clinical pharmacist, the items below may need attention.     You may go to "Admission" then "Reconcile Home Medications" tabs to review and/or act upon these items.     Potentially problematic discrepancies with current MAR  o Patient IS taking the following which was not ordered upon admit  o Furosemide 20 mg 1 tab by mouth once daily  o Potassium Chloride 8 mEq 1 cap by mouth every other day      Please address this information as you see fit.  Feel free to contact us if you have any questions or require assistance.    Azeb Reyes  EXT [phone]                .    .          "

## 2019-06-25 NOTE — HPI
Mr. Bragg is a 88 years old  male with spinal stenosis (L3-L4), R leg malignant melanoma (stage IIIB dx 3/2017 s/p excisional biopsy and MOS with SLN biopsy/resection of 2 positive nodes 4/2017 with subsequent lymphedema), recently diagnosed R leg SCC (s/p local resection; PET 6/2019 with uptake in the T11 spine, RLE, and R groin consistent with metastatic disease; s/p nivolumab x3 in 5/2019), and R heel ulcer presents with progressive worsening of right heel ulceration (exposing achilles tendon) with associated circumferential ulceration of lateral and medial malleolus of right lower foot.      Patient has chronic wounds of right lower extremity, had prior admission on 04/08 for similar complaint, had wound cultures positive for MSSA and pseudomonas aeruginosa (04/09/19) and was discharged home after completing 14 day course of ciprofloxacin and cefadroxil. After the discharge, patient continued to have home health care visits for wound care and he continued to follow up with podiatry as outpatient. His last visit home health nurse noticed that the wound smelled different one week ago. Pt saw his podiatrist one week ago and started on Bactrim three days ago. He states that the antibiotics have not changed the pain or the infection. Pt has noticed increased drainage; serous and some pus from the ankle wounds, and increased foul smelling odor. Patient denies any systemic complaints such as fever, chills, or night sweats. Denies any severe pain at the site, states only that it makes his compression stockings uncomfortable. Pt lives in [facility] with his spouse, denies any sick contacts, denies recent travel, no pets.         Oncologic History: Pt initially presented with a lesion on his right proximal calf in March 2017.  He underwent a biopsy on 3/21/17 with pathology showing a 1.4mm malignant melanoma Alexis level IV, no ulceration, perineural invasion seen, mitotic rate 6/mm2.  The patient then  underwent  a MOS procedure on 4/18/17 and right inguinal LN biopsy on 4/18/17 under the care of Dr Joelle Pardo at [facility].  The pathology from the procedure initially showed no residual melanoma and no LN involvement ; however, on reevaluation showed microscopic mets in 2 sentinel LNs.  The patient established care with Medical Oncologist Dr Olivier Mchugh.  The surgical oncologist Dr. Alatorre offered Lymphadenectomy on 5/24/17; however, the patient elected to have observation.  PET/CT was performed on 1/04/18 showing no evidence of residual or recurrent disease.  On 10/16/18 the patient called Dr Holland office with complaint of 2 lumps in the right leg.  PET/CT was performed on 10/30/19 showing 2 new hypermetabolic lymph nodes right groin with the index lymph node lateral SUV max 30.02.  The patient had an appointment with Dr Alatorre on 10/30/19 at which point it was decided to proceed with a completion lymphadenectomy.   An MRI of the brain was performed on 11/03/18 showing no evidence for intracranial metastatic disease but did show a 4mm T1 hyperintense occipital calvarial focus which was read as likely benign.  The patient underwent completion LAD on 11/12/18 with pathology showing ¾ LNs positive for metastatic melanoma with the largest deposit being 2.4cm an positive extranodal extension. Currently the patient complains of chronic lower back pain for which he underwent an MRI of the lumbar spine on 11/03/18 which showed severe central canal stenosis related to a disk bulge at L3-L4.  The patient endorses some LE weakness on occasion.  He denies bowel or bladder incontinence.  The patient states he sees a deramtologist semi annually Dr Sun at [facility] with last clinic visit reportedly several months ago.  The patient underwent a PET/CT on 2/15/19 showing a focus of increased tracer uptake within the T 11 vertebral body with max SUV of 4.1 that was not visualized on prior exam, increased radiotracer  uptake in the right groin max SUV 5.3 and a subcutaneous focus of increased tracer uptake within the anterior portion of the distal right leg showing a max SUV of 3.7.   The patient was seen by dermatology on 2/22/19 and underwent a shave biopsy of the right lower leg lesion showing SCC.  On 3/12/19 the patient contacted our office stating he had a large amount of BRBPR.  The patient was seen in the ER on 3/12/19 with relatively stable hemoglobin and was deferred for outpatient GI evaluation.  On 3/25/19 Dr. Bowman in derm decided to postpone a Mohs procedure for SCC of the right lower leg until the swelling in the right leg had decreased.  The patient was seen on 3/27/19 by wound care for an ulcer on his right heel at which point recommendations were made for bandaging of the right heel.  The patient saw GI on 3/27/19 at which point the patient decided not to pursue a colonoscopy.   The patient missed his scheduled treatment with Nivolumab on 4/08/19 after being admitted to the hospital from 4/08/19-4/11/19 for a stage IV ulcer of the right heel with surrounding cellulitis. Cycle 3 received on 05/27/19. Repeat PET/CT 6/03/19: New hypermetabolic focus in the proximal left upper extremity. Patient was scheduled for cycle 4 today (06/24) Nivolumab; but did not receive chemo and was sent to ED from Doctor's office for acute infection of right foot.

## 2019-06-25 NOTE — ASSESSMENT & PLAN NOTE
Malignant Melanoma of the right leg Stage IIIB -    The patient has a h/o melanoma of the left leg originally diagnosed in 2017  -Lymphadenectomy showed two positive LN's  -Patient's tumor is BRAF [id number] wild type  -MRI of the brain performed at [facility] showed no evidence of metastatic disease  -PET/CT on 2/05/19 showed uptake in the T11 spine, right lower leg and right groin  -Recent skin biopsy showed SCC  -Pt is s/p 2 cycles of nivolumab  -Cycle 2 delayed due to admission for cellulitis and stage IV right heel ulcer  -Cycle 3 received on 05/27/19  -Repeat PET/CT 6/03/19: New hypermetabolic focus in the proximal left upper extremity.    - Patient was scheduled for cycle 4 today (06/24) Nivolumab; but did not receive chemo and was sent to ED from Doctor's office for acute infection    Plan:  - Consider Med Oncology follow up upon discharge to resume chemotherapy once active infection resolves

## 2019-06-25 NOTE — ASSESSMENT & PLAN NOTE
patient has a stage IV ulcer on the right heel currently being managed by podiatry and home health  -wound care consulted; appreciate [facility]  - routine wound care

## 2019-06-25 NOTE — CONSULTS
Wound care  Consult for foot wound. Podiatry has been consulted as they follow patient in clinic.   Discussed with Dr Reyna and wound care will sign off.     Please reconsult if we can be of further assistance.     Madison Dale RN CN [facility]   [phone]

## 2019-06-25 NOTE — HPI
89 y/o M h/o spinal stenosis,  R leg melanoma stage IIIB 2017 s/p excisional biopsy and MOS with SLN biopsy/resection of 2 positive nodes 4/2017 with subsequent lymphedema),  (s/p local resection; PET 6/2019 with uptake in the T11 spine, RLE, and R groin consistent with metastatic disease; s/p nivolumab x3 in 5/2019), and R heel ulcer, recently admitted 4/8 with progressive R heall ulceration exposing achilles tendon with associated surrounding cellulitis, MRI with no osteo, tissue culture taken  podiatry grew MSSA and PsA at that time treated with cefadroxil and cipro seen 6/18 in podinow readmitted 6/24 with worsening purulent drainage from ankle wounds with increased foul odor without systemic signs or symptoms of infection

## 2019-06-25 NOTE — ASSESSMENT & PLAN NOTE
88 years old male with chronic right foot infection on chronic antibiotics, completed ciprofloxacin course just on day of admission (06/24/19) presents from Dr. Villatoro office for worsening right foot infection. Noticed increased drainage, foul odor and tenderness at the wound for the past 2-3 days.  -Follows wound care and podiatry chronically  - was seen at Dr. Villatoro (Oncologist) office in am for chemotherapy for underlying melanoma of lower extremity of infected leg  (at another site) with metastasis to inguinal lymph nodes.   - Did not receive chemo in am because of the underlying infection  - Denies fever, chills, palpations, dizziness, nausea, vomiting, abdominal pain, neck pain, extremity tenderness  - , and ESR 49 consistent with subacute/acute infection  - afebrile, no leukocyte count    Plan:  - Patient received antibiotics in the ED: Vanc and Zosyn (Finished Cipro course with worsening of the wound and has history of pseudomonas a and staph aureus in previous wound cultures 04/09/19)  - will continue empiric coverage with dose adjusted Vanc and Zosyn  - MRI ordered (results pending)  - podiatry consult; infectious disease consult; reccs appreciated  - cbc in am

## 2019-06-25 NOTE — CONSULTS
[facility]  Infectious Disease  Consult Note    Patient Name: Shaq Bragg Jr.  MRN: [id number]  Admission Date: 6/24/2019  Hospital Length of Stay: 1 days  Attending Physician: Romi Reyna MD  Primary Care Provider: Ata Jeter MD     Isolation Status: No active isolations    Patient information was obtained from patient and past medical records.      Inpatient consult to Infectious Diseases  Consult performed by: Caesar Mir MD  Consult ordered by: Romi Reyna MD  Reason for consult: wound infection        Assessment/Plan:     Pseudomonas infection  87 y/o M h/o spinal stenosis,  R leg melanoma stage IIIB 2017 s/p excisional biopsy and MOS with SLN biopsy/resection of 2 positive nodes 4/2017 with subsequent lymphedema),  (s/p local resection; PET 6/2019 with uptake in the T11 spine, RLE, and R groin consistent with metastatic disease; s/p nivolumab x3 in 5/2019), and R heel ulcer, recently admitted 4/8 with progressive R heall ulceration exposing achilles tendon with associated surrounding cellulitis, MRI with no osteo, tissue culture taken  podiatry grew MSSA and PsA at that time treated with cefadroxil and cipro seen 6/18 in podiatry with cultures again growing PsA and MSSA now readmitted 6/24 with worsening purulent drainage from ankle wounds with increased foul odor without systemic signs or symptoms of infection, MRI negative for abscess or osteomyeltitis  - based on 6/18 cultures would stop vanco, pip/tazo and start cefepime, piptazo based on MATILDA is resistant  - recommend 2 week course of cefepime 2g q12 hours  - please call back if current cultures grow different organisms    Patient will be discharged on cefepime IV q12 hours for 14 days, estimated end of therapy date 7/9/19    Please have the following outpatient labs drawn WEEKLY and faxed to Infectious Diseases clinic at [phone]:  - CBC with diff  - CMP  - ESR  - CRP      We will arrange for a follow-up  appointment in Infectious Diseases clinic in 2 weeks.    Prior to discharge, please ensure the patient's follow-up has been scheduled.  If there is still no follow-up scheduled in Infectious Diseases clinic, please send an EPIC message to the Infectious Diseases charge nurse Ariela Gregorio.                Thank you for your consult. I will sign off. Please contact us if you have any additional questions.    Caesar Mir MD  Infectious Disease  [facility]    Subjective:     Principal Problem: Subacute osteomyelitis of right foot    HPI: 89 y/o M h/o spinal stenosis,  R leg melanoma stage IIIB 2017 s/p excisional biopsy and MOS with SLN biopsy/resection of 2 positive nodes 4/2017 with subsequent lymphedema),  (s/p local resection; PET 6/2019 with uptake in the T11 spine, RLE, and R groin consistent with metastatic disease; s/p nivolumab x3 in 5/2019), and R heel ulcer, recently admitted 4/8 with progressive R heall ulceration exposing achilles tendon with associated surrounding cellulitis, MRI with no osteo, tissue culture taken  podiatry grew MSSA and PsA at that time treated with cefadroxil and cipro seen 6/18 in podinow readmitted 6/24 with worsening purulent drainage from ankle wounds with increased foul odor without systemic signs or symptoms of infection    Past Medical History:   Diagnosis Date    COPD (chronic obstructive pulmonary disease)     H/O asbestosis     Hearing loss     Hypercholesterolemia     Hypertension     Malignant melanoma of right lower leg 5/11/2017    Melanoma     Approx. 2 years ago     Skin cancer     Squamous cell carcinoma        Past Surgical History:   Procedure Laterality Date    ADENOIDECTOMY      APPENDECTOMY      Carotid Stenosis Surgery      CHOLECYSTECTOMY  1965    LYMPHADENECTOMY, INGUINAL, Sartorious Flap Right 11/12/2018    Performed by Yair Alatorre MD at [facility] OR [address] [place] [address]    TONSILLECTOMY         Review of patient's allergies  indicates:  No Known Allergies    Medications:  Medications Prior to Admission   Medication Sig    aspirin (ECOTRIN) 81 MG EC tablet Take 81 mg by mouth every morning.     cadexomer iodine (IODOSORB) 0.9 % gel Apply topically daily as needed for Wound Care.    finasteride (PROSCAR) 5 mg tablet Take 5 mg by mouth every morning.     furosemide (LASIX) 20 MG tablet Take 20 mg by mouth every other day.     HYDROcodone-acetaminophen (NORCO) 5-325 mg per tablet Take 1 tablet by mouth every 6 (six) hours as needed for Pain.    losartan-hydrochlorothiazide 50-12.5 mg (HYZAAR) 50-12.5 mg per tablet TAKE 1 TABLET EVERY DAY    metoprolol tartrate (LOPRESSOR) 50 MG tablet Takes 50 mg in morning and half tablet (25 mg) in evening    potassium chloride (MICRO-K) 8 mEq CpSR Take 8 mEq by mouth every other day.     simvastatin (ZOCOR) 10 MG tablet Take 10 mg by mouth every evening.    tamsulosin (FLOMAX) 0.4 mg Cp24 Take 0.4 mg by mouth every morning.     [DISCONTINUED] sulfamethoxazole-trimethoprim 800-160mg (BACTRIM DS) 800-160 mg Tab Take 1 tablet by mouth 2 (two) times daily.    polyethylene glycol (GLYCOLAX) 17 gram/dose powder Take 17 g by mouth once daily.     Antibiotics (From admission, onward)    Start     Stop Route Frequency Ordered    06/25/19 1530  ceFEPIme injection 2 g      -- IV Every 12 hours (non-standard times) 06/25/19 1422        Antifungals (From admission, onward)    None        Antivirals (From admission, onward)    None             There is no immunization history on file for this patient.    Family History     Problem Relation (Age of Onset)    Cancer Father    Coronary artery disease Mother    Diabetes Sister    Esophageal cancer Father    No Known Problems Maternal Aunt, Maternal Uncle, Paternal Aunt, Paternal Uncle, Maternal Grandmother, Maternal Grandfather, Paternal Grandmother, Paternal Grandfather        Social History     Socioeconomic History    Marital status:      Spouse  name: Not on file    Number of children: Not on file    Years of education: Not on file    Highest education level: Not on file   Occupational History    Not on file   Social Needs    Financial resource strain: Not on file    Food insecurity:     Worry: Not on file     Inability: Not on file    Transportation needs:     Medical: Not on file     Non-medical: Not on file   Tobacco Use    Smoking status: Former Smoker     Packs/day: 1.00     Years: 15.00     Pack years: 15.00     Types: Cigarettes     Last attempt to quit: 1965     Years since quittin.5    Smokeless tobacco: Never Used   Substance and Sexual Activity    Alcohol use: No     Frequency: Never    Drug use: No    Sexual activity: Never   Lifestyle    Physical activity:     Days per week: Not on file     Minutes per session: Not on file    Stress: Not on file   Relationships    Social connections:     Talks on phone: Not on file     Gets together: Not on file     Attends Latter-day service: Not on file     Active member of club or organization: Not on file     Attends meetings of clubs or organizations: Not on file     Relationship status: Not on file   Other Topics Concern    Not on file   Social History Narrative    Not on file     Review of Systems   Constitutional: Negative for activity change, appetite change, chills, fatigue and fever.   HENT: Negative for congestion, dental problem, mouth sores and sinus pressure.    Eyes: Negative for pain, redness and visual disturbance.   Respiratory: Negative for cough, shortness of breath and wheezing.    Cardiovascular: Negative for chest pain and leg swelling.   Gastrointestinal: Negative for abdominal distention, abdominal pain, diarrhea, nausea and vomiting.   Endocrine: Negative for polyuria.   Genitourinary: Negative for decreased urine volume, dysuria and frequency.   Musculoskeletal: Positive for joint swelling.   Skin: Positive for wound. Negative for color change.    Allergic/Immunologic: Negative for food allergies.   Neurological: Negative for dizziness, weakness and headaches.   Hematological: Negative for adenopathy.   Psychiatric/Behavioral: Negative for agitation and confusion. The patient is not nervous/anxious.      Objective:     Vital Signs (Most Recent):  Temp: 98 °F (36.7 °C) (06/25/19 1514)  Pulse: 63 (06/25/19 1514)  Resp: 16 (06/25/19 1514)  BP: (!) 114/57 (06/25/19 1514)  SpO2: 95 % (06/25/19 1514) Vital Signs (24h Range):  Temp:  [97.6 °F (36.4 °C)-98.1 °F (36.7 °C)] 98 °F (36.7 °C)  Pulse:  [54-80] 63  Resp:  [11-23] 16  SpO2:  [93 %-99 %] 95 %  BP: (114-153)/(57-71) 114/57     Weight: 78.5 kg (173 lb)  Body mass index is 23.46 kg/m².    Estimated Creatinine Clearance: 43.1 mL/min (based on SCr of 1.3 mg/dL).    Physical Exam   Constitutional: He is oriented to person, place, and time. He appears well-developed and well-nourished.   HENT:   Head: Normocephalic and atraumatic.   Mouth/Throat: Oropharynx is clear and moist.   Eyes: Conjunctivae are normal.   Neck: Neck supple.   Cardiovascular: Normal rate, regular rhythm and normal heart sounds.   No murmur heard.  Pulmonary/Chest: Effort normal and breath sounds normal. No respiratory distress. He has no wheezes.   Abdominal: Soft. Bowel sounds are normal. He exhibits no distension. There is no tenderness.   Musculoskeletal: Normal range of motion. He exhibits no edema or tenderness.   Lymphadenopathy:     He has no cervical adenopathy.   Neurological: He is alert and oriented to person, place, and time. Coordination normal.   Skin: Skin is warm and dry. No rash noted.   RLE edema, erythema, wounds   Psychiatric: He has a normal mood and affect. His behavior is normal.               Significant Labs:   CBC:   Recent Labs   Lab 06/24/19  1243 06/24/19  1540 06/25/19  0341   WBC 8.27 7.14 7.05   HGB 13.0* 14.2 12.1*   HCT 39.7* 41.3 37.2*    200 169     CMP:   Recent Labs   Lab 06/24/19  124  06/24/19  1540 06/25/19  0341   * 135* 134*   K 4.5 4.2 4.3   CL 99 99 102   CO2 30* 26 18*    104 87   BUN 15 14 14   CREATININE 1.5* 1.5* 1.3   CALCIUM 9.4 9.9 8.8   PROT 6.8 7.4  --    ALBUMIN 3.1* 3.3*  --    BILITOT 0.8 0.8  --    ALKPHOS 105 113  --    AST 24 26  --    ALT 19 20  --    ANIONGAP 6* 10 14   EGFRNONAA 41.0* 41.0* 48.7*       Significant Imaging: I have reviewed all pertinent imaging results/findings within the past 24 hours.

## 2019-06-25 NOTE — ASSESSMENT & PLAN NOTE
Currently controlled; /68 and HR 61 bpm on arrival    Plan:  Will resume home medications:  Metoprolol 50 mg in am daily; 25 mg in pm daily  Will resume losartan-HCTZ 50-12.5 mg combination  Goal /80 mmHg  Low sodium diet

## 2019-06-25 NOTE — ASSESSMENT & PLAN NOTE
The patient has chronic lymphedema s/p lymphadenectomy.  -Edema may be contributing to poor wound healing of the right heel ulcer  -wound care consulted; will appreciate assistance with wound wrapping once acute infection resolves

## 2019-06-25 NOTE — ED NOTES
Shaq Bragg Jr., a 88 y.o. male presents to the ED via personal transportation with CC patient was sent from oncology for increased drainage, swelling and smell from right achillis wound.  Patient has foul odor from foot wound that has cast padding.        Patient identifiers verified verbally with patient and correct for Shaq Bragg Jr..    LOC/ APPEARANCE: The patient is AAOx4. Pt is speaking appropriately, no slurred speech. Pt changed into hospital gown. Continuous cardiac monitor, cont pulse ox, and auto BP cuff applied to patient. Pt is clean and well groomed. No JVD visible. Pt reports pain level of 7 to right leg.  . Pt updated on POC. Bed low and locked with side rails up x2, call bell in pt reach.  SKIN: Patient has wound to right lower leg, hx of graphing to right achillis tendon, with swelling, dry skin ,and weekly wound care.  Noted right lower leg is swollen with extremely dry skin.  Capillary refill <3 seconds.   RESPIRATORY: Airway is open and patent. Respirations-spontaneous, unlabored, regular rate, equal bilaterally on inspiration and expiration. No accessory muscle use noted. Lungs clear to auscultation in all fields bilaterally anterior and posterior.   CARDIAC: Patient has regular heart rate.  No peripheral edema noted, and patient has no c/o chest pain. Peripheral pulses present equal and strong throughout.  ABDOMEN: Soft and non-tender to palpation with no distention noted. Normoactive bowel sounds x4 quadrants. Pt has no complaints of abnormal bowel movements, denies blood in stool. Pt reports normal appetite.   NEUROLOGIC: Eyes open spontaneously and facial expression symmetrical. Pt behavior appropriate to situation, and pt follows commands. Pt reports sensation present in all extremities when touched with a finger, denies any numbness or tingling. PERRLA  MUSCULOSKELETAL: Spontaneous movement noted to all extremities. Hand  equal and leg strength strong +5  bilaterally.   : No complaints of frequency, burning, urgency or blood in the urine. No complaints of incontinence.

## 2019-06-25 NOTE — MEDICAL/APP STUDENT
[facility] Medicine  Progress Note    Patient Name: Shaq Bragg Jr.  MRN: [id number]  Patient Class: IP- Inpatient   Admission Date: 6/24/2019  Length of Stay: 1 days  Attending Physician: Romi Reyna MD  Primary Care Provider: Ata Jeter MD    [facility] Medicine Team: [facility]    Subjective:     Principal Problem:Subacute osteomyelitis of right foot    HPI: Mr. Bragg is an 88 y.o. Male who presents with infection to a right lower leg wound. He has a past medical history of malignant melanoma of the right lower leg, hypertension, H/O asbestosis, hypercholesterolemia, melanoma, hearing loss, squamous cell carcinoma and COPD.     Patient presented to podiatrist 3 days ago and received Bactrim for his right lower leg wound. He states there have been no change since starting antibiotics and states that drainage has increased from wound. After seeing his oncologist was sent to the hospital for concerns regarding the smell of the wound.     Patient states his right lower leg wound has been bothering him for 3-4 months to the point it has become difficult to walk. After the patients surgery in November 2018 for melanoma, the patient experienced issues with fluid drainage and was sent to wound care in January 2019. Wound care wrapped his legs which the patient states began to cut into his leg and led to the current wound. Starting in March/April the wound became worse and he has had continued pain and drainage since then. Patient was also hospitalized for an infection in his right lower leg in April.     Hospital Course: Patient has been given Vancomycin and Zosyn for his wound infection. Podiatry has been consulted to assess the wound. Infectious disease team has also been consulted with regards to antibiotic suggestions given Mr. Bragg's immunocompromised state. An MRI has been completed to assess the depth of the wound which stated that the ulceration  was full-thickness overlying the Achilles tendon and that edema was seen throughout the soft tissue.     Interval History: Patient states that his leg still hurts to put pressure on and he hasn't noticed any changes. Patient remains on antibiotics awaiting infectious disease consult to determine if antibiotics need to be altered.     Review of Systems   Constitutional: Negative for fever.   HENT: Positive for hearing loss.    Respiratory: Negative for cough, shortness of breath and wheezing.    Gastrointestinal: Negative for abdominal pain, constipation, diarrhea, nausea and vomiting.   Skin:        Right lower leg has wound, lower limb is red, swollen and dry   Neurological: Negative for dizziness, weakness and headaches.     Objective:     Vital Signs (Most Recent):  Temp: 97.7 °F (36.5 °C) (06/25/19 1141)  Pulse: 62 (06/25/19 1141)  Resp: 16 (06/25/19 1141)  BP: 129/60 (06/25/19 1141)  SpO2: 96 % (06/25/19 1141) Vital Signs (24h Range):  Temp:  [97.5 °F (36.4 °C)-98.1 °F (36.7 °C)] 97.7 °F (36.5 °C)  Pulse:  [54-80] 62  Resp:  [11-23] 16  SpO2:  [93 %-99 %] 96 %  BP: (128-153)/(60-71) 129/60     Weight: 78.5 kg (173 lb)  Body mass index is 23.46 kg/m².    Intake/Output Summary (Last 24 hours) at 6/25/2019 1341  Last data filed at 6/25/2019 1304  Gross per 24 hour   Intake 890 ml   Output 400 ml   Net 490 ml      Physical Exam   Constitutional: He is oriented to person, place, and time. He appears well-developed.   Eyes: Pupils are equal, round, and reactive to light.   Cardiovascular: Normal rate, regular rhythm, normal heart sounds and intact distal pulses.   Pulmonary/Chest: Effort normal.   Breath sounds are mildly decreased    Abdominal: Soft. Bowel sounds are normal. There is no tenderness.   Neurological: He is alert and oriented to person, place, and time.   Skin: Skin is warm and dry.   Erythema of the right lower leg   Psychiatric: He has a normal mood and affect. His behavior is normal.        Significant Labs: Patients RBC count is decreased and his Hemoglobin an hematocrit are trending downwards. The patients sodium is trending slightly low (134). The patients eGFR is also low, but trending upward (47.4 to 56.3). The creatinine is trending down 1.5 to 1.3.    Significant Imaging: MRI demonstrated a full-thickness ulceration overlying the Achilles tendon with edema through out the soft tissue.     Assessment/Plan:      Active Diagnoses:    Diagnosis Date Noted POA    Leg ulcer [L97.909] 06/25/2019 Yes    Acute renal failure superimposed on stage 3 chronic kidney disease [N17.9, N18.3] 06/24/2019 Yes    Chronic CHF (congestive heart failure) [I50.9] 06/24/2019 Unknown    Stage IV pressure ulcer of right heel [L89.614] 06/24/2019 Unknown    Essential hypertension [I10] 04/08/2019 Yes    BPH (benign prostatic hyperplasia) [N40.0] 04/08/2019 Yes    Lymphedema of right lower extremity [I89.0] 02/05/2019 Yes    Malignant melanoma of right lower leg [C43.71] 05/11/2017 Yes      Problems Resolved During this Admission:    Diagnosis Date Noted Date Resolved POA    PRINCIPAL PROBLEM:  Subacute osteomyelitis of right foot [M86.271] 06/24/2019 06/25/2019 Unknown     VTE Risk Mitigation (From admission, onward)        Ordered     IP VTE HIGH RISK PATIENT  Once      06/24/19 1848         1. Infection   - Continue Vancomycin and Zosyn   - Consult infectious disease for sensitivity and potential change in antibiotic  - Consult Podiatry for wound assessment    2. Pain Management  - Provide acetaminophen PRN   3. Oncology  - cease immunotherapy while in hospital   4. COPD  - continue home med of Ipratropium   - monitor for symptom exacerbation   5. Hypertension   - Continue home med of Losartan and Metoprolol  - Monitor blood pressure   6. Hypercholesterolemia  - Continue home med Simvastatin       Cata Lazo MS3  Department of [facility] Medicine   [facility]

## 2019-06-25 NOTE — ASSESSMENT & PLAN NOTE
Patient is on lasix 20 mg daily for lymphedema  Patient has never been told off CHF; currently denies chest pain or dyspnea with rest or exertion  CXR consistent with bilateral infiltrates, cardiomegaly and b/l pleural effusion  EKG: Sinus belkis 60 bpm    Plan:  - GDMT as indicated: Metoprolol, losartan  - continue home asa 81 and simvastatin 20 mg daily  - will resume home lasix 20 mg as blood pressure tolerates PRN ( and as CHANTEL resolves)  - Follow up echocardiogram (pending)

## 2019-06-25 NOTE — CONSULTS
Single lumen 18g x 8cm midline placed to -rt brachialvein.  Max dwell date 7/24/19-, Lot# [id number].  Needle advance into the vessel under real time ultrasound guidance.  Image recorded and saved.

## 2019-06-25 NOTE — NURSING
Pt admitted around 0030. VSS, afebrile. Pt oriented to room. Call bell in reach. Will continue to monitor.

## 2019-06-25 NOTE — PLAN OF CARE
"CM met with patient to complete the discharge planning assessment. Patient was given the [facility] " My Health Packet" and CM placed name and phone number on the whiteboard. After reviewing packet, patient expressed understanding of the discharge process.            Ata Jeter MD   [address] / [place] [address]       [facility] - [place], [place] - [address]  [address]  [place] [address]  Phone: [phone] Fax: [phone]    Payor: [employer] MEDICARE / Plan: [employer] MEDICARE HMO / Product Type: Capitation /               06/25/19 1517   Discharge Assessment   Assessment Type Discharge Planning Assessment   Confirmed/corrected address and phone number on facesheet? Yes   Assessment information obtained from? Patient   Expected Length of Stay (days) 3   Communicated expected length of stay with patient/caregiver yes   Prior to hospitilization cognitive status: Alert/Oriented   Prior to hospitalization functional status: Independent   Current cognitive status: Alert/Oriented   Current Functional Status: Independent   Lives With spouse   Able to Return to Prior Arrangements yes   Is patient able to care for self after discharge? Yes   Who are your caregiver(s) and their phone number(s)? Jennifer Bragg (spouse) [phone]   Patient's perception of discharge disposition home health   Readmission Within the Last 30 Days no previous admission in last 30 days   Patient currently being followed by outpatient case management? No   Patient currently receives any other outside agency services? No   Equipment Currently Used at Home none   Do you have any problems affording any of your prescribed medications? No   Is the patient taking medications as prescribed? yes   Does the patient have transportation home? Yes   Transportation Anticipated car, drives self   Does the patient receive services at the Coumadin Clinic? No   Discharge Plan A Home Health  ([facility])   DME " Needed Upon Discharge  none   Patient/Family in Agreement with Plan yes

## 2019-06-25 NOTE — ASSESSMENT & PLAN NOTE
89 y/o M h/o spinal stenosis,  R leg melanoma stage IIIB 2017 s/p excisional biopsy and MOS with SLN biopsy/resection of 2 positive nodes 4/2017 with subsequent lymphedema),  (s/p local resection; PET 6/2019 with uptake in the T11 spine, RLE, and R groin consistent with metastatic disease; s/p nivolumab x3 in 5/2019), and R heel ulcer, recently admitted 4/8 with progressive R heall ulceration exposing achilles tendon with associated surrounding cellulitis, MRI with no osteo, tissue culture taken  podiatry grew MSSA and PsA at that time treated with cefadroxil and cipro seen 6/18 in podiatry with cultures again growing PsA and MSSA now readmitted 6/24 with worsening purulent drainage from ankle wounds with increased foul odor without systemic signs or symptoms of infection, MRI negative for abscess or osteomyeltitis  - based on 6/18 cultures would stop vanco, pip/tazo and start cefepime, piptazo based on MATILDA is resistant  - recommend 2 week course of cefepime 2g q12 hours  - please call back if current cultures grow different organisms    Patient will be discharged on cefepime IV q12 hours for 14 days, estimated end of therapy date 7/9/19    Please have the following outpatient labs drawn WEEKLY and faxed to Infectious Diseases clinic at [phone]:  - CBC with diff  - CMP  - ESR  - CRP      We will arrange for a follow-up appointment in Infectious Diseases clinic in 2 weeks.    Prior to discharge, please ensure the patient's follow-up has been scheduled.  If there is still no follow-up scheduled in Infectious Diseases clinic, please send an EPIC message to the Infectious Diseases charge nurse Ariela Gregorio.

## 2019-06-26 VITALS
HEIGHT: 72 IN | WEIGHT: 171.94 LBS | BODY MASS INDEX: 23.29 KG/M2 | RESPIRATION RATE: 17 BRPM | DIASTOLIC BLOOD PRESSURE: 61 MMHG | OXYGEN SATURATION: 96 % | TEMPERATURE: 97 F | HEART RATE: 60 BPM | SYSTOLIC BLOOD PRESSURE: 128 MMHG

## 2019-06-26 LAB
ANION GAP SERPL CALC-SCNC: 11 MMOL/L (ref 8–16)
ANION GAP SERPL CALC-SCNC: 8 MMOL/L (ref 8–16)
BASOPHILS # BLD AUTO: 0.04 K/UL (ref 0–0.2)
BASOPHILS # BLD AUTO: 0.06 K/UL (ref 0–0.2)
BASOPHILS NFR BLD: 0.7 % (ref 0–1.9)
BASOPHILS NFR BLD: 0.7 % (ref 0–1.9)
BILIRUB UR QL STRIP: NEGATIVE
BUN SERPL-MCNC: 14 MG/DL (ref 8–23)
BUN SERPL-MCNC: 15 MG/DL (ref 8–23)
CALCIUM SERPL-MCNC: 9.1 MG/DL (ref 8.7–10.5)
CALCIUM SERPL-MCNC: 9.5 MG/DL (ref 8.7–10.5)
CHLORIDE SERPL-SCNC: 100 MMOL/L (ref 95–110)
CHLORIDE SERPL-SCNC: 99 MMOL/L (ref 95–110)
CLARITY UR REFRACT.AUTO: CLEAR
CO2 SERPL-SCNC: 23 MMOL/L (ref 23–29)
CO2 SERPL-SCNC: 29 MMOL/L (ref 23–29)
COLOR UR AUTO: YELLOW
CREAT SERPL-MCNC: 1.5 MG/DL (ref 0.5–1.4)
CREAT SERPL-MCNC: 1.6 MG/DL (ref 0.5–1.4)
CREAT UR-MCNC: 98 MG/DL (ref 23–375)
DIFFERENTIAL METHOD: ABNORMAL
DIFFERENTIAL METHOD: ABNORMAL
EOSINOPHIL # BLD AUTO: 0.2 K/UL (ref 0–0.5)
EOSINOPHIL # BLD AUTO: 0.4 K/UL (ref 0–0.5)
EOSINOPHIL NFR BLD: 3.8 % (ref 0–8)
EOSINOPHIL NFR BLD: 4.4 % (ref 0–8)
ERYTHROCYTE [DISTWIDTH] IN BLOOD BY AUTOMATED COUNT: 13.1 % (ref 11.5–14.5)
ERYTHROCYTE [DISTWIDTH] IN BLOOD BY AUTOMATED COUNT: 14.2 % (ref 11.5–14.5)
EST. GFR  (AFRICAN AMERICAN): 43.8 ML/MIN/1.73 M^2
EST. GFR  (AFRICAN AMERICAN): 47.4 ML/MIN/1.73 M^2
EST. GFR  (NON AFRICAN AMERICAN): 37.9 ML/MIN/1.73 M^2
EST. GFR  (NON AFRICAN AMERICAN): 41 ML/MIN/1.73 M^2
GLUCOSE SERPL-MCNC: 103 MG/DL (ref 70–110)
GLUCOSE SERPL-MCNC: 107 MG/DL (ref 70–110)
GLUCOSE UR QL STRIP: NEGATIVE
HCT VFR BLD AUTO: 35.5 % (ref 40–54)
HCT VFR BLD AUTO: 39.8 % (ref 40–54)
HGB BLD-MCNC: 11.7 G/DL (ref 14–18)
HGB BLD-MCNC: 13.3 G/DL (ref 14–18)
HGB UR QL STRIP: ABNORMAL
IMM GRANULOCYTES # BLD AUTO: 0.02 K/UL (ref 0–0.04)
IMM GRANULOCYTES # BLD AUTO: 0.03 K/UL (ref 0–0.04)
IMM GRANULOCYTES NFR BLD AUTO: 0.3 % (ref 0–0.5)
IMM GRANULOCYTES NFR BLD AUTO: 0.4 % (ref 0–0.5)
KETONES UR QL STRIP: NEGATIVE
LEUKOCYTE ESTERASE UR QL STRIP: NEGATIVE
LYMPHOCYTES # BLD AUTO: 0.8 K/UL (ref 1–4.8)
LYMPHOCYTES # BLD AUTO: 1.4 K/UL (ref 1–4.8)
LYMPHOCYTES NFR BLD: 13.7 % (ref 18–48)
LYMPHOCYTES NFR BLD: 16.9 % (ref 18–48)
MAGNESIUM SERPL-MCNC: 2.1 MG/DL (ref 1.6–2.6)
MCH RBC QN AUTO: 31.5 PG (ref 27–31)
MCH RBC QN AUTO: 31.8 PG (ref 27–31)
MCHC RBC AUTO-ENTMCNC: 33 G/DL (ref 32–36)
MCHC RBC AUTO-ENTMCNC: 33.4 G/DL (ref 32–36)
MCV RBC AUTO: 94 FL (ref 82–98)
MCV RBC AUTO: 97 FL (ref 82–98)
MICROSCOPIC COMMENT: NORMAL
MONOCYTES # BLD AUTO: 0.5 K/UL (ref 0.3–1)
MONOCYTES # BLD AUTO: 0.6 K/UL (ref 0.3–1)
MONOCYTES NFR BLD: 7.9 % (ref 4–15)
MONOCYTES NFR BLD: 8.7 % (ref 4–15)
NEUTROPHILS # BLD AUTO: 4.4 K/UL (ref 1.8–7.7)
NEUTROPHILS # BLD AUTO: 5.6 K/UL (ref 1.8–7.7)
NEUTROPHILS NFR BLD: 69.7 % (ref 38–73)
NEUTROPHILS NFR BLD: 72.8 % (ref 38–73)
NITRITE UR QL STRIP: NEGATIVE
NRBC BLD-RTO: 0 /100 WBC
NRBC BLD-RTO: 0 /100 WBC
PH UR STRIP: 6 [PH] (ref 5–8)
PHOSPHATE SERPL-MCNC: 3.2 MG/DL (ref 2.7–4.5)
PLATELET # BLD AUTO: 190 K/UL (ref 150–350)
PLATELET # BLD AUTO: 239 K/UL (ref 150–350)
PMV BLD AUTO: 10.1 FL (ref 9.2–12.9)
PMV BLD AUTO: 11.3 FL (ref 9.2–12.9)
POTASSIUM SERPL-SCNC: 3.7 MMOL/L (ref 3.5–5.1)
POTASSIUM SERPL-SCNC: 4.2 MMOL/L (ref 3.5–5.1)
PROT UR QL STRIP: NEGATIVE
RBC # BLD AUTO: 3.68 M/UL (ref 4.6–6.2)
RBC # BLD AUTO: 4.22 M/UL (ref 4.6–6.2)
RBC #/AREA URNS AUTO: 2 /HPF (ref 0–4)
SODIUM SERPL-SCNC: 134 MMOL/L (ref 136–145)
SODIUM SERPL-SCNC: 136 MMOL/L (ref 136–145)
SODIUM UR-SCNC: 42 MMOL/L (ref 20–250)
SP GR UR STRIP: 1.01 (ref 1–1.03)
SQUAMOUS #/AREA URNS AUTO: 0 /HPF
URN SPEC COLLECT METH UR: ABNORMAL
UUN UR-MCNC: 453 MG/DL (ref 140–1050)
WBC # BLD AUTO: 5.98 K/UL (ref 3.9–12.7)
WBC # BLD AUTO: 8.01 K/UL (ref 3.9–12.7)
WBC #/AREA URNS AUTO: 0 /HPF (ref 0–5)

## 2019-06-26 PROCEDURE — 80048 BASIC METABOLIC PNL TOTAL CA: CPT | Mod: 91

## 2019-06-26 PROCEDURE — 88305 TISSUE EXAM BY PATHOLOGIST: CPT | Performed by: PATHOLOGY

## 2019-06-26 PROCEDURE — 81001 URINALYSIS AUTO W/SCOPE: CPT

## 2019-06-26 PROCEDURE — 36415 COLL VENOUS BLD VENIPUNCTURE: CPT

## 2019-06-26 PROCEDURE — 83735 ASSAY OF MAGNESIUM: CPT

## 2019-06-26 PROCEDURE — 88305 TISSUE SPECIMEN TO PATHOLOGY - SURGERY: ICD-10-PCS | Mod: 26,,, | Performed by: PATHOLOGY

## 2019-06-26 PROCEDURE — 11105 PUNCH BX SKIN EA SEP/ADDL: CPT | Mod: ,,, | Performed by: PODIATRIST

## 2019-06-26 PROCEDURE — 11104 PUNCH BX SKIN SINGLE LESION: CPT | Mod: ,,, | Performed by: PODIATRIST

## 2019-06-26 PROCEDURE — 11105 PR PUNCH BIOPSY, SKIN, EA ADDTL LESION: ICD-10-PCS | Mod: ,,, | Performed by: PODIATRIST

## 2019-06-26 PROCEDURE — 85025 COMPLETE CBC W/AUTO DIFF WBC: CPT

## 2019-06-26 PROCEDURE — 84300 ASSAY OF URINE SODIUM: CPT

## 2019-06-26 PROCEDURE — 88305 TISSUE EXAM BY PATHOLOGIST: CPT | Mod: 26,,, | Performed by: PATHOLOGY

## 2019-06-26 PROCEDURE — 25000003 PHARM REV CODE 250: Performed by: STUDENT IN AN ORGANIZED HEALTH CARE EDUCATION/TRAINING PROGRAM

## 2019-06-26 PROCEDURE — 99239 PR HOSPITAL DISCHARGE DAY,>30 MIN: ICD-10-PCS | Mod: GC,,, | Performed by: INTERNAL MEDICINE

## 2019-06-26 PROCEDURE — 97165 OT EVAL LOW COMPLEX 30 MIN: CPT

## 2019-06-26 PROCEDURE — 84100 ASSAY OF PHOSPHORUS: CPT

## 2019-06-26 PROCEDURE — 63600175 PHARM REV CODE 636 W HCPCS: Performed by: INTERNAL MEDICINE

## 2019-06-26 PROCEDURE — 82570 ASSAY OF URINE CREATININE: CPT

## 2019-06-26 PROCEDURE — 84540 ASSAY OF URINE/UREA-N: CPT

## 2019-06-26 PROCEDURE — 80048 BASIC METABOLIC PNL TOTAL CA: CPT

## 2019-06-26 PROCEDURE — 11104 PR PUNCH BIOPSY, SKIN, SINGLE LESION: ICD-10-PCS | Mod: ,,, | Performed by: PODIATRIST

## 2019-06-26 PROCEDURE — 97161 PT EVAL LOW COMPLEX 20 MIN: CPT

## 2019-06-26 PROCEDURE — 99239 HOSP IP/OBS DSCHRG MGMT >30: CPT | Mod: GC,,, | Performed by: INTERNAL MEDICINE

## 2019-06-26 RX ORDER — POTASSIUM CHLORIDE 600 MG/1
CAPSULE, EXTENDED RELEASE ORAL
Start: 2019-06-26

## 2019-06-26 RX ORDER — LOSARTAN POTASSIUM AND HYDROCHLOROTHIAZIDE 12.5; 5 MG/1; MG/1
TABLET ORAL
Start: 2019-06-26

## 2019-06-26 RX ORDER — FUROSEMIDE 20 MG/1
TABLET ORAL
Start: 2019-06-26

## 2019-06-26 RX ADMIN — FINASTERIDE 5 MG: 5 TABLET, FILM COATED ORAL at 06:06

## 2019-06-26 RX ADMIN — CEFEPIME 2 G: 2 INJECTION, POWDER, FOR SOLUTION INTRAVENOUS at 02:06

## 2019-06-26 RX ADMIN — SODIUM CHLORIDE, SODIUM LACTATE, POTASSIUM CHLORIDE, AND CALCIUM CHLORIDE 500 ML: .6; .31; .03; .02 INJECTION, SOLUTION INTRAVENOUS at 09:06

## 2019-06-26 RX ADMIN — METOPROLOL TARTRATE 50 MG: 50 TABLET ORAL at 09:06

## 2019-06-26 RX ADMIN — CEFEPIME 2 G: 2 INJECTION, POWDER, FOR SOLUTION INTRAVENOUS at 06:06

## 2019-06-26 RX ADMIN — ASPIRIN 81 MG: 81 TABLET, COATED ORAL at 06:06

## 2019-06-26 RX ADMIN — TAMSULOSIN HYDROCHLORIDE 0.4 MG: 0.4 CAPSULE ORAL at 06:06

## 2019-06-26 NOTE — ASSESSMENT & PLAN NOTE
Currently controlled; /68 and HR 61 bpm on arrival    Plan:  Will resume home medications:  Metoprolol 50 mg in am daily; 25 mg in pm daily  HOLD losartan-HCTZ 50-12.5 mg until seen by PCP.  Goal /80 mmHg  Low sodium diet

## 2019-06-26 NOTE — PROGRESS NOTES
Road Test  Oxygen-Patient tolerating room air, no distress.  Ambulation-Patient up with no assistance.  Devices-Patient going home with midline catheter.  Tolerating-Regular diet.  Elimination-Patient voiding without difficulty.  Self Care-Performs self care without assistance.  Teaching-Verbal and written discharge teaching given.    Patient tolerates room air, ambulates with no assistance, regular diet, voiding without difficulty, and is going home with midline cathter for IV antibiotic administration. Patient going home. Home health has been setup. Antibiotic administration teaching done. Discharge paperwork discussed and medications reviewed. Patient has all belongings and no questions at this time. Awaiting wheelchair transport at this time.

## 2019-06-26 NOTE — PLAN OF CARE
Future Appointments   Date Time Provider Department Center   6/27/2019  3:45 PM Jose Adorno MD [facility] DERMSUR [place]   7/3/2019  1:00 PM Morgan Soni DPM [facility] POD [place]   7/10/2019 10:20 AM Morgan Soni DPM [facility] POD [place]   7/17/2019  1:20 PM Morgan Soni DPM [facility] POD [place]          06/26/19 1454   Final Note   Assessment Type Final Discharge Note   Anticipated Discharge Disposition Home-Health   What phone number can be called within the next 1-3 days to see how you are doing after discharge? [phone]   Hospital Follow Up  Appt(s) scheduled? Yes  (Ambulatory referral sent to Infectious Disease.)   Right Care Referral Info   Post Acute Recommendation Home-care   Facility Name [facility]

## 2019-06-26 NOTE — PLAN OF CARE
Problem: Physical Therapy Goal  Goal: Physical Therapy Goal  Outcome: Outcome(s) achieved Date Met: 06/26/19  PT evaluation completed- see note for details. Pt discharged from PT services 2/2 no acute therapy needs.     Nneka Galicia PT, DPT   6/26/2019  Pager: [phone]

## 2019-06-26 NOTE — HOSPITAL COURSE
Pt presented to ED with worsening right leg heel ulcer, he was started on vancomycin and Zosyn- MRI done did not show any osteomyelitis, podiatry consulted wound was cleaned and bone biopsy obtained- ID also consulted and recommended 14 days of cefepime and labs weekly to be sent to ID clinic- prior to discahrge crt increased to 1.6 which improved with 500 cc of LR- Pt will follow up with PCP with labs be taking by - home health orders updated with wound care reccs.

## 2019-06-26 NOTE — ASSESSMENT & PLAN NOTE
The patient has chronic lymphedema s/p lymphadenectomy.  -Edema may be contributing to poor wound healing of the right heel ulcer  - Podiatry wound care reccs updated in HH orders.

## 2019-06-26 NOTE — DISCHARGE SUMMARY
[facility] Hospital Medicine  Discharge Summary      Patient Name: Shaq Bragg Jr.  MRN: [id number]  Admission Date: 6/24/2019  Hospital Length of Stay: 2 days  Discharge Date and Time:  06/26/2019 2:38 PM  Attending Physician: Romi Reyna MD   Discharging Provider: Mika Sykes MD  Primary Care Provider: Ata Jeter MD  Hospital Medicine Team: [facility] [place] HOSP MED 4 Mika Sykes MD    HPI:     Mr. Bragg is a 88 years old  male with spinal stenosis (L3-L4), R leg malignant melanoma (stage IIIB dx 3/2017 s/p excisional biopsy and MOS with SLN biopsy/resection of 2 positive nodes 4/2017 with subsequent lymphedema), recently diagnosed R leg SCC (s/p local resection; PET 6/2019 with uptake in the T11 spine, RLE, and R groin consistent with metastatic disease; s/p nivolumab x3 in 5/2019), and R heel ulcer presents with progressive worsening of right heel ulceration (exposing achilles tendon) with associated circumferential ulceration of lateral and medial malleolus of right lower foot.      Patient has chronic wounds of right lower extremity, had prior admission on 04/08 for similar complaint, had wound cultures positive for MSSA and pseudomonas aeruginosa (04/09/19) and was discharged home after completing 14 day course of ciprofloxacin and cefadroxil. After the discharge, patient continued to have home health care visits for wound care and he continued to follow up with podiatry as outpatient. His last visit home health nurse noticed that the wound smelled different one week ago. Pt saw his podiatrist one week ago and started on Bactrim three days ago. He states that the antibiotics have not changed the pain or the infection. Pt has noticed increased drainage; serous and some pus from the ankle wounds, and increased foul smelling odor. Patient denies any systemic complaints such as fever, chills, or night sweats. Denies any severe pain at the site, states only that  it makes his compression stockings uncomfortable. Pt lives in [place] [facility] with his spouse, denies any sick contacts, denies recent travel, no pets.         Oncologic History: Pt initially presented with a lesion on his right proximal calf in March 2017.  He underwent a biopsy on 3/21/17 with pathology showing a 1.4mm malignant melanoma Alxeis level IV, no ulceration, perineural invasion seen, mitotic rate 6/mm2.  The patient then underwent  a MOS procedure on 4/18/17 and right inguinal LN biopsy on 4/18/17 under the care of Dr Joelle Pardo at [facility].  The pathology from the procedure initially showed no residual melanoma and no LN involvement ; however, on reevaluation showed microscopic mets in 2 sentinel LNs.  The patient established care with Medical Oncologist Dr Olivier Mchugh.  The surgical oncologist Dr. Alatorre offered Lymphadenectomy on 5/24/17; however, the patient elected to have observation.  PET/CT was performed on 1/04/18 showing no evidence of residual or recurrent disease.  On 10/16/18 the patient called Dr Holland office with complaint of 2 lumps in the right leg.  PET/CT was performed on 10/30/19 showing 2 new hypermetabolic lymph nodes right groin with the index lymph node lateral SUV max 30.02.  The patient had an appointment with Dr Alatorre on 10/30/19 at which point it was decided to proceed with a completion lymphadenectomy.   An MRI of the brain was performed on 11/03/18 showing no evidence for intracranial metastatic disease but did show a 4mm T1 hyperintense occipital calvarial focus which was read as likely benign.  The patient underwent completion LAD on 11/12/18 with pathology showing ¾ LNs positive for metastatic melanoma with the largest deposit being 2.4cm an positive extranodal extension. Currently the patient complains of chronic lower back pain for which he underwent an MRI of the lumbar spine on 11/03/18 which showed severe central canal stenosis related to a disk bulge at L3-L4.  The  patient endorses some LE weakness on occasion.  He denies bowel or bladder incontinence.  The patient states he sees a deramtologist semi annually Dr Sun at [facility] with last clinic visit reportedly several months ago.  The patient underwent a PET/CT on 2/15/19 showing a focus of increased tracer uptake within the T 11 vertebral body with max SUV of 4.1 that was not visualized on prior exam, increased radiotracer uptake in the right groin max SUV 5.3 and a subcutaneous focus of increased tracer uptake within the anterior portion of the distal right leg showing a max SUV of 3.7.   The patient was seen by dermatology on 2/22/19 and underwent a shave biopsy of the right lower leg lesion showing SCC.  On 3/12/19 the patient contacted our office stating he had a large amount of BRBPR.  The patient was seen in the ER on 3/12/19 with relatively stable hemoglobin and was deferred for outpatient GI evaluation.  On 3/25/19 Dr. Bowman in derm decided to postpone a Mohs procedure for SCC of the right lower leg until the swelling in the right leg had decreased.  The patient was seen on 3/27/19 by wound care for an ulcer on his right heel at which point recommendations were made for bandaging of the right heel.  The patient saw GI on 3/27/19 at which point the patient decided not to pursue a colonoscopy.   The patient missed his scheduled treatment with Nivolumab on 4/08/19 after being admitted to the hospital from 4/08/19-4/11/19 for a stage IV ulcer of the right heel with surrounding cellulitis. Cycle 3 received on 05/27/19. Repeat PET/CT 6/03/19: New hypermetabolic focus in the proximal left upper extremity. Patient was scheduled for cycle 4 today (06/24) Nivolumab; but did not receive chemo and was sent to ED from Doctor's office for acute infection of right foot.     * No surgery found *      Hospital Course:   Pt presented to ED with worsening right leg heel ulcer, he was started on vancomycin and Zosyn- MRI  done did not show any osteomyelitis, podiatry consulted wound was cleaned and bone biopsy obtained- ID also consulted and recommended 14 days of cefepime and labs weekly to be sent to ID clinic- prior to discahrge crt increased to 1.6 which improved with 500 cc of LR- Pt will follow up with PCP with labs be taking by - home health orders updated with wound care reccs.     Review of Systems   Constitutional: Negative for activity change, appetite change, chills, fatigue and fever.   HENT: Negative for congestion, dental problem, mouth sores and sinus pressure.    Eyes: Negative for pain, redness and visual disturbance.   Respiratory: Negative for cough, shortness of breath and wheezing.    Cardiovascular: Negative for chest pain and leg swelling.   Gastrointestinal: Negative for abdominal distention, abdominal pain, diarrhea, nausea and vomiting.   Endocrine: Negative for polyuria.   Genitourinary: Negative for decreased urine volume, dysuria and frequency.   Musculoskeletal: Positive for joint swelling.   Skin: Positive for wound. Negative for color change.   Allergic/Immunologic: Negative for food allergies.   Neurological: Negative for dizziness, weakness and headaches.   Hematological: Negative for adenopathy.   Psychiatric/Behavioral: Negative for agitation and confusion. The patient is not nervous/anxious.       Objective:      Vitals:    06/26/19 1156   BP: 128/61   Pulse: 60   Resp: 17   Temp: 97.3 °F (36.3 °C)     Weight: 78.5 kg (173 lb)  Body mass index is 23.46 kg/m².     Estimated Creatinine Clearance: 43.1 mL/min (based on SCr of 1.3 mg/dL).     Physical Exam   Constitutional: He is oriented to person, place, and time. He appears well-developed and well-nourished.   HENT:   Head: Normocephalic and atraumatic.   Mouth/Throat: Oropharynx is clear and moist.   Eyes: Conjunctivae are normal.   Neck: Neck supple.   Cardiovascular: Normal rate, regular rhythm and normal heart sounds.   No murmur  heard.  Pulmonary/Chest: Effort normal and breath sounds normal. No respiratory distress. He has no wheezes.   Abdominal: Soft. Bowel sounds are normal. He exhibits no distension. There is no tenderness.   Musculoskeletal: Normal range of motion. He exhibits no edema or tenderness.   Lymphadenopathy:     He has no cervical adenopathy.   Neurological: He is alert and oriented to person, place, and time. Coordination normal.   Skin: Skin is warm and dry. No rash noted.   RLE edema, erythema, wounds   Psychiatric: He has a normal mood and affect. His behavior is normal.     Consults:   Consults (From admission, onward)        Status Ordering Provider     Inpatient consult to Infectious Diseases  Once     Provider:  (Not yet assigned)    Completed DUGLAS GRADY     Inpatient [facility]  Once     Provider:  (Not yet assigned)    Completed LEO BRADY     Inpatient consult to Podiatry  Once     Provider:  (Not yet assigned)    Completed MATTHEW FRANCE     Inpatient consult to Social Work  Once     Provider:  (Not yet assigned)    Acknowledged ESTIVEN SALCIDO          Stage IV pressure ulcer of right heel    patient has a stage IV ulcer on the right heel currently being managed by podiatry and home health  - Follow up with ID and podiatry   - wound clx 06/18    Pseudomonas aeruginosa Staphylococcus aureus     CULTURE, AEROBIC  (SPECIFY SOURCE) CULTURE, AEROBIC  (SPECIFY SOURCE)     Amikacin <=16 mcg/mL Sensitive       Cefepime 16 mcg/mL Intermediate       Ciprofloxacin >2 mcg/mL Resistant       Clindamycin   <=0.5 mcg/mL Resistant     Erythromycin   >4 mcg/mL Resistant     Gentamicin <=4 mcg/mL Sensitive       Oxacillin   <=0.25 mcg/mL Sensitive     Penicillin   <=0.03 mcg/mL Sensitive     Piperacillin/Tazo 64 mcg/mL Sensitive       Tetracycline   <=4 mcg/mL Sensitive     Tobramycin <=4 mcg/mL Sensitive       Trimeth/Sulfa   <=0.5/9.5 m... Sensitive        - Bone biopsy take by  podiatry 06/26 - follow up results.    Chronic CHF (congestive heart failure)  Patient is on lasix 20 mg daily for lymphedema  Patient has never been told off CHF; currently denies chest pain or dyspnea with rest or exertion  CXR consistent with bilateral infiltrates, cardiomegaly and b/l pleural effusion  EKG: Sinus belkis 60 bpm    Continue metoprolol- hold ARBs- Hold lasix in setting of CHANTEL - to be resume by PCP.    Acute renal failure superimposed on stage 3 chronic kidney disease  Creatinine 1.6 (baseline 1.1- 1.3)    Retroperitoneal ultrasound: medical renal disease.     Hold lasix/ losartan and hydrochlorothiazide in setting of Acute renal failure, follow up with PCP with labs before resuming.    BPH (benign prostatic hyperplasia)  Will resume home medications:  Flomax 0.4 mg daily      Essential hypertension  Currently controlled; /68 and HR 61 bpm on arrival    Plan:  Will resume home medications:  Metoprolol 50 mg in am daily; 25 mg in pm daily  HOLD losartan-HCTZ 50-12.5 mg until seen by PCP.  Goal /80 mmHg  Low sodium diet    Lymphedema of right lower extremity  The patient has chronic lymphedema s/p lymphadenectomy.  -Edema may be contributing to poor wound healing of the right heel ulcer  - Podiatry wound care reccs updated in HH orders.    Malignant melanoma of right lower leg    Malignant Melanoma of the right leg Stage IIIB -    The patient has a h/o melanoma of the left leg originally diagnosed in 2017  -Lymphadenectomy showed two positive LN's  -Patient's tumor is BRAF V600E wild type  -MRI of the [facility] performed at [facility] showed no evidence of metastatic disease  -PET/CT on 2/05/19 showed uptake in the T11 spine, right lower leg and right groin  -Recent skin biopsy showed SCC  -Pt is s/p 2 cycles of nivolumab  -Cycle 2 delayed due to admission for cellulitis and stage IV right heel ulcer  -Cycle 3 received on 05/27/19  -Repeat PET/CT 6/03/19: New hypermetabolic focus in the proximal left  upper extremity.    - Patient was scheduled for cycle 4 today (06/24) Nivolumab; but did not receive chemo and was sent to ED from Doctor's office for acute infection    Plan:  Oncology follow up upon discharge to resume chemotherapy once active infection resolves        Final Active Diagnoses:    Diagnosis Date Noted POA    Leg ulcer [L97.909] 06/25/2019 Yes    Acute renal failure superimposed on stage 3 chronic kidney disease [N17.9, N18.3] 06/24/2019 Yes    Chronic CHF (congestive heart failure) [I50.9] 06/24/2019 Yes    Stage IV pressure ulcer of right heel [L89.614] 06/24/2019 Yes    Pseudomonas infection [B96.5] 06/24/2019 Yes    Essential hypertension [I10] 04/08/2019 Yes    BPH (benign prostatic hyperplasia) [N40.0] 04/08/2019 Yes    Lymphedema of right lower extremity [I89.0] 02/05/2019 Yes    Malignant melanoma of right lower leg [C43.71] 05/11/2017 Yes      Problems Resolved During this Admission:    Diagnosis Date Noted Date Resolved POA    PRINCIPAL PROBLEM:  Subacute osteomyelitis of right foot [M86.271] 06/24/2019 06/25/2019 No       Discharged Condition: good    Disposition:     Follow Up:  Follow-up Information     Ata Jeter MD In 1 week.    Specialty:  Internal Medicine  Contact information:  [address] [place] [address] [place] [id number] [phone]                 Patient Instructions:      BASIC METABOLIC PANEL   Standing Status: Future Standing Exp. Date: 08/24/20     Ambulatory Referral to Podiatry   Referral Priority: Routine Referral Type: Consultation   Referral Reason: Specialty Services Required   Requested Specialty: Podiatry   Number of Visits Requested: 1     Ambulatory Referral to Infectious Disease   Referral Priority: Routine Referral Type: Consultation   Referral Reason: Specialty Services Required   Requested Specialty: Infectious Diseases   Number of Visits Requested: 1     Ambulatory Referral to Podiatry   Referral Priority: Routine Referral Type: Consultation    Referral Reason: Specialty Services Required   Requested Specialty: Podiatry   Number of Visits Requested: 1     Ambulatory Referral to Infectious Disease   Referral Priority: Routine Referral Type: Consultation   Referral Reason: Specialty Services Required   Requested Specialty: Infectious Diseases   Number of Visits Requested: 1       Significant Diagnostic Studies: Labs:   BMP:   Recent Labs   Lab 06/25/19  0341 06/26/19  0603 06/26/19  1210   GLU 87 107 103   * 136 134*   K 4.3 3.7 4.2    99 100   CO2 18* 29 23   BUN 14 14 15   CREATININE 1.3 1.6* 1.5*   CALCIUM 8.8 9.5 9.1   MG 2.1 2.1  --    , CMP   Recent Labs   Lab 06/24/19  1540 06/25/19  0341 06/26/19  0603 06/26/19  1210   * 134* 136 134*   K 4.2 4.3 3.7 4.2   CL 99 102 99 100   CO2 26 18* 29 23    87 107 103   BUN 14 14 14 15   CREATININE 1.5* 1.3 1.6* 1.5*   CALCIUM 9.9 8.8 9.5 9.1   PROT 7.4  --   --   --    ALBUMIN 3.3*  --   --   --    BILITOT 0.8  --   --   --    ALKPHOS 113  --   --   --    AST 26  --   --   --    ALT 20  --   --   --    ANIONGAP 10 14 8 11   ESTGFRAFRICA 47.4* 56.3* 43.8* 47.4*   EGFRNONAA 41.0* 48.7* 37.9* 41.0*   , CBC   Recent Labs   Lab 06/25/19  0341 06/26/19  0439 06/26/19  0603   WBC 7.05 5.98 8.01   HGB 12.1* 11.7* 13.3*   HCT 37.2* 35.5* 39.8*    190 239   , A1C:   Recent Labs   Lab 06/25/19  0341   HGBA1C 4.8    and All labs within the past 24 hours have been reviewed  Microbiology:   Blood Culture   Lab Results   Component Value Date    LABBLOO No Growth to date 06/24/2019    LABBLOO No Growth to date 06/24/2019       Pending Diagnostic Studies:     None         Medications:  Reconciled Home Medications:      Medication List      START taking these medications    ceFEPIme 2 gram injection  Commonly known as:  MAXIPIME  Inject 2 g into the vein every 12 (twelve) hours. for 14 days        CHANGE how you take these medications    furosemide 20 MG tablet  Commonly known as:  LASIX  HOLD  UNTIL SEEN by primary care  What changed:    · how much to take  · how to take this  · when to take this  · additional instructions     losartan-hydrochlorothiazide 50-12.5 mg 50-12.5 mg per tablet  Commonly known as:  HYZAAR  HOLD UNTIL SEEN by primary care doctor  What changed:    · how much to take  · how to take this  · when to take this  · additional instructions     potassium chloride 8 mEq Cpsr  Commonly known as:  MICRO-K  HOLD until seen by primary care doctor  What changed:    · how much to take  · how to take this  · when to take this  · additional instructions        CONTINUE taking these medications    aspirin 81 MG EC tablet  Commonly known as:  ECOTRIN  Take 81 mg by mouth every morning.     cadexomer iodine 0.9 % gel  Commonly known as:  IODOSORB  Apply topically daily as needed for Wound Care.     finasteride 5 mg tablet  Commonly known as:  PROSCAR  Take 5 mg by mouth every morning.     HYDROcodone-acetaminophen 5-325 mg per tablet  Commonly known as:  NORCO  Take 1 tablet by mouth every 6 (six) hours as needed for Pain.     metoprolol tartrate 50 MG tablet  Commonly known as:  LOPRESSOR  Takes 50 mg in morning and half tablet (25 mg) in evening     simvastatin 10 MG tablet  Commonly known as:  ZOCOR  Take 10 mg by mouth every evening.     tamsulosin 0.4 mg Cap  Commonly known as:  FLOMAX  Take 0.4 mg by mouth every morning.        STOP taking these medications    senna-docusate 8.6-50 mg 8.6-50 mg per tablet  Commonly known as:  PERICOLACE     sulfamethoxazole-trimethoprim 800-160mg 800-160 mg Tab  Commonly known as:  BACTRIM DS            Indwelling Lines/Drains at time of discharge:   Lines/Drains/Airways          None                   Mika Sykes MD  Department of Hospital Medicine  [facility]

## 2019-06-26 NOTE — ASSESSMENT & PLAN NOTE
Malignant Melanoma of the right leg Stage IIIB -    The patient has a h/o melanoma of the left leg originally diagnosed in 2017  -Lymphadenectomy showed two positive LN's  -Patient's tumor is BRAF V600E wild type  -MRI of the brain performed at [facility] showed no evidence of metastatic disease  -PET/CT on 2/05/19 showed uptake in the T11 spine, right lower leg and right groin  -Recent skin biopsy showed SCC  -Pt is s/p 2 cycles of nivolumab  -Cycle 2 delayed due to admission for cellulitis and stage IV right heel ulcer  -Cycle 3 received on 05/27/19  -Repeat PET/CT 6/03/19: New hypermetabolic focus in the proximal left upper extremity.    - Patient was scheduled for cycle 4 today (06/24) Nivolumab; but did not receive chemo and was sent to ED from Doctor's office for acute infection    Plan:  Oncology follow up upon discharge to resume chemotherapy once active infection resolves

## 2019-06-26 NOTE — ASSESSMENT & PLAN NOTE
Patient is on lasix 20 mg daily for lymphedema  Patient has never been told off CHF; currently denies chest pain or dyspnea with rest or exertion  CXR consistent with bilateral infiltrates, cardiomegaly and b/l pleural effusion  EKG: Sinus belkis 60 bpm    Continue metoprolol- hold ARBs- Hold lasix in setting of CHANTEL - to be resume by PCP.

## 2019-06-26 NOTE — MEDICAL/APP STUDENT
[facility] Hospital Medicine  Progress Note    Patient Name: Shaq Bragg Jr.  MRN: [id number]  Patient Class: IP- Inpatient   Admission Date: 6/24/2019  Length of Stay: 2 days  Attending Physician: Romi Reyna MD  Primary Care Provider: Ata Jeter MD    [facility] Medicine Team: [facility] MED [facility] MS3    Subjective:     Principal Problem:Subacute osteomyelitis of right foot    Mr. Bragg is an 88 y.o. Male who presents with infection to a right lower leg wound. He has a past medical history of malignant melanoma of the right lower leg, hypertension, H/O asbestosis, hypercholesterolemia, melanoma, hearing loss, squamous cell carcinoma and COPD.      Patient presented to podiatrist 3 days ago and received Bactrim for his right lower leg wound. He states there have been no change since starting antibiotics and states that drainage has increased from wound. After seeing his oncologist was sent to the hospital for concerns regarding the smell of the wound.      Patient states his right lower leg wound has been bothering him for 3-4 months to the point it has become difficult to walk. After the patients surgery in November 2018 for melanoma, the patient experienced issues with fluid drainage and was sent to wound care in January 2019. Wound care wrapped his legs which the patient states began to cut into his leg and led to the current wound. Starting in March/April the wound became worse and he has had continued pain and drainage since then. Patient was also hospitalized for an infection in his right lower leg in April.      Hospital Course: Patient has ceased taking Vancomycin and Zosyn for his wound infection as infectious disease team consulted and requested to switch to Cefepime. Podiatry has been consulted to assess the wound and patient received instructions regarding wound care.An MRI has been completed to assess the depth of the wound which stated that the ulceration was  full-thickness overlying the Achilles tendon and that edema was seen throughout the soft tissue. Podiatry has stated that on inspection the wound appears to be superficial and can be followed up in podiatry clinic.         Interval History: Patient states he is feeling better, although there is still pain in his foot. He is anxious to go home, but does endorse some weakness due to a lack of appetite while in hospital. Still monitoring kidney functioning following change in antibiotics and plan to discharge home today.     Review of Systems   Constitutional: Positive for appetite change. Negative for fever.   Respiratory: Negative for cough, shortness of breath and wheezing.    Gastrointestinal: Negative for abdominal pain, diarrhea, nausea and vomiting.   Skin:        Right lower leg is red, dry and swollen, Wound on right lower leg still has discharge       Neurological: Positive for weakness. Negative for dizziness, light-headedness and headaches.     Objective:     Vital Signs (Most Recent):  Temp: 97.3 °F (36.3 °C) (06/26/19 1156)  Pulse: 60 (06/26/19 1156)  Resp: 17 (06/26/19 1156)  BP: 128/61 (06/26/19 1156)  SpO2: 96 % (06/26/19 1156) Vital Signs (24h Range):  Temp:  [97.2 °F (36.2 °C)-98.1 °F (36.7 °C)] 97.3 °F (36.3 °C)  Pulse:  [60-84] 60  Resp:  [16-17] 17  SpO2:  [92 %-96 %] 96 %  BP: (114-136)/(57-79) 128/61     Weight: 78 kg (171 lb 15.3 oz)  Body mass index is 23.32 kg/m².    Intake/Output Summary (Last 24 hours) at 6/26/2019 1306  Last data filed at 6/26/2019 0907  Gross per 24 hour   Intake 980 ml   Output 1150 ml   Net -170 ml      Physical Exam   Constitutional: He is oriented to person, place, and time. He appears well-developed.   Eyes: Pupils are equal, round, and reactive to light.   Cardiovascular: Normal rate, regular rhythm, normal heart sounds and intact distal pulses.   Pulmonary/Chest: Effort normal and breath sounds normal.   Abdominal: Soft. Bowel sounds are normal. There is no  tenderness.   Musculoskeletal: Normal range of motion.   Neurological: He is alert and oriented to person, place, and time.   Skin: Skin is warm and dry.   Skin is warm and dry except for the right lower leg which is red and swollen    Psychiatric: He has a normal mood and affect. His behavior is normal. Judgment and thought content normal.       Significant Labs: Patients hemoglobin and hematocrit are trending upwards. The creatinine is trending upwards, however this is close to baseline and will be continued to be monitored with the change in antibiotics. The eGFR is also trending upwards and will continue to be monitored.     Significant Imaging: MRI demonstrated a full-thickness ulceration overlying the Achilles tendon with edema through out the soft tissue this has been followed up with by podiatry.      Assessment/Plan:      Active Diagnoses:    Diagnosis Date Noted POA    Leg ulcer [L97.909] 06/25/2019 Yes    Acute renal failure superimposed on stage 3 chronic kidney disease [N17.9, N18.3] 06/24/2019 Yes    Chronic CHF (congestive heart failure) [I50.9] 06/24/2019 Unknown    Stage IV pressure ulcer of right heel [L89.614] 06/24/2019 Unknown    Pseudomonas infection [B96.5] 06/24/2019 Yes    Essential hypertension [I10] 04/08/2019 Yes    BPH (benign prostatic hyperplasia) [N40.0] 04/08/2019 Yes    Lymphedema of right lower extremity [I89.0] 02/05/2019 Yes    Malignant melanoma of right lower leg [C43.71] 05/11/2017 Yes      Problems Resolved During this Admission:    Diagnosis Date Noted Date Resolved POA    PRINCIPAL PROBLEM:  Subacute osteomyelitis of right foot [M86.271] 06/24/2019 06/25/2019 Unknown     VTE Risk Mitigation (From admission, onward)        Ordered     IP VTE HIGH RISK PATIENT  Once      06/24/19 4367          1. Infection   - Continue Cefepime and cease Zosyn and Vancomycin given Infectious Disease reccomendations  - Follow up with podiatry after discharge to reassess the wound    Follow up with Infectious Disease team if infection persists   2. Pain Management  - Provide acetaminophen PRN   3. Oncology  - cease immunotherapy until infection of the right lower leg wound has been managed  4. COPD  - continue home med of Ipratropium   - monitor for symptom exacerbation   5. Hypertension   - Continue home med of Losartan and Metoprolol  - Monitor blood pressure   6. Hypercholesterolemia  - Continue home med Simvastatin        Cata Lazo MS3  Department of Hospital Medicine   [facility]

## 2019-06-26 NOTE — PLAN OF CARE
Problem: Adult Inpatient Plan of Care  Goal: Plan of Care Review  Outcome: Ongoing (interventions implemented as appropriate)  Patient is A & O x4, VSS, afebrile.  Pt is independent; fall precautions maintained. Wife remains at the bedside. No major complaints on this shift. Pt has new midline access. Accucheck AC/HS completed with no coverage given.  Patient voiding in urinal. Pt in non-skid footwear with bed locked in lowest position, side rails x2.  No falls or injuries this shift. Will continue to monitor.

## 2019-06-26 NOTE — PROCEDURES
Punch biopsy procedure note:  06/26/2019  Procedure note:   Surgeon:  Dr. Lorenz  Assisting Surgeon: Aroldo Curiel DPM, PGY3  Pre op diagnosis: RLE wound, concerning for malignancy    Post op diagnosis: same    Anesthesia:   Hemostasis: direct pressure.   EBL: < 1ml    Findings:  After consent was signed and witnessed 5ml of 1%Lidocaine plain and 0.25% Marcaine plain was given locally.  The  site was prepped with  Betadine, and a 3mm punch needle was used to biopsy the Central and lateral boarder of the Right lower extremity wound.  This was sent to pathology.  Patient tolerated procedure well, hemostasis was achieved with pressure, and site was dressed with xeroform over tendon, betadine wet to dry dressing, aBD, Kerlix, and ace..

## 2019-06-26 NOTE — PT/OT/SLP EVAL
Occupational Therapy   Evaluation and Discharge Note    Name: Shaq Bragg Jr.  MRN: [id number]  Admitting Diagnosis:  Subacute osteomyelitis of right foot      Recommendations:     Discharge Recommendations: home  Discharge Equipment Recommendations:  none  Barriers to discharge:       Assessment:     Shaq Bragg Jr. is a 88 y.o. male with a medical diagnosis of Subacute osteomyelitis of right foot. Upon arrival, pt found seated UIC with wife at bedside.  At this time, patient is functioning at their prior level of function and does not require further acute OT services. He is currently WBAT through RLE with Darco boot donned.    Plan:     During this hospitalization, patient does not require further acute OT services.  Please re-consult if situation changes.    · Plan of Care Reviewed with: patient    Subjective     Chief Complaint: Minimal RLE pain  Patient/Family Comments/goals: Return home    Occupational Profile:  Living Environment: Pt lives with wife in 2SH with bed and bath located on second level ( 14 steps with L hand rail); bathroom contains tub-shower combo with no DME. Wife owns shower chair   Previous level of function: PTA, pt reports being independent with ADL and functional mobility.   Roles and Routines: Home/community dweller, drives,    Equipment Used at home:  (wife owns shower chair)  Assistance upon Discharge: Pt will have assistance from wife    Pain/Comfort:  · Pain Rating 1: (reports minimal pain in R foot )    Patients cultural, spiritual, Oriental orthodox conflicts given the current situation: no    Objective:     Communicated with: RN prior to session.  Patient found up in chair with peripheral IV upon OT entry to room.    General Precautions: Standard,     Orthopedic Precautions:RLE weight bearing as tolerated   Braces: (Darco boot)     Occupational Performance:    Bed Mobility:    · NT, pt found seated UIC upon arrival     Functional Mobility/Transfers:  · Patient  completed Sit <> Stand Transfer with independence  with  no assistive device   · Functional Mobility: Pt completed functional mobility in hallway with supervision and no AD. He tolerated well with no LOB or SOB.      Cognitive/Visual Perceptual:  Cognitive/Psychosocial Skills:     -       Oriented to: Person, Place, Time and Situation   -       Follows Commands/attention:Follows multistep  commands  -       Communication: clear/fluent  -       Safety awareness/insight to disability: intact   -       Mood/Affect/Coping skills/emotional control: Appropriate to situation  Visual/Perceptual:      -Intact     Physical Exam:  Postural examination/scapula alignment:    -       Rounded shoulders  Skin integrity: Visible skin intact  Edema:  None noted  Dominant hand:    -       RUE  Upper Extremity Range of Motion:     -       Right Upper Extremity: WFL  -       Left Upper Extremity: WFL  Upper Extremity Strength:    -       Right Upper Extremity: WFL  -       Left Upper Extremity: WFL   Strength:    -       Right Upper Extremity: WFL  -       Left Upper Extremity: WFL  Fine Motor Coordination:  Intact    AMPAC 6 Click ADL:  AMPAC Total Score: 24    Treatment & Education:  -Pt edu on OT role/POC  -Importance of OOB activity with staff assistance  -Safety during functional t/f and mobility  -White board updated  - All questions/concerns answered within OT scope of practice  Education:    Patient left up in chair with all lines intact, call button in reach and Rn notified    GOALS:   Multidisciplinary Problems     Occupational Therapy Goals     Not on file          Multidisciplinary Problems (Resolved)        Problem: Occupational Therapy Goal    Goal Priority Disciplines Outcome Interventions   Occupational Therapy Goal   (Resolved)     OT, PT/OT Outcome(s) achieved    Description:  Pt is currently performing ADLs, functional mobility & t/fs at baseline and displays age-appropriate strength, endurance & balance. OT  services are not recommended at this time and patient is safe to D/C home.                    History:     Past Medical History:   Diagnosis Date    COPD (chronic obstructive pulmonary disease)     H/O asbestosis     Hearing loss     Hypercholesterolemia     Hypertension     Malignant melanoma of right lower leg 5/11/2017    Melanoma     Approx. 2 years ago     Skin cancer     Squamous cell carcinoma        Past Surgical History:   Procedure Laterality Date    ADENOIDECTOMY      APPENDECTOMY      Carotid Stenosis Surgery      CHOLECYSTECTOMY  1965    LYMPHADENECTOMY, INGUINAL, Sartorious Flap Right 11/12/2018    Performed by Yair Alatorre MD at [facility] OR [address] [place] [address]    TONSILLECTOMY         Time Tracking:     OT Date of Treatment: 06/26/19  OT Start Time: 0909  OT Stop Time: 0923  OT Total Time (min): 14 min    Billable Minutes:Evaluation 14    Justina pineda OT  6/26/2019

## 2019-06-26 NOTE — PT/OT/SLP EVAL
Physical Therapy Evaluation and Discharge Note    Patient Name:  Shaq Bragg Jr.   MRN:  [id number]    Recommendations:     Discharge Recommendations:  home   Discharge Equipment Recommendations: none   Barriers to discharge: None    Assessment:     Shaq Bragg Jr. is a 88 y.o. male admitted with a medical diagnosis of subacute osteomyelitis of right foot. At this time, patient is functioning at their prior level of function and does not require further acute PT services. Pt safe to ambulate with nursing staff supervision during acute hospital stay to prevent deconditioning. Recommending pt discharge home with no further therapy needs anticipated at this time.     Recent Surgery: * No surgery found *      Plan:     During this hospitalization, patient does not require further acute PT services.  Please re-consult if situation changes.      Subjective     Chief Complaint: R foot pain   Patient/Family Comments/goals: return home  Pain/Comfort:  · Pain Rating 1: (Pt did not rate pain on numeric scale)  · Location - Side 1: Right  · Location 1: (foot)  · Pain Addressed 1: Reposition  · Pain Rating Post-Intervention 1: (pt reported no increase in pain)    Patients cultural, spiritual, Yarsanism conflicts given the current situation: no    Patient History:     Living Environment: Pt lives with spouse in 2SH with no MERLIN. Pt's Br/Ba are on 2nd level of home.    Prior Level of Function: (I) with mobility and ADLs   DME owned: shower chair   Caregiver Assistance: (A) from spouse as needed     Objective:     Communicated with RN prior to session.  Patient found sitting up in chair with peripheral IV upon PT entry to room.    General Precautions: Standard, fall   Orthopedic Precautions:RLE weight bearing as tolerated   Braces: (RLE darco shoe)     Exams:  · Cognitive Exam:  Patient is oriented to Person, Place, Time and Situation  · Postural Exam:  Patient presented with the following abnormalities:    · -        Rounded shoulders  · -       Forward head  · RLE ROM: WFL  · RLE Strength: WFL; ankle N/T   · LLE ROM: WFL  · LLE Strength: WFL    Functional Mobility:    Bed Mobility:   · N/T 2/2 pt sitting up in chair upon PT arrival     Transfers:   · Sit <> Stand Transfer: independence from bedside chair with no AD x 2 trials                 Gait:  · Distance: ~120 ft.   · Assistance level: supervision   · Assistive Device: no AD   · Gait Assessment: recipricol gait pattern with no acute gait deficits identified; no LOB     Therapeutic Activities and Exercises:   Pt and spouse educated on:  - Role of PT and discharge from therapy  - Safety with mobility and tips to reduce fall risk   - activity recommendations  - Instructed to call nursing staff for assistance with mobility as needed 2/2 fall risk     Pt verbalized understanding and expressed no further concerns/questions.    AM-PAC 6 CLICK MOBILITY  Total Score:22     Patient left up in chair with all lines intact, call button in reach and RN notified.    GOALS:   Multidisciplinary Problems     Physical Therapy Goals     Not on file          Multidisciplinary Problems (Resolved)        Problem: Physical Therapy Goal    Goal Priority Disciplines Outcome Goal Variances Interventions   Physical Therapy Goal   (Resolved)     PT, PT/OT Outcome(s) achieved                     History:     Past Medical History:   Diagnosis Date    COPD (chronic obstructive pulmonary disease)     H/O asbestosis     Hearing loss     Hypercholesterolemia     Hypertension     Malignant melanoma of right lower leg 5/11/2017    Melanoma     Approx. 2 years ago     Skin cancer     Squamous cell carcinoma        Past Surgical History:   Procedure Laterality Date    ADENOIDECTOMY      APPENDECTOMY      Carotid Stenosis Surgery      CHOLECYSTECTOMY  1965    LYMPHADENECTOMY, INGUINAL, Sartorious Flap Right 11/12/2018    Performed by Yair Alatorre MD at [facility] OR [address] [place] [address]    TONSILLECTOMY          Time Tracking:     PT Received On: 06/26/19  PT Start Time: 0910     PT Stop Time: 0923  PT Total Time (min): 13 min     Billable Minutes: Evaluation 13 min    Nneka Galicia PT, DPT   6/26/2019  Pager: [phone]

## 2019-06-26 NOTE — ASSESSMENT & PLAN NOTE
patient has a stage IV ulcer on the right heel currently being managed by podiatry and home health  - Follow up with ID and podiatry   - wound clx 06/18    Pseudomonas aeruginosa Staphylococcus aureus     CULTURE, AEROBIC  (SPECIFY SOURCE) CULTURE, AEROBIC  (SPECIFY SOURCE)     Amikacin <=16 mcg/mL Sensitive       Cefepime 16 mcg/mL Intermediate       Ciprofloxacin >2 mcg/mL Resistant       Clindamycin   <=0.5 mcg/mL Resistant     Erythromycin   >4 mcg/mL Resistant     Gentamicin <=4 mcg/mL Sensitive       Oxacillin   <=0.25 mcg/mL Sensitive     Penicillin   <=0.03 mcg/mL Sensitive     Piperacillin/Tazo 64 mcg/mL Sensitive       Tetracycline   <=4 mcg/mL Sensitive     Tobramycin <=4 mcg/mL Sensitive       Trimeth/Sulfa   <=0.5/9.5 m... Sensitive        - Bone biopsy take by podiatry 06/26 - follow up results.

## 2019-06-26 NOTE — PROGRESS NOTES
Pharmacokinetic Initial Assessment: IV Vancomycin     Therapy with vancomycin and consult discontinued by provider.  Pharmacy will sign off, please re-consult as needed.    Dang Sanches, PharmD, BCPS, Internal Medicine Clinical Pharmacy Specialist  EXT [phone]

## 2019-06-26 NOTE — ASSESSMENT & PLAN NOTE
Creatinine 1.6 (baseline 1.1- 1.3)    Retroperitoneal ultrasound: medical renal disease.     Hold lasix/ losartan and hydrochlorothiazide in setting of Acute renal failure, follow up with PCP with labs before resuming.

## 2019-06-27 ENCOUNTER — TELEPHONE (OUTPATIENT)
Dept: DERMATOLOGY | Facility: CLINIC | Age: 84
End: 2019-06-27

## 2019-06-27 NOTE — PHYSICIAN QUERY
"PT Name: Shaq Bragg Jr.  MR #: [id number]    Physician Query Form - Heart  Condition Clarification     CDS: Kirsty Dale RN    Contact information:[email or web address]    This form is a permanent document in the medical record.     Query Date: June 27, 2019    By submitting this query, we are merely seeking further clarification of documentation. Please utilize your independent clinical judgment when addressing the question(s) below.    The medical record contains the following   Indicators     Supporting Clinical Findings Location in Medical Record    BNP     x EF The estimated ejection fraction is 58% Echo 6/25   x Radiology findings FINDINGS:  Two views: There is cardiomegaly aortic plaque and DJD.  There is mild bibasilar edema and small pleural effusions.  Impression    Mild CHF. Chest Xray 6/24   x Echo Results Mild left atrial enlargement.  Normal left ventricular systolic function. The estimated ejection fraction is 58%  Normal LV diastolic function.  No wall motion abnormalities.  Normal right ventricular systolic function.  Mild right atrial enlargement.  Mild mitral sclerosis.  Low normal PRAVIN to trivial aortic valve stenosis.  Aortic valve area is 2.35 cm2; peak velocity is 1.75 m/s; mean gradient is 6 mmHg.  Normal central venous pressure (3 mm Hg).  The estimated PA systolic pressure is 13 mm Hg  Small pericardial effusion. Echo 6/25    "Ascites" documented      "SOB" or "LIAO" documented      "Hypoxia" documented     x Heart Failure documented Chronic CHF (congestive heart failure)  Patient is on lasix 20 mg daily for lymphedema  Patient has never been told off CHF; currently denies chest pain or dyspnea with rest or exertion  CXR consistent with bilateral infiltrates, cardiomegaly and b/l pleural effusion  EKG: Sinus belkis 60 bpm  Plan:  - GDMT as indicated: Metoprolol, losartan  - continue home asa 81 and simvastatin 20 mg daily  - will resume home lasix 20 mg as blood pressure tolerates " "PRN ( and as CHANTEL resolves)  - Follow up echocardiogram (pending) BRYAN Reynolds MD/GODWIN Reyna MD; H&P 6/24    "Edema" documented      Diuretics/Meds      Treatment:      Other:      Heart failure (HF) can be acute, chronic or both. It is generally further specificed as systolic, diastolic, or combined. Lastly, it is important to identify an underlying etiology if known or suspected.     Common clues to acute exacerbation:  Rapidly progressive symptoms (w/in 2 weeks of presentation), using IV diuretics to treat, using supplemental O2, pulmonary edema on Xray, MI w/in 4 weeks, and/or BNP >500    Systolic Heart Failure: is defined as chart documentation of a left ventricular ejection fraction (LVEF) less than 40%     Diastolic Heart Failure: is defined as a left ventricular ejection fraction (LVEF) greater than 40%   +      Evidence of diastolic dysfunction on echocardiography OR    Right heart catheterization wedge pressure above 12 mm Hg OR    Left heart catheterization left ventricular end diastolic pressure 18 mm Hg or above.    References: *American Heart Association    The clinical guidelines noted below are only system guidelines, and do not replace the providers clinical judgment.     Provider, please specify the diagnosis associated with above clinical findings                                  [   ] Chronic Diastolic Heart Failure - Pre-existing diastolic HF diagnosis.  EF > 40%  without  acute HF symptoms documented    [   ] Other Type of Heart Failure (please specify type): _________________________    [ X  ] Heart Failure Ruled Out    [   ] Other (please specify): ___________________________________    [  ] Clinically Undetermined                          Please document in your progress notes daily for the duration of treatment until resolved and include in your discharge summary.  "

## 2019-06-27 NOTE — TELEPHONE ENCOUNTER
Returned patient call , he just got out of the hospital for an infection in his foot. He needs to have IV antibiotic at home for 2 weeks. I told her that we would call him back in a few weeks. Earlene

## 2019-06-28 ENCOUNTER — PATIENT OUTREACH (OUTPATIENT)
Dept: ADMINISTRATIVE | Facility: CLINIC | Age: 84
End: 2019-06-28

## 2019-06-28 ENCOUNTER — TELEPHONE (OUTPATIENT)
Dept: ADMINISTRATIVE | Facility: OTHER | Age: 84
End: 2019-06-28

## 2019-06-28 LAB
BACTERIA SPEC AEROBE CULT: ABNORMAL
BACTERIA SPEC AEROBE CULT: ABNORMAL

## 2019-06-28 NOTE — PATIENT INSTRUCTIONS
Discharge Instructions for Osteomyelitis  You have a condition called osteomyelitis. This is a bone infection caused by bacteria or fungi. The infection may have come from one area of your body and spread to the bone by traveling through the blood. Osteomyelitis is described as acute when the infection is new, and chronic when you have had the infection for a longer time. If you have any questions or concerns, call your healthcare provider.  Home care  · Take your medicine exactly as directed. If you were given antibiotics or antifungal medicine, make sure you finish the prescription--even if you feel better. If you dont finish the medicine, the infection may return and may make future infections harder to treat.  · Be careful not to injure the area where you have the infection.  · Carefully follow all instructions for taking care of any wounds.  · Use a splint, sling, or brace as directed by your healthcare provider.  Follow-up care  Make a follow-up appointment as directed by our staff.  When to seek medical care  Call your healthcare provider if you have any of the following:  · Increasing pain, redness, swelling, or drainage in the infected area  · Fever 100.4°F (38°C) or greater, or as advised  · Increasing fatigue or feeling tired   Date Last Reviewed: 12/1/2016  © 8104-9821 [employer]. [address] [place] [address], [place] [address]. All rights reserved. This information is not intended as a substitute for professional medical care. Always follow your healthcare professional's instructions.

## 2019-06-28 NOTE — TELEPHONE ENCOUNTER
----- Message from Willard Oneal sent at 6/28/2019  1:32 PM CDT -----  Contact: Wife/Sis  Mary called in regarding patient appt on 7/3/19?  Jennifer stated she is not sure if patient needs to come for this appt as he just got out of the hospital & never came in on 6/26/19?    Jennifer's call back number is [phone] cell

## 2019-06-28 NOTE — TELEPHONE ENCOUNTER
Spoke with Mrs. Rodriguez, patients wife. She states he was released from the hospital on Wednesday for IV antibiotic treatment for his foot/infection. His wife states he was sent to the hospital by his oncologist at [facility] because his foot had a really bad odor and looked terrible. They couldn't do his chemo because of this. She's asking if she still needs to have in come in on Wednesday to see Dr. Soni? She has a cardiology appt for herself that day and needs to know first thing Monday morning because she would need to cancel that.

## 2019-06-29 LAB
BACTERIA BLD CULT: NORMAL
BACTERIA BLD CULT: NORMAL

## 2019-06-30 ENCOUNTER — NURSE TRIAGE (OUTPATIENT)
Dept: ADMINISTRATIVE | Facility: CLINIC | Age: 84
End: 2019-06-30

## 2019-06-30 NOTE — TELEPHONE ENCOUNTER
Reason for Disposition   Central or PICC line has moved out of position (or can see cuff slipping out of skin)   Central or PICC line comes all the way out (or child pulls it out)    Protocols used: IV SITE AND OTHER SYMPTOMS-P-AH    Midline in right upper arm. Home health nurse says today the midline came completely out but patient did receive antibiotics this morning. Home health nurse states she went over to assess and tip of catheter is intact and patient is not having any problems at insertion site with bleeding or otherwise. Dr. Samy Palumbo (on call MD) recommends patient follow up with Dr. Villatoro's office in the morning. Advised Home health nurse who agrees to notify patient.

## 2019-07-01 ENCOUNTER — TELEPHONE (OUTPATIENT)
Dept: HEMATOLOGY/ONCOLOGY | Facility: CLINIC | Age: 84
End: 2019-07-01

## 2019-07-01 ENCOUNTER — HOSPITAL ENCOUNTER (EMERGENCY)
Facility: HOSPITAL | Age: 84
Discharge: HOME | End: 2019-07-01
Attending: EMERGENCY MEDICINE
Payer: MEDICARE

## 2019-07-01 VITALS
WEIGHT: 170 LBS | RESPIRATION RATE: 18 BRPM | OXYGEN SATURATION: 95 % | DIASTOLIC BLOOD PRESSURE: 75 MMHG | HEIGHT: 72 IN | BODY MASS INDEX: 23.03 KG/M2 | SYSTOLIC BLOOD PRESSURE: 144 MMHG | TEMPERATURE: 99 F | HEART RATE: 72 BPM

## 2019-07-01 DIAGNOSIS — Z95.828 S/P PICC CENTRAL LINE PLACEMENT: Primary | ICD-10-CM

## 2019-07-01 PROCEDURE — 76937 US GUIDE VASCULAR ACCESS: CPT

## 2019-07-01 PROCEDURE — 36410 VNPNXR 3YR/> PHY/QHP DX/THER: CPT

## 2019-07-01 PROCEDURE — 99283 EMERGENCY DEPT VISIT LOW MDM: CPT | Mod: 25

## 2019-07-01 PROCEDURE — C1751 CATH, INF, PER/CENT/MIDLINE: HCPCS

## 2019-07-01 NOTE — TELEPHONE ENCOUNTER
Returned call to pt wife Jovon.   Ms. Rodriguez stated that pt's midline came completely out of his arm and is on IV antibiotics (needs for 2 weeks starting on 6/26).   S/w Dr. Jeffery as Dr. Villatoro out of office and Dr. Jeffery encouraged pt to go to ED for rapid replacement of midline to continue IV abx.   Ms. Rodriguez verbalized understanding and will take pt to [facility] ED which is closer to their home.         ----- Message from Darlene Olivier sent at 7/1/2019  8:09 AM CDT -----  Contact: pt wife jovon  pt wife jovon called to speak with nurse darron have some questions about midline came out of pt arm   Callback#[phone] or cell [phone]  Thank You  MARGARET Olivier

## 2019-07-01 NOTE — ED PROVIDER NOTES
"Encounter Date: 7/1/2019       History     Chief Complaint   Patient presents with    Vascular Access Problem     " midline fell out "     Patient is an 88 year old male who presents for replacement of his midline catheter. He has PMH significant for COPD, hypertension and metastatic CA. He is currently getting IV vancomycin and zosyn for a foot wound. He was able to receive his antibiotics yesterday but today the midline catheter "completely fell out". He states he contacted his doctor at [facility] and was told to come to the ER to have it replaced. He denied any redness or drainage from the site. He denied any numbness/tingling to the arm.        Review of patient's allergies indicates:  No Known Allergies  Past Medical History:   Diagnosis Date    COPD (chronic obstructive pulmonary disease)     H/O asbestosis     Hearing loss     Hypercholesterolemia     Hypertension     Malignant melanoma of right lower leg 5/11/2017    Melanoma     Approx. 2 years ago     Skin cancer     Squamous cell carcinoma      Past Surgical History:   Procedure Laterality Date    ADENOIDECTOMY      APPENDECTOMY      Carotid Stenosis Surgery      CHOLECYSTECTOMY  1965    LYMPHADENECTOMY, INGUINAL, Sartorious Flap Right 11/12/2018    Performed by Yair Alatorre MD at [facility] OR [address] [place] [address]    TONSILLECTOMY       Family History   Problem Relation Age of Onset    Coronary artery disease Mother     Cancer Father         Esophageal    Esophageal cancer Father     Diabetes Sister     No Known Problems Maternal Aunt     No Known Problems Maternal Uncle     No Known Problems Paternal Aunt     No Known Problems Paternal Uncle     No Known Problems Maternal Grandmother     No Known Problems Maternal Grandfather     No Known Problems Paternal Grandmother     No Known Problems Paternal Grandfather     Melanoma Neg Hx     Psoriasis Neg Hx     Lupus Neg Hx      Social History     Tobacco Use    Smoking status: " Former Smoker     Packs/day: 1.00     Years: 15.00     Pack years: 15.00     Types: Cigarettes     Last attempt to quit: 1965     Years since quittin.5    Smokeless tobacco: Never Used   Substance Use Topics    Alcohol use: No     Frequency: Never    Drug use: No     Review of Systems   Constitutional: Negative for chills and fever.   Musculoskeletal: Negative for arthralgias.   Skin: Negative for color change and wound.       Physical Exam     Initial Vitals [19 1155]   BP Pulse Resp Temp SpO2   (!) 144/75 72 18 98.5 °F (36.9 °C) 95 %      MAP       --         Physical Exam    Nursing note and vitals reviewed.  Constitutional: He appears well-developed and well-nourished. He is cooperative.  Non-toxic appearance. He does not have a sickly appearance.   HENT:   Head: Normocephalic and atraumatic.   Right Ear: External ear normal.   Left Ear: External ear normal.   Nose: Nose normal.   Eyes: Lids are normal.   Neck: Full passive range of motion without pain. Neck supple.   Cardiovascular: Normal rate, regular rhythm and normal heart sounds. Exam reveals no gallop and no friction rub.    No murmur heard.  Pulmonary/Chest: Breath sounds normal. He has no wheezes. He has no rhonchi. He has no rales.   Abdominal: Normal appearance.   Neurological: He is alert.   Skin: Skin is warm, dry and intact. No rash noted.         ED Course   Procedures  Labs Reviewed - No data to display       Imaging Results    None          Medical Decision Making:   History:   Old Medical Records: I decided to obtain old medical records.       APC / Resident Notes:   Urgent evaluation of an 88-year-old male who presents for placement of his midline catheter.  It was accidentally removed today.  He is receiving outpatient IV antibiotics for a current foot infection.  Are PICC team was able to place the midline without difficulty.  He is to continues antibiotics as prescribed.  Patient is to follow up with their primary care  provider. Case was discussed with Dr. Summers who is in agreement with the plan of care. All questions answered.                    Clinical Impression:       ICD-10-CM ICD-9-CM   1. S/P PICC central line placement Z95.828 V45.89                                Natividad Mujica PA-C  07/01/19 1736

## 2019-07-01 NOTE — DISCHARGE INSTRUCTIONS
Continue routine care for line and antibiotics as prescribed.  Follow up with your primary care provider.  For worsening symptoms, chest pain, shortness of breath, increased abdominal pain, high grade fever, stroke or stroke like symptoms, immediately go to the nearest Emergency Room or call 911 as soon as possible.

## 2019-07-02 ENCOUNTER — TELEPHONE (OUTPATIENT)
Dept: INFECTIOUS DISEASES | Facility: CLINIC | Age: 84
End: 2019-07-02

## 2019-07-02 LAB — BACTERIA SPEC ANAEROBE CULT: ABNORMAL

## 2019-07-02 NOTE — TELEPHONE ENCOUNTER
Patient's wife informed that Dr Soni still wants to see the patient. She verbalized understanding.      NOE Tipton LPN   Caller: Unspecified (4 days ago,  4:14 PM)             Please let patients wife know that Dr. Soni would still like to see him on Wednesday.     Thank you!

## 2019-07-03 ENCOUNTER — OFFICE VISIT (OUTPATIENT)
Dept: PODIATRY | Facility: CLINIC | Age: 84
End: 2019-07-03
Payer: MEDICARE

## 2019-07-03 VITALS — HEIGHT: 72 IN | BODY MASS INDEX: 23.03 KG/M2 | WEIGHT: 170 LBS

## 2019-07-03 DIAGNOSIS — L89.614 PRESSURE ULCER OF RIGHT HEEL, STAGE 4: ICD-10-CM

## 2019-07-03 DIAGNOSIS — M25.571 ACUTE RIGHT ANKLE PAIN: Primary | ICD-10-CM

## 2019-07-03 DIAGNOSIS — L92.9 HYPERGRANULATION: ICD-10-CM

## 2019-07-03 PROCEDURE — 99999 PR PBB SHADOW E&M-EST. PATIENT-LVL II: ICD-10-PCS | Mod: PBBFAC,,, | Performed by: PODIATRIST

## 2019-07-03 PROCEDURE — 11042 WOUND DEBRIDEMENT: ICD-10-PCS | Mod: S$GLB,,, | Performed by: PODIATRIST

## 2019-07-03 PROCEDURE — 99999 PR PBB SHADOW E&M-EST. PATIENT-LVL II: CPT | Mod: PBBFAC,,, | Performed by: PODIATRIST

## 2019-07-03 PROCEDURE — 99499 NO LOS: ICD-10-PCS | Mod: S$GLB,,, | Performed by: PODIATRIST

## 2019-07-03 PROCEDURE — 99499 UNLISTED E&M SERVICE: CPT | Mod: S$GLB,,, | Performed by: PODIATRIST

## 2019-07-03 PROCEDURE — 11042 DBRDMT SUBQ TIS 1ST 20SQCM/<: CPT | Mod: S$GLB,,, | Performed by: PODIATRIST

## 2019-07-03 RX ORDER — HYDROCODONE BITARTRATE AND ACETAMINOPHEN 5; 325 MG/1; MG/1
1 TABLET ORAL EVERY 6 HOURS PRN
Qty: 30 TABLET | Refills: 0 | Status: SHIPPED | OUTPATIENT
Start: 2019-07-03 | End: 2019-11-20

## 2019-07-03 NOTE — PROGRESS NOTES
Subjective:      Patient ID: Shaq Bragg Jr. is a 88 y.o. male.    Chief Complaint: Wound Care  Patient presents to clinic for a 2 week follow up.  Notes being admitted since our last exam by Hematology/Oncology, as localized infection was noted to the Rt. Posterior ankle/heel wound.  States that a biopsy was taken from the wound, and he was placed on IV antibiotics.  Notes that the most recent MRI was negative for bone infection.  With today's exam, he denies experiencing pain from the wound. Has kept the previous bandage by Home Health clean, dry, and intact.  Denies having N/V/F/C/D.  Denies any additional pedal complaints.      Past Medical History:   Diagnosis Date    COPD (chronic obstructive pulmonary disease)     H/O asbestosis     Hearing loss     Hypercholesterolemia     Hypertension     Malignant melanoma of right lower leg 5/11/2017    Melanoma     Approx. 2 years ago     Skin cancer     Squamous cell carcinoma        Past Surgical History:   Procedure Laterality Date    ADENOIDECTOMY      APPENDECTOMY      Carotid Stenosis Surgery      CHOLECYSTECTOMY  1965    LYMPHADENECTOMY, INGUINAL, Sartorious Flap Right 11/12/2018    Performed by [facility] at [facility] OR [address] [place] [address]    TONSILLECTOMY         Family History   Problem Relation Age of Onset    Coronary artery disease Mother     Cancer Father         Esophageal    Esophageal cancer Father     Diabetes Sister     No Known Problems Maternal Aunt     No Known Problems Maternal Uncle     No Known Problems Paternal Aunt     No Known Problems Paternal Uncle     No Known Problems Maternal Grandmother     No Known Problems Maternal Grandfather     No Known Problems Paternal Grandmother     No Known Problems Paternal Grandfather     Melanoma Neg Hx     Psoriasis Neg Hx     Lupus Neg Hx        Social History     Socioeconomic History    Marital status:      Spouse name: Not on file    Number of children: Not  on file    Years of education: Not on file    Highest education level: Not on file   Occupational History    Not on file   Social Needs    Financial resource strain: Not on file    Food insecurity:     Worry: Not on file     Inability: Not on file    Transportation needs:     Medical: Not on file     Non-medical: Not on file   Tobacco Use    Smoking status: Former Smoker     Packs/day: 1.00     Years: 15.00     Pack years: 15.00     Types: Cigarettes     Last attempt to quit: 1965     Years since quittin.5    Smokeless tobacco: Never Used   Substance and Sexual Activity    Alcohol use: No     Frequency: Never    Drug use: No    Sexual activity: Never   Lifestyle    Physical activity:     Days per week: Not on file     Minutes per session: Not on file    Stress: Not on file   Relationships    Social connections:     Talks on phone: Not on file     Gets together: Not on file     Attends Restorationist service: Not on file     Active member of club or organization: Not on file     Attends meetings of clubs or organizations: Not on file     Relationship status: Not on file   Other Topics Concern    Not on file   Social History Narrative    Not on file       Current Outpatient Medications   Medication Sig Dispense Refill    aspirin (ECOTRIN) 81 MG EC tablet Take 81 mg by mouth every morning.       cadexomer iodine (IODOSORB) 0.9 % gel Apply topically daily as needed for Wound Care. 10 g 3    ceFEPIme (MAXIPIME) 2 gram injection Inject 2 g into the vein every 12 (twelve) hours. for 14 days 56 g 0    finasteride (PROSCAR) 5 mg tablet Take 5 mg by mouth every morning.       furosemide (LASIX) 20 MG tablet HOLD UNTIL SEEN by primary care      HYDROcodone-acetaminophen (NORCO) 5-325 mg per tablet Take 1 tablet by mouth every 6 (six) hours as needed for Pain. 30 tablet 0    losartan-hydrochlorothiazide 50-12.5 mg (HYZAAR) 50-12.5 mg per tablet HOLD UNTIL SEEN by primary care doctor      metoprolol  tartrate (LOPRESSOR) 50 MG tablet Takes 50 mg in morning and half tablet (25 mg) in evening      potassium chloride (MICRO-K) 8 mEq CpSR HOLD until seen by primary care doctor      simvastatin (ZOCOR) 10 MG tablet Take 10 mg by mouth every evening.      tamsulosin (FLOMAX) 0.4 mg Cp24 Take 0.4 mg by mouth every morning.        No current facility-administered medications for this visit.        Review of patient's allergies indicates:  No Known Allergies    Review of Systems   Constitution: Negative for chills and fever.   HENT: Negative for congestion and tinnitus.    Cardiovascular: Positive for leg swelling. Negative for chest pain and claudication.   Hematologic/Lymphatic: Negative for bleeding problem.   Skin: Positive for color change, nail changes and poor wound healing.   Musculoskeletal: Positive for myalgias and stiffness.   Gastrointestinal: Negative for nausea and vomiting.   Genitourinary: Negative for dysuria.   Neurological: Negative for numbness and paresthesias.   Psychiatric/Behavioral: Negative for altered mental status and suicidal ideas.   Allergic/Immunologic: Negative for environmental allergies and persistent infections.           Objective:      Physical Exam   Constitutional: He is oriented to person, place, and time. He appears well-developed and well-nourished.   Cardiovascular:   Pulses:       Dorsalis pedis pulses are 2+ on the right side, and 2+ on the left side.        Posterior tibial pulses are 1+ on the right side, and 1+ on the left side.   CFT is < 3 seconds bilateral.  Pedal hair growth is decreased bilateral.  Mild varicosities noted bilateral.  Moderate nonpitting edema noted to bilateral lower extremity.  Toes are warm to touch bilateral.     Musculoskeletal: He exhibits edema and tenderness.   Muscle strength 4/5 in all muscle groups on the Rt. And 5/5 on the Lt.   No tenderness nor crepitation with ROM of foot/ankle joints bilateral.  Pain with palpation to the wound of  the Rt. Posterior heel and ankle.   Neurological: He is alert and oriented to person, place, and time. He has normal strength. No sensory deficit.   Light touch intact bilateral.     Skin: Skin is warm and dry. Capillary refill takes less than 2 seconds. Lesion noted. No abrasion, no bruising, no burn, no ecchymosis, no laceration and no petechiae noted. No erythema. No pallor.   Location: Open wound noted to the Rt. Posterior ankle/heel.  Base: Down to sub Q and comprised of slough and hypergranulation. No remaining exposure of Achilles.  Drainage: None  Caterina wound: Devoid of erythema, fluctuance, purulence, and malodor. Moderate caterina wound edema noted.  Pre-debridement measurement:4.8 x 2.9 x 0.2cm.   Post-debridement measurement: 5 x 3.1x 0.2cm.    Location: Open wound noted slightly inferior to the first, along the Rt. posterior heel.    Base: Down to sub and comprised of eschar and granulation.  Drainage: None  Caterina wound: Devoid of erythema, edema, fluctuance, purulence, and malodor.   Measurement: 1.5 x 2.7 x 0.1cm.                 Assessment:       Encounter Diagnoses   Name Primary?    Acute right ankle pain Yes    Pressure ulcer of right heel, stage 4     Hypergranulation          Plan:       Shaq was seen today for wound care.    Diagnoses and all orders for this visit:    Acute right ankle pain  -     HYDROcodone-acetaminophen (NORCO) 5-325 mg per tablet; Take 1 tablet by mouth every 6 (six) hours as needed for Pain.    Pressure ulcer of right heel, stage 4    Hypergranulation      I counseled the patient on his conditions, their implications and medical management.    Performed a selective excisional debridement of the Rt. Foot/ankle.  See attached procedure note.     Wound bases were covered with silver alginate, foam, and a compressive wrap.  To be changed twice weekly by Home Health.  Will contact with new orders.    Patient to continue with IV antibiotics per ID recs.    Advised to rest and  elevate the affected extremity.    Instructed to minimize weight bearing to facilitate wound healing.    Advised to ambulate only in the postoperative shoe/boot.    RTC in 1 week for a wound check.    Morgan Soni DPM

## 2019-07-04 NOTE — PROCEDURES
"Wound Debridement  Date/Time: 7/3/2019 3:42 PM  Performed by: Morgan Soni DPM  Authorized by: Morgan Soni DPM     Time out: Immediately prior to procedure a "time out" was called to verify the correct patient, procedure, equipment, support staff and site/side marked as required.    Consent Done?:  Yes (Verbal)    Preparation: Patient was prepped and draped in usual sterile fashion    Local anesthesia used?: No      Wound Details:    Location:  Right foot    Location:  Right Heel    Type of Debridement:  Excisional       Length (cm):  4.8       Area (sq cm):  13.92       Width (cm):  2.9       Percent Debrided (%):  100       Depth (cm):  0.2       Total Area Debrided (sq cm):  13.92    Depth of debridement:  Subcutaneous tissue    Tissue debrided:  Subcutaneous and Hypergranulation    Devitalized tissue debrided:  Fibrin and Slough    Instruments:  Curette    Bleeding:  Minimal  Hemostasis Achieved: Yes    Method Used:  Pressure and Silver Nitrate  Patient tolerance:  Patient tolerated the procedure well with no immediate complications      "

## 2019-07-08 ENCOUNTER — TELEPHONE (OUTPATIENT)
Dept: PODIATRY | Facility: CLINIC | Age: 84
End: 2019-07-08

## 2019-07-08 NOTE — TELEPHONE ENCOUNTER
----- Message from Bharathi Ford sent at 7/8/2019 10:34 AM CDT -----  Contact: pt wife jovon  Pt is requesting to reschedule their appointment.    Date of current appointment: 7.10.19  Reason for reschedule: ashley is to early  First Available: 7.23.19  Additional information: pt is requesting something later in the day if something is available    Call back: [phone]  thanks

## 2019-07-08 NOTE — TELEPHONE ENCOUNTER
Spoke with patient's wife.  Informed her that at this time there is no later appointment available on Wednesday.  She verbalized understanding.

## 2019-07-09 ENCOUNTER — TELEPHONE (OUTPATIENT)
Dept: INFECTIOUS DISEASES | Facility: CLINIC | Age: 84
End: 2019-07-09

## 2019-07-10 ENCOUNTER — DOCUMENTATION ONLY (OUTPATIENT)
Dept: INFECTIOUS DISEASES | Facility: CLINIC | Age: 84
End: 2019-07-10

## 2019-07-10 ENCOUNTER — TELEPHONE (OUTPATIENT)
Dept: INFECTIOUS DISEASES | Facility: CLINIC | Age: 84
End: 2019-07-10

## 2019-07-10 ENCOUNTER — OFFICE VISIT (OUTPATIENT)
Dept: PODIATRY | Facility: CLINIC | Age: 84
End: 2019-07-10
Payer: MEDICARE

## 2019-07-10 VITALS — BODY MASS INDEX: 23.03 KG/M2 | WEIGHT: 170 LBS | HEIGHT: 72 IN

## 2019-07-10 DIAGNOSIS — L92.9 HYPERGRANULATION: ICD-10-CM

## 2019-07-10 DIAGNOSIS — L89.614 PRESSURE ULCER OF RIGHT HEEL, STAGE 4: Primary | ICD-10-CM

## 2019-07-10 PROCEDURE — 11042 WOUND DEBRIDEMENT: ICD-10-PCS | Mod: S$GLB,,, | Performed by: PODIATRIST

## 2019-07-10 PROCEDURE — 99499 NO LOS: ICD-10-PCS | Mod: S$GLB,,, | Performed by: PODIATRIST

## 2019-07-10 PROCEDURE — 99999 PR PBB SHADOW E&M-EST. PATIENT-LVL II: CPT | Mod: PBBFAC,,, | Performed by: PODIATRIST

## 2019-07-10 PROCEDURE — 11042 DBRDMT SUBQ TIS 1ST 20SQCM/<: CPT | Mod: S$GLB,,, | Performed by: PODIATRIST

## 2019-07-10 PROCEDURE — 99999 PR PBB SHADOW E&M-EST. PATIENT-LVL II: ICD-10-PCS | Mod: PBBFAC,,, | Performed by: PODIATRIST

## 2019-07-10 PROCEDURE — 99499 UNLISTED E&M SERVICE: CPT | Mod: S$GLB,,, | Performed by: PODIATRIST

## 2019-07-10 RX ORDER — METRONIDAZOLE 500 MG/1
500 TABLET ORAL 3 TIMES DAILY
Qty: 42 TABLET | Refills: 0 | Status: SHIPPED | OUTPATIENT
Start: 2019-07-10 | End: 2019-07-24

## 2019-07-11 NOTE — PROCEDURES
"Wound Debridement  Date/Time: 7/10/2019 10:00 PM  Performed by: Morgan Soni DPM  Authorized by: Morgan Soni DPM     Time out: Immediately prior to procedure a "time out" was called to verify the correct patient, procedure, equipment, support staff and site/side marked as required.    Consent Done?:  Yes (Verbal)    Preparation: Patient was prepped and draped in usual sterile fashion    Local anesthesia used?: No      Wound Details:    Location:  Right foot    Location:  Right Heel    Type of Debridement:  Excisional       Length (cm):  4.6       Area (sq cm):  12.42       Width (cm):  2.7       Percent Debrided (%):  100       Depth (cm):  0.2       Total Area Debrided (sq cm):  12.42    Depth of debridement:  Subcutaneous tissue    Tissue debrided:  Subcutaneous and Hypergranulation    Devitalized tissue debrided:  Fibrin and Slough    Instruments:  Blade and Other (silver nitrate)    Bleeding:  Minimal  Hemostasis Achieved: Yes    Method Used:  Pressure  Patient tolerance:  Patient tolerated the procedure well with no immediate complications      "

## 2019-07-11 NOTE — PROGRESS NOTES
Subjective:      Patient ID: Shaq Bragg Jr. is a 88 y.o. male.    Chief Complaint: Wound Care  Patient presents to clinic for a 1 week wound check.  Denies experiencing pain from the wound with today's exam. Has kept the previous bandage by Home Health clean, dry, and intact.  Has attempted to elevate the limb while at rest.  Denies having N/V/F/C/D.  Denies any additional pedal complaints.      Past Medical History:   Diagnosis Date    COPD (chronic obstructive pulmonary disease)     H/O asbestosis     Hearing loss     Hypercholesterolemia     Hypertension     Malignant melanoma of right lower leg 5/11/2017    Melanoma     Approx. 2 years ago     Skin cancer     Squamous cell carcinoma        Past Surgical History:   Procedure Laterality Date    ADENOIDECTOMY      APPENDECTOMY      Carotid Stenosis Surgery      CHOLECYSTECTOMY  1965    LYMPHADENECTOMY, INGUINAL, Sartorious Flap Right 11/12/2018    Performed by Yair Al[facility]re [facility] at [facility] OR [address] [place] [address]    TONSILLECTOMY         Family History   Problem Relation Age of Onset    Coronary artery disease Mother     Cancer Father         Esophageal    Esophageal cancer Father     Diabetes Sister     No Known Problems Maternal Aunt     No Known Problems Maternal Uncle     No Known Problems Paternal Aunt     No Known Problems Paternal Uncle     No Known Problems Maternal Grandmother     No Known Problems Maternal Grandfather     No Known Problems Paternal Grandmother     No Known Problems Paternal Grandfather     Melanoma Neg Hx     Psoriasis Neg Hx     Lupus Neg Hx        Social History     Socioeconomic History    Marital status:      Spouse name: Not on file    Number of children: Not on file    Years of education: Not on file    Highest education level: Not on file   Occupational History    Not on file   Social Needs    Financial resource strain: Not on file    Food insecurity:     Worry: Not on file      Inability: Not on file    Transportation needs:     Medical: Not on file     Non-medical: Not on file   Tobacco Use    Smoking status: Former Smoker     Packs/day: 1.00     Years: 15.00     Pack years: 15.00     Types: Cigarettes     Last attempt to quit: 1965     Years since quittin.5    Smokeless tobacco: Never Used   Substance and Sexual Activity    Alcohol use: No     Frequency: Never    Drug use: No    Sexual activity: Never   Lifestyle    Physical activity:     Days per week: Not on file     Minutes per session: Not on file    Stress: Not on file   Relationships    Social connections:     Talks on phone: Not on file     Gets together: Not on file     Attends [employer] service: Not on file     Active member of club or organization: Not on file     Attends meetings of clubs or organizations: Not on file     Relationship status: Not on file   Other Topics Concern    Not on file   Social History Narrative    Not on file       Current Outpatient Medications   Medication Sig Dispense Refill    aspirin (ECOTRIN) 81 MG EC tablet Take 81 mg by mouth every morning.       cadexomer iodine (IODOSORB) 0.9 % gel Apply topically daily as needed for Wound Care. 10 g 3    finasteride (PROSCAR) 5 mg tablet Take 5 mg by mouth every morning.       furosemide (LASIX) 20 MG tablet HOLD UNTIL SEEN by primary care      HYDROcodone-acetaminophen (NORCO) 5-325 mg per tablet Take 1 tablet by mouth every 6 (six) hours as needed for Pain. 30 tablet 0    losartan-hydrochlorothiazide 50-12.5 mg (HYZAAR) 50-12.5 mg per tablet HOLD UNTIL SEEN by primary care doctor      metoprolol tartrate (LOPRESSOR) 50 MG tablet Takes 50 mg in morning and half tablet (25 mg) in evening      metroNIDAZOLE (FLAGYL) 500 MG tablet Take 1 tablet (500 mg total) by mouth 3 (three) times daily. for 14 days 42 tablet 0    potassium chloride (MICRO-K) 8 mEq CpSR HOLD until seen by primary care doctor      simvastatin (ZOCOR) 10 MG tablet  Take 10 mg by mouth every evening.      tamsulosin (FLOMAX) 0.4 mg Cp24 Take 0.4 mg by mouth every morning.        No current facility-administered medications for this visit.        Review of patient's allergies indicates:  No Known Allergies    Review of Systems   Constitution: Negative for chills and fever.   HENT: Negative for congestion and tinnitus.    Cardiovascular: Positive for leg swelling. Negative for chest pain and claudication.   Hematologic/Lymphatic: Negative for bleeding problem.   Skin: Positive for color change, nail changes and poor wound healing.   Musculoskeletal: Positive for stiffness. Negative for myalgias.   Gastrointestinal: Negative for nausea and vomiting.   Genitourinary: Negative for dysuria.   Neurological: Negative for numbness and paresthesias.   Psychiatric/Behavioral: Negative for altered mental status and suicidal ideas.   Allergic/Immunologic: Negative for environmental allergies and persistent infections.           Objective:      Physical Exam   Constitutional: He is oriented to person, place, and time. He appears well-developed and well-nourished.   Cardiovascular:   Pulses:       Dorsalis pedis pulses are 2+ on the right side, and 2+ on the left side.        Posterior tibial pulses are 1+ on the right side, and 1+ on the left side.   CFT is < 3 seconds bilateral.  Pedal hair growth is decreased bilateral.  Mild varicosities noted bilateral.  Moderate nonpitting edema noted to bilateral lower extremity.  Toes are warm to touch bilateral.     Musculoskeletal: He exhibits edema. He exhibits no tenderness.   Muscle strength 4/5 in all muscle groups on the Rt. And 5/5 on the Lt.   No tenderness nor crepitation with ROM of foot/ankle joints bilateral.  (-) for pain with palpation to the wound of the Rt. Posterior heel and ankle.   Neurological: He is alert and oriented to person, place, and time. He has normal strength. No sensory deficit.   Light touch intact bilateral.      Skin: Skin is warm and dry. Capillary refill takes less than 2 seconds. Lesion noted. No abrasion, no bruising, no burn, no ecchymosis, no laceration and no petechiae noted. No erythema. No pallor.   Location: Open wound noted to the Rt. Posterior ankle/heel.  Base: Down to sub Q and comprised of slough and hypergranulation. No exposure of Achilles.  Drainage: None  Caterina wound: Devoid of erythema, fluctuance, purulence, and malodor. Moderate caterina wound edema noted.  Pre-debridement measurement:4.6 x 2.7 x 0.2cm.   Post-debridement measurement: 4.8 x 2.9 x 0.2cm.    Location: Open wound noted slightly inferior to the first, along the Rt. posterior heel.    Base: Down to sub and comprised of granulation.  Drainage: None  Caterina wound: Devoid of erythema, edema, fluctuance, purulence, and malodor.   Measurement: 1.5 x 2.7 x 0.1cm.                 Assessment:       Encounter Diagnoses   Name Primary?    Pressure ulcer of right heel, stage 4 Yes    Hypergranulation          Plan:       Shaq was seen today for wound care.    Diagnoses and all orders for this visit:    Pressure ulcer of right heel, stage 4    Hypergranulation      I counseled the patient on his conditions, their implications and medical management.    Performed a selective excisional debridement of the Rt. Foot/ankle.  See attached procedure note.     Wound bases were covered with silver alginate, foam, and a compressive wrap.  To be changed twice weekly by Home Health.      Patient to continue with IV antibiotics per ID recs.    Advised to rest and elevate the affected extremity.    Instructed to minimize weight bearing to facilitate wound healing.    Advised to ambulate only in the postoperative shoe/boot.    Reviewed patient's most recent biopsy report which was negative for malignancy.    RTC in 1 week for a wound check.    Morgan Soni DPM

## 2019-07-15 ENCOUNTER — TELEPHONE (OUTPATIENT)
Dept: INFECTIOUS DISEASES | Facility: CLINIC | Age: 84
End: 2019-07-15

## 2019-07-15 ENCOUNTER — TELEPHONE (OUTPATIENT)
Dept: DERMATOLOGY | Facility: CLINIC | Age: 84
End: 2019-07-15

## 2019-07-15 LAB
HEMATOCRIT BLOOD: 37.2 % (ref 42–52)
HGB BLD-MCNC: 12.9 G/DL (ref 13.5–17.5)
PLATELET COUNT: 131 BIL/L (ref 150–400)
WBC: 4.8 BIL/L (ref 4.8–10.8)

## 2019-07-16 ENCOUNTER — TELEPHONE (OUTPATIENT)
Dept: INFECTIOUS DISEASES | Facility: CLINIC | Age: 84
End: 2019-07-16

## 2019-07-16 LAB — SED RATE (WESTERGREN): 15 MM/HR (ref 0–20.2)

## 2019-07-17 ENCOUNTER — OFFICE VISIT (OUTPATIENT)
Dept: PODIATRY | Facility: CLINIC | Age: 84
End: 2019-07-17
Payer: MEDICARE

## 2019-07-17 VITALS
HEIGHT: 72 IN | BODY MASS INDEX: 22.89 KG/M2 | DIASTOLIC BLOOD PRESSURE: 71 MMHG | HEART RATE: 63 BPM | WEIGHT: 169 LBS | SYSTOLIC BLOOD PRESSURE: 140 MMHG

## 2019-07-17 DIAGNOSIS — L92.9 HYPERGRANULATION: ICD-10-CM

## 2019-07-17 DIAGNOSIS — L89.614 PRESSURE ULCER OF RIGHT HEEL, STAGE 4: Primary | ICD-10-CM

## 2019-07-17 PROCEDURE — 99999 PR PBB SHADOW E&M-EST. PATIENT-LVL III: ICD-10-PCS | Mod: PBBFAC,,, | Performed by: PODIATRIST

## 2019-07-17 PROCEDURE — 99499 NO LOS: ICD-10-PCS | Mod: S$GLB,,, | Performed by: PODIATRIST

## 2019-07-17 PROCEDURE — 99999 PR PBB SHADOW E&M-EST. PATIENT-LVL III: CPT | Mod: PBBFAC,,, | Performed by: PODIATRIST

## 2019-07-17 PROCEDURE — 99499 UNLISTED E&M SERVICE: CPT | Mod: S$GLB,,, | Performed by: PODIATRIST

## 2019-07-17 PROCEDURE — 11042 WOUND DEBRIDEMENT: ICD-10-PCS | Mod: S$GLB,,, | Performed by: PODIATRIST

## 2019-07-17 PROCEDURE — 11042 DBRDMT SUBQ TIS 1ST 20SQCM/<: CPT | Mod: S$GLB,,, | Performed by: PODIATRIST

## 2019-07-17 NOTE — PROCEDURES
"Encounter Date: 2019       History     Chief Complaint   Patient presents with    Arm Pain     Pt c/o left upper arm pain X 4-5 days. Pt states "Everytime the weather changes, this arm hurts."     Patient is a 51-year-old female presenting to the emergency department with complaints of left upper extremity pain.  The patient states her symptoms started 4-5 days ago.  She admits that happens frequently, stating that it worsens when the weather changes.  She states that when the pain is severe, hurts to move her arm.  She denies any numbness, tingling, weakness.  She denies any fall or injury. She admits in the past she has taken Motrin 800 mg with significant improvement however she has been out for the past few weeks.  No chest pain or shortness of breath. No other injury. No medication use thus far today.This is the extent of the patient's complaints at this time.         The history is provided by the patient.     Review of patient's allergies indicates:  No Known Allergies  Past Medical History:   Diagnosis Date    Hypertension      Past Surgical History:   Procedure Laterality Date     SECTION       History reviewed. No pertinent family history.  Social History     Tobacco Use    Smoking status: Never Smoker    Smokeless tobacco: Never Used   Substance Use Topics    Alcohol use: No    Drug use: No     Review of Systems   Constitutional: Negative for activity change, appetite change, chills, fatigue and fever.   HENT: Negative for congestion, rhinorrhea and sore throat.    Eyes: Negative for photophobia and visual disturbance.   Respiratory: Negative for cough, shortness of breath and wheezing.    Cardiovascular: Negative for chest pain.   Gastrointestinal: Negative for abdominal pain, diarrhea, nausea and vomiting.   Genitourinary: Negative for dysuria, hematuria and urgency.   Musculoskeletal: Positive for arthralgias. Negative for back pain, myalgias and neck pain.   Skin: Negative for " "Wound Debridement  Date/Time: 7/17/2019 6:04 PM  Performed by: Morgan Soni DPM  Authorized by: Morgan Soni DPM     Time out: Immediately prior to procedure a "time out" was called to verify the correct patient, procedure, equipment, support staff and site/side marked as required.    Consent Done?:  Yes (Verbal)    Preparation: Patient was prepped and draped in usual sterile fashion    Local anesthesia used?: No      Wound Details:    Location:  Right foot    Location:  Right Heel    Type of Debridement:  Excisional       Length (cm):  4.3       Area (sq cm):  9.03       Width (cm):  2.1       Percent Debrided (%):  100       Depth (cm):  0.2       Total Area Debrided (sq cm):  9.03    Depth of debridement:  Subcutaneous tissue    Tissue debrided:  Subcutaneous and Hypergranulation    Devitalized tissue debrided:  Fibrin and Slough    Instruments:  Curette    Bleeding:  Minimal  Hemostasis Achieved: Yes    Method Used:  Pressure and Silver Nitrate  Patient tolerance:  Patient tolerated the procedure well with no immediate complications      " color change and wound.   Neurological: Negative for weakness and headaches.   Psychiatric/Behavioral: Negative for agitation and confusion.       Physical Exam     Initial Vitals [02/12/19 1122]   BP Pulse Resp Temp SpO2   (!) 152/91 96 18 98.3 °F (36.8 °C) 98 %      MAP       --         Physical Exam    Nursing note and vitals reviewed.  Constitutional: She appears well-developed and well-nourished. She is not diaphoretic. She is cooperative.  Non-toxic appearance. She does not have a sickly appearance. She does not appear ill. No distress.   Well-appearing,  female accompanied the emergency room.  Speaking clearly in full sentences.  No acute distress.   HENT:   Head: Normocephalic and atraumatic.   Right Ear: External ear normal.   Left Ear: External ear normal.   Nose: Nose normal.   Mouth/Throat: Oropharynx is clear and moist.   Eyes: Conjunctivae and EOM are normal.   Neck: Normal range of motion. Neck supple.   Musculoskeletal: Normal range of motion.        Arms:  No significant reproducible tenderness to palpation of the left upper extremity.  Normal range of motion, strength, sensation of the left shoulder, elbow, wrist and fingers.  No edema or erythema of the joints. Good pulses bilaterally. No skin changes.   Neurological: She is alert and oriented to person, place, and time. GCS eye subscore is 4. GCS verbal subscore is 5. GCS motor subscore is 6.   Skin: Skin is warm.   Psychiatric: She has a normal mood and affect. Her behavior is normal. Judgment and thought content normal.         ED Course   Procedures  Labs Reviewed - No data to display        Medical Decision Making:   Initial Assessment:   Urgent evaluation of a 51-year-old female with a past medical history of hypertension, presenting to the emergency department with complaints of left upper extremity pain.  The patient is afebrile, nontoxic appearing, hemodynamically stable, neurovascularly intact. Physical exam reveals mild  tenderness to palpation of left upper extremity with no significant bony deformity, crepitus, step-off.  No recent injury or trauma.  ED Management:  The patient is not have any signs or symptoms to raise concern for acute fracture dislocation, do not feel an x-ray is warranted.  No evidence consistent with gout.  Given patient's history and physical exam findings, likely secondary to musculoskeletal etiology.  Will plan to treat with NSAIDs as the patient reports significant improvement in the past.  She is given ibuprofen the emergency department discharged home with a prescription.  Discharged home in stable condition.The patient was instructed to follow up with a primary care provider in 2 days or to return to the emergency department for worsening symptoms. The treatment plan was discussed with the patient who demonstrated understanding and comfort with plan.    This note was created using [employer] Fluency Direct. There may be typographical errors secondary to dictation.                         Clinical Impression:     1. Left arm pain          Disposition:   Disposition: Discharged  Condition: Stable                        Kaylee Robertson PA-C  02/12/19 9420

## 2019-07-17 NOTE — PROGRESS NOTES
Subjective:      Patient ID: Shaq Bragg Jr. is a 88 y.o. male.    Chief Complaint: Wound Check (1wk f/u achilles ulcer R ft PCP Dr Jeter, A1C 4.8 6/2019)  Patient presents to clinic for a 1 week wound check.  Relates having mild aching pain, 5/10,  from the wound with today's exam.  Symptoms are exacerbated applied pressure to the wound.  Symptoms are alleviated with rest.  Has kept the previous bandage by Home Health clean, dry, and intact.  Has attempted to elevate the limb while at rest.  Denies having N/V/F/C/D.  Denies any additional pedal complaints.      Past Medical History:   Diagnosis Date    COPD (chronic obstructive pulmonary disease)     H/O asbestosis     Hearing loss     Hypercholesterolemia     Hypertension     Malignant melanoma of right lower leg 5/11/2017    Melanoma     Approx. 2 years ago     Skin cancer     Squamous cell carcinoma        Past Surgical History:   Procedure Laterality Date    ADENOIDECTOMY      APPENDECTOMY      Carotid Stenosis Surgery      CHOLECYSTECTOMY  1965    LYMPHADENECTOMY, INGUINAL, Sartorious Flap Right 11/12/2018    Performed by Yair Alatorre MD at [facility] OR [address] [place] [address]    TONSILLECTOMY         Family History   Problem Relation Age of Onset    Coronary artery disease Mother     Cancer Father         Esophageal    Esophageal cancer Father     Diabetes Sister     No Known Problems Maternal Aunt     No Known Problems Maternal Uncle     No Known Problems Paternal Aunt     No Known Problems Paternal Uncle     No Known Problems Maternal Grandmother     No Known Problems Maternal Grandfather     No Known Problems Paternal Grandmother     No Known Problems Paternal Grandfather     Melanoma Neg Hx     Psoriasis Neg Hx     Lupus Neg Hx        Social History     Socioeconomic History    Marital status:      Spouse name: Not on file    Number of children: Not on file    Years of education: Not on file    Highest education  level: Not on file   Occupational History    Not on file   Social Needs    Financial resource strain: Not on file    Food insecurity:     Worry: Not on file     Inability: Not on file    Transportation needs:     Medical: Not on file     Non-medical: Not on file   Tobacco Use    Smoking status: Former Smoker     Packs/day: 1.00     Years: 15.00     Pack years: 15.00     Types: Cigarettes     Last attempt to quit: 1965     Years since quittin.5    Smokeless tobacco: Never Used   Substance and Sexual Activity    Alcohol use: No     Frequency: Never    Drug use: No    Sexual activity: Never   Lifestyle    Physical activity:     Days per week: Not on file     Minutes per session: Not on file    Stress: Not on file   Relationships    Social connections:     Talks on phone: Not on file     Gets together: Not on file     Attends [employer] service: Not on file     Active member of club or organization: Not on file     Attends meetings of clubs or organizations: Not on file     Relationship status: Not on file   Other Topics Concern    Not on file   Social History Narrative    Not on file       Current Outpatient Medications   Medication Sig Dispense Refill    aspirin (ECOTRIN) 81 MG EC tablet Take 81 mg by mouth every morning.       cadexomer iodine (IODOSORB) 0.9 % gel Apply topically daily as needed for Wound Care. 10 g 3    finasteride (PROSCAR) 5 mg tablet Take 5 mg by mouth every morning.       furosemide (LASIX) 20 MG tablet HOLD UNTIL SEEN by primary care      HYDROcodone-acetaminophen (NORCO) 5-325 mg per tablet Take 1 tablet by mouth every 6 (six) hours as needed for Pain. 30 tablet 0    losartan-hydrochlorothiazide 50-12.5 mg (HYZAAR) 50-12.5 mg per tablet HOLD UNTIL SEEN by primary care doctor      metoprolol tartrate (LOPRESSOR) 50 MG tablet Takes 50 mg in morning and half tablet (25 mg) in evening      metroNIDAZOLE (FLAGYL) 500 MG tablet Take 1 tablet (500 mg total) by mouth 3  (three) times daily. for 14 days 42 tablet 0    potassium chloride (MICRO-K) 8 mEq CpSR HOLD until seen by primary care doctor      simvastatin (ZOCOR) 10 MG tablet Take 10 mg by mouth every evening.      tamsulosin (FLOMAX) 0.4 mg Cp24 Take 0.4 mg by mouth every morning.        No current facility-administered medications for this visit.        Review of patient's allergies indicates:  No Known Allergies    Review of Systems   Constitution: Negative for chills and fever.   HENT: Negative for congestion and tinnitus.    Cardiovascular: Positive for leg swelling. Negative for chest pain and claudication.   Hematologic/Lymphatic: Negative for bleeding problem.   Skin: Positive for color change and nail changes. Negative for poor wound healing.   Musculoskeletal: Positive for stiffness. Negative for myalgias.   Gastrointestinal: Negative for nausea and vomiting.   Genitourinary: Negative for dysuria.   Neurological: Negative for numbness and paresthesias.   Psychiatric/Behavioral: Negative for altered mental status and suicidal ideas.   Allergic/Immunologic: Negative for environmental allergies and persistent infections.           Objective:      Physical Exam   Constitutional: He is oriented to person, place, and time. He appears well-developed and well-nourished.   Cardiovascular:   Pulses:       Dorsalis pedis pulses are 2+ on the right side, and 2+ on the left side.        Posterior tibial pulses are 1+ on the right side, and 1+ on the left side.   CFT is < 3 seconds bilateral.  Pedal hair growth is decreased bilateral.  Mild varicosities noted bilateral.  Moderate nonpitting edema noted to bilateral lower extremity.  Toes are warm to touch bilateral.     Musculoskeletal: He exhibits edema. He exhibits no tenderness.   Muscle strength 4/5 in all muscle groups on the Rt. And 5/5 on the Lt.   No tenderness nor crepitation with ROM of foot/ankle joints bilateral.  (+) pain with palpation to the wound of the Rt.  Posterior heel and ankle.   Neurological: He is alert and oriented to person, place, and time. He has normal strength. No sensory deficit.   Light touch intact bilateral.     Skin: Skin is warm and dry. Lesion noted. No abrasion, no bruising, no burn, no ecchymosis, no laceration and no petechiae noted. No erythema. No pallor.   Location: Open wound noted to the Rt. Posterior ankle/heel.  Base: Down to sub Q and comprised of slough and hypergranulation. No exposure of Achilles.  Drainage: None  Caterina wound: Devoid of erythema, fluctuance, purulence, and malodor. Moderate caterina wound edema noted.  Pre-debridement measurement:4.3 x 2.1 x 0.2cm.   Post-debridement measurement: 4.5 x 2.3 x 0.2cm.    Location: Open wound noted slightly inferior to the first, along the Rt. posterior heel.    Base: Down to dermis and comprised of granulation.  Drainage: None  Caterina wound: Devoid of erythema, edema, fluctuance, purulence, and malodor.   Measurement: 0.7 x 1.3 x 0.1cm.                 Assessment:       Encounter Diagnoses   Name Primary?    Pressure ulcer of right heel, stage 4 Yes    Hypergranulation          Plan:       Shaq was seen today for wound check.    Diagnoses and all orders for this visit:    Pressure ulcer of right heel, stage 4    Hypergranulation      I counseled the patient on his conditions, their implications and medical management.    Performed a selective excisional debridement of the Rt. Foot/ankle.  See attached procedure note.     Wound bases were covered with silver alginate, foam, and a compressive wrap.  To be changed twice weekly by Home Health.      Patient to continue with antibiotics per ID recs.    Advised to rest and elevate the affected extremity.    Instructed to minimize weight bearing to facilitate wound healing.    Advised to ambulate only in the postoperative shoe/boot.    RTC in 1 week for a wound check.    Morgan Soni DPM

## 2019-07-18 ENCOUNTER — OFFICE VISIT (OUTPATIENT)
Dept: INFECTIOUS DISEASES | Facility: CLINIC | Age: 84
End: 2019-07-18
Payer: MEDICARE

## 2019-07-18 ENCOUNTER — INFUSION (OUTPATIENT)
Dept: INFECTIOUS DISEASES | Facility: HOSPITAL | Age: 84
End: 2019-07-18
Attending: INTERNAL MEDICINE
Payer: MEDICARE

## 2019-07-18 VITALS
DIASTOLIC BLOOD PRESSURE: 72 MMHG | WEIGHT: 178.81 LBS | SYSTOLIC BLOOD PRESSURE: 161 MMHG | BODY MASS INDEX: 24.25 KG/M2 | TEMPERATURE: 98 F | HEART RATE: 64 BPM

## 2019-07-18 DIAGNOSIS — L08.9 SKIN INFECTION: ICD-10-CM

## 2019-07-18 DIAGNOSIS — Z78.9 HEALTH CARE HOME, ACTIVE CARE COORDINATION: ICD-10-CM

## 2019-07-18 DIAGNOSIS — I73.9 PERIPHERAL VASCULAR DISEASE: ICD-10-CM

## 2019-07-18 DIAGNOSIS — Z79.2 RECEIVING INTRAVENOUS ANTIBIOTIC TREATMENT AT HOME: Primary | ICD-10-CM

## 2019-07-18 DIAGNOSIS — A49.8 PSEUDOMONAS INFECTION: ICD-10-CM

## 2019-07-18 DIAGNOSIS — L89.614 PRESSURE INJURY OF RIGHT HEEL, STAGE 4: ICD-10-CM

## 2019-07-18 DIAGNOSIS — Z45.2 PICC (PERIPHERALLY INSERTED CENTRAL CATHETER) REMOVAL: ICD-10-CM

## 2019-07-18 PROCEDURE — 99215 PR OFFICE/OUTPT VISIT, EST, LEVL V, 40-54 MIN: ICD-10-PCS | Mod: S$GLB,,, | Performed by: INTERNAL MEDICINE

## 2019-07-18 PROCEDURE — 99215 OFFICE O/P EST HI 40 MIN: CPT | Mod: S$GLB,,, | Performed by: INTERNAL MEDICINE

## 2019-07-18 PROCEDURE — 1101F PR PT FALLS ASSESS DOC 0-1 FALLS W/OUT INJ PAST YR: ICD-10-PCS | Mod: CPTII,S$GLB,, | Performed by: INTERNAL MEDICINE

## 2019-07-18 PROCEDURE — 99999 PR PBB SHADOW E&M-EST. PATIENT-LVL III: CPT | Mod: PBBFAC,,, | Performed by: INTERNAL MEDICINE

## 2019-07-18 PROCEDURE — 1101F PT FALLS ASSESS-DOCD LE1/YR: CPT | Mod: CPTII,S$GLB,, | Performed by: INTERNAL MEDICINE

## 2019-07-18 PROCEDURE — 99999 PR PBB SHADOW E&M-EST. PATIENT-LVL III: ICD-10-PCS | Mod: PBBFAC,,, | Performed by: INTERNAL MEDICINE

## 2019-07-18 NOTE — PROGRESS NOTES
Midline to left upper arm removed as ordered, vaseline gauze, 2x2 pads and coban applied, understood to remove in 24 hrs, hold pressure if bleeding occurs and call MD,  Left unit in NAD with family at side.

## 2019-07-25 ENCOUNTER — TELEPHONE (OUTPATIENT)
Dept: INFECTIOUS DISEASES | Facility: HOSPITAL | Age: 84
End: 2019-07-25

## 2019-07-25 NOTE — TELEPHONE ENCOUNTER
Spoke with pt's podiatrist. He says that his wound is drastically improved.  He no longer has exposure of Achilles tendon, and there are absolutely no signs of localized infection.  I'm fine with discontinuing the IV antibiotics.        Spoke with patient to update plan

## 2019-08-01 ENCOUNTER — OFFICE VISIT (OUTPATIENT)
Dept: PODIATRY | Facility: CLINIC | Age: 84
End: 2019-08-01
Payer: MEDICARE

## 2019-08-01 VITALS — WEIGHT: 178.81 LBS | HEIGHT: 72 IN | BODY MASS INDEX: 24.22 KG/M2

## 2019-08-01 DIAGNOSIS — L92.9 HYPERGRANULATION: ICD-10-CM

## 2019-08-01 DIAGNOSIS — L89.614 PRESSURE ULCER OF RIGHT HEEL, STAGE 4: Primary | ICD-10-CM

## 2019-08-01 PROCEDURE — 97597 WOUND DEBRIDEMENT: ICD-10-PCS | Mod: S$GLB,,, | Performed by: PODIATRIST

## 2019-08-01 PROCEDURE — 99999 PR PBB SHADOW E&M-EST. PATIENT-LVL II: ICD-10-PCS | Mod: PBBFAC,,, | Performed by: PODIATRIST

## 2019-08-01 PROCEDURE — 99999 PR PBB SHADOW E&M-EST. PATIENT-LVL II: CPT | Mod: PBBFAC,,, | Performed by: PODIATRIST

## 2019-08-01 PROCEDURE — 99499 UNLISTED E&M SERVICE: CPT | Mod: S$GLB,,, | Performed by: PODIATRIST

## 2019-08-01 PROCEDURE — 99499 NO LOS: ICD-10-PCS | Mod: S$GLB,,, | Performed by: PODIATRIST

## 2019-08-01 PROCEDURE — 97597 DBRDMT OPN WND 1ST 20 CM/<: CPT | Mod: S$GLB,,, | Performed by: PODIATRIST

## 2019-08-02 NOTE — PROGRESS NOTES
Subjective:      Patient ID: Shaq Bragg Jr. is a 88 y.o. male.    Chief Complaint: Follow-up (wound care)  Patient presents to clinic for a 2 week wound check.  Denies experiencing pain from the wound with today's exam.  Continues receiving dressing changes per Home Health.   Has kept the previous bandage by Home Health clean, dry, and intact.  Continues to make an effort to elevate the limb while at rest.  Denies having N/V/F/C/D.  Denies any additional pedal complaints.      Past Medical History:   Diagnosis Date    COPD (chronic obstructive pulmonary disease)     H/O asbestosis     Hearing loss     Hypercholesterolemia     Hypertension     Malignant melanoma of right lower leg 5/11/2017    Melanoma     Approx. 2 years ago     Skin cancer     Squamous cell carcinoma        Past Surgical History:   Procedure Laterality Date    ADENOIDECTOMY      APPENDECTOMY      Carotid Stenosis Surgery      CHOLECYSTECTOMY  1965    LYMPHADENECTOMY, INGUINAL, Sartorious Flap Right 11/12/2018    Performed by [facility]air [facility]re MD at [facility] OR [address] [place] [address]    TONSILLECTOMY         Family History   Problem Relation Age of Onset    Coronary artery disease Mother     Cancer Father         Esophageal    Esophageal cancer Father     Diabetes Sister     No Known Problems Maternal Aunt     No Known Problems Maternal Uncle     No Known Problems Paternal Aunt     No Known Problems Paternal Uncle     No Known Problems Maternal Grandmother     No Known Problems Maternal Grandfather     No Known Problems Paternal Grandmother     No Known Problems Paternal Grandfather     Melanoma Neg Hx     Psoriasis Neg Hx     Lupus Neg Hx        Social History     Socioeconomic History    Marital status:      Spouse name: Not on file    Number of children: Not on file    Years of education: Not on file    Highest education level: Not on file   Occupational History    Not on file   Social Needs    Financial  resource strain: Not on file    Food insecurity:     Worry: Not on file     Inability: Not on file    Transportation needs:     Medical: Not on file     Non-medical: Not on file   Tobacco Use    Smoking status: Former Smoker     Packs/day: 1.00     Years: 15.00     Pack years: 15.00     Types: Cigarettes     Last attempt to quit: 1965     Years since quittin.6    Smokeless tobacco: Never Used   Substance and Sexual Activity    Alcohol use: No     Frequency: Never    Drug use: No    Sexual activity: Never   Lifestyle    Physical activity:     Days per week: Not on file     Minutes per session: Not on file    Stress: Not on file   Relationships    Social connections:     Talks on phone: Not on file     Gets together: Not on file     Attends Zoroastrianism service: Not on file     Active member of club or organization: Not on file     Attends meetings of clubs or organizations: Not on file     Relationship status: Not on file   Other Topics Concern    Not on file   Social History Narrative    Not on file       Current Outpatient Medications   Medication Sig Dispense Refill    aspirin (ECOTRIN) 81 MG EC tablet Take 81 mg by mouth every morning.       cadexomer iodine (IODOSORB) 0.9 % gel Apply topically daily as needed for Wound Care. 10 g 3    finasteride (PROSCAR) 5 mg tablet Take 5 mg by mouth every morning.       furosemide (LASIX) 20 MG tablet HOLD UNTIL SEEN by primary care      HYDROcodone-acetaminophen (NORCO) 5-325 mg per tablet Take 1 tablet by mouth every 6 (six) hours as needed for Pain. 30 tablet 0    losartan-hydrochlorothiazide 50-12.5 mg (HYZAAR) 50-12.5 mg per tablet HOLD UNTIL SEEN by primary care doctor      metoprolol tartrate (LOPRESSOR) 50 MG tablet Takes 50 mg in morning and half tablet (25 mg) in evening      potassium chloride (MICRO-K) 8 mEq CpSR HOLD until seen by primary care doctor      simvastatin (ZOCOR) 10 MG tablet Take 10 mg by mouth every evening.      tamsulosin  (FLOMAX) 0.4 mg Cp24 Take 0.4 mg by mouth every morning.        No current facility-administered medications for this visit.        Review of patient's allergies indicates:  No Known Allergies    Review of Systems   Constitution: Negative for chills and fever.   HENT: Negative for congestion and tinnitus.    Cardiovascular: Positive for leg swelling. Negative for chest pain and claudication.   Hematologic/Lymphatic: Negative for bleeding problem.   Skin: Positive for color change and nail changes. Negative for poor wound healing.   Musculoskeletal: Positive for stiffness. Negative for myalgias.   Gastrointestinal: Negative for nausea and vomiting.   Genitourinary: Negative for dysuria.   Neurological: Negative for numbness and paresthesias.   Psychiatric/Behavioral: Negative for altered mental status and suicidal ideas.   Allergic/Immunologic: Negative for environmental allergies and persistent infections.           Objective:      Physical Exam   Constitutional: He is oriented to person, place, and time. He appears well-developed and well-nourished.   Cardiovascular:   Pulses:       Dorsalis pedis pulses are 2+ on the right side, and 2+ on the left side.        Posterior tibial pulses are 1+ on the right side, and 1+ on the left side.   CFT is < 3 seconds bilateral.  Pedal hair growth is decreased bilateral.  Mild varicosities noted bilateral.  Moderate nonpitting edema noted to bilateral lower extremity.  Toes are warm to touch bilateral.     Musculoskeletal: He exhibits edema. He exhibits no tenderness.   Muscle strength 4/5 in all muscle groups on the Rt. And 5/5 on the Lt.   No tenderness nor crepitation with ROM of foot/ankle joints bilateral.  (-) pain with palpation to the wound of the Rt. Posterior ankle.   Neurological: He is alert and oriented to person, place, and time. He has normal strength. No sensory deficit.   Light touch intact bilateral.     Skin: Skin is warm and dry. Lesion noted. No abrasion,  no bruising, no burn, no ecchymosis, no laceration and no petechiae noted. No erythema. No pallor.   Location: Open wound noted to the Rt. Posterior ankle/heel.  Base: Down to sub Q and comprised of slough and hypergranulation. No exposure of Achilles.  Drainage: None  Caterina wound: Devoid of erythema, fluctuance, purulence, and malodor. Moderate caterina wound edema noted.  Pre-debridement measurement: 2.4 x 1.2 x 0.1cm.   Post-debridement measurement:  2.6 x 1.4 x 0.1cm.    Mature epithelium noted to the Rt. Posterior inferior heel.                     Assessment:       Encounter Diagnoses   Name Primary?    Pressure ulcer of right heel, stage 4 Yes    Hypergranulation          Plan:       Shaq was seen today for follow-up.    Diagnoses and all orders for this visit:    Pressure ulcer of right heel, stage 4    Hypergranulation      I counseled the patient on his conditions, their implications and medical management.    Performed a selective excisional debridement of the Rt. Foot/ankle.  See attached procedure note.     Wound base was covered with silver alginate, foam, and a compressive wrap.  To be changed twice weekly by Home Health.      Advised to rest and elevate the affected extremity.    Instructed to minimize weight bearing to facilitate wound healing.    Advised to ambulate only in the postoperative shoe/boot.    RTC in 1 week for a wound check.    Morgan Soni DPM

## 2019-08-02 NOTE — PROCEDURES
"Wound Debridement  Date/Time: 8/1/2019 8:56 PM  Performed by: Morgan Soni DPM  Authorized by: Morgan Snoi DPM     Time out: Immediately prior to procedure a "time out" was called to verify the correct patient, procedure, equipment, support staff and site/side marked as required.    Consent Done?:  Yes (Verbal)    Preparation: Patient was prepped and draped in usual sterile fashion    Local anesthesia used?: No      Wound Details:    Location:  Right foot    Location:  Right Ankle    Type of Debridement:  Excisional       Length (cm):  2.4       Area (sq cm):  2.88       Width (cm):  1.2       Percent Debrided (%):  100       Depth (cm):  0.1       Total Area Debrided (sq cm):  2.88    Depth of debridement:  Epidermis/Dermis    Tissue debrided:  Dermis and Hypergranulation    Devitalized tissue debrided:  Fibrin    Instruments:  Curette (silver nitrate)    Bleeding:  Minimal  Hemostasis Achieved: Yes    Method Used:  Pressure  Patient tolerance:  Patient tolerated the procedure well with no immediate complications      "

## 2019-08-12 ENCOUNTER — TELEPHONE (OUTPATIENT)
Dept: HEMATOLOGY/ONCOLOGY | Facility: CLINIC | Age: 84
End: 2019-08-12

## 2019-08-12 ENCOUNTER — HOSPITAL ENCOUNTER (EMERGENCY)
Facility: HOSPITAL | Age: 84
Discharge: HOME | End: 2019-08-12
Attending: EMERGENCY MEDICINE
Payer: MEDICARE

## 2019-08-12 VITALS
RESPIRATION RATE: 14 BRPM | BODY MASS INDEX: 24.11 KG/M2 | HEART RATE: 66 BPM | SYSTOLIC BLOOD PRESSURE: 148 MMHG | DIASTOLIC BLOOD PRESSURE: 78 MMHG | WEIGHT: 178 LBS | HEIGHT: 72 IN | OXYGEN SATURATION: 96 % | TEMPERATURE: 98 F

## 2019-08-12 DIAGNOSIS — K92.1 BLOOD IN STOOL: Primary | ICD-10-CM

## 2019-08-12 LAB
BASOPHILS # BLD AUTO: 0.05 K/UL (ref 0–0.2)
BASOPHILS NFR BLD: 0.7 % (ref 0–1.9)
DIFFERENTIAL METHOD: ABNORMAL
EOSINOPHIL # BLD AUTO: 0.1 K/UL (ref 0–0.5)
EOSINOPHIL NFR BLD: 1.5 % (ref 0–8)
ERYTHROCYTE [DISTWIDTH] IN BLOOD BY AUTOMATED COUNT: 13.2 % (ref 11.5–14.5)
HCT VFR BLD AUTO: 39.2 % (ref 40–54)
HGB BLD-MCNC: 12.8 G/DL (ref 14–18)
IMM GRANULOCYTES # BLD AUTO: 0.01 K/UL (ref 0–0.04)
LYMPHOCYTES # BLD AUTO: 0.8 K/UL (ref 1–4.8)
LYMPHOCYTES NFR BLD: 11.6 % (ref 18–48)
MCH RBC QN AUTO: 31 PG (ref 27–31)
MCHC RBC AUTO-ENTMCNC: 32.7 G/DL (ref 32–36)
MCV RBC AUTO: 95 FL (ref 82–98)
MONOCYTES # BLD AUTO: 0.4 K/UL (ref 0.3–1)
MONOCYTES NFR BLD: 5.9 % (ref 4–15)
NEUTROPHILS # BLD AUTO: 5.5 K/UL (ref 1.8–7.7)
NEUTROPHILS NFR BLD: 80.2 % (ref 38–73)
NRBC BLD-RTO: 0 /100 WBC
PLATELET # BLD AUTO: 180 K/UL (ref 150–350)
PMV BLD AUTO: 10 FL (ref 9.2–12.9)
RBC # BLD AUTO: 4.13 M/UL (ref 4.6–6.2)
WBC # BLD AUTO: 6.8 K/UL (ref 3.9–12.7)

## 2019-08-12 PROCEDURE — 36415 COLL VENOUS BLD VENIPUNCTURE: CPT

## 2019-08-12 PROCEDURE — 85025 COMPLETE CBC W/AUTO DIFF WBC: CPT

## 2019-08-12 PROCEDURE — 99283 EMERGENCY DEPT VISIT LOW MDM: CPT

## 2019-08-12 NOTE — TELEPHONE ENCOUNTER
Spoke with patient spouse, Ms. Rodriguez, with Dr. Villatoro recommendations of patient to go to local ED given his complaints/symptoms. Wife voiced understanding.

## 2019-08-12 NOTE — ED PROVIDER NOTES
Encounter Date: 8/12/2019    SCRIBE #1 NOTE: I, Judy Silva, am scribing for, and in the presence of, MAX Chan.       History     Chief Complaint   Patient presents with    Rectal Bleeding     started 2 days ago       Time seen by provider: 5:20 PM on 08/12/2019    Shaq Bragg Jr. is an 88 y.o. male who presents the ED with complaints of rectal bleeding for the last couple of weeks. The patient reports that he called his oncologist, Dr. Myla Villatoro, who advised him to come to the ED today. The patient mentions that he temporarily discontinued cancer treatment to let his right achilles tendon heal. He was on antibiotics through his midline which was removed after treatment a couple weeks ago. He notes great improvement to his right foot. The patient has bright red blood only on the tissue paper when wiping.  No gross blood no bloody bowel movements no blood noticed in the toilet bowl.  The patient denies rectal pain, cramping, urgency, N/V/D, fever, abdominal pain, dizziness, weakness, and light headedness. He has not seen his PCP. The patient currently only takes medications for HTN. The patient denies onset of any other symptoms at this time. PMHx of HTN, COPD, skin cancer, malignant melanoma of right lower leg, hearing loss, hypercholesterolemia, h/o asbestosis, melanoma, and squamous cell carcinoma. SHx includes cholecystectomy, inguinal lymphadenectomy, carotid stenosis surgery, appendectomy, tonsillectomy, and adenoidectomy. NKDA noted.    The history is provided by the patient and the spouse.     Review of patient's allergies indicates:  No Known Allergies  Past Medical History:   Diagnosis Date    COPD (chronic obstructive pulmonary disease)     H/O asbestosis     Hearing loss     Hypercholesterolemia     Hypertension     Malignant melanoma of right lower leg 5/11/2017    Melanoma     Approx. 2 years ago     Skin cancer     Squamous cell carcinoma      Past Surgical History:    Procedure Laterality Date    ADENOIDECTOMY      APPENDECTOMY      Carotid Stenosis Surgery      CHOLECYSTECTOMY      LYMPHADENECTOMY, INGUINAL, Sartorious Flap Right 2018    [facility]    TONSILLECTOMY       Family History   Problem Relation Age of Onset    Coronary artery disease Mother     Cancer Father         Esophageal    Esophageal cancer Father     Diabetes Sister     No Known Problems Maternal Aunt     No Known Problems Maternal Uncle     No Known Problems Paternal Aunt     No Known Problems Paternal Uncle     No Known Problems Maternal Grandmother     No Known Problems Maternal Grandfather     No Known Problems Paternal Grandmother     No Known Problems Paternal Grandfather     Melanoma Neg Hx     Psoriasis Neg Hx     Lupus Neg Hx      Social History     Tobacco Use    Smoking status: Former Smoker     Packs/day: 1.00     Years: 15.00     Pack years: 15.00     Types: Cigarettes     Last attempt to quit:      Years since quittin.6    Smokeless tobacco: Never Used   Substance Use Topics    Alcohol use: No     Frequency: Never    Drug use: No     Review of Systems   Constitutional: Negative for activity change, appetite change, chills, fatigue and fever.   Respiratory: Negative for shortness of breath.    Cardiovascular: Negative for chest pain.   Gastrointestinal: Positive for anal bleeding and constipation. Negative for abdominal distention, abdominal pain, diarrhea, nausea, rectal pain and vomiting.   Skin: Negative for rash.   Neurological: Negative for weakness and headaches.   Hematological: Does not bruise/bleed easily.       Physical Exam     Initial Vitals [19 1519]   BP Pulse Resp Temp SpO2   (!) 159/70 61 16 98.4 °F (36.9 °C) 95 %      MAP       --         Physical Exam    Nursing note and vitals reviewed.  Constitutional: He appears well-developed and well-nourished. He is not diaphoretic. He appears  ill. No distress.   No acute distress   HENT:   Head: Normocephalic and atraumatic.   Right Ear: External ear normal.   Left Ear: External ear normal.   Nose: Nose normal.   Mouth/Throat: Oropharynx is clear and moist.   Eyes: Conjunctivae and EOM are normal.   Neck: Normal range of motion. Neck supple.   Cardiovascular: Normal rate, regular rhythm, normal heart sounds and intact distal pulses. Exam reveals no gallop and no friction rub.    No murmur heard.  Pulmonary/Chest: Breath sounds normal. No respiratory distress. He has no wheezes. He has no rhonchi. He has no rales.   Abdominal: Soft. He exhibits no distension and no mass. There is no tenderness.   No palpable abdominal tenderness noted.    Musculoskeletal: Normal range of motion. He exhibits no edema or tenderness.   Lymphadenopathy:     He has no cervical adenopathy.   Neurological: He is alert and oriented to person, place, and time. He has normal strength. No cranial nerve deficit or sensory deficit.   Skin: Skin is warm and dry. No rash and no abscess noted. No erythema.   Dressing in place to right lower leg.   Psychiatric: He has a normal mood and affect.         ED Course   Procedures  Labs Reviewed   CBC W/ AUTO DIFFERENTIAL - Abnormal; Notable for the following components:       Result Value    RBC 4.13 (*)     Hemoglobin 12.8 (*)     Hematocrit 39.2 (*)     Lymph # 0.8 (*)     Gran% 80.2 (*)     Lymph% 11.6 (*)     All other components within normal limits          Imaging Results    None          Medical Decision Making:   History:   Old Medical Records: I decided to obtain old medical records.  Clinical Tests:   Lab Tests: Reviewed and Ordered            Scribe Attestation:   Scribe #1: I performed the above scribed service and the documentation accurately describes the services I performed. I attest to the accuracy of the note.    Attending Attestation:     Physician Attestation Statement for NP/PA:   I have conducted a face to face encounter  with this patient in addition to the NP/PA, due to Medical Complexity    Other NP/PA Attestation Additions:      Medical Decision Making: I provided a face to face evaluation of this patient.  I discussed the patient's care with Advanced Practice Clinician.  I reviewed their note and agree with the history, physical, assessment, diagnosis, treatment, and discharge plan provided by the Advanced Practice Clinician. My overall impression is blood when wiping with toilet tissue.  The patient has been instructed to follow up with their physician or the one provided as well as specific return precautions.            I, Dr. Saleem, personally performed the services described in this documentation. All medical record entries made by the scribe were at my direction and in my presence.  I have reviewed the chart and agree that the record reflects my personal performance and is accurate and complete.11:07 PM 08/12/2019            ED Course as of Aug 12 1804   Mon Aug 12, 2019   1613 BP(!): 159/70 [EF]   1613 Temp: 98.4 °F (36.9 °C) [EF]   1613 Temp src: Oral [EF]   1613 Pulse: 61 [EF]   1613 Resp: 16 [EF]   1613 SpO2: 95 % [EF]   1617 I have evaluated and performed a medical screening assessment on this patient while awaiting a thorough evaluation and treatment. All of the emergency department beds are occupied at this time. When appropriate, laboratory studies will be ordered from triage. The patient has been advised of this process and care plan. Patient complains of blood in stool intermittently for 2 weeks    Orders pending: cbc  Disposition: ER      [EF]      ED Course User Index  [EF] Alejandro Saleem MD     Clinical Impression:       ICD-10-CM ICD-9-CM   1. Blood in stool K92.1 578.1         Disposition:   Disposition: Discharged  Condition: Stable           88-year-old male presents with blood for 2 weeks when wiping after a bowel movement.  No other bleeding.  No gross blood.  Hemoglobin consistent with prior.  No  abdominal pain.  Patient can be discharged home follow up GI.             Alejandro Saleem MD  08/12/19 [id number]

## 2019-08-12 NOTE — TELEPHONE ENCOUNTER
Returned call to patient wife, Ms. Rodriguez, to discuss complaints/concerns. Per patient spouse, she states that he has been passing blood in his stools. She states the that rectal discharge is more liquid nature, mucolytic with bright red blood.   Patient has been battling constipation, and has yet to pass stools for a week.  He does not take any stool softeners or laxatives.  He has also never underwent a colonoscopy.   The patient does have some weakness, but denies, lightheadedness/diziness and chest palpitations. She denies patient having any n/v, abdominal distention or abdominal pain. Patient does have a decreased appetite.  Discussed the possibility of initiating bowel regimen, as concerned that this is hemorrhoid bleeding, however, to be safe plan to review with Dr. Villatoro and pt PCP prior in case patient should be evaluated.

## 2019-08-12 NOTE — DISCHARGE INSTRUCTIONS
Your blood count is normal. Return to the ER for any concerns particularly increased bleeding or abdominal pain. He can take MiraLax.

## 2019-08-12 NOTE — TELEPHONE ENCOUNTER
----- Message from Lana Campo sent at 8/12/2019 11:13 AM CDT -----  Contact: Wife  Needs Advice    Reason for call: Patient is passing blood with his stool        Communication Preference: [phone] or [phone]    Additional Information:

## 2019-08-13 ENCOUNTER — OFFICE VISIT (OUTPATIENT)
Dept: GASTROENTEROLOGY | Facility: CLINIC | Age: 84
End: 2019-08-13
Payer: MEDICARE

## 2019-08-13 ENCOUNTER — TELEPHONE (OUTPATIENT)
Dept: GASTROENTEROLOGY | Facility: CLINIC | Age: 84
End: 2019-08-13

## 2019-08-13 VITALS
DIASTOLIC BLOOD PRESSURE: 65 MMHG | HEART RATE: 57 BPM | WEIGHT: 176.81 LBS | SYSTOLIC BLOOD PRESSURE: 158 MMHG | BODY MASS INDEX: 23.98 KG/M2

## 2019-08-13 DIAGNOSIS — K59.00 CONSTIPATION, UNSPECIFIED CONSTIPATION TYPE: Primary | ICD-10-CM

## 2019-08-13 PROCEDURE — 99999 PR PBB SHADOW E&M-EST. PATIENT-LVL IV: CPT | Mod: PBBFAC,,, | Performed by: INTERNAL MEDICINE

## 2019-08-13 PROCEDURE — 99215 PR OFFICE/OUTPT VISIT, EST, LEVL V, 40-54 MIN: ICD-10-PCS | Mod: S$GLB,,, | Performed by: INTERNAL MEDICINE

## 2019-08-13 PROCEDURE — 99215 OFFICE O/P EST HI 40 MIN: CPT | Mod: S$GLB,,, | Performed by: INTERNAL MEDICINE

## 2019-08-13 PROCEDURE — 99999 PR PBB SHADOW E&M-EST. PATIENT-LVL IV: ICD-10-PCS | Mod: PBBFAC,,, | Performed by: INTERNAL MEDICINE

## 2019-08-13 PROCEDURE — 1101F PT FALLS ASSESS-DOCD LE1/YR: CPT | Mod: CPTII,S$GLB,, | Performed by: INTERNAL MEDICINE

## 2019-08-13 PROCEDURE — 1101F PR PT FALLS ASSESS DOC 0-1 FALLS W/OUT INJ PAST YR: ICD-10-PCS | Mod: CPTII,S$GLB,, | Performed by: INTERNAL MEDICINE

## 2019-08-13 RX ORDER — POLYETHYLENE GLYCOL 3350 17 G/17G
17 POWDER, FOR SOLUTION ORAL
COMMUNITY
Start: 2019-08-02

## 2019-08-13 NOTE — PROGRESS NOTES
CC: constipation    HPI 88 y.o. male with history of COPD, HLD, HTN, malignant melanoma of right leg who presents for evaluation of daily, chronic constipation associated with straining and hard stools. He states that he has had ongoing issues with constipation throughout his life. He denies having endoscopy prior. No history of colonoscopy. No family history of colon cancer. He reports trace blood that he sees mainly on the toilet paper. He denies blood in toilet or on the stool. He is undergoing treatment for melanoma, but due to cellulitis of right leg after surgery chemotherapy was held. He does not want a colonoscopy at this point. He has refused colonoscopy in the past and is not amenable to having one now. Denies weight loss. Denies appetite changes. He is pretty active in terms of exercise. He performs yard work regularly and can climb a flight of stairs. He denies using any of the narcotics he was given for his leg surgery.    Medical records reviewed. Additional history supplemented by nursing.    Past Medical History:   Diagnosis Date    COPD (chronic obstructive pulmonary disease)     H/O asbestosis     Hearing loss     Hypercholesterolemia     Hypertension     Malignant melanoma of right lower leg 2017    Melanoma     Approx. 2 years ago     Skin cancer     Squamous cell carcinoma      Past Surgical History:   Procedure Laterality Date    ADENOIDECTOMY      APPENDECTOMY      Carotid Stenosis Surgery      CHOLECYSTECTOMY  1965    LYMPHADENECTOMY, INGUINAL, Sartorious Flap Right 2018    Performed by Yair Al[facility]re MD at [facility] OR [address] [place] [address]    TONSILLECTOMY       Social History  Social History     Tobacco Use    Smoking status: Former Smoker     Packs/day: 1.00     Years: 15.00     Pack years: 15.00     Types: Cigarettes     Last attempt to quit: 1965     Years since quittin.6    Smokeless tobacco: Never Used   Substance Use Topics    Alcohol use: No     Frequency:  Never    Drug use: No     Review of Systems  General ROS: negative for chills, fever or weight loss  Psychological ROS: negative for hallucination, depression or suicidal ideation  Ophthalmic ROS: negative for blurry vision, photophobia or eye pain  ENT ROS: negative for epistaxis, sore throat or rhinorrhea  Respiratory ROS: no cough, shortness of breath, or wheezing  Cardiovascular ROS: no chest pain or dyspnea on exertion  Gastrointestinal ROS: + crampy abdominal pain, +constipation, +rectal bleeding  Genito-Urinary ROS: no dysuria, trouble voiding, or hematuria  Musculoskeletal ROS: negative for gait disturbance or muscular weakness  Neurological ROS: no syncope or seizures; no ataxia  Dermatological ROS: negative for pruritis, rash and jaundice    FH  No family history of colon cancer or gastric cancer    Physical Examination  BP (!) 158/65   Pulse (!) 57   Wt 80.2 kg (176 lb 12.9 oz)   BMI 23.98 kg/m²   General appearance: alert, cooperative, no distress  HENT: Normocephalic, atraumatic, neck symmetrical, no nasal discharge   Eyes: conjunctivae/corneas clear, PERRL, EOM's intact  Lungs: clear to auscultation bilaterally, no dullness to percussion bilaterally  Heart: regular rate and rhythm without rub; no displacement of the PMI   Abdomen: soft, non-tender; bowel sounds normoactive; no organomegaly  Extremities: extremities symmetric; no clubbing, cyanosis, or edema  Integument: Skin color, texture, turgor normal; no rashes; hair distrubution normal  Neurologic: Alert and oriented X 3, normal strength, normal coordination and gait  Psychiatric: no pressured speech; normal affect; no evidence of impaired cognition     Labs:  H/H 12.8/39.2  Cr 1.5    Imaging:  PET CT whole body:  -New hypermetabolic focus in the proximal left upper extremity.  Recommend physical examination.  -No other findings suggestive of malignancy.  -Interval resection of squamous cell carcinoma from the right shin.  -Active cellulitis  of the right heel.    Reviewed independently    Assessment:   88 y.o. male with history of COPD, HLD, HTN, malignant melanoma of right leg who presents for evaluation of daily, chronic constipation associated with straining and hard stools. He is not amenable to colonoscopy at this time and would like conservative management. He has not tried any over the counter regimens for constipation.    1. Rectal bleeding  2. Chronic idiopathic constipation    Plan:     -Discussed risk vs benefit of procedure, but patient does not want to undergo colonoscopy at this time  -For constipation, will plan trial of Miralax 17 g PO Daily; discussed uptitration to twice or three times daily depending on bowel movements  -He could also try dulcolax 10 mg in the future with stool softeners  -If above trials fail to improve symptoms, will plan Amitiza or Linzess as next step in management    Hiram Ruiz MD  [facility]

## 2019-08-13 NOTE — TELEPHONE ENCOUNTER
----- Message from Liliya Yeboah sent at 8/13/2019 11:14 AM CDT -----    Type:  Sooner Apoointment Request    Caller is requesting a sooner appointment.  Caller declined first available appointment listed below.  Caller will not accept being placed on the waitlist and is requesting a message be sent to doctor.    Name of Caller:  Pt  Wife jovon  When is the first available appointment?    sept  Symptoms:    rectal  bleeding  was  in ER  yesterday     /constipated  Best Call Back Number: [phone]  Cell [phone]  Additional Information:    Calling to  Get  Fitted in asap

## 2019-08-14 ENCOUNTER — OFFICE VISIT (OUTPATIENT)
Dept: PODIATRY | Facility: CLINIC | Age: 84
End: 2019-08-14
Payer: MEDICARE

## 2019-08-14 VITALS
SYSTOLIC BLOOD PRESSURE: 149 MMHG | BODY MASS INDEX: 23.89 KG/M2 | RESPIRATION RATE: 14 BRPM | DIASTOLIC BLOOD PRESSURE: 60 MMHG | HEIGHT: 72 IN | WEIGHT: 176.38 LBS | HEART RATE: 55 BPM

## 2019-08-14 DIAGNOSIS — L97.311 CHRONIC ULCER OF RIGHT ANKLE LIMITED TO BREAKDOWN OF SKIN: Primary | ICD-10-CM

## 2019-08-14 PROCEDURE — 99499 UNLISTED E&M SERVICE: CPT | Mod: S$GLB,,, | Performed by: PODIATRIST

## 2019-08-14 PROCEDURE — 99999 PR PBB SHADOW E&M-EST. PATIENT-LVL III: ICD-10-PCS | Mod: PBBFAC,,, | Performed by: PODIATRIST

## 2019-08-14 PROCEDURE — 97597 DBRDMT OPN WND 1ST 20 CM/<: CPT | Mod: S$GLB,,, | Performed by: PODIATRIST

## 2019-08-14 PROCEDURE — 99999 PR PBB SHADOW E&M-EST. PATIENT-LVL III: CPT | Mod: PBBFAC,,, | Performed by: PODIATRIST

## 2019-08-14 PROCEDURE — 99499 NO LOS: ICD-10-PCS | Mod: S$GLB,,, | Performed by: PODIATRIST

## 2019-08-14 PROCEDURE — 97597 WOUND DEBRIDEMENT: ICD-10-PCS | Mod: S$GLB,,, | Performed by: PODIATRIST

## 2019-08-15 NOTE — PROGRESS NOTES
Subjective:      Patient ID: Shaq Bragg Jr. is a 88 y.o. male.    Chief Complaint: Foot Ulcer  Patient presents to clinic for a 2 week wound check.  Denies experiencing pain from the wound with today's exam.  Continues receiving dressing changes per Home Health, with the previous bandage kept clean, dry, and intact.  Has been doing a better job of elevating the affected limb.  Denies having N/V/F/C/D.  Denies any additional pedal complaints.      Past Medical History:   Diagnosis Date    COPD (chronic obstructive pulmonary disease)     H/O asbestosis     Hearing loss     Hypercholesterolemia     Hypertension     Malignant melanoma of right lower leg 5/11/2017    Melanoma     Approx. 2 years ago     Skin cancer     Squamous cell carcinoma        Past Surgical History:   Procedure Laterality Date    ADENOIDECTOMY      APPENDECTOMY      Carotid Stenosis Surgery      CHOLECYSTECTOMY  1965    LYMPHADENECTOMY, INGUINAL, Sartorious Flap Right 11/12/2018    Performed by Yair Alatorre MD at [facility] OR [address] [place] [address]    TONSILLECTOMY         Family History   Problem Relation Age of Onset    Coronary artery disease Mother     Cancer Father         Esophageal    Esophageal cancer Father     Diabetes Sister     No Known Problems Maternal Aunt     No Known Problems Maternal Uncle     No Known Problems Paternal Aunt     No Known Problems Paternal Uncle     No Known Problems Maternal Grandmother     No Known Problems Maternal Grandfather     No Known Problems Paternal Grandmother     No Known Problems Paternal Grandfather     Melanoma Neg Hx     Psoriasis Neg Hx     Lupus Neg Hx        Social History     Socioeconomic History    Marital status:      Spouse name: Not on file    Number of children: Not on file    Years of education: Not on file    Highest education level: Not on file   Occupational History    Not on file   Social Needs    Financial resource strain: Not on file     Food insecurity:     Worry: Not on file     Inability: Not on file    Transportation needs:     Medical: Not on file     Non-medical: Not on file   Tobacco Use    Smoking status: Former Smoker     Packs/day: 1.00     Years: 15.00     Pack years: 15.00     Types: Cigarettes     Last attempt to quit: 1965     Years since quittin.6    Smokeless tobacco: Never Used   Substance and Sexual Activity    Alcohol use: No     Frequency: Never    Drug use: No    Sexual activity: Never   Lifestyle    Physical activity:     Days per week: Not on file     Minutes per session: Not on file    Stress: Not on file   Relationships    Social connections:     Talks on phone: Not on file     Gets together: Not on file     Attends Zoroastrianism service: Not on file     Active member of club or organization: Not on file     Attends meetings of clubs or organizations: Not on file     Relationship status: Not on file   Other Topics Concern    Not on file   Social History Narrative    Not on file       Current Outpatient Medications   Medication Sig Dispense Refill    aspirin (ECOTRIN) 81 MG EC tablet Take 81 mg by mouth every morning.       cadexomer iodine (IODOSORB) 0.9 % gel Apply topically daily as needed for Wound Care. 10 g 3    finasteride (PROSCAR) 5 mg tablet Take 5 mg by mouth every morning.       furosemide (LASIX) 20 MG tablet HOLD UNTIL SEEN by primary care      HYDROcodone-acetaminophen (NORCO) 5-325 mg per tablet Take 1 tablet by mouth every 6 (six) hours as needed for Pain. 30 tablet 0    losartan-hydrochlorothiazide 50-12.5 mg (HYZAAR) 50-12.5 mg per tablet HOLD UNTIL SEEN by primary care doctor      metoprolol tartrate (LOPRESSOR) 50 MG tablet Takes 50 mg in morning and half tablet (25 mg) in evening      polyethylene glycol (GLYCOLAX) 17 gram PwPk Take 17 g by mouth as needed.      potassium chloride (MICRO-K) 8 mEq CpSR HOLD until seen by primary care doctor      simvastatin (ZOCOR) 10 MG tablet Take  10 mg by mouth every evening.      tamsulosin (FLOMAX) 0.4 mg Cp24 Take 0.4 mg by mouth every morning.        No current facility-administered medications for this visit.        Review of patient's allergies indicates:  No Known Allergies    Review of Systems   Constitution: Negative for chills and fever.   HENT: Negative for congestion and tinnitus.    Cardiovascular: Positive for leg swelling. Negative for chest pain and claudication.   Hematologic/Lymphatic: Negative for bleeding problem.   Skin: Positive for color change and nail changes. Negative for poor wound healing.   Musculoskeletal: Positive for stiffness. Negative for myalgias.   Gastrointestinal: Negative for nausea and vomiting.   Genitourinary: Negative for dysuria.   Neurological: Negative for numbness and paresthesias.   Psychiatric/Behavioral: Negative for altered mental status and suicidal ideas.   Allergic/Immunologic: Negative for environmental allergies and persistent infections.           Objective:      Physical Exam   Constitutional: He is oriented to person, place, and time. He appears well-developed and well-nourished.   Cardiovascular:   Pulses:       Dorsalis pedis pulses are 2+ on the right side, and 2+ on the left side.        Posterior tibial pulses are 1+ on the right side, and 1+ on the left side.   CFT is < 3 seconds bilateral.  Pedal hair growth is decreased bilateral.  Mild varicosities noted bilateral.  Moderate nonpitting edema noted to bilateral lower extremity.  Toes are warm to touch bilateral.     Musculoskeletal: He exhibits edema. He exhibits no tenderness.   Muscle strength 4/5 in all muscle groups on the Rt. And 5/5 on the Lt.   No tenderness nor crepitation with ROM of foot/ankle joints bilateral.  (-) pain with palpation to the wound of the Rt. Posterior ankle.   Neurological: He is alert and oriented to person, place, and time. He has normal strength. No sensory deficit.   Light touch intact bilateral.     Skin:  Skin is warm and dry. Lesion noted. No abrasion, no bruising, no burn, no ecchymosis, no laceration and no petechiae noted. No erythema. No pallor.   Location: Open wound noted to the Rt. Posterior ankle/heel.  Base: Down to dermis and comprised of fibrin.  Drainage: None  Jessica wound: Devoid of erythema, fluctuance, purulence, and malodor. Mild jessica wound edema noted.  Pre-debridement measurement: 1.2 x 0.5 x 0.1cm.   Post-debridement measurement:  1.4 x 0.7 x 0.1cm.                     Assessment:       Encounter Diagnosis   Name Primary?    Chronic ulcer of right ankle limited to breakdown of skin Yes         Plan:       Shaq was seen today for foot ulcer.    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    Performed a selective excisional debridement of the Rt. Foot/ankle.  See attached procedure note.     Wound base was covered with carlos, foam, and a compressive wrap.  To be changed twice weekly by Home Health.      Advised to rest and elevate the affected extremity.    Instructed to minimize weight bearing to facilitate wound healing.    Advised to ambulate only in the postoperative shoe/boot.    RTC in 2 weeks for a wound check.    Morgan Soni DPM

## 2019-08-15 NOTE — PROCEDURES
"Wound Debridement  Date/Time: 8/14/2019 4:19 AM  Performed by: Morgan Soni DPM  Authorized by: Morgan Soni DPM     Time out: Immediately prior to procedure a "time out" was called to verify the correct patient, procedure, equipment, support staff and site/side marked as required.    Consent Done?:  Yes (Verbal)    Preparation: Patient was prepped and draped in usual sterile fashion    Local anesthesia used?: No      Wound Details:    Location:  Right foot    Location:  Right Ankle    Type of Debridement:  Excisional       Length (cm):  1.2       Area (sq cm):  0.6       Width (cm):  0.5       Percent Debrided (%):  100       Depth (cm):  0.1       Total Area Debrided (sq cm):  0.6    Depth of debridement:  Epidermis/Dermis    Tissue debrided:  Dermis    Devitalized tissue debrided:  Fibrin    Instruments:  Curette    Bleeding:  Moderate  Hemostasis Achieved: Yes    Method Used:  Pressure and Silver Nitrate  Patient tolerance:  Patient tolerated the procedure well with no immediate complications      "

## 2019-08-16 ENCOUNTER — TELEPHONE (OUTPATIENT)
Dept: DERMATOLOGY | Facility: CLINIC | Age: 84
End: 2019-08-16

## 2019-08-28 ENCOUNTER — OFFICE VISIT (OUTPATIENT)
Dept: PODIATRY | Facility: CLINIC | Age: 84
End: 2019-08-28
Payer: MEDICARE

## 2019-08-28 VITALS — BODY MASS INDEX: 23.89 KG/M2 | WEIGHT: 176.38 LBS | HEIGHT: 72 IN

## 2019-08-28 DIAGNOSIS — L92.9 HYPERGRANULATION: ICD-10-CM

## 2019-08-28 DIAGNOSIS — L97.311 CHRONIC ULCER OF RIGHT ANKLE LIMITED TO BREAKDOWN OF SKIN: Primary | ICD-10-CM

## 2019-08-28 PROCEDURE — 99999 PR PBB SHADOW E&M-EST. PATIENT-LVL II: CPT | Mod: PBBFAC,,, | Performed by: PODIATRIST

## 2019-08-28 PROCEDURE — 97597 WOUND DEBRIDEMENT: ICD-10-PCS | Mod: S$GLB,,, | Performed by: PODIATRIST

## 2019-08-28 PROCEDURE — 99999 PR PBB SHADOW E&M-EST. PATIENT-LVL II: ICD-10-PCS | Mod: PBBFAC,,, | Performed by: PODIATRIST

## 2019-08-28 PROCEDURE — 99499 UNLISTED E&M SERVICE: CPT | Mod: S$GLB,,, | Performed by: PODIATRIST

## 2019-08-28 PROCEDURE — 97597 DBRDMT OPN WND 1ST 20 CM/<: CPT | Mod: S$GLB,,, | Performed by: PODIATRIST

## 2019-08-28 PROCEDURE — 99499 NO LOS: ICD-10-PCS | Mod: S$GLB,,, | Performed by: PODIATRIST

## 2019-08-29 NOTE — PROGRESS NOTES
Subjective:      Patient ID: Shaq Bragg Jr. is a 88 y.o. male.    Chief Complaint: Wound Care  Patient presents to clinic for a 2 week wound check.  Denies experiencing pain from the wound with today's exam.  Continues receiving dressing changes per Home Health, with the previous bandage kept clean, dry, and intact.  Denies having N/V/F/C/D.  Denies any additional pedal complaints.      Past Medical History:   Diagnosis Date    COPD (chronic obstructive pulmonary disease)     H/O asbestosis     Hearing loss     Hypercholesterolemia     Hypertension     Malignant melanoma of right lower leg 5/11/2017    Melanoma     Approx. 2 years ago     Skin cancer     Squamous cell carcinoma        Past Surgical History:   Procedure Laterality Date    ADENOIDECTOMY      APPENDECTOMY      Carotid Stenosis Surgery      CHOLECYSTECTOMY  1965    LYMPHADENECTOMY, INGUINAL, Sartorious Flap Right 11/12/2018    Performed by Yair Alatorre MD at [facility] OR [address] [place] [address]    TONSILLECTOMY         Family History   Problem Relation Age of Onset    Coronary artery disease Mother     Cancer Father         Esophageal    Esophageal cancer Father     Diabetes Sister     No Known Problems Maternal Aunt     No Known Problems Maternal Uncle     No Known Problems Paternal Aunt     No Known Problems Paternal Uncle     No Known Problems Maternal Grandmother     No Known Problems Maternal Grandfather     No Known Problems Paternal Grandmother     No Known Problems Paternal Grandfather     Melanoma Neg Hx     Psoriasis Neg Hx     Lupus Neg Hx        Social History     Socioeconomic History    Marital status:      Spouse name: Not on file    Number of children: Not on file    Years of education: Not on file    Highest education level: Not on file   Occupational History    Not on file   Social Needs    Financial resource strain: Not on file    Food insecurity:     Worry: Not on file     Inability: Not on  file    Transportation needs:     Medical: Not on file     Non-medical: Not on file   Tobacco Use    Smoking status: Former Smoker     Packs/day: 1.00     Years: 15.00     Pack years: 15.00     Types: Cigarettes     Last attempt to quit: 1965     Years since quittin.6    Smokeless tobacco: Never Used   Substance and Sexual Activity    Alcohol use: No     Frequency: Never    Drug use: No    Sexual activity: Never   Lifestyle    Physical activity:     Days per week: Not on file     Minutes per session: Not on file    Stress: Not on file   Relationships    Social connections:     Talks on phone: Not on file     Gets together: Not on file     Attends Hoahaoism service: Not on file     Active member of club or organization: Not on file     Attends meetings of clubs or organizations: Not on file     Relationship status: Not on file   Other Topics Concern    Not on file   Social History Narrative    Not on file       Current Outpatient Medications   Medication Sig Dispense Refill    aspirin (ECOTRIN) 81 MG EC tablet Take 81 mg by mouth every morning.       cadexomer iodine (IODOSORB) 0.9 % gel Apply topically daily as needed for Wound Care. 10 g 3    finasteride (PROSCAR) 5 mg tablet Take 5 mg by mouth every morning.       furosemide (LASIX) 20 MG tablet HOLD UNTIL SEEN by primary care      HYDROcodone-acetaminophen (NORCO) 5-325 mg per tablet Take 1 tablet by mouth every 6 (six) hours as needed for Pain. 30 tablet 0    losartan-hydrochlorothiazide 50-12.5 mg (HYZAAR) 50-12.5 mg per tablet HOLD UNTIL SEEN by primary care doctor      metoprolol tartrate (LOPRESSOR) 50 MG tablet Takes 50 mg in morning and half tablet (25 mg) in evening      polyethylene glycol (GLYCOLAX) 17 gram PwPk Take 17 g by mouth as needed.      potassium chloride (MICRO-K) 8 mEq CpSR HOLD until seen by primary care doctor      simvastatin (ZOCOR) 10 MG tablet Take 10 mg by mouth every evening.      tamsulosin (FLOMAX) 0.4 mg  Cp24 Take 0.4 mg by mouth every morning.        No current facility-administered medications for this visit.        Review of patient's allergies indicates:  No Known Allergies    Review of Systems   Constitution: Negative for chills and fever.   HENT: Negative for congestion and tinnitus.    Cardiovascular: Positive for leg swelling. Negative for chest pain and claudication.   Hematologic/Lymphatic: Negative for bleeding problem.   Skin: Positive for color change and nail changes. Negative for poor wound healing.   Musculoskeletal: Positive for stiffness. Negative for myalgias.   Gastrointestinal: Negative for nausea and vomiting.   Genitourinary: Negative for dysuria.   Neurological: Negative for numbness and paresthesias.   Psychiatric/Behavioral: Negative for altered mental status and suicidal ideas.   Allergic/Immunologic: Negative for environmental allergies and persistent infections.           Objective:      Physical Exam   Constitutional: He is oriented to person, place, and time. He appears well-developed and well-nourished.   Cardiovascular:   Pulses:       Dorsalis pedis pulses are 2+ on the right side, and 2+ on the left side.        Posterior tibial pulses are 1+ on the right side, and 1+ on the left side.   CFT is < 3 seconds bilateral.  Pedal hair growth is decreased bilateral.  Mild varicosities noted bilateral.  Moderate nonpitting edema noted to bilateral lower extremity.  Toes are warm to touch bilateral.     Musculoskeletal: He exhibits edema. He exhibits no tenderness.   Muscle strength 4/5 in all muscle groups on the Rt. And 5/5 on the Lt.   No tenderness nor crepitation with ROM of foot/ankle joints bilateral.  (-) pain with palpation to the wound of the Rt. Posterior ankle.   Neurological: He is alert and oriented to person, place, and time. He has normal strength. No sensory deficit.   Light touch intact bilateral.     Skin: Skin is warm and dry. Lesion noted. No abrasion, no bruising, no  burn, no ecchymosis, no laceration and no petechiae noted. No erythema. No pallor.   Location: Open wound noted to the Rt. Posterior ankle/heel.  Base: Down to dermis and comprised of fibrin and hypergranulation.  Drainage: None  Caterina wound: Devoid of erythema, fluctuance, purulence, and malodor. Mild caterina wound edema noted.  Pre-debridement measurement: 1.1 x 0.3 x 0.1cm.   Post-debridement measurement:  1.3 x 0.5 x 0.1cm.                     Assessment:       Encounter Diagnoses   Name Primary?    Chronic ulcer of right ankle limited to breakdown of skin Yes    Hypergranulation          Plan:       Shaq was seen today for wound care.    Diagnoses and all orders for this visit:    Chronic ulcer of right ankle limited to breakdown of skin    Hypergranulation      I counseled the patient on his conditions, their implications and medical management.    Performed a selective excisional debridement of the Rt. Foot/ankle.  See attached procedure note.     Wound base was covered with silver alginate, foam, and a compressive wrap.  To be changed twice weekly by Home Health.      Advised to rest and elevate the affected extremity.    Instructed to minimize weight bearing to facilitate wound healing.    Advised to ambulate only in the postoperative shoe/boot.    RTC in 2 weeks for a wound check.    Morgan Soni DPM

## 2019-08-29 NOTE — PROCEDURES
"Wound Debridement  Date/Time: 8/28/2019 9:40 PM  Performed by: Morgan Soni DPM  Authorized by: Morgan Soni DPM     Time out: Immediately prior to procedure a "time out" was called to verify the correct patient, procedure, equipment, support staff and site/side marked as required.    Consent Done?:  Yes (Verbal)    Preparation: Patient was prepped and draped in usual sterile fashion    Local anesthesia used?: No      Wound Details:    Location:  Right foot    Location:  Right Ankle    Type of Debridement:  Excisional       Length (cm):  1.1       Area (sq cm):  0.33       Width (cm):  0.3       Percent Debrided (%):  100       Depth (cm):  0.1       Total Area Debrided (sq cm):  0.33    Depth of debridement:  Epidermis/Dermis    Tissue debrided:  Dermis and Hypergranulation    Devitalized tissue debrided:  Fibrin    Instruments:  Curette    Bleeding:  Minimal  Hemostasis Achieved: Yes    Method Used:  Pressure and Silver Nitrate  Patient tolerance:  Patient tolerated the procedure well with no immediate complications      "

## 2019-09-11 ENCOUNTER — OFFICE VISIT (OUTPATIENT)
Dept: PODIATRY | Facility: CLINIC | Age: 84
End: 2019-09-11
Payer: MEDICARE

## 2019-09-11 VITALS
WEIGHT: 176.38 LBS | SYSTOLIC BLOOD PRESSURE: 145 MMHG | HEART RATE: 56 BPM | BODY MASS INDEX: 23.89 KG/M2 | HEIGHT: 72 IN | DIASTOLIC BLOOD PRESSURE: 66 MMHG

## 2019-09-11 DIAGNOSIS — L97.311 CHRONIC ULCER OF RIGHT ANKLE LIMITED TO BREAKDOWN OF SKIN: Primary | ICD-10-CM

## 2019-09-11 PROCEDURE — 99999 PR PBB SHADOW E&M-EST. PATIENT-LVL III: CPT | Mod: PBBFAC,,, | Performed by: PODIATRIST

## 2019-09-11 PROCEDURE — 99999 PR PBB SHADOW E&M-EST. PATIENT-LVL III: ICD-10-PCS | Mod: PBBFAC,,, | Performed by: PODIATRIST

## 2019-09-11 PROCEDURE — 1101F PR PT FALLS ASSESS DOC 0-1 FALLS W/OUT INJ PAST YR: ICD-10-PCS | Mod: CPTII,S$GLB,, | Performed by: PODIATRIST

## 2019-09-11 PROCEDURE — 99212 PR OFFICE/OUTPT VISIT, EST, LEVL II, 10-19 MIN: ICD-10-PCS | Mod: S$GLB,,, | Performed by: PODIATRIST

## 2019-09-11 PROCEDURE — 99212 OFFICE O/P EST SF 10 MIN: CPT | Mod: S$GLB,,, | Performed by: PODIATRIST

## 2019-09-11 PROCEDURE — 1101F PT FALLS ASSESS-DOCD LE1/YR: CPT | Mod: CPTII,S$GLB,, | Performed by: PODIATRIST

## 2019-09-11 NOTE — PROGRESS NOTES
Subjective:      Patient ID: Shaq Brgag Jr. is a 88 y.o. male.    Chief Complaint: Wound Care  Patient presents to clinic for a 2 week wound check.  Denies experiencing pain from the wound with today's exam.  Continues receiving dressing changes per Home Health, with the previous bandage kept clean, dry, and intact.  Denies having N/V/F/C/D.  Denies any additional pedal complaints.      Past Medical History:   Diagnosis Date    COPD (chronic obstructive pulmonary disease)     H/O asbestosis     Hearing loss     Hypercholesterolemia     Hypertension     Malignant melanoma of right lower leg 5/11/2017    Melanoma     Approx. 2 years ago     Skin cancer     Squamous cell carcinoma        Past Surgical History:   Procedure Laterality Date    ADENOIDECTOMY      APPENDECTOMY      Carotid Stenosis Surgery      CHOLECYSTECTOMY  1965    LYMPHADENECTOMY, INGUINAL, Sartorious Flap Right 11/12/2018    Performed by Yair Alatorre MD at [facility] OR [address] [place] [address]    TONSILLECTOMY         Family History   Problem Relation Age of Onset    Coronary artery disease Mother     Cancer Father         Esophageal    Esophageal cancer Father     Diabetes Sister     No Known Problems Maternal Aunt     No Known Problems Maternal Uncle     No Known Problems Paternal Aunt     No Known Problems Paternal Uncle     No Known Problems Maternal Grandmother     No Known Problems Maternal Grandfather     No Known Problems Paternal Grandmother     No Known Problems Paternal Grandfather     Melanoma Neg Hx     Psoriasis Neg Hx     Lupus Neg Hx        Social History     Socioeconomic History    Marital status:      Spouse name: Not on file    Number of children: Not on file    Years of education: Not on file    Highest education level: Not on file   Occupational History    Not on file   Social Needs    Financial resource strain: Not on file    Food insecurity:     Worry: Not on file     Inability: Not on  file    Transportation needs:     Medical: Not on file     Non-medical: Not on file   Tobacco Use    Smoking status: Former Smoker     Packs/day: 1.00     Years: 15.00     Pack years: 15.00     Types: Cigarettes     Last attempt to quit: 1965     Years since quittin.7    Smokeless tobacco: Never Used   Substance and Sexual Activity    Alcohol use: No     Frequency: Never    Drug use: No    Sexual activity: Never   Lifestyle    Physical activity:     Days per week: Not on file     Minutes per session: Not on file    Stress: Not on file   Relationships    Social connections:     Talks on phone: Not on file     Gets together: Not on file     Attends Christian service: Not on file     Active member of club or organization: Not on file     Attends meetings of clubs or organizations: Not on file     Relationship status: Not on file   Other Topics Concern    Not on file   Social History Narrative    Not on file       Current Outpatient Medications   Medication Sig Dispense Refill    aspirin (ECOTRIN) 81 MG EC tablet Take 81 mg by mouth every morning.       cadexomer iodine (IODOSORB) 0.9 % gel Apply topically daily as needed for Wound Care. 10 g 3    finasteride (PROSCAR) 5 mg tablet Take 5 mg by mouth every morning.       furosemide (LASIX) 20 MG tablet HOLD UNTIL SEEN by primary care      HYDROcodone-acetaminophen (NORCO) 5-325 mg per tablet Take 1 tablet by mouth every 6 (six) hours as needed for Pain. 30 tablet 0    losartan-hydrochlorothiazide 50-12.5 mg (HYZAAR) 50-12.5 mg per tablet HOLD UNTIL SEEN by primary care doctor      metoprolol tartrate (LOPRESSOR) 50 MG tablet Takes 50 mg in morning and half tablet (25 mg) in evening      polyethylene glycol (GLYCOLAX) 17 gram PwPk Take 17 g by mouth as needed.      potassium chloride (MICRO-K) 8 mEq CpSR HOLD until seen by primary care doctor      simvastatin (ZOCOR) 10 MG tablet Take 10 mg by mouth every evening.      tamsulosin (FLOMAX) 0.4 mg  Cp24 Take 0.4 mg by mouth every morning.        No current facility-administered medications for this visit.        Review of patient's allergies indicates:  No Known Allergies    Review of Systems   Constitution: Negative for chills and fever.   HENT: Negative for congestion and tinnitus.    Cardiovascular: Positive for leg swelling. Negative for chest pain and claudication.   Hematologic/Lymphatic: Negative for bleeding problem.   Skin: Positive for color change and nail changes. Negative for poor wound healing.   Musculoskeletal: Positive for stiffness. Negative for myalgias.   Gastrointestinal: Negative for nausea and vomiting.   Genitourinary: Negative for dysuria.   Neurological: Negative for numbness and paresthesias.   Psychiatric/Behavioral: Negative for altered mental status and suicidal ideas.   Allergic/Immunologic: Negative for environmental allergies and persistent infections.           Objective:      Physical Exam   Constitutional: He is oriented to person, place, and time. He appears well-developed and well-nourished.   Cardiovascular:   Pulses:       Dorsalis pedis pulses are 2+ on the right side, and 2+ on the left side.        Posterior tibial pulses are 1+ on the right side, and 1+ on the left side.   CFT is < 3 seconds bilateral.  Pedal hair growth is decreased bilateral.  Mild varicosities noted bilateral.  Moderate nonpitting edema noted to bilateral lower extremity.  Toes are warm to touch bilateral.     Musculoskeletal: He exhibits edema. He exhibits no tenderness.   Muscle strength 4/5 in all muscle groups on the Rt. And 5/5 on the Lt.   No tenderness nor crepitation with ROM of foot/ankle joints bilateral.  (-) pain with palpation of bilateral foot and ankle.   Neurological: He is alert and oriented to person, place, and time. He has normal strength. No sensory deficit.   Light touch intact bilateral.     Skin: Skin is warm, dry and intact. No abrasion, no bruising, no burn, no  ecchymosis, no laceration, no lesion and no petechiae noted. No erythema. No pallor.   Fragile epithelium noted to the Rt. Posterior ankle/heel. No localized sign of infection noted.                       Assessment:       Encounter Diagnosis   Name Primary?    Chronic ulcer of right ankle limited to breakdown of skin Yes         Plan:       Shaq was seen today for wound care.    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    No wound debridement performed, as the site is fully epithelialized.    The site of new skin was offloaded with foam, and a compressive wrap was applied.  To be changed twice weekly by Home Health x 1 final week.      Advised to rest and elevate the affected extremity.    Instructed to minimize weight bearing to facilitate wound healing.    Advised to ambulate only in the postoperative shoe/boot.    May transition back into casual shoe gear, resume normal showering routine, and resume ambulation to tolerance in one week.    Discouraged from soaking or scrubbing the affected extremity x 3 final weeks.    RTC prn.    Morgan Soni DPM

## 2019-09-19 ENCOUNTER — TELEPHONE (OUTPATIENT)
Dept: HEMATOLOGY/ONCOLOGY | Facility: CLINIC | Age: 84
End: 2019-09-19

## 2019-09-19 NOTE — TELEPHONE ENCOUNTER
Message fwd to MD for follow up.       ----- Message from Nancy Garza sent at 9/19/2019  2:21 PM CDT -----  Patient's wife, Jennifer, called.   No. [phone]   Patient needs an appointment soon to discuss treatment.

## 2019-09-20 ENCOUNTER — TELEPHONE (OUTPATIENT)
Dept: HEMATOLOGY/ONCOLOGY | Facility: CLINIC | Age: 84
End: 2019-09-20

## 2019-09-20 NOTE — TELEPHONE ENCOUNTER
Returned call to pt.   Informed pt that Dr. Villatoro would like to see in October and  should be contact soon.   Pt verbalized understanding.     ----- Message from Debbie Amos sent at 9/20/2019  1:33 PM CDT -----  Contact: Jennifer(wife) @ [phone]    Patient's wife, Jennifer, called.   No. [phone]   Patient needs an appointment soon to discuss treatment.

## 2019-09-27 ENCOUNTER — TELEPHONE (OUTPATIENT)
Dept: DERMATOLOGY | Facility: CLINIC | Age: 84
End: 2019-09-27

## 2019-09-30 ENCOUNTER — TELEPHONE (OUTPATIENT)
Dept: HEMATOLOGY/ONCOLOGY | Facility: CLINIC | Age: 84
End: 2019-09-30

## 2019-09-30 NOTE — TELEPHONE ENCOUNTER
spoke with pt spouse on today in regards to rescheduling appointments, spouse is aware and has confirm.

## 2019-10-24 ENCOUNTER — OFFICE VISIT (OUTPATIENT)
Dept: DERMATOLOGY | Facility: CLINIC | Age: 84
End: 2019-10-24
Payer: MEDICARE

## 2019-10-24 DIAGNOSIS — D48.5 NEOPLASM OF UNCERTAIN BEHAVIOR OF SKIN: Primary | ICD-10-CM

## 2019-10-24 DIAGNOSIS — I89.0 LYMPHEDEMA OF RIGHT LOWER EXTREMITY: ICD-10-CM

## 2019-10-24 DIAGNOSIS — Z85.828 HISTORY OF NONMELANOMA SKIN CANCER: ICD-10-CM

## 2019-10-24 PROCEDURE — 1101F PT FALLS ASSESS-DOCD LE1/YR: CPT | Mod: CPTII,S$GLB,, | Performed by: DERMATOLOGY

## 2019-10-24 PROCEDURE — 1101F PR PT FALLS ASSESS DOC 0-1 FALLS W/OUT INJ PAST YR: ICD-10-PCS | Mod: CPTII,S$GLB,, | Performed by: DERMATOLOGY

## 2019-10-24 PROCEDURE — 99999 PR PBB SHADOW E&M-EST. PATIENT-LVL II: CPT | Mod: PBBFAC,,, | Performed by: DERMATOLOGY

## 2019-10-24 PROCEDURE — 99212 OFFICE O/P EST SF 10 MIN: CPT | Mod: S$GLB,,, | Performed by: DERMATOLOGY

## 2019-10-24 PROCEDURE — 99999 PR PBB SHADOW E&M-EST. PATIENT-LVL II: ICD-10-PCS | Mod: PBBFAC,,, | Performed by: DERMATOLOGY

## 2019-10-24 PROCEDURE — 99212 PR OFFICE/OUTPT VISIT, EST, LEVL II, 10-19 MIN: ICD-10-PCS | Mod: S$GLB,,, | Performed by: DERMATOLOGY

## 2019-10-24 RX ORDER — TRIAMCINOLONE ACETONIDE 1 MG/G
OINTMENT TOPICAL
Qty: 30 G | Refills: 3 | Status: SHIPPED | OUTPATIENT
Start: 2019-10-24

## 2019-10-24 NOTE — PROGRESS NOTES
ALLERGIES:  Patient has no known allergies.    CHIEF COMPLAINT: followup squamous cell carcinoma     HISTORY OF PRESENT ILLNESS:  Location: right leg  Timing: I last saw him 4 months ago   Context: see previous details in notes below; treatment was deferred  Prior biopsy in February by Dr. Amber Anthony showed squamous cell carcinoma   Dr. Bowman saw him in consultation regarding this lesion on 03/25; she decided to defer treatment given the swelling of the right lower extremity which is a consequence of his previous wide local excision of a melanoma, 1.4 mm in thickness, and lymph node dissection, in 2017 on this extremity  At his last visit, there was no clear evidence of residual squamous cell carcinoma at the site, and after discussing the clinical findings and options, we elected to defer surgery and observe the site  Quality: not much change    PATH:   FINAL PATHOLOGIC DIAGNOSIS  Skin, right lower leg, shave biopsy:  -SQUAMOUS CELL CARCINOMA, EXTENDING TO THE PERIPHERAL AND DEEP BIOPSY EDGES  Diagnosed by: Arnol Sun  (Electronically Signed: 2019-02-28 12:23:24)    REVIEW OF SYSTEMS:   General: general health good  Skin: also status post biopsy by me left arm; see the last note  FINAL PATHOLOGIC DIAGNOSIS  1. Skin, left arm, shave biopsy:  - DERMAL FIBROSIS AND MIXED INFLAMMATION, TRAUMATIZED/IMPETIGINIZED.  MICROSCOPIC DESCRIPTION: Sections show epidermal acanthosis with no significant cytologic atypia. There is  overlying serum crust associated with neutrophils, parakeratosis, and clusters of bacteria. The underlying dermis is  fibrotic, and it contains a mixed inflammatory infiltrate consisting of lymphocytes, histiocytes, plasma cells, and  occasional neutrophils and eosinophils. Mart 1 immunohistochemical stain (performed on both blocks) fails to  reveal a melanocytic proliferation. Appropriately reactive controls were reviewed.  Diagnosed by: Xiomy Mcdaniel M.D.  (Electronically Signed: 2019-06-17  13:31:39)  He was also being treated for an ulcer on his right heel; this apparently is considerably improved now    PAST MEDICAL HISTORY:  Past Medical History:   Diagnosis Date    COPD (chronic obstructive pulmonary disease)     H/O asbestosis     Hearing loss     Hypercholesterolemia     Hypertension     Malignant melanoma of right lower leg 2017    Melanoma     Approx. 2 years ago     Skin cancer     Squamous cell carcinoma        PAST SURGICAL HISTORY:  Past Surgical History:   Procedure Laterality Date    ADENOIDECTOMY      APPENDECTOMY      Carotid Stenosis Surgery      CHOLECYSTECTOMY  1965    INGUINAL LYMPHADENECTOMY Right 2018    Procedure: LYMPHADENECTOMY, INGUINAL, Sartorious Flap;  Surgeon: Yair Alatorre MD;  [facility] OR [address] [place] [address];  Service: General;  Laterality: Right;    TONSILLECTOMY         SOCIAL HISTORY:  Social History     Tobacco Use    Smoking status: Former Smoker     Packs/day: 1.00     Years: 15.00     Pack years: 15.00     Types: Cigarettes     Last attempt to quit:      Years since quittin.8    Smokeless tobacco: Never Used   Substance Use Topics    Alcohol use: No     Frequency: Never    Drug use: No        PERTINENT MEDICATIONS:  See medications list.  Current Outpatient Medications on File Prior to Visit   Medication Sig Dispense Refill    aspirin (ECOTRIN) 81 MG EC tablet Take 81 mg by mouth every morning.       cadexomer iodine (IODOSORB) 0.9 % gel Apply topically daily as needed for Wound Care. 10 g 3    finasteride (PROSCAR) 5 mg tablet Take 5 mg by mouth every morning.       furosemide (LASIX) 20 MG tablet HOLD UNTIL SEEN by primary care      HYDROcodone-acetaminophen (NORCO) 5-325 mg per tablet Take 1 tablet by mouth every 6 (six) hours as needed for Pain. 30 tablet 0    losartan-hydrochlorothiazide 50-12.5 mg (HYZAAR) 50-12.5 mg per tablet HOLD UNTIL SEEN by primary care doctor      metoprolol tartrate (LOPRESSOR) 50 MG  tablet Takes 50 mg in morning and half tablet (25 mg) in evening      polyethylene glycol (GLYCOLAX) 17 gram PwPk Take 17 g by mouth as needed.      potassium chloride (MICRO-K) 8 mEq CpSR HOLD until seen by primary care doctor      simvastatin (ZOCOR) 10 MG tablet Take 10 mg by mouth every evening.      tamsulosin (FLOMAX) 0.4 mg Cp24 Take 0.4 mg by mouth every morning.        No current facility-administered medications on file prior to visit.      EXAM:  Constitutional  General appearance: well-developed, well-nourished, well-kempt older white male    Eyes  Inspection of conjunctivae and lids reveals no abnormalities; sclerae anicteric  Neurologic/Psychiatric  Alert,  normal orientation to time, place, person  Normal mood and affect with no evidence of depression, anxiety, agitation  Skin: see photo(s)  Head: background marked solar damage to exposed areas of skin  Neck: examination reveals marked chronic solar damage  Right upper extremity: examination reveals marked chronic solar damage  Left upper extremity: examination reveals marked chronic solar damage  Right lower extremity: see photos below  Close examination of the area, with reference to the previous photos, shows no residual evidence of squamous cell carcinoma clinically at the site of prior biopsy  Proximal to the previous biopsy site, there is an approximately 1.5 cm hyperkeratotic nodule of uncertain nature  Still has swelling to the lower extremity  Photo(s) this visit:         Photo from Dr. Bowman's visit in March          Photo(s) from biopsy visit in February:          ASSESSMENT:   status post biopsy, squamous cell carcinoma, right leg, now clinically clear 8 months post biopsy  Hyperkeratotic nodule, of uncertain nature, proximal to the site of previous biopsy  chronic solar damage to areas as noted above  personal history of melanoma skin cancer   chronic postoperative lymphedema    PLAN:  The diagnosis of the previously-biopsied squamous  cell carcinoma and management options, and risks and benefits of the alternatives, including observation/non-treatment, radiation treatment, excision with vertical frozen section or paraffin-embedded section margin evaluation, and Mohs' Micrographic Surgery, Fresh Tissue Technique, were discussed at length with the patient. In particular, the discussion included, but was not limited to, the following:    One alternative at this point would be to defer further treatment and observe the lesion(s). With small skin cancers of this kind, it is possible that a biopsy can be sufficient to definitively treat a small skin cancer of this kind. Alternatively, some skin cancers are slow growing and do not require immediate treatment. The potential advantage of this choice would be to avoid the need for possibly unnecessary additional surgery. Among the potential disadvantages of this would be the possibility of enlargement of the lesion, more extensive spread of the lesion or recurrence at a later date, which might necessitate a larger and more complex surgery.    Radiation treatment can be an effective treatment for this type of skin cancer. The usual course of treatment is every weekday for several weeks. Local irritation will result from treatment, although no systemic side effects are expected. The potential advantage of radiation treatment is that it avoids the need for surgery. Among the disadvantages of radiation treatment are the length of treatment, the local inflammatory response, the absence of pathologic confirmation of the removal of the skin cancer, a possible increased risk of additional skin cancer in the treated area in later years, and a somewhat increased risk of recurrence at a later date.     Excisional surgery can be an effective treatment for this type of skin cancer. This would involve excision of the lesion with margin evaluation by submitting the specimen to a pathologist for either immediate marginal  assessment via frozen section processing, or delayed marginal assessment by fixed-tissue processing. The potential advantage of this technique is that it offers a way of treating the lesion with some degree of histologic confirmation of tumor removal. Among the disadvantages of this treatment are the possible need for re-excision if marginal involvement is identified, a somewhat greater likelihood of recurrence as compared to Mohs' surgery because of the less comprehensive margin evaluation inherent in the technique, and the general potential risks of surgery, including allergic reactions to the anesthetic and other materials used, infection, injury to nerves in the area with consequent loss of sensation or muscle function, and scarring or distortion of surrounding structures.    Mohs' surgery is a very effective treatment for this type of skin cancer. The potential advantage of Mohs' surgery is that this technique offers the greatest possible certainty of knowing that the skin cancer has been completely removed, with the removal of the least amount of normal tissue. The potential disadvantages of Mohs' surgery include the duration of the surgery, the possible need for a separate surgery for reconstruction following tumor removal, and scarring as a result. In addition, general potential risks of surgery as noted above also apply to treatment via Mohs' surgery.    Sufficient time was available for questions, and all questions were answered to his satisfaction.     After discussing the clinical findings and the treatment alternatives, and the risks and benefits of the alternatives at length and in detail, and given the current clinical findings and absence of any evidence of residual tumor to the biopsy site at present, he and I have decided to postpone surgical treatment and to observe the site(s) for the present.     We also discussed the hyperkeratotic lesion, proximal to the site of previous biopsy.    He is to  begin triamcinolone ointment to this lesion bid.    An appointment will be made for him to follow-up for re-evaluation in 3 weeks. He is to call to be seen sooner if any changes or signs of recurrence, which were reviewed, should arise in the meantime.    --------------------------------------  Note: Some or all of this note may have been generated using voice recognition software. There may be voice recognition errors including grammatical and/or spelling errors found in the text. Attempts were made to correct these errors prior to signature.     =================    Note of 06/11  ALLERGIES:  Patient has no known allergies.    CHIEF COMPLAINT:  This 88 y.o. 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 Consultation requested by Tony Bowman MD    The patient is accompanied to this visit by his wife.    HISTORY OF PRESENT ILLNESS:   Location: right lower leg  Duration: unknown or more  Quality: currently better  Context: status post biopsy on 2/22/19 by Amber Anthony M.D; path = squamous cell carcinoma; pathology accession #[id number],  Pathology [facility]    Prior Treatment: none  Dr. Bowman saw him in consultation regarding this lesion on 03/25; she decided to defer treatment given the swelling of the right lower extremity which is a consequence of his previous wide local excision of a melanoma, 1.4 mm in thickness, and lymph node dissection, in 2017 on this extremity  He now also has a persistent ulcer on the right posterior heel  Has started Nivolumab monthly infusions    See also the handwritten notes/diagrams scanned to chart for additional details.    Defibrillator: No  Pacemaker: No  Artificial heart valves: No  Artificial joints: No    REVIEW OF SYSTEMS:   General: general health fair  Skin: previous skin cancer(s) Yes   If yes, details:Melanaoma right leg  Relevant other: Has a new  growth/spot on the left arm; this apparently was noted on a recent PET scan  Prior melanoma on the right leg; status post excision with lymph node dissection; as noted above   Cardiovascular:   High Blood Pressure: Yes   Chest Pain:  No   Defibrillator: as above  Pacemaker: as above  Artificial heart valves: as above  Prior Endocarditis: No   Prior Heart Attack/MI: No     If yes, when:   Prior Cardiac Bypass or Stents:  No   If yes, when:   Mitral Valve Prolapse: No   Relevant other:  No   Respiratory:   Shortness of breath:  No   Relevant other:  No   Endocrine:   Diabetes:  No   Relevant other:  No   Hem/Lymph:   Taking Prescribed Blood Thinners:  No   Easy Bleeding:  No   Relevant other:  No   Allergy/Immuno: as noted above  Relevant other:  No   GI:   Prior Hepatitis:  No     If yes, details:   Relevant other:  No   Musculoskeletal:   Artificial joints: as above  Relevant other:  No   Neurologic:   Prior Stroke:  No     If yes, details:   Relevant other:  No   Relevant other info:  No      PET scan (highlights added)  EXAMINATION:  NM PET CT WHOLE BODY    CLINICAL HISTORY:  melanoma staging; Malignant melanoma of right lower limb, including hip    TECHNIQUE:  11.1 mCi of F18-FDG was administered intravenously in the right wrist after initial attempt to inject at the right antecubital vein.  After an approximately 60 min distribution time, PET/CT images were acquired from the skull vertex through the feet. Transmission images were acquired to correct for attenuation using a whole body low-dose CT scan without contrast with the arms positioned along the side of the torso. Glycemia at the time of injection was 98 mg/dL.    COMPARISON:  MRI of the right tibia fibula 04/09/2019, MRI of the thoracic spine 02/22/2019, and FDG PET-CT 02/15/2019    FINDINGS:  Quality of the study: Degraded by partial infiltration of the radiopharmaceutical at the initial attempt at site of injection at the right antecubital fossa as well  as diffuse soft tissue retention which may be related to cardiac and/or renal function.  This may reduce sensitivity for less metabolically active lesions.    The hypermetabolic focus along the right shin is no longer appreciated, and hypermetabolic focus in the T11 vertebral body is not observed.    In the extremities, there is a new hypermetabolic focus superficially in the lateral aspect of the proximal left upper extremity with a maximum SUV of 1.7 without discrete correlate and which may not be included on CT.  There is new hypermetabolic thickening of the skin and subcutaneous tissues of the right heel in keeping with known cellulitis.  There are no pathologically enlarged or hypermetabolic lymph nodes.  There is redemonstration of non hypermetabolic right inguinal lymph nodes including index 1.2 cm rounded node on image 180.  Mild postsurgical uptake in the right inguinal lymphadenectomy bed has resolved.    Physiologic uptake of the tracer is present within the brain, salivary glands, myocardium, GI and  tracts.    Incidental CT findings: There is redemonstration of bilateral rounded atelectasis in the lower lobes.      Impression       1.  New hypermetabolic focus in the proximal left upper extremity.  Recommend physical examination.    2.  No other findings suggestive of malignancy.    3.  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    4.  Active cellulitis of the right heel.    5.  Other findings as above.         PAST MEDICAL HISTORY:  Past Medical History:   Diagnosis Date    COPD (chronic obstructive pulmonary disease)     H/O asbestosis     Hearing loss     Hypercholesterolemia     Hypertension     Malignant melanoma of right lower leg 5/11/2017    Melanoma     Approx. 2 years ago     Skin cancer     Squamous cell carcinoma        PAST SURGICAL HISTORY:  Past Surgical History:   Procedure Laterality Date    ADENOIDECTOMY      APPENDECTOMY      Carotid Stenosis  Surgery      CHOLECYSTECTOMY  1965    LYMPHADENECTOMY, INGUINAL, Sartorious Flap Right 2018    Performed by Yair Alatorre MD at [facility] OR [facility]    TONSILLECTOMY          SOCIAL HISTORY:  Dependencies: smoking status as noted below  Social History     Tobacco Use    Smoking status: Former Smoker     Packs/day: 1.00     Years: 15.00     Pack years: 15.00     Types: Cigarettes     Last attempt to quit: 1965     Years since quittin.4    Smokeless tobacco: Never Used   Substance Use Topics    Alcohol use: No     Frequency: Never    Drug use: No       PERTINENT MEDICATIONS:  See medications list.    Current Outpatient Medications:     aspirin (ECOTRIN) 81 MG EC tablet, Take 81 mg by mouth every morning. , Disp: , Rfl:     cadexomer iodine (IODOSORB) 0.9 % gel, Apply topically daily as needed for Wound Care., Disp: 10 g, Rfl: 3    ciprofloxacin HCl (CIPRO) 500 MG tablet, Take 0.5 tablets (250 mg total) by mouth 2 (two) times daily., Disp: 14 tablet, Rfl: 0    finasteride (PROSCAR) 5 mg tablet, Take 5 mg by mouth every morning. , Disp: , Rfl:     furosemide (LASIX) 20 MG tablet, Take 20 mg by mouth every other day. , Disp: , Rfl:     HYDROcodone-acetaminophen (NORCO) 5-325 mg per tablet, TK 1 T PO  Q 12 H PRN P, Disp: , Rfl: 0    losartan-hydrochlorothiazide 50-12.5 mg (HYZAAR) 50-12.5 mg per tablet, Take 1 tablet by mouth every morning. , Disp: , Rfl:     metoprolol tartrate (LOPRESSOR) 50 MG tablet, Takes 50 mg in morning and half tablet (25 mg) in evening, Disp: , Rfl:     polyethylene glycol (GLYCOLAX) 17 gram/dose powder, Take 17 g by mouth once daily., Disp: 3060 g, Rfl: 0    potassium chloride (MICRO-K) 8 mEq CpSR, Take 8 mEq by mouth every other day. , Disp: , Rfl:     senna-docusate 8.6-50 mg (PERICOLACE) 8.6-50 mg per tablet, Take 1 tablet by mouth 2 (two) times daily as needed for Constipation., Disp: , Rfl:     simvastatin (ZOCOR) 10 MG tablet, Take 10 mg by mouth every  evening., Disp: , Rfl:     tamsulosin (FLOMAX) 0.4 mg Cp24, Take 0.4 mg by mouth every morning. , Disp: , Rfl:     ALLERGIES:  Patient has no known allergies.    EXAM:  See also the handwritten notes/diagrams scanned to chart for additional details.  Constitutional  General appearance: well-developed, well-nourished, well-kempt elderly white male    Eyes  Inspection of conjunctivae and lids reveals no abnormalities; sclerae anicteric  Neurologic/Psychiatric  Alert,  normal orientation to time, place, person  Normal mood and affect with no evidence of depression, anxiety, agitation  Skin: see photo(s)  Head: background marked solar damage to exposed areas of skin  Neck: examination reveals marked chronic solar damage  Right upper extremity: examination reveals marked chronic solar damage  Left upper extremity: examination reveals marked chronic solar damage; on his left deltoid area there is an approximately 2 cm area of erythema and scaling/crusting, which is clinically suggestive of a 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  Right lower extremity:  There is a boot with dressing on his right distal lower extremity  There is edema of the right lower extremity below the knee; with some chronic stasis changes  Examination of the site of previous biopsy, which was confirmed by reference to the previous photographs, shows an approximately 1 cm pink, sclerotic plaque which is not indurated and shows no evidence of residual squamous cell carcinoma at this time    Photo(s) from biopsy visit:                  ASSESSMENT: status post biopsy, squamous cell carcinoma, right leg; now clinically clear   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, left arm deltoid area  chronic solar damage to areas as noted above  Personal history of melanoma, with metastatic lesions; on Nivolumab  personal history of non-melanoma skin cancer  Chronic postoperative edema of the right lower extremity  Chronic ulcer, right posterior lower  extremity    PLAN:  The diagnosis of the right leg lesion and clinical findings and management options, and risks and benefits of the alternatives, including observation/non-treatment, radiation treatment, excision with vertical frozen section or paraffin-embedded section margin evaluation, and Mohs' Micrographic Surgery, Fresh Tissue Technique, were discussed at length with the patient. In particular, the discussion included, but was not limited to, the following:    One alternative at this point would be to defer further treatment and observe the lesion(s). With small skin cancers of this kind, it is possible that a biopsy can be sufficient to definitively treat a small skin cancer of this kind. Alternatively, some skin cancers are slow growing and do not require immediate treatment. The potential advantage of this choice would be to avoid the need for possibly unnecessary additional surgery. Among the potential disadvantages of this would be the possibility of enlargement of the lesion, more extensive spread of the lesion or recurrence at a later date, which might necessitate a larger and more complex surgery.    Radiation treatment can be an effective treatment for this type of skin cancer. The usual course of treatment is every weekday for several weeks. Local irritation will result from treatment, although no systemic side effects are expected. The potential advantage of radiation treatment is that it avoids the need for surgery. Among the disadvantages of radiation treatment are the length of treatment, the local inflammatory response, the absence of pathologic confirmation of the removal of the skin cancer, a possible increased risk of additional skin cancer in the treated area in later years, and a somewhat increased risk of recurrence at a later date.     Excisional surgery can be an effective treatment for this type of skin cancer. This would involve excision of the lesion with margin evaluation by  submitting the specimen to a pathologist for either immediate marginal assessment via frozen section processing, or delayed marginal assessment by fixed-tissue processing. The potential advantage of this technique is that it offers a way of treating the lesion with some degree of histologic confirmation of tumor removal. Among the disadvantages of this treatment are the possible need for re-excision if marginal involvement is identified, a somewhat greater likelihood of recurrence as compared to Mohs' surgery because of the less comprehensive margin evaluation inherent in the technique, and the general potential risks of surgery, including allergic reactions to the anesthetic and other materials used, infection, injury to nerves in the area with consequent loss of sensation or muscle function, and scarring or distortion of surrounding structures.    Mohs' surgery is a very effective treatment for this type of skin cancer. The potential advantage of Mohs' surgery is that this technique offers the greatest possible certainty of knowing that the skin cancer has been completely removed, with the removal of the least amount of normal tissue. The potential disadvantages of Mohs' surgery include the duration of the surgery, the possible need for a separate surgery for reconstruction following tumor removal, and scarring as a result. In addition, general potential risks of surgery as noted above also apply to treatment via Mohs' surgery.    In light of the absence of any evidence of residual tumor to the site of the previous biopsy on his right leg, we will defer treatment pro tem.    We also discussed the lesion on the left arm.  In light of the uncertain nature of this lesion, a shave biopsy will be performed to establish a definitive diagnosis and guide further therapy.  See the procedure note below.    Sufficient time was available for questions, and all questions were answered to his satisfaction.     Discussion of the  above issues constituted greater than 50% of the face-to-face encounter, the duration of which was 30 minutes.    An appointment will be made for him in two weeks. He is to call to be seen sooner if any changes or signs of recurrence, which were reviewed, should arise in the meantime.    --------------------------------------  Note: Some or all of this note may have been generated using voice recognition software. There may be voice recognition errors including grammatical and/or spelling errors found in the text. Attempts were made to correct these errors prior to signature.     PROCEDURE NOTE: SHAVE BIOPSY    The diagnosis and management options and risk, benefits and alternatives were discussed with the patient. All questions were answered. Verbal consent was obtained.    Site: left arm  Indication: clinically suspicious lesion; rule out malignancy  Prep: Alcohol  Anesthesia: 2% lidocaine plain, less than 2 mL  Shave biopsy performed  Hemostasis with electrodesiccation  Dressed with petrolatum and bandaid  Specimen placed in formalin to be submitted to pathology    Routine care instructions given  Followup: 2 weeks to discuss biopsy results and coordinate any further treatment indicated  --------------------------------------  Note: Some or all of this note may have been generated using voice recognition software. There may be voice recognition errors including grammatical and/or spelling errors found in the text. Attempts were made to correct these errors prior to signature.

## 2019-11-04 ENCOUNTER — OFFICE VISIT (OUTPATIENT)
Dept: HEMATOLOGY/ONCOLOGY | Facility: CLINIC | Age: 84
End: 2019-11-04
Payer: MEDICARE

## 2019-11-04 VITALS
SYSTOLIC BLOOD PRESSURE: 173 MMHG | OXYGEN SATURATION: 96 % | DIASTOLIC BLOOD PRESSURE: 63 MMHG | RESPIRATION RATE: 16 BRPM | HEART RATE: 58 BPM | BODY MASS INDEX: 24.22 KG/M2 | TEMPERATURE: 98 F | HEIGHT: 72 IN | WEIGHT: 178.81 LBS

## 2019-11-04 DIAGNOSIS — C43.71 MALIGNANT MELANOMA OF RIGHT LOWER LEG: Primary | ICD-10-CM

## 2019-11-04 PROCEDURE — 99999 PR PBB SHADOW E&M-EST. PATIENT-LVL V: CPT | Mod: PBBFAC,,, | Performed by: INTERNAL MEDICINE

## 2019-11-04 PROCEDURE — 1101F PR PT FALLS ASSESS DOC 0-1 FALLS W/OUT INJ PAST YR: ICD-10-PCS | Mod: CPTII,S$GLB,, | Performed by: INTERNAL MEDICINE

## 2019-11-04 PROCEDURE — 1101F PT FALLS ASSESS-DOCD LE1/YR: CPT | Mod: CPTII,S$GLB,, | Performed by: INTERNAL MEDICINE

## 2019-11-04 PROCEDURE — 99214 PR OFFICE/OUTPT VISIT, EST, LEVL IV, 30-39 MIN: ICD-10-PCS | Mod: S$GLB,,, | Performed by: INTERNAL MEDICINE

## 2019-11-04 PROCEDURE — 99999 PR PBB SHADOW E&M-EST. PATIENT-LVL V: ICD-10-PCS | Mod: PBBFAC,,, | Performed by: INTERNAL MEDICINE

## 2019-11-04 PROCEDURE — 99214 OFFICE O/P EST MOD 30 MIN: CPT | Mod: S$GLB,,, | Performed by: INTERNAL MEDICINE

## 2019-11-04 RX ORDER — ERYTHROMYCIN 5 MG/G
OINTMENT OPHTHALMIC
Refills: 0 | COMMUNITY
Start: 2019-10-14

## 2019-11-04 NOTE — PROGRESS NOTES
Subjective:       Patient ID: Shaq Bragg Jr. is a 89 y.o. male.    Chief Complaint: No chief complaint on file.    HPI     Presents with his wife  Wound has finally scabbed and relatively healed  He feels much better and is able to ambulate on it  Weight stable  No pain other than chronic low back pain although this is worse    Diagnosis:   1. Malignant melanoma of right lower leg    2. Lymphedema of right lower extremity    3. Pressure injury of right heel, stage 4    4. Other specified disorders involving the immune mechanism, not elsewhere classified           Chief Complaint: Malignant melanoma of right lower leg        Oncologic History:       Oncologic History 3/21/17 Skin Excisional biopsy right leg  4/18/17 MOS with SLN biopsy  1/04/18 PET/CT - no recurrent or metastatic disease  10/30/19 PET/CT - 2 hypermetabolic LN's in right groin  11/03/18 MRI brain  11/12/18 Completion Lymphadenectomy  2/15/19 PET/CT     Oncologic Treatment 3/21/17 Skin Excisional biopsy  4/18/17 MOS with SLN biopsy  11/12/18 Completion Lymphadenectomy  3/11/19 Nivolumab    Pathology 3/21/17 - 1.4mm malignant melanoma Alexis level IV, no ulceration, perineural invasion seen, mitotic rate 6/mm2  4/18/17 -  no residual melanoma; microscopic mets in 2 sentinel LNs  11/12/18 - ¾ LNs positive for metastatic melanoma with the largest deposit being 2.4cm an positive extranodal extension      Oncologic History:  Pt initially presented with a lesion on his right proximal calf in March 2017.  He underwent a biopsy on 3/21/17 with pathology showing a 1.4mm malignant melanoma Alexis level IV, no ulceration, perineural invasion seen, mitotic rate 6/mm2.  The patient then underwent  a MOS procedure on 4/18/17 and right inguinal LN biopsy on 4/18/17 under the care of Dr Joelle Pardo at [facility].  The pathology from the procedure initially showed no residual melanoma and no LN involvement ; however, on reevaluation showed microscopic mets in 2  sentinel LNs.  The patient established care with Medical Oncologist Dr Olivier Mchugh.  The surgical oncologist Dr. Alatorre offered Lymphadenectomy on 5/24/17; however, the patient elected to have observation.  PET/CT was performed on 1/04/18 showing no evidence of residual or recurrent disease.  On 10/16/18 the patient called Dr Holland office with complaint of 2 lumps in the right leg.  PET/CT was performed on 10/30/19 showing 2 new hypermetabolic lymph nodes right groin with the index lymph node lateral SUV max 30.02.  The patient had an appointment with Dr Alatorre on 10/30/19 at which point it was decided to proceed with a completion lymphadenectomy.   An MRI of the brain was performed on 11/03/18 showing no evidence for intracranial metastatic disease but did show a 4mm T1 hyperintense occipital calvarial focus which was read as likely benign.  The patient underwent completion LAD on 11/12/18 with pathology showing ¾ LNs positive for metastatic melanoma with the largest deposit being 2.4cm an positive extranodal extension. Currently the patient complains of chronic lower back pain for which he underwent an MRI of the lumbar spine on 11/03/18 which showed severe central canal stenosis related to a disk bulge at L3-L4.  The patient endorses some LE weakness on occasion.  He denies bowel or bladder incontinence.  The patient states he sees a deramtologist semi annually Dr Sun at [facility] with last clinic visit reportedly several months ago.  The patient underwent a PET/CT on 2/15/19 showing a focus of increased tracer uptake within the T 11 vertebral body with max SUV of 4.1 that was not visualized on prior exam, increased radiotracer uptake in the right groin max SUV 5.3 and a subcutaneous focus of increased tracer uptake within the anterior portion of the distal right leg showing a max SUV of 3.7.   The patient was seen by dermatology on 2/22/19 and underwent a shave biopsy of the right lower leg  lesion showing SCC.  On 3/12/19 the patient contacted our office stating he had a large amount of BRBPR.  The patient was seen in the ER on 3/12/19 with relatively stable hemoglobin and was deferred for outpatient GI evaluation.  On 3/25/19 Dr. Bowman in derm decided to postpone a Mohs procedure for SCC of the right lower leg until the swelling in the right leg had decreased.  The patient was seen on 3/27/19 by wound care for an ulcer on his right heel at which point recommendations were made for bandaging of the right heel.  The patient saw GI on 3/27/19 at which point the patient decided not to pursue a colonoscopy.   The patient missed his scheduled treatment with Nivolumab on 4/08/19 after being admitted to the hospital from 4/08/19-4/11/19 for a stage IV ulcer of the right heel with surrounding cellulitis growing MSSA and Pseudomonas on biopsy treated with 14 day course of antibiotics with PO Cefadroxil and ciprofloxacin completed last week.  The patient was seen by podiatry today for wound care and given instructions on care for his right foot.    PET scan 6/3/19  COMPARISON:  MRI of the right tibia fibula 04/09/2019, MRI of the thoracic spine 02/22/2019, and FDG PET-CT 02/15/2019    FINDINGS:  Quality of the study: Degraded by partial infiltration of the radiopharmaceutical at the initial attempt at site of injection at the right antecubital fossa as well as diffuse soft tissue retention which may be related to cardiac and/or renal function.  This may reduce sensitivity for less metabolically active lesions.    The hypermetabolic focus along the right shin is no longer appreciated, and hypermetabolic focus in the T11 vertebral body is not observed.    In the extremities, there is a new hypermetabolic focus superficially in the lateral aspect of the proximal left upper extremity with a maximum SUV of 1.7 without discrete correlate and which may not be included on CT.  There is new hypermetabolic thickening of  the skin and subcutaneous tissues of the right heel in keeping with known cellulitis.  There are no pathologically enlarged or hypermetabolic lymph nodes.  There is redemonstration of non hypermetabolic right inguinal lymph nodes including index 1.2 cm rounded node on image 180.  Mild postsurgical uptake in the right inguinal lymphadenectomy bed has resolved.    Physiologic uptake of the tracer is present within the brain, salivary glands, myocardium, GI and  tracts.    Incidental CT findings: There is redemonstration of bilateral rounded atelectasis in the lower lobes.      Impression       1.  New hypermetabolic focus in the proximal left upper extremity.  Recommend physical examination.    2.  No other findings suggestive of malignancy.    3.  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    4.  Active cellulitis of the right heel.    5.  Other findings as above.          Subjective:    Interval History: Mr. Bragg is a 88 y.o. male who presents for follow up on recently diagnosed malignant melanoma.  Since the last visit the patient continues to follow with podiatry.  He had his right heel debrided on 5/2019 with continued exposed achilles tendon.  He is on cipro and is to take his last pill tonight. Currently the patient states he feels well.  He denies CP, SOB, N/V, constipation, diarrhea.  He continues to complain of swelling in the right leg.     Past Medical History:           Past Medical History:   Diagnosis Date    COPD (chronic obstructive pulmonary disease)      H/O asbestosis      Hearing loss      Hypercholesterolemia      Hypertension      Malignant melanoma of right lower leg 5/11/2017    Melanoma       Approx. 2 years ago     Skin cancer      Squamous cell carcinoma           Past Surgical HIstory:             Past Surgical History:   Procedure Laterality Date    ADENOIDECTOMY        APPENDECTOMY        Carotid Stenosis Surgery        CHOLECYSTECTOMY   1965     LYMPHADENECTOMY, INGUINAL, [place] Flap Right 11/12/2018     Performed by [facility]air [facility]re [facility] at [facility]    TONSILLECTOMY             Family History:             Family History   Problem Relation Age of Onset    Coronary artery disease Mother      Cancer Father           Esophageal    Esophageal cancer Father      Diabetes Sister      No Known Problems Maternal Aunt      No Known Problems Maternal Uncle      No Known Problems Paternal Aunt      No Known Problems Paternal Uncle      No Known Problems Maternal Grandmother      No Known Problems Maternal Grandfather      No Known Problems Paternal Grandmother      No Known Problems Paternal Grandfather      Melanoma Neg Hx      Psoriasis Neg Hx      Lupus Neg Hx           Social History:  reports that he quit smoking about 54 years ago. His smoking use included cigarettes. He has a 15.00 pack-year smoking history. He has never used smokeless tobacco. He reports that he does not drink alcohol or use drugs.     Allergies:  Review of patient's allergies indicates:  No Known Allergies     Medications:  Current Medications     Review of Systems   Constitutional: Positive for activity change and fatigue. Negative for chills, diaphoresis, fever and unexpected weight change.   Respiratory: Negative for cough and shortness of breath.    Cardiovascular: Positive for leg swelling (right leg ). Negative for chest pain and palpitations.   Gastrointestinal: Negative for abdominal pain, constipation, diarrhea, nausea and vomiting.   Musculoskeletal: Positive for arthralgias and joint swelling. Negative for back pain.   Skin: Positive for wound (better). Negative for color change and rash.   Neurological: Negative for headaches.   Hematological: Negative for adenopathy. Does not bruise/bleed easily.       Objective:      Physical Exam   Constitutional: He is oriented to person, place, and time. He appears well-developed and well-nourished. No distress.    HENT:   Head: Normocephalic and atraumatic.   Eyes: Pupils are equal, round, and reactive to light. Conjunctivae and EOM are normal. No scleral icterus.   Cardiovascular: Normal rate, regular rhythm, normal heart sounds and intact distal pulses. Exam reveals no gallop and no friction rub.   No murmur heard.  Pulmonary/Chest: Effort normal and breath sounds normal. No respiratory distress. He has no wheezes. He has no rales. He exhibits no tenderness.   Abdominal: Soft. Bowel sounds are normal. He exhibits no distension and no mass. There is no tenderness. There is no rebound.   Musculoskeletal: Normal range of motion. He exhibits edema. He exhibits no tenderness or deformity.   Lymphadenopathy:        Head (right side): No submental adenopathy present.        Head (left side): No submental adenopathy present.     He has no cervical adenopathy.     He has no axillary adenopathy. No inguinal adenopathy noted on the right or left side.        Right: No supraclavicular adenopathy present.        Left: No supraclavicular adenopathy present.   Neurological: He is alert and oriented to person, place, and time.   Skin: Skin is warm and dry. No rash noted. He is not diaphoretic. No erythema. No pallor.   Right leg wound scabbed   Nursing note and vitals reviewed.    Labs- reviewed  Assessment:       1. Malignant melanoma of right lower leg        Plan:       1. Now that wound has healed needs disease reevaluated  Back pain has worsened as well  We will order PET and MRI and discuss post

## 2019-11-04 NOTE — Clinical Note
PET and MRI in [place] in next week or 2-- [facility]

## 2019-11-07 ENCOUNTER — LAB VISIT (OUTPATIENT)
Dept: LAB | Facility: HOSPITAL | Age: 84
End: 2019-11-07
Attending: SPECIALIST
Payer: MEDICARE

## 2019-11-07 DIAGNOSIS — Z12.5 SPECIAL SCREENING FOR MALIGNANT NEOPLASM OF PROSTATE: Primary | ICD-10-CM

## 2019-11-07 DIAGNOSIS — Z12.5 SPECIAL SCREENING FOR MALIGNANT NEOPLASM OF PROSTATE: ICD-10-CM

## 2019-11-07 LAB — COMPLEXED PSA SERPL-MCNC: 1 NG/ML (ref 0–4)

## 2019-11-07 PROCEDURE — 84153 ASSAY OF PSA TOTAL: CPT

## 2019-11-07 PROCEDURE — 36415 COLL VENOUS BLD VENIPUNCTURE: CPT

## 2019-11-11 ENCOUNTER — TELEPHONE (OUTPATIENT)
Dept: HEMATOLOGY/ONCOLOGY | Facility: CLINIC | Age: 84
End: 2019-11-11

## 2019-11-11 NOTE — TELEPHONE ENCOUNTER
"Returned call to pt wife.   Pt wife stated that pt has been having severe back pain 10/10 to where pt can not hardly walk.   Pt wife asked if Dr. Villatoro could refill [place] that foot doctor had prescribed as only has 2 pills left and foot doctor has d/c'd pt. Pt wife also asked if pt could be sent in something stronger.   Informed pt wife that given severity of back pain that needs to bring pt to local ED (pt lives in [place]) for pain control and stat imaging.   Pt wife verbalized understanding.       Message fwd.         ----- Message from Mila Landers sent at 11/11/2019 11:17 AM CST -----  Contact: Jennifer Bragg   Consult/Advisory:    Name Of Caller: Jennifer   Provider Name: Monisha MOORE MD   What is the nature of the call?:  -- pt experiencing severe back pains and asking to be prescribed   Something to relieve it. Please call and speak with his wife about if he   could get more of the Norco's and possibly a stronger strength.   Pharmacy:  [facility] [address] [place] [address], [place] - [address] [phone] (Phone)  [phone] (Fax)    Does patient feel the need to be seen today? Unclear   Relationship to the Pt?: spouse   Contact Preference?:  [phone]    Additional Notes:  -- also asking if its possible to see the MD before his Dec appts,   "Thank you for all that you do for our patients'"        "

## 2019-11-11 NOTE — TELEPHONE ENCOUNTER
Returned call to pt wife.   Pt wife stated that they have not seen ED doctor yet- informed pt wife that needs to see ED doctor and ED doctor will order scan if having severe back pain.   Informed pt wife if any questions, ED doctor can call staff here.   Pt wife verbalized understanding.           ----- Message from Jf Martins sent at 11/11/2019  1:35 PM CST -----  Contact: Mrs. Bragg (wife) @ [phone]  Mrs. Bragg states they went to local ED today, due to pain, but ED is refusing to complete MRI, ED said they need order from Dr. Villatoro. Mrs. Bragg is asking for a call back to confirm if they should wait for order or if they should leave.

## 2019-11-13 ENCOUNTER — TELEPHONE (OUTPATIENT)
Dept: HEMATOLOGY/ONCOLOGY | Facility: CLINIC | Age: 84
End: 2019-11-13

## 2019-11-13 ENCOUNTER — HOSPITAL ENCOUNTER (OUTPATIENT)
Dept: RADIOLOGY | Facility: HOSPITAL | Age: 84
Discharge: HOME | End: 2019-11-13
Attending: INTERNAL MEDICINE
Payer: MEDICARE

## 2019-11-13 DIAGNOSIS — C43.71 MALIGNANT MELANOMA OF RIGHT LOWER LEG: ICD-10-CM

## 2019-11-13 DIAGNOSIS — M48.00 SPINAL STENOSIS, UNSPECIFIED SPINAL REGION: Primary | ICD-10-CM

## 2019-11-13 PROCEDURE — 63600175 PHARM REV CODE 636 W HCPCS: Performed by: INTERNAL MEDICINE

## 2019-11-13 PROCEDURE — A9585 GADOBUTROL INJECTION: HCPCS | Performed by: INTERNAL MEDICINE

## 2019-11-13 PROCEDURE — 72158 MRI LUMBAR SPINE W/O & W/DYE: CPT | Mod: TC

## 2019-11-13 RX ADMIN — GADOBUTROL 8 ML: 604.72 INJECTION INTRAVENOUS at 12:11

## 2019-11-13 NOTE — TELEPHONE ENCOUNTER
Message fwd to .     ----- Message from Myla Villatoro MD sent at 11/13/2019  3:03 PM CST -----  Called patient and spoke to wife   Reviewed MRI  Can we see if ortho spine can see when he is here this week?  Thanks

## 2019-11-14 ENCOUNTER — TELEPHONE (OUTPATIENT)
Dept: ORTHOPEDICS | Facility: CLINIC | Age: 84
End: 2019-11-14

## 2019-11-14 DIAGNOSIS — M48.061 SPINAL STENOSIS OF LUMBAR REGION, UNSPECIFIED WHETHER NEUROGENIC CLAUDICATION PRESENT: Primary | ICD-10-CM

## 2019-11-18 ENCOUNTER — HOSPITAL ENCOUNTER (OUTPATIENT)
Dept: RADIOLOGY | Facility: HOSPITAL | Age: 84
Discharge: HOME | End: 2019-11-18
Attending: INTERNAL MEDICINE
Payer: MEDICARE

## 2019-11-18 VITALS — BODY MASS INDEX: 24.38 KG/M2 | WEIGHT: 180 LBS | HEIGHT: 72 IN

## 2019-11-18 DIAGNOSIS — C43.71 MALIGNANT MELANOMA OF RIGHT LOWER LEG: ICD-10-CM

## 2019-11-18 LAB — GLUCOSE SERPL-MCNC: 103 MG/DL (ref 70–110)

## 2019-11-18 PROCEDURE — 78816 PET IMAGE W/CT FULL BODY: CPT | Mod: TC,PO

## 2019-11-19 ENCOUNTER — TELEPHONE (OUTPATIENT)
Dept: HEMATOLOGY/ONCOLOGY | Facility: CLINIC | Age: 84
End: 2019-11-19

## 2019-11-19 ENCOUNTER — OFFICE VISIT (OUTPATIENT)
Dept: DERMATOLOGY | Facility: CLINIC | Age: 84
End: 2019-11-19
Payer: MEDICARE

## 2019-11-19 DIAGNOSIS — D48.5 NEOPLASM OF UNCERTAIN BEHAVIOR OF SKIN: Primary | ICD-10-CM

## 2019-11-19 PROCEDURE — 99499 UNLISTED E&M SERVICE: CPT | Mod: S$GLB,,, | Performed by: DERMATOLOGY

## 2019-11-19 PROCEDURE — 88305 TISSUE EXAM BY PATHOLOGIST: CPT | Performed by: DERMATOLOGY

## 2019-11-19 PROCEDURE — 11102 PR TANGENTIAL BIOPSY, SKIN, SINGLE LESION: ICD-10-PCS | Mod: S$GLB,,, | Performed by: DERMATOLOGY

## 2019-11-19 PROCEDURE — 99499 NO LOS: ICD-10-PCS | Mod: S$GLB,,, | Performed by: DERMATOLOGY

## 2019-11-19 PROCEDURE — 99999 PR PBB SHADOW E&M-EST. PATIENT-LVL II: CPT | Mod: PBBFAC,,, | Performed by: DERMATOLOGY

## 2019-11-19 PROCEDURE — 88305 TISSUE EXAM BY PATHOLOGIST: ICD-10-PCS | Mod: 26,,, | Performed by: DERMATOLOGY

## 2019-11-19 PROCEDURE — 88305 TISSUE EXAM BY PATHOLOGIST: CPT | Mod: 26,,, | Performed by: DERMATOLOGY

## 2019-11-19 PROCEDURE — 11102 TANGNTL BX SKIN SINGLE LES: CPT | Mod: S$GLB,,, | Performed by: DERMATOLOGY

## 2019-11-19 PROCEDURE — 99999 PR PBB SHADOW E&M-EST. PATIENT-LVL II: ICD-10-PCS | Mod: PBBFAC,,, | Performed by: DERMATOLOGY

## 2019-11-19 NOTE — TELEPHONE ENCOUNTER
----- Message from Myla Villatoro MD sent at 11/19/2019  2:51 PM CST -----  Left message for them to return call to discuss PET

## 2019-11-19 NOTE — PROGRESS NOTES
ALLERGIES:  Patient has no known allergies.    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 2 years ago     Skin cancer     Squamous cell carcinoma        PAST SURGICAL HISTORY:  Past Surgical History:   Procedure Laterality Date    ADENOIDECTOMY      APPENDECTOMY      Carotid Stenosis Surgery      CHOLECYSTECTOMY  1965    INGUINAL LYMPHADENECTOMY Right 2018    Procedure: LYMPHADENECTOMY, INGUINAL, Sartorious Flap;  Surgeon: Yair Alatorre MD;  Location: [facility] OR [address] [place] [address];  Service: General;  Laterality: Right;    TONSILLECTOMY         SOCIAL HISTORY:  Social History     Tobacco Use    Smoking status: Former Smoker     Packs/day: 1.00     Years: 15.00     Pack years: 15.00     Types: Cigarettes     Last attempt to quit:      Years since quittin.9    Smokeless tobacco: Never Used   Substance Use Topics    Alcohol use: No     Frequency: Never    Drug use: No        PERTINENT MEDICATIONS:  See medications list.  Current Outpatient Medications on File Prior to Visit   Medication Sig Dispense Refill    aspirin (ECOTRIN) 81 MG EC tablet Take 81 mg by mouth every morning.       cadexomer iodine (IODOSORB) 0.9 % gel Apply topically  daily as needed for Wound Care. 10 g 3    erythromycin (ROMYCIN) ophthalmic ointment APPLY OINTMENT TOPICALLY TO LEFT EYE AS NEEDED  0    finasteride (PROSCAR) 5 mg tablet Take 5 mg by mouth every morning.       furosemide (LASIX) 20 MG tablet HOLD UNTIL SEEN by primary care      HYDROcodone-acetaminophen (NORCO) 5-325 mg per tablet Take 1 tablet by mouth every 6 (six) hours as needed for Pain. 30 tablet 0    losartan-hydrochlorothiazide 50-12.5 mg (HYZAAR) 50-12.5 mg per tablet HOLD UNTIL SEEN by primary care doctor      metoprolol tartrate (LOPRESSOR) 50 MG tablet Takes 50 mg in morning and half tablet (25 mg) in evening      polyethylene glycol (GLYCOLAX) 17 gram PwPk Take 17 g by mouth as needed.      potassium chloride (MICRO-K) 8 mEq CpSR HOLD until seen by primary care doctor      simvastatin (ZOCOR) 10 MG tablet Take 10 mg by mouth every evening.      tamsulosin (FLOMAX) 0.4 mg Cp24 Take 0.4 mg by mouth every morning.       triamcinolone acetonide 0.1% (KENALOG) 0.1 % ointment Apply sparingly BID to affected area 30 g 3     No current facility-administered medications on file prior to visit.          EXAM:  Constitutional  General appearance: well-developed, well-nourished, well-kempt older white male    Eyes  Inspection of conjunctivae and lids reveals no abnormalities; sclerae anicteric  Neurologic/Psychiatric  Alert,  normal orientation to time, place, person  Normal mood and affect with no evidence of depression, anxiety, agitation  Skin: see photo(s)  Head: background marked solar damage to exposed areas of skin  Right lower extremity: see the photos below  There is an approximately 1.5+ cm area of irregular hyperkeratosis on the right anterior/lateral leg as noted in the photos  Careful comparison to previous photographs suggests that this lesion is superior and lateral to the previously-biopsied lesion, which is now clinically clear as previously noted  Photo(s) this visit:           See  below for prior photos      ASSESSMENT:   status post prior biopsy, squamous cell carcinoma, right leg,  clinically clear 9 months post biopsy by Dr. Anthony  squamous cell carcinoma vs other, right leg; this site appears to be NOT the site of the prior biopsy, based on previous photos  Stasis dermatitis vs other, right ankle    PLAN:   I discussed the diagnosis and management options with the patient.  We will continue to observe the previously-biopsied site.   Given the clinically suspicious nature of the lesion noted today, we will proceed with shave biopsy.  See the procedure note below.    PROCEDURE NOTE: SHAVE BIOPSY    The diagnosis and management options and risk, benefits and alternatives were discussed with the patient. All questions were answered. Verbal consent was obtained.    Site: right leg  Indication: clinically suspicious lesion; rule out malignancy  Prep: Alcohol  Anesthesia: 1% lidocaine plain, less than 2 mL  Shave biopsy performed  Hemostasis with electrodesiccation  Dressed with petrolatum and bandaid  Specimen placed in formalin to be submitted to pathology    Routine care instructions given  Followup: 2 weeks to discuss biopsy results and coordinate any further treatment indicated     --------------------------------------  Note: Some or all of this note may have been generated using voice recognition software. There may be voice recognition errors including grammatical and/or spelling errors found in the text. Attempts were made to correct these errors prior to signature.        ====================  Note of 10/24  ALLERGIES:  Patient has no known allergies.    CHIEF COMPLAINT: followup squamous cell carcinoma     HISTORY OF PRESENT ILLNESS:  Location: right leg  Timing: I last saw him 4 months ago   Context: see previous details in notes below; treatment was deferred  Prior biopsy in February by Dr. Amber Anthony showed squamous cell carcinoma   Dr. Bowman saw him in consultation regarding  this lesion on 03/25; she decided to defer treatment given the swelling of the right lower extremity which is a consequence of his previous wide local excision of a melanoma, 1.4 mm in thickness, and lymph node dissection, in 2017 on this extremity  At his last visit, there was no clear evidence of residual squamous cell carcinoma at the site, and after discussing the clinical findings and options, we elected to defer surgery and observe the site  Quality: not much change    PATH:   FINAL PATHOLOGIC DIAGNOSIS  Skin, right lower leg, shave biopsy:  -SQUAMOUS CELL CARCINOMA, EXTENDING TO THE PERIPHERAL AND DEEP BIOPSY EDGES  Diagnosed by: Arnol Sun  (Electronically Signed: 2019-02-28 12:23:24)    REVIEW OF SYSTEMS:   General: general health good  Skin: also status post biopsy by me left arm; see the last note  FINAL PATHOLOGIC DIAGNOSIS  1. Skin, left arm, shave biopsy:  - DERMAL FIBROSIS AND MIXED INFLAMMATION, TRAUMATIZED/IMPETIGINIZED.  MICROSCOPIC DESCRIPTION: Sections show epidermal acanthosis with no significant cytologic atypia. There is  overlying serum crust associated with neutrophils, parakeratosis, and clusters of bacteria. The underlying dermis is  fibrotic, and it contains a mixed inflammatory infiltrate consisting of lymphocytes, histiocytes, plasma cells, and  occasional neutrophils and eosinophils. Mart 1 immunohistochemical stain (performed on both blocks) fails to  reveal a melanocytic proliferation. Appropriately reactive controls were reviewed.  Diagnosed by: Xiomy Mcdaniel M.D.  (Electronically Signed: 2019-06-17 13:31:39)  He was also being treated for an ulcer on his right heel; this apparently is considerably improved now    PAST MEDICAL HISTORY:  Past Medical History:   Diagnosis Date    COPD (chronic obstructive pulmonary disease)     H/O asbestosis     Hearing loss     Hypercholesterolemia     Hypertension     Malignant melanoma of right lower leg 5/11/2017    Melanoma      Approx. 2 years ago     Skin cancer     Squamous cell carcinoma        PAST SURGICAL HISTORY:  Past Surgical History:   Procedure Laterality Date    ADENOIDECTOMY      APPENDECTOMY      Carotid Stenosis Surgery      CHOLECYSTECTOMY  1965    INGUINAL LYMPHADENECTOMY Right 2018    Procedure: LYMPHADENECTOMY, INGUINAL, Sartorious Flap;  Surgeon: Yair Alatorre MD;  Location: [facility] OR [address] [place] [address];  Service: General;  Laterality: Right;    TONSILLECTOMY         SOCIAL HISTORY:  Social History     Tobacco Use    Smoking status: Former Smoker     Packs/day: 1.00     Years: 15.00     Pack years: 15.00     Types: Cigarettes     Last attempt to quit:      Years since quittin.8    Smokeless tobacco: Never Used   Substance Use Topics    Alcohol use: No     Frequency: Never    Drug use: No        PERTINENT MEDICATIONS:  See medications list.  Current Outpatient Medications on File Prior to Visit   Medication Sig Dispense Refill    aspirin (ECOTRIN) 81 MG EC tablet Take 81 mg by mouth every morning.       cadexomer iodine (IODOSORB) 0.9 % gel Apply topically daily as needed for Wound Care. 10 g 3    finasteride (PROSCAR) 5 mg tablet Take 5 mg by mouth every morning.       furosemide (LASIX) 20 MG tablet HOLD UNTIL SEEN by primary care      HYDROcodone-acetaminophen (NORCO) 5-325 mg per tablet Take 1 tablet by mouth every 6 (six) hours as needed for Pain. 30 tablet 0    losartan-hydrochlorothiazide 50-12.5 mg (HYZAAR) 50-12.5 mg per tablet HOLD UNTIL SEEN by primary care doctor      metoprolol tartrate (LOPRESSOR) 50 MG tablet Takes 50 mg in morning and half tablet (25 mg) in evening      polyethylene glycol (GLYCOLAX) 17 gram PwPk Take 17 g by mouth as needed.      potassium chloride (MICRO-K) 8 mEq CpSR HOLD until seen by primary care doctor      simvastatin (ZOCOR) 10 MG tablet Take 10 mg by mouth every evening.      tamsulosin (FLOMAX) 0.4 mg Cp24 Take 0.4 mg by mouth every morning.         No current facility-administered medications on file prior to visit.      EXAM:  Constitutional  General appearance: well-developed, well-nourished, well-kempt older white male    Eyes  Inspection of conjunctivae and lids reveals no abnormalities; sclerae anicteric  Neurologic/Psychiatric  Alert,  normal orientation to time, place, person  Normal mood and affect with no evidence of depression, anxiety, agitation  Skin: see photo(s)  Head: background marked solar damage to exposed areas of skin  Neck: examination reveals marked chronic solar damage  Right upper extremity: examination reveals marked chronic solar damage  Left upper extremity: examination reveals marked chronic solar damage  Right lower extremity: see photos below  Close examination of the area, with reference to the previous photos, shows no residual evidence of squamous cell carcinoma clinically at the site of prior biopsy  Proximal to the previous biopsy site, there is an approximately 1.5 cm hyperkeratotic nodule of uncertain nature  Still has swelling to the lower extremity  Photo(s) this visit:         Photo from Dr. Bowman's visit in March          Photo(s) from biopsy visit in February:          ASSESSMENT:   status post biopsy, squamous cell carcinoma, right leg, now clinically clear 8 months post biopsy  Hyperkeratotic nodule, of uncertain nature, proximal to the site of previous biopsy  chronic solar damage to areas as noted above  personal history of melanoma skin cancer   chronic postoperative lymphedema    PLAN:  The diagnosis of the previously-biopsied squamous cell carcinoma and management options, and risks and benefits of the alternatives, including observation/non-treatment, radiation treatment, excision with vertical frozen section or paraffin-embedded section margin evaluation, and Mohs' Micrographic Surgery, Fresh Tissue Technique, were discussed at length with the patient. In particular, the discussion included, but was not  limited to, the following:    One alternative at this point would be to defer further treatment and observe the lesion(s). With small skin cancers of this kind, it is possible that a biopsy can be sufficient to definitively treat a small skin cancer of this kind. Alternatively, some skin cancers are slow growing and do not require immediate treatment. The potential advantage of this choice would be to avoid the need for possibly unnecessary additional surgery. Among the potential disadvantages of this would be the possibility of enlargement of the lesion, more extensive spread of the lesion or recurrence at a later date, which might necessitate a larger and more complex surgery.    Radiation treatment can be an effective treatment for this type of skin cancer. The usual course of treatment is every weekday for several weeks. Local irritation will result from treatment, although no systemic side effects are expected. The potential advantage of radiation treatment is that it avoids the need for surgery. Among the disadvantages of radiation treatment are the length of treatment, the local inflammatory response, the absence of pathologic confirmation of the removal of the skin cancer, a possible increased risk of additional skin cancer in the treated area in later years, and a somewhat increased risk of recurrence at a later date.     Excisional surgery can be an effective treatment for this type of skin cancer. This would involve excision of the lesion with margin evaluation by submitting the specimen to a pathologist for either immediate marginal assessment via frozen section processing, or delayed marginal assessment by fixed-tissue processing. The potential advantage of this technique is that it offers a way of treating the lesion with some degree of histologic confirmation of tumor removal. Among the disadvantages of this treatment are the possible need for re-excision if marginal involvement is identified, a  somewhat greater likelihood of recurrence as compared to Mohs' surgery because of the less comprehensive margin evaluation inherent in the technique, and the general potential risks of surgery, including allergic reactions to the anesthetic and other materials used, infection, injury to nerves in the area with consequent loss of sensation or muscle function, and scarring or distortion of surrounding structures.    Mohs' surgery is a very effective treatment for this type of skin cancer. The potential advantage of Mohs' surgery is that this technique offers the greatest possible certainty of knowing that the skin cancer has been completely removed, with the removal of the least amount of normal tissue. The potential disadvantages of Mohs' surgery include the duration of the surgery, the possible need for a separate surgery for reconstruction following tumor removal, and scarring as a result. In addition, general potential risks of surgery as noted above also apply to treatment via Mohs' surgery.    Sufficient time was available for questions, and all questions were answered to his satisfaction.     After discussing the clinical findings and the treatment alternatives, and the risks and benefits of the alternatives at length and in detail, and given the current clinical findings and absence of any evidence of residual tumor to the biopsy site at present, he and I have decided to postpone surgical treatment and to observe the site(s) for the present.     We also discussed the hyperkeratotic lesion, proximal to the site of previous biopsy.    He is to begin triamcinolone ointment to this lesion bid.    An appointment will be made for him to follow-up for re-evaluation in 3 weeks. He is to call to be seen sooner if any changes or signs of recurrence, which were reviewed, should arise in the meantime.    --------------------------------------  Note: Some or all of this note may have been generated using voice recognition  software. There may be voice recognition errors including grammatical and/or spelling errors found in the text. Attempts were made to correct these errors prior to signature.     =================    Note of 06/11  ALLERGIES:  Patient has no known allergies.    CHIEF COMPLAINT:  This 88 y.o. 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 Consultation requested by Tony Bowman MD    The patient is accompanied to this visit by his wife.    HISTORY OF PRESENT ILLNESS:   Location: right lower leg  Duration: unknown or more  Quality: currently better  Context: status post biopsy on 2/22/19 by Amber Anthony M.D; path = squamous cell carcinoma; pathology accession #[id number],  Pathology [facility]    Prior Treatment: none  Dr. Bowman saw him in consultation regarding this lesion on 03/25; she decided to defer treatment given the swelling of the right lower extremity which is a consequence of his previous wide local excision of a melanoma, 1.4 mm in thickness, and lymph node dissection, in 2017 on this extremity  He now also has a persistent ulcer on the right posterior heel  Has started Nivolumab monthly infusions    See also the handwritten notes/diagrams scanned to chart for additional details.    Defibrillator: No  Pacemaker: No  Artificial heart valves: No  Artificial joints: No    REVIEW OF SYSTEMS:   General: general health fair  Skin: previous skin cancer(s) Yes   If yes, details:Melanaoma right leg  Relevant other: Has a new growth/spot on the left arm; this apparently was noted on a recent PET scan  Prior melanoma on the right leg; status post excision with lymph node dissection; as noted above   Cardiovascular:   High Blood Pressure: Yes   Chest Pain:  No   Defibrillator: as above  Pacemaker: as above  Artificial heart valves: as above  Prior Endocarditis: No   Prior Heart Attack/MI: No     If yes,  when:   Prior Cardiac Bypass or Stents:  No   If yes, when:   Mitral Valve Prolapse: No   Relevant other:  No   Respiratory:   Shortness of breath:  No   Relevant other:  No   Endocrine:   Diabetes:  No   Relevant other:  No   Hem/Lymph:   Taking Prescribed Blood Thinners:  No   Easy Bleeding:  No   Relevant other:  No   Allergy/Immuno: as noted above  Relevant other:  No   GI:   Prior Hepatitis:  No     If yes, details:   Relevant other:  No   Musculoskeletal:   Artificial joints: as above  Relevant other:  No   Neurologic:   Prior Stroke:  No     If yes, details:   Relevant other:  No   Relevant other info:  No      PET scan (highlights added)  EXAMINATION:  NM PET CT WHOLE BODY    CLINICAL HISTORY:  melanoma staging; Malignant melanoma of right lower limb, including hip    TECHNIQUE:  11.1 mCi of F18-FDG was administered intravenously in the right wrist after initial attempt to inject at the right antecubital vein.  After an approximately 60 min distribution time, PET/CT images were acquired from the skull vertex through the feet. Transmission images were acquired to correct for attenuation using a whole body low-dose CT scan without contrast with the arms positioned along the side of the torso. Glycemia at the time of injection was 98 mg/dL.    COMPARISON:  MRI of the right tibia fibula 04/09/2019, MRI of the thoracic spine 02/22/2019, and FDG PET-CT 02/15/2019    FINDINGS:  Quality of the study: Degraded by partial infiltration of the radiopharmaceutical at the initial attempt at site of injection at the right antecubital fossa as well as diffuse soft tissue retention which may be related to cardiac and/or renal function.  This may reduce sensitivity for less metabolically active lesions.    The hypermetabolic focus along the right shin is no longer appreciated, and hypermetabolic focus in the T11 vertebral body is not observed.    In the extremities, there is a new hypermetabolic focus superficially in the  lateral aspect of the proximal left upper extremity with a maximum SUV of 1.7 without discrete correlate and which may not be included on CT.  There is new hypermetabolic thickening of the skin and subcutaneous tissues of the right heel in keeping with known cellulitis.  There are no pathologically enlarged or hypermetabolic lymph nodes.  There is redemonstration of non hypermetabolic right inguinal lymph nodes including index 1.2 cm rounded node on image 180.  Mild postsurgical uptake in the right inguinal lymphadenectomy bed has resolved.    Physiologic uptake of the tracer is present within the brain, salivary glands, myocardium, GI and  tracts.    Incidental CT findings: There is redemonstration of bilateral rounded atelectasis in the lower lobes.      Impression       1.  New hypermetabolic focus in the proximal left upper extremity.  Recommend physical examination.    2.  No other findings suggestive of malignancy.    3.  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    4.  Active cellulitis of the right heel.    5.  Other findings as above.         PAST MEDICAL HISTORY:  Past Medical History:   Diagnosis Date    COPD (chronic obstructive pulmonary disease)     H/O asbestosis     Hearing loss     Hypercholesterolemia     Hypertension     Malignant melanoma of right lower leg 5/11/2017    Melanoma     Approx. 2 years ago     Skin cancer     Squamous cell carcinoma        PAST SURGICAL HISTORY:  Past Surgical History:   Procedure Laterality Date    ADENOIDECTOMY      APPENDECTOMY      Carotid Stenosis Surgery      CHOLECYSTECTOMY  1965    LYMPHADENECTOMY, INGUINAL, Sartorious Flap Right 11/12/2018    Performed by Yair Alatorre MD at [facility] OR [address] [place] [address]    TONSILLECTOMY          SOCIAL HISTORY:  Dependencies: smoking status as noted below  Social History     Tobacco Use    Smoking status: Former Smoker     Packs/day: 1.00     Years: 15.00     Pack years: 15.00      Types: Cigarettes     Last attempt to quit: 1965     Years since quittin.4    Smokeless tobacco: Never Used   Substance Use Topics    Alcohol use: No     Frequency: Never    Drug use: No       PERTINENT MEDICATIONS:  See medications list.    Current Outpatient Medications:     aspirin (ECOTRIN) 81 MG EC tablet, Take 81 mg by mouth every morning. , Disp: , Rfl:     cadexomer iodine (IODOSORB) 0.9 % gel, Apply topically daily as needed for Wound Care., Disp: 10 g, Rfl: 3    ciprofloxacin HCl (CIPRO) 500 MG tablet, Take 0.5 tablets (250 mg total) by mouth 2 (two) times daily., Disp: 14 tablet, Rfl: 0    finasteride (PROSCAR) 5 mg tablet, Take 5 mg by mouth every morning. , Disp: , Rfl:     furosemide (LASIX) 20 MG tablet, Take 20 mg by mouth every other day. , Disp: , Rfl:     HYDROcodone-acetaminophen (NORCO) 5-325 mg per tablet, TK 1 T PO  Q 12 H PRN P, Disp: , Rfl: 0    losartan-hydrochlorothiazide 50-12.5 mg (HYZAAR) 50-12.5 mg per tablet, Take 1 tablet by mouth every morning. , Disp: , Rfl:     metoprolol tartrate (LOPRESSOR) 50 MG tablet, Takes 50 mg in morning and half tablet (25 mg) in evening, Disp: , Rfl:     polyethylene glycol (GLYCOLAX) 17 gram/dose powder, Take 17 g by mouth once daily., Disp: 3060 g, Rfl: 0    potassium chloride (MICRO-K) 8 mEq CpSR, Take 8 mEq by mouth every other day. , Disp: , Rfl:     senna-docusate 8.6-50 mg (PERICOLACE) 8.6-50 mg per tablet, Take 1 tablet by mouth 2 (two) times daily as needed for Constipation., Disp: , Rfl:     simvastatin (ZOCOR) 10 MG tablet, Take 10 mg by mouth every evening., Disp: , Rfl:     tamsulosin (FLOMAX) 0.4 mg Cp24, Take 0.4 mg by mouth every morning. , Disp: , Rfl:     ALLERGIES:  Patient has no known allergies.    EXAM:  See also the handwritten notes/diagrams scanned to chart for additional details.  Constitutional  General appearance: well-developed, well-nourished, well-kempt elderly white male    Eyes  Inspection of  conjunctivae and lids reveals no abnormalities; sclerae anicteric  Neurologic/Psychiatric  Alert,  normal orientation to time, place, person  Normal mood and affect with no evidence of depression, anxiety, agitation  Skin: see photo(s)  Head: background marked solar damage to exposed areas of skin  Neck: examination reveals marked chronic solar damage  Right upper extremity: examination reveals marked chronic solar damage  Left upper extremity: examination reveals marked chronic solar damage; on his left deltoid area there is an approximately 2 cm area of erythema and scaling/crusting, which is clinically suggestive of a 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  Right lower extremity:  There is a boot with dressing on his right distal lower extremity  There is edema of the right lower extremity below the knee; with some chronic stasis changes  Examination of the site of previous biopsy, which was confirmed by reference to the previous photographs, shows an approximately 1 cm pink, sclerotic plaque which is not indurated and shows no evidence of residual squamous cell carcinoma at this time    Photo(s) from biopsy visit:                  ASSESSMENT: status post biopsy, squamous cell carcinoma, right leg; now clinically clear   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, left arm deltoid area  chronic solar damage to areas as noted above  Personal history of melanoma, with metastatic lesions; on Nivolumab  personal history of non-melanoma skin cancer  Chronic postoperative edema of the right lower extremity  Chronic ulcer, right posterior lower extremity    PLAN:  The diagnosis of the right leg lesion and clinical findings and management options, and risks and benefits of the alternatives, including observation/non-treatment, radiation treatment, excision with vertical frozen section or paraffin-embedded section margin evaluation, and Mohs' Micrographic Surgery, Fresh Tissue Technique, were discussed at length with the  patient. In particular, the discussion included, but was not limited to, the following:    One alternative at this point would be to defer further treatment and observe the lesion(s). With small skin cancers of this kind, it is possible that a biopsy can be sufficient to definitively treat a small skin cancer of this kind. Alternatively, some skin cancers are slow growing and do not require immediate treatment. The potential advantage of this choice would be to avoid the need for possibly unnecessary additional surgery. Among the potential disadvantages of this would be the possibility of enlargement of the lesion, more extensive spread of the lesion or recurrence at a later date, which might necessitate a larger and more complex surgery.    Radiation treatment can be an effective treatment for this type of skin cancer. The usual course of treatment is every weekday for several weeks. Local irritation will result from treatment, although no systemic side effects are expected. The potential advantage of radiation treatment is that it avoids the need for surgery. Among the disadvantages of radiation treatment are the length of treatment, the local inflammatory response, the absence of pathologic confirmation of the removal of the skin cancer, a possible increased risk of additional skin cancer in the treated area in later years, and a somewhat increased risk of recurrence at a later date.     Excisional surgery can be an effective treatment for this type of skin cancer. This would involve excision of the lesion with margin evaluation by submitting the specimen to a pathologist for either immediate marginal assessment via frozen section processing, or delayed marginal assessment by fixed-tissue processing. The potential advantage of this technique is that it offers a way of treating the lesion with some degree of histologic confirmation of tumor removal. Among the disadvantages of this treatment are the possible need  for re-excision if marginal involvement is identified, a somewhat greater likelihood of recurrence as compared to Mohs' surgery because of the less comprehensive margin evaluation inherent in the technique, and the general potential risks of surgery, including allergic reactions to the anesthetic and other materials used, infection, injury to nerves in the area with consequent loss of sensation or muscle function, and scarring or distortion of surrounding structures.    Mohs' surgery is a very effective treatment for this type of skin cancer. The potential advantage of Mohs' surgery is that this technique offers the greatest possible certainty of knowing that the skin cancer has been completely removed, with the removal of the least amount of normal tissue. The potential disadvantages of Mohs' surgery include the duration of the surgery, the possible need for a separate surgery for reconstruction following tumor removal, and scarring as a result. In addition, general potential risks of surgery as noted above also apply to treatment via Mohs' surgery.    In light of the absence of any evidence of residual tumor to the site of the previous biopsy on his right leg, we will defer treatment pro tem.    We also discussed the lesion on the left arm.  In light of the uncertain nature of this lesion, a shave biopsy will be performed to establish a definitive diagnosis and guide further therapy.  See the procedure note below.    Sufficient time was available for questions, and all questions were answered to his satisfaction.     Discussion of the above issues constituted greater than 50% of the face-to-face encounter, the duration of which was 30 minutes.    An appointment will be made for him in two weeks. He is to call to be seen sooner if any changes or signs of recurrence, which were reviewed, should arise in the meantime.    --------------------------------------  Note: Some or all of this note may have been generated  using voice recognition software. There may be voice recognition errors including grammatical and/or spelling errors found in the text. Attempts were made to correct these errors prior to signature.     PROCEDURE NOTE: SHAVE BIOPSY    The diagnosis and management options and risk, benefits and alternatives were discussed with the patient. All questions were answered. Verbal consent was obtained.    Site: left arm  Indication: clinically suspicious lesion; rule out malignancy  Prep: Alcohol  Anesthesia: 2% lidocaine plain, less than 2 mL  Shave biopsy performed  Hemostasis with electrodesiccation  Dressed with petrolatum and bandaid  Specimen placed in formalin to be submitted to pathology    Routine care instructions given  Followup: 2 weeks to discuss biopsy results and coordinate any further treatment indicated  --------------------------------------  Note: Some or all of this note may have been generated using voice recognition software. There may be voice recognition errors including grammatical and/or spelling errors found in the text. Attempts were made to correct these errors prior to signature.

## 2019-11-20 ENCOUNTER — TELEPHONE (OUTPATIENT)
Dept: HEMATOLOGY/ONCOLOGY | Facility: CLINIC | Age: 84
End: 2019-11-20

## 2019-11-20 DIAGNOSIS — M25.571 ACUTE RIGHT ANKLE PAIN: ICD-10-CM

## 2019-11-20 DIAGNOSIS — G89.3 NEOPLASM RELATED PAIN: Primary | ICD-10-CM

## 2019-11-20 RX ORDER — HYDROCODONE BITARTRATE AND ACETAMINOPHEN 5; 325 MG/1; MG/1
1 TABLET ORAL EVERY 6 HOURS PRN
Qty: 90 TABLET | Refills: 0 | Status: SHIPPED | OUTPATIENT
Start: 2019-11-20 | End: 2020-08-14 | Stop reason: SDUPTHER

## 2019-11-20 NOTE — TELEPHONE ENCOUNTER
Message fwd to MD.       ----- Message from Jazz Adorno sent at 11/20/2019  9:35 AM CST -----  Contact: Jennifer Rodriguez returning call to Kelsie Marie to discuss PET results    Pt contact [phone]

## 2019-11-20 NOTE — TELEPHONE ENCOUNTER
----- Message from Myla Villatoro MD sent at 11/20/2019  9:44 AM CST -----  Contact: Jennifer  Called back    ----- Message -----  From: Kelsie Marie  Sent: 11/20/2019   9:37 AM CST  To: Myla Villatoro MD        ----- Message -----  From: Jazz Adorno  Sent: 11/20/2019   9:35 AM CST  To: [facility]ELDON Staff    Jennifer returning call to Kelsie Marie to discuss PET results    Pt contact [phone]

## 2019-11-27 NOTE — TELEPHONE ENCOUNTER
Patient given first available today with Dr. Ruiz    inflamed ascending colon, normal appendix with normal base, appendectomy performed, peritoneal fluid sent for cytology

## 2019-12-03 ENCOUNTER — OFFICE VISIT (OUTPATIENT)
Dept: DERMATOLOGY | Facility: CLINIC | Age: 84
End: 2019-12-03
Payer: MEDICARE

## 2019-12-03 DIAGNOSIS — C44.722 SQUAMOUS CELL CARCINOMA, LEG, RIGHT: Primary | ICD-10-CM

## 2019-12-03 LAB
FINAL PATHOLOGIC DIAGNOSIS: NORMAL
GROSS: NORMAL
MICROSCOPIC EXAM: NORMAL

## 2019-12-03 PROCEDURE — 1126F AMNT PAIN NOTED NONE PRSNT: CPT | Mod: S$GLB,,, | Performed by: DERMATOLOGY

## 2019-12-03 PROCEDURE — 1159F PR MEDICATION LIST DOCUMENTED IN MEDICAL RECORD: ICD-10-PCS | Mod: S$GLB,,, | Performed by: DERMATOLOGY

## 2019-12-03 PROCEDURE — 1101F PR PT FALLS ASSESS DOC 0-1 FALLS W/OUT INJ PAST YR: ICD-10-PCS | Mod: CPTII,S$GLB,, | Performed by: DERMATOLOGY

## 2019-12-03 PROCEDURE — 99212 OFFICE O/P EST SF 10 MIN: CPT | Mod: S$GLB,,, | Performed by: DERMATOLOGY

## 2019-12-03 PROCEDURE — 99999 PR PBB SHADOW E&M-EST. PATIENT-LVL II: CPT | Mod: PBBFAC,,, | Performed by: DERMATOLOGY

## 2019-12-03 PROCEDURE — 1159F MED LIST DOCD IN RCRD: CPT | Mod: S$GLB,,, | Performed by: DERMATOLOGY

## 2019-12-03 PROCEDURE — 99999 PR PBB SHADOW E&M-EST. PATIENT-LVL II: ICD-10-PCS | Mod: PBBFAC,,, | Performed by: DERMATOLOGY

## 2019-12-03 PROCEDURE — 1101F PT FALLS ASSESS-DOCD LE1/YR: CPT | Mod: CPTII,S$GLB,, | Performed by: DERMATOLOGY

## 2019-12-03 PROCEDURE — 99212 PR OFFICE/OUTPT VISIT, EST, LEVL II, 10-19 MIN: ICD-10-PCS | Mod: S$GLB,,, | Performed by: DERMATOLOGY

## 2019-12-03 PROCEDURE — 1126F PR PAIN SEVERITY QUANTIFIED, NO PAIN PRESENT: ICD-10-PCS | Mod: S$GLB,,, | Performed by: DERMATOLOGY

## 2019-12-03 NOTE — PROGRESS NOTES
ALLERGIES:  Patient has no known allergies.    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  JOSE CHANDLER M.D. 12/3/2019 10:10    EXAM:   Skin:  There is an approximately 1.5 cm ulceration at the site of recent biopsy on his right leg  There is, at the moment, no evidence of residual squamous cell carcinoma clinically    ASSESSMENT:   status post biopsy, squamous cell carcinoma, right leg; clinically with no evidence of residual tumor at present    PLAN:  The diagnosis/diagnoses and management options, and risks and benefits of the alternatives were discussed with the patient.  Given the current clinical findings, we will defer further treatment for the time being.  Follow-up 3 weeks; call as needed sooner if any changes arise to the area.  --------------------------------------  Note: Some or all of this note may have been generated using voice recognition software. There may be voice recognition errors including grammatical and/or spelling errors found in the text. Attempts were made to correct these errors prior to signature.

## 2019-12-04 ENCOUNTER — HOSPITAL ENCOUNTER (OUTPATIENT)
Dept: RADIOLOGY | Facility: HOSPITAL | Age: 84
Discharge: HOME | End: 2019-12-04
Attending: ORTHOPAEDIC SURGERY
Payer: MEDICARE

## 2019-12-04 ENCOUNTER — OFFICE VISIT (OUTPATIENT)
Dept: ORTHOPEDICS | Facility: CLINIC | Age: 84
End: 2019-12-04
Payer: MEDICARE

## 2019-12-04 VITALS — BODY MASS INDEX: 23.57 KG/M2 | HEIGHT: 72 IN | WEIGHT: 174.06 LBS

## 2019-12-04 DIAGNOSIS — M48.061 SPINAL STENOSIS OF LUMBAR REGION, UNSPECIFIED WHETHER NEUROGENIC CLAUDICATION PRESENT: ICD-10-CM

## 2019-12-04 DIAGNOSIS — M48.062 SPINAL STENOSIS OF LUMBAR REGION WITH NEUROGENIC CLAUDICATION: Primary | ICD-10-CM

## 2019-12-04 PROCEDURE — 99999 PR PBB SHADOW E&M-EST. PATIENT-LVL III: CPT | Mod: PBBFAC,,, | Performed by: ORTHOPAEDIC SURGERY

## 2019-12-04 PROCEDURE — 1126F PR PAIN SEVERITY QUANTIFIED, NO PAIN PRESENT: ICD-10-PCS | Mod: S$GLB,,, | Performed by: ORTHOPAEDIC SURGERY

## 2019-12-04 PROCEDURE — 99213 PR OFFICE/OUTPT VISIT, EST, LEVL III, 20-29 MIN: ICD-10-PCS | Mod: S$GLB,,, | Performed by: ORTHOPAEDIC SURGERY

## 2019-12-04 PROCEDURE — 72120 X-RAY BEND ONLY L-S SPINE: CPT | Mod: 26,,, | Performed by: RADIOLOGY

## 2019-12-04 PROCEDURE — 72120 X-RAY BEND ONLY L-S SPINE: CPT | Mod: TC

## 2019-12-04 PROCEDURE — 99213 OFFICE O/P EST LOW 20 MIN: CPT | Mod: S$GLB,,, | Performed by: ORTHOPAEDIC SURGERY

## 2019-12-04 PROCEDURE — 99999 PR PBB SHADOW E&M-EST. PATIENT-LVL III: ICD-10-PCS | Mod: PBBFAC,,, | Performed by: ORTHOPAEDIC SURGERY

## 2019-12-04 PROCEDURE — 72100 XR LUMBAR SPINE AP AND LAT WITH FLEX/EXT: ICD-10-PCS | Mod: 26,,, | Performed by: RADIOLOGY

## 2019-12-04 PROCEDURE — 72120 XR LUMBAR SPINE AP AND LAT WITH FLEX/EXT: ICD-10-PCS | Mod: 26,,, | Performed by: RADIOLOGY

## 2019-12-04 PROCEDURE — 1159F PR MEDICATION LIST DOCUMENTED IN MEDICAL RECORD: ICD-10-PCS | Mod: S$GLB,,, | Performed by: ORTHOPAEDIC SURGERY

## 2019-12-04 PROCEDURE — 1101F PT FALLS ASSESS-DOCD LE1/YR: CPT | Mod: CPTII,S$GLB,, | Performed by: ORTHOPAEDIC SURGERY

## 2019-12-04 PROCEDURE — 1101F PR PT FALLS ASSESS DOC 0-1 FALLS W/OUT INJ PAST YR: ICD-10-PCS | Mod: CPTII,S$GLB,, | Performed by: ORTHOPAEDIC SURGERY

## 2019-12-04 PROCEDURE — 1159F MED LIST DOCD IN RCRD: CPT | Mod: S$GLB,,, | Performed by: ORTHOPAEDIC SURGERY

## 2019-12-04 PROCEDURE — 72100 X-RAY EXAM L-S SPINE 2/3 VWS: CPT | Mod: 26,,, | Performed by: RADIOLOGY

## 2019-12-04 PROCEDURE — 1126F AMNT PAIN NOTED NONE PRSNT: CPT | Mod: S$GLB,,, | Performed by: ORTHOPAEDIC SURGERY

## 2019-12-09 NOTE — PROGRESS NOTES
The patient returns for follow-up.  He is an 89-year-old male with known lumbar spinal stenosis.  He has 0/10 pain in his legs when standing still but 5/10 pain when walking.  This is resulted and a gradual decrease in activity, although he notes that he is getting older as well and this may be related.    At this point he just wants to be able to golf.    On physical examination there are no focal motor sensory deficits and he has normal bilateral dorsalis pedis pulses 2+.    Lumbar spine x-rays demonstrate no evidence of instability or significant disc collapse.  I did review recent MRI of his lumbar spine demonstrates severe L2-3 and L3-4 stenosis.    Impression:  Lumbar spinal stenosis    Plan:  I think it is totally reasonable for him to play golf as tolerated.  If he wants an epidural in the future we can get that set up L2-3 and L3-4 levels.    I spent 15 minutes with the patient of which greater than 1/2 the time was devoted to counciling the patient regarding treatment options.

## 2019-12-11 ENCOUNTER — TELEPHONE (OUTPATIENT)
Dept: ADMINISTRATIVE | Facility: OTHER | Age: 84
End: 2019-12-11

## 2019-12-11 NOTE — TELEPHONE ENCOUNTER
LM to contact our office to discuss [facility]

## 2019-12-23 ENCOUNTER — OFFICE VISIT (OUTPATIENT)
Dept: DERMATOLOGY | Facility: CLINIC | Age: 84
End: 2019-12-23
Payer: MEDICARE

## 2019-12-23 DIAGNOSIS — D04.71 SQUAMOUS CELL CARCINOMA IN SITU OF SKIN OF LOWER LEG, RIGHT: Primary | ICD-10-CM

## 2019-12-23 PROCEDURE — 1101F PR PT FALLS ASSESS DOC 0-1 FALLS W/OUT INJ PAST YR: ICD-10-PCS | Mod: CPTII,S$GLB,, | Performed by: DERMATOLOGY

## 2019-12-23 PROCEDURE — 1101F PT FALLS ASSESS-DOCD LE1/YR: CPT | Mod: CPTII,S$GLB,, | Performed by: DERMATOLOGY

## 2019-12-23 PROCEDURE — 1159F MED LIST DOCD IN RCRD: CPT | Mod: S$GLB,,, | Performed by: DERMATOLOGY

## 2019-12-23 PROCEDURE — 1159F PR MEDICATION LIST DOCUMENTED IN MEDICAL RECORD: ICD-10-PCS | Mod: S$GLB,,, | Performed by: DERMATOLOGY

## 2019-12-23 PROCEDURE — 99999 PR PBB SHADOW E&M-EST. PATIENT-LVL II: ICD-10-PCS | Mod: PBBFAC,,, | Performed by: DERMATOLOGY

## 2019-12-23 PROCEDURE — 99212 OFFICE O/P EST SF 10 MIN: CPT | Mod: S$GLB,,, | Performed by: DERMATOLOGY

## 2019-12-23 PROCEDURE — 99999 PR PBB SHADOW E&M-EST. PATIENT-LVL II: CPT | Mod: PBBFAC,,, | Performed by: DERMATOLOGY

## 2019-12-23 PROCEDURE — 1126F AMNT PAIN NOTED NONE PRSNT: CPT | Mod: S$GLB,,, | Performed by: DERMATOLOGY

## 2019-12-23 PROCEDURE — 99212 PR OFFICE/OUTPT VISIT, EST, LEVL II, 10-19 MIN: ICD-10-PCS | Mod: S$GLB,,, | Performed by: DERMATOLOGY

## 2019-12-23 PROCEDURE — 1126F PR PAIN SEVERITY QUANTIFIED, NO PAIN PRESENT: ICD-10-PCS | Mod: S$GLB,,, | Performed by: DERMATOLOGY

## 2019-12-23 NOTE — PROGRESS NOTES
ALLERGIES:  Patient has no known allergies.    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  JOSE CHANDLRE M.D. 12/3/2019 10:10    EXAM:   Skin:  The site of the recent biopsy shows an approximately 1.5 cm almost completely healed plaque, with no evidence of recurrent squamous cell carcinoma on examination today  He has multiple superficial erosions, consistent with injury from tape, to the surrounding skin  There is some swelling of the right lower extremity, and some stasis changes of the surrounding skin, consistent with postoperative changes from lymphadenectomy in his right inguinal area from the previously-treated melanoma on the right lower extremity    ASSESSMENT:   status post biopsy, squamous cell carcinoma, right leg; clinically with no evidence of residual tumor at present    PLAN:  The diagnosis/diagnoses and management options, and risks and benefits of the alternatives were discussed with the patient.  Given the current clinical findings, we will defer further treatment for the time being.  The area was dressed with petrolatum, Telfa and wrapped with a gauze  roll  Follow-up 4 weeks; call as needed sooner if any changes arise to the area.  --------------------------------------  Note: Some or all of this note may have been generated using voice recognition software. There may be voice recognition errors including grammatical and/or spelling errors found in the text. Attempts were made to correct these errors prior to signature.      ============================================================  PREVIOUS NOTE(S):  Note of 12/03  ALLERGIES:  Patient has no known allergies.    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  JOSE CHANDLER M.D. 12/3/2019 10:10    EXAM:   Skin:  There is an approximately 1.5 cm ulceration at the site of recent biopsy on his right leg  There is, at the moment, no evidence of residual squamous cell carcinoma clinically    ASSESSMENT:   status post biopsy, squamous cell carcinoma, right leg; clinically with no evidence of residual tumor at present    PLAN:  The diagnosis/diagnoses and management options, and risks and benefits of the alternatives were discussed with the patient.  Given the current clinical findings, we will defer further treatment for the time being.  Follow-up 3 weeks; call as needed  sooner if any changes arise to the area.  --------------------------------------  Note: Some or all of this note may have been generated using voice recognition software. There may be voice recognition errors including grammatical and/or spelling errors found in the text. Attempts were made to correct these errors prior to signature.

## 2020-01-13 ENCOUNTER — OFFICE VISIT (OUTPATIENT)
Dept: HEMATOLOGY/ONCOLOGY | Facility: CLINIC | Age: 85
End: 2020-01-13
Payer: MEDICARE

## 2020-01-13 VITALS
TEMPERATURE: 98 F | DIASTOLIC BLOOD PRESSURE: 77 MMHG | RESPIRATION RATE: 16 BRPM | OXYGEN SATURATION: 97 % | HEIGHT: 72 IN | HEART RATE: 51 BPM | WEIGHT: 175.25 LBS | BODY MASS INDEX: 23.74 KG/M2 | SYSTOLIC BLOOD PRESSURE: 179 MMHG

## 2020-01-13 DIAGNOSIS — G89.29 CHRONIC LOW BACK PAIN, UNSPECIFIED BACK PAIN LATERALITY, UNSPECIFIED WHETHER SCIATICA PRESENT: ICD-10-CM

## 2020-01-13 DIAGNOSIS — M54.50 CHRONIC LOW BACK PAIN, UNSPECIFIED BACK PAIN LATERALITY, UNSPECIFIED WHETHER SCIATICA PRESENT: ICD-10-CM

## 2020-01-13 DIAGNOSIS — L89.614 PRESSURE INJURY OF RIGHT HEEL, STAGE 4: ICD-10-CM

## 2020-01-13 DIAGNOSIS — C43.71 MALIGNANT MELANOMA OF RIGHT LOWER LEG: Primary | ICD-10-CM

## 2020-01-13 PROBLEM — M54.9 CHRONIC BACK PAIN: Status: ACTIVE | Noted: 2020-01-13

## 2020-01-13 PROCEDURE — 99214 PR OFFICE/OUTPT VISIT, EST, LEVL IV, 30-39 MIN: ICD-10-PCS | Mod: S$GLB,,, | Performed by: INTERNAL MEDICINE

## 2020-01-13 PROCEDURE — 99999 PR PBB SHADOW E&M-EST. PATIENT-LVL IV: CPT | Mod: PBBFAC,,, | Performed by: INTERNAL MEDICINE

## 2020-01-13 PROCEDURE — 1101F PT FALLS ASSESS-DOCD LE1/YR: CPT | Mod: CPTII,S$GLB,, | Performed by: INTERNAL MEDICINE

## 2020-01-13 PROCEDURE — 1101F PR PT FALLS ASSESS DOC 0-1 FALLS W/OUT INJ PAST YR: ICD-10-PCS | Mod: CPTII,S$GLB,, | Performed by: INTERNAL MEDICINE

## 2020-01-13 PROCEDURE — 99214 OFFICE O/P EST MOD 30 MIN: CPT | Mod: S$GLB,,, | Performed by: INTERNAL MEDICINE

## 2020-01-13 PROCEDURE — 99999 PR PBB SHADOW E&M-EST. PATIENT-LVL IV: ICD-10-PCS | Mod: PBBFAC,,, | Performed by: INTERNAL MEDICINE

## 2020-01-13 PROCEDURE — 1159F PR MEDICATION LIST DOCUMENTED IN MEDICAL RECORD: ICD-10-PCS | Mod: S$GLB,,, | Performed by: INTERNAL MEDICINE

## 2020-01-13 PROCEDURE — 1126F AMNT PAIN NOTED NONE PRSNT: CPT | Mod: S$GLB,,, | Performed by: INTERNAL MEDICINE

## 2020-01-13 PROCEDURE — 1159F MED LIST DOCD IN RCRD: CPT | Mod: S$GLB,,, | Performed by: INTERNAL MEDICINE

## 2020-01-13 PROCEDURE — 1126F PR PAIN SEVERITY QUANTIFIED, NO PAIN PRESENT: ICD-10-PCS | Mod: S$GLB,,, | Performed by: INTERNAL MEDICINE

## 2020-01-13 NOTE — PROGRESS NOTES
Subjective:       Patient ID: Shaq Bragg Jr. is a 89 y.o. male.    Chief Complaint: No chief complaint on file.    HPI      Presents with his wife  Reports weight stable  Denies pain issues other than from his chronic arthritis- declined surgery, will now consider injection he discussed with Dr. Ruiz  States right foot wound almost resolved     Last PET stable    Diagnosis:   1. Malignant melanoma of right lower leg    2. Lymphedema of right lower extremity    3. Pressure injury of right heel, stage 4    4. Other specified disorders involving the immune mechanism, not elsewhere classified           Chief Complaint: Malignant melanoma of right lower leg    Oncologic History:       Oncologic History 3/21/17 Skin Excisional biopsy right leg  4/18/17 MOS with SLN biopsy  1/04/18 PET/CT - no recurrent or metastatic disease  10/30/19 PET/CT - 2 hypermetabolic LN's in right groin  11/03/18 MRI brain  11/12/18 Completion Lymphadenectomy  2/15/19 PET/CT     Oncologic Treatment 3/21/17 Skin Excisional biopsy  4/18/17 MOS with SLN biopsy  11/12/18 Completion Lymphadenectomy  3/11/19 Nivolumab    Pathology 3/21/17 - 1.4mm malignant melanoma Alexis level IV, no ulceration, perineural invasion seen, mitotic rate 6/mm2  4/18/17 -  no residual melanoma; microscopic mets in 2 sentinel LNs  11/12/18 - ¾ LNs positive for metastatic melanoma with the largest deposit being 2.4cm an positive extranodal extension      Oncologic History:  Pt initially presented with a lesion on his right proximal calf in March 2017.  He underwent a biopsy on 3/21/17 with pathology showing a 1.4mm malignant melanoma Alexis level IV, no ulceration, perineural invasion seen, mitotic rate 6/mm2.  The patient then underwent  a MOS procedure on 4/18/17 and right inguinal LN biopsy on 4/18/17 under the care of Dr Joelle Pardo at [facility].  The pathology from the procedure initially showed no residual melanoma and no LN involvement ; however, on  reevaluation showed microscopic mets in 2 sentinel LNs.  The patient established care with Medical Oncologist Dr Olivier Mchugh.  The surgical oncologist Dr. Alatorre offered Lymphadenectomy on 5/24/17; however, the patient elected to have observation.  PET/CT was performed on 1/04/18 showing no evidence of residual or recurrent disease.  On 10/16/18 the patient called Dr Holland office with complaint of 2 lumps in the right leg.  PET/CT was performed on 10/30/19 showing 2 new hypermetabolic lymph nodes right groin with the index lymph node lateral SUV max 30.02.  The patient had an appointment with Dr Alatorre on 10/30/19 at which point it was decided to proceed with a completion lymphadenectomy.   An MRI of the brain was performed on 11/03/18 showing no evidence for intracranial metastatic disease but did show a 4mm T1 hyperintense occipital calvarial focus which was read as likely benign.  The patient underwent completion LAD on 11/12/18 with pathology showing ¾ LNs positive for metastatic melanoma with the largest deposit being 2.4cm an positive extranodal extension. Currently the patient complains of chronic lower back pain for which he underwent an MRI of the lumbar spine on 11/03/18 which showed severe central canal stenosis related to a disk bulge at L3-L4.  The patient endorses some LE weakness on occasion.  He denies bowel or bladder incontinence.  The patient states he sees a deramtologist semi annually Dr Sun at [facility] with last clinic visit reportedly several months ago.  The patient underwent a PET/CT on 2/15/19 showing a focus of increased tracer uptake within the T 11 vertebral body with max SUV of 4.1 that was not visualized on prior exam, increased radiotracer uptake in the right groin max SUV 5.3 and a subcutaneous focus of increased tracer uptake within the anterior portion of the distal right leg showing a max SUV of 3.7.   The patient was seen by dermatology on 2/22/19 and  underwent a shave biopsy of the right lower leg lesion showing SCC.  On 3/12/19 the patient contacted our office stating he had a large amount of BRBPR.  The patient was seen in the ER on 3/12/19 with relatively stable hemoglobin and was deferred for outpatient GI evaluation.  On 3/25/19 Dr. Bowman in derm decided to postpone a Mohs procedure for SCC of the right lower leg until the swelling in the right leg had decreased.  The patient was seen on 3/27/19 by wound care for an ulcer on his right heel at which point recommendations were made for bandaging of the right heel.  The patient saw GI on 3/27/19 at which point the patient decided not to pursue a colonoscopy.   The patient missed his scheduled treatment with Nivolumab on 4/08/19 after being admitted to the hospital from 4/08/19-4/11/19 for a stage IV ulcer of the right heel with surrounding cellulitis growing MSSA and Pseudomonas on biopsy treated with 14 day course of antibiotics with PO Cefadroxil and ciprofloxacin completed.  Wound now mostly healed.     Past Medical History:           Past Medical History:   Diagnosis Date    COPD (chronic obstructive pulmonary disease)      H/O asbestosis      Hearing loss      Hypercholesterolemia      Hypertension      Malignant melanoma of right lower leg 5/11/2017    Melanoma       Approx. 2 years ago     Skin cancer      Squamous cell carcinoma           Past Surgical HIstory:             Past Surgical History:   Procedure Laterality Date    ADENOIDECTOMY        APPENDECTOMY        Carotid Stenosis Surgery        CHOLECYSTECTOMY   1965    LYMPHADENECTOMY, INGUINAL, Sart[place] Flap Right 11/12/2018     Performed by Yair Al[facility]re [facility] at [facility]    TONSILLECTOMY             Family History:             Family History   Problem Relation Age of Onset    Coronary artery disease Mother      Cancer Father           Esophageal    Esophageal cancer Father      Diabetes Sister      No Known  Problems Maternal Aunt      No Known Problems Maternal Uncle      No Known Problems Paternal Aunt      No Known Problems Paternal Uncle      No Known Problems Maternal Grandmother      No Known Problems Maternal Grandfather      No Known Problems Paternal Grandmother      No Known Problems Paternal Grandfather      Melanoma Neg Hx      Psoriasis Neg Hx      Lupus Neg Hx           Social History:  reports that he quit smoking about 54 years ago. His smoking use included cigarettes. He has a 15.00 pack-year smoking history. He has never used smokeless tobacco. He reports that he does not drink alcohol or use drugs.    Review of Systems   Constitutional: Positive for activity change and fatigue. Negative for chills, diaphoresis, fever and unexpected weight change.   Respiratory: Negative for cough and shortness of breath.    Cardiovascular: Positive for leg swelling (right leg ). Negative for chest pain and palpitations.   Gastrointestinal: Negative for abdominal pain, constipation, diarrhea, nausea and vomiting.   Musculoskeletal: Positive for arthralgias and joint swelling. Negative for back pain.   Skin: Positive for wound (better). Negative for color change and rash.   Neurological: Negative for headaches.   Hematological: Negative for adenopathy. Does not bruise/bleed easily.       Objective:      Physical Exam   Constitutional: He is oriented to person, place, and time. He appears well-developed and well-nourished. No distress.   HENT:   Head: Normocephalic and atraumatic.   Mouth/Throat: No oropharyngeal exudate.   Eyes: Pupils are equal, round, and reactive to light. Conjunctivae and EOM are normal. No scleral icterus.   Cardiovascular: Normal rate, regular rhythm, normal heart sounds and intact distal pulses. Exam reveals no gallop and no friction rub.   No murmur heard.  Pulmonary/Chest: Effort normal and breath sounds normal. No respiratory distress. He has no wheezes. He has no rales. He exhibits  no tenderness.   Abdominal: Soft. Bowel sounds are normal. He exhibits no distension and no mass. There is no tenderness. There is no rebound.   Musculoskeletal: Normal range of motion. He exhibits edema. He exhibits no tenderness or deformity.   Lymphadenopathy:        Head (right side): No submental adenopathy present.        Head (left side): No submental adenopathy present.     He has no cervical adenopathy.     He has no axillary adenopathy. No inguinal adenopathy noted on the right or left side.        Right: No supraclavicular adenopathy present.        Left: No supraclavicular adenopathy present.   Neurological: He is alert and oriented to person, place, and time.   Skin: Skin is warm and dry. No rash noted. He is not diaphoretic. No erythema. No pallor.   Right leg wound scabbed   Nursing note and vitals reviewed.   Labs- reviewed   Assessment:       1. Malignant melanoma of right lower leg    2. Pressure injury of right heel, stage 4    3. Chronic low back pain, unspecified back pain laterality, unspecified whether sciatica present        Plan:       1. Repeat PET scan at next visit  Clinically doing wekk  2. Mostly healed  3. Dr. Ruiz recommended pain clinic referral - order placed- he desires to do on separate day from above to minimize time impacting

## 2020-01-27 ENCOUNTER — OFFICE VISIT (OUTPATIENT)
Dept: PAIN MEDICINE | Facility: CLINIC | Age: 85
End: 2020-01-27
Payer: MEDICARE

## 2020-01-27 VITALS
WEIGHT: 175 LBS | SYSTOLIC BLOOD PRESSURE: 114 MMHG | HEART RATE: 54 BPM | BODY MASS INDEX: 23.19 KG/M2 | DIASTOLIC BLOOD PRESSURE: 51 MMHG | HEIGHT: 73 IN

## 2020-01-27 DIAGNOSIS — M47.896 OTHER SPONDYLOSIS, LUMBAR REGION: ICD-10-CM

## 2020-01-27 DIAGNOSIS — M48.062 SPINAL STENOSIS OF LUMBAR REGION WITH NEUROGENIC CLAUDICATION: Primary | ICD-10-CM

## 2020-01-27 PROCEDURE — 1101F PT FALLS ASSESS-DOCD LE1/YR: CPT | Mod: CPTII,S$GLB,, | Performed by: ANESTHESIOLOGY

## 2020-01-27 PROCEDURE — 99999 PR PBB SHADOW E&M-EST. PATIENT-LVL III: CPT | Mod: PBBFAC,,, | Performed by: ANESTHESIOLOGY

## 2020-01-27 PROCEDURE — 99204 PR OFFICE/OUTPT VISIT, NEW, LEVL IV, 45-59 MIN: ICD-10-PCS | Mod: S$GLB,,, | Performed by: ANESTHESIOLOGY

## 2020-01-27 PROCEDURE — 1125F PR PAIN SEVERITY QUANTIFIED, PAIN PRESENT: ICD-10-PCS | Mod: S$GLB,,, | Performed by: ANESTHESIOLOGY

## 2020-01-27 PROCEDURE — 1125F AMNT PAIN NOTED PAIN PRSNT: CPT | Mod: S$GLB,,, | Performed by: ANESTHESIOLOGY

## 2020-01-27 PROCEDURE — 1159F MED LIST DOCD IN RCRD: CPT | Mod: S$GLB,,, | Performed by: ANESTHESIOLOGY

## 2020-01-27 PROCEDURE — 99999 PR PBB SHADOW E&M-EST. PATIENT-LVL III: ICD-10-PCS | Mod: PBBFAC,,, | Performed by: ANESTHESIOLOGY

## 2020-01-27 PROCEDURE — 99204 OFFICE O/P NEW MOD 45 MIN: CPT | Mod: S$GLB,,, | Performed by: ANESTHESIOLOGY

## 2020-01-27 PROCEDURE — 1101F PR PT FALLS ASSESS DOC 0-1 FALLS W/OUT INJ PAST YR: ICD-10-PCS | Mod: CPTII,S$GLB,, | Performed by: ANESTHESIOLOGY

## 2020-01-27 PROCEDURE — 1159F PR MEDICATION LIST DOCUMENTED IN MEDICAL RECORD: ICD-10-PCS | Mod: S$GLB,,, | Performed by: ANESTHESIOLOGY

## 2020-01-27 NOTE — H&P (VIEW-ONLY)
This note was completed with dictation software and grammatical errors may exist.    Referring Physician: Myla Villatoro MD    PCP: Ata Jeter MD      CC:  Low back and leg pain    HPI:   Shaq Bragg Jr. is a 89 y.o. male referred to us for low back and leg pain. Pain has been present for 3 years.  No recent traumatic incident.  His constant aching, throbbing pain in his lower back.  Pain radiates to his posterior calf is an thigh is.  Pain worsens standing, walking and bending.  Pain improves with rest and lying down.  MRI performed shows severe lumbar stenosis at L2-3 and L3-4.  He has been seen by Spine surgery in referred to us for interventional procedures.  He denies any worsening weakness.  No bowel bladder changes.    ROS:  CONSTITUTIONAL: No fevers, chills, night sweats, wt. loss, appetite changes  SKIN: no rashes or itching  ENT: No headaches, head trauma, vision changes, or eye pain  LYMPH NODES: None noticed   CV: No chest pain, palpitations.   RESP: No shortness of breath, dyspnea on exertion, cough, wheezing, or hemoptysis  GI: No nausea, emesis, diarrhea, constipation, melena, hematochezia, pain.    : No dysuria, hematuria, urgency, or frequency   HEME: No easy bruising, bleeding problems  PSYCHIATRIC: No depression, anxiety, psychosis, hallucinations.  NEURO: No seizures, memory loss, dizziness or difficulty sleeping  MSK:  Positive HPI      Past Medical History:   Diagnosis Date    COPD (chronic obstructive pulmonary disease)     H/O asbestosis     Hearing loss     Hypercholesterolemia     Hypertension     Malignant melanoma of right lower leg 5/11/2017    Melanoma     Approx. 2 years ago     Skin cancer     Squamous cell carcinoma      Past Surgical History:   Procedure Laterality Date    ADENOIDECTOMY      APPENDECTOMY      Carotid Stenosis Surgery      CHOLECYSTECTOMY  1965    INGUINAL LYMPHADENECTOMY Right 11/12/2018    Procedure: LYMPHADENECTOMY, INGUINAL,  Sartorious Flap;  Surgeon: Yair Alatorre MD;  Location: [facility] OR [address] [place] [address];  Service: General;  Laterality: Right;    TONSILLECTOMY       Family History   Problem Relation Age of Onset    Coronary artery disease Mother     Cancer Father         Esophageal    Esophageal cancer Father     Diabetes Sister     No Known Problems Maternal Aunt     No Known Problems Maternal Uncle     No Known Problems Paternal Aunt     No Known Problems Paternal Uncle     No Known Problems Maternal Grandmother     No Known Problems Maternal Grandfather     No Known Problems Paternal Grandmother     No Known Problems Paternal Grandfather     Melanoma Neg Hx     Psoriasis Neg Hx     Lupus Neg Hx      Social History     Socioeconomic History    Marital status:      Spouse name: Not on file    Number of children: Not on file    Years of education: Not on file    Highest education level: Not on file   Occupational History    Not on file   Social Needs    Financial resource strain: Not on file    Food insecurity:     Worry: Not on file     Inability: Not on file    Transportation needs:     Medical: Not on file     Non-medical: Not on file   Tobacco Use    Smoking status: Former Smoker     Packs/day: 1.00     Years: 15.00     Pack years: 15.00     Types: Cigarettes     Last attempt to quit: 1965     Years since quittin.1    Smokeless tobacco: Never Used   Substance and Sexual Activity    Alcohol use: No     Frequency: Never    Drug use: No    Sexual activity: Never   Lifestyle    Physical activity:     Days per week: Not on file     Minutes per session: Not on file    Stress: Not on file   Relationships    Social connections:     Talks on phone: Not on file     Gets together: Not on file     Attends [employer] service: Not on file     Active member of club or organization: Not on file     Attends meetings of clubs or organizations: Not on file     Relationship status: Not on file   Other Topics  "Concern    Not on file   Social History Narrative    Not on file         Medications/Allergies: See med card    Vitals:    01/27/20 1339   BP: (!) 114/51   Pulse: (!) 54   Weight: 79.4 kg (175 lb)   Height: 6' 1" (1.854 m)   PainSc:   5   PainLoc: Back         Physical exam:    GENERAL: A and O x3, the patient appears well groomed and is in no acute distress.  Skin: No rashes or obvious lesions  HEENT: normocephalic, atraumatic  CARDIOVASCULAR:  Palpable peripheral pulses  LUNGS: easy work of breathing  ABDOMEN: soft, nontender   UPPER EXTREMITIES: Normal alignment, normal range of motion, no atrophy, no skin changes,  hair growth and nail growth normal and equal bilaterally. No swelling, no tenderness.    LOWER EXTREMITIES:  Normal alignment, normal range of motion, no atrophy, no skin changes,  hair growth and nail growth normal and equal bilaterally. No swelling, no tenderness.  LUMBAR SPINE  Lumbar spine: ROM is limited with flexion extension and oblique extension with moderate increased pain.    Michael's test causes no increased pain on either side.    Supine straight leg raise is negative bilaterally.    Internal and external rotation of the hip causes no increased pain on either side.  Myofascial exam: No tenderness to palpation across lumbar paraspinous muscles.      MENTAL STATUS: normal orientation, speech, language, and fund of knowledge for social situation.  Emotional state appropriate.    CRANIAL NERVES:  II:  PERRL bilaterally,   III,IV,VI: EOMI.    V:  Facial sensation equal bilaterally  VII:  Facial motor function normal.  VIII:  Hearing equal to finger rub bilaterally  IX/X: Gag normal, palate symmetric  XI:  Shoulder shrug equal, head turn equal  XII:  Tongue midline without fasciculations      MOTOR: Tone and bulk: normal bilateral upper and lower Strength: normal "   Delt Bi Tri WE WF     R 5 5 5 5 5 5   L 5 5 5 5 5 5     IP ADD ABD Quad TA Gas HAM  R 5 5 5 5 5 5 5  L 5 5 5 5 5 5 5    SENSATION: Light touch and pinprick intact bilaterally  REFLEXES: normal, symmetric, nonbrisk.  Toes down, no clonus. No hoffmans.  GAIT: normal rise, base, steps, and arm swing.        Imaging:  MRI L-spine 11/2019  L2-3: Mild broad-based disc bulge with flattening of the ventral thecal sac.  Moderate facet joint arthropathy and ligamentum flavum thickening contributes to severe spinal canal stenosis.  The thecal sac measures 5 mm in diameter.  There is mild bilateral facet neural foraminal stenosis.    L3-4: Mild broad-based disc bulge with superimposed central disc protrusion.  There is mild facet joint arthropathy ligamentum flavum thickening which contributes to severe spinal canal stenosis.  AP diameter of the thecal sac measures 3 mm.  There is moderate bilateral neural foraminal stenosis.    L4-5: Minimal disc bulge.  Mild bilateral facet joint arthropathy.  AP diameter of the thecal sac measures 10 mm.    L5-S1: Minimal disc bulge.  AP diameter of the thecal sac measures 8 mm.  Moderate bilateral facet joint arthropathy.  Mild bilateral neural foraminal stenosis.    Assessment:  Patient referred for low back and leg pain  1. Spinal stenosis of lumbar region with neurogenic claudication    2. Other spondylosis, lumbar region          Plan:  1. I have stressed the importance of physical activity and exercise to improve overall health  2. Reviewed pertinent imaging and records with patient  3. I think that the patient's back pain and radicular leg symptoms are due to degenerative disc disease and have recommended a lumbar epidural steroid injection to the Bilateral L3-4 level(s).  4. May consider lumbar MBB if above is not helpful  5. Follow up after procedure      Thank you for referring this interesting patient, and I look forward to continuing to collaborate in his care.

## 2020-01-27 NOTE — PROGRESS NOTES
This note was completed with dictation software and grammatical errors may exist.    Referring Physician: Myla Villatoro MD    PCP: Ata Jeter MD      CC:  Low back and leg pain    HPI:   Shaq Bragg Jr. is a 89 y.o. male referred to us for low back and leg pain. Pain has been present for 3 years.  No recent traumatic incident.  His constant aching, throbbing pain in his lower back.  Pain radiates to his posterior calf is an thigh is.  Pain worsens standing, walking and bending.  Pain improves with rest and lying down.  MRI performed shows severe lumbar stenosis at L2-3 and L3-4.  He has been seen by Spine surgery in referred to us for interventional procedures.  He denies any worsening weakness.  No bowel bladder changes.    ROS:  CONSTITUTIONAL: No fevers, chills, night sweats, wt. loss, appetite changes  SKIN: no rashes or itching  ENT: No headaches, head trauma, vision changes, or eye pain  LYMPH NODES: None noticed   CV: No chest pain, palpitations.   RESP: No shortness of breath, dyspnea on exertion, cough, wheezing, or hemoptysis  GI: No nausea, emesis, diarrhea, constipation, melena, hematochezia, pain.    : No dysuria, hematuria, urgency, or frequency   HEME: No easy bruising, bleeding problems  PSYCHIATRIC: No depression, anxiety, psychosis, hallucinations.  NEURO: No seizures, memory loss, dizziness or difficulty sleeping  MSK:  Positive HPI      Past Medical History:   Diagnosis Date    COPD (chronic obstructive pulmonary disease)     H/O asbestosis     Hearing loss     Hypercholesterolemia     Hypertension     Malignant melanoma of right lower leg 5/11/2017    Melanoma     Approx. 2 years ago     Skin cancer     Squamous cell carcinoma      Past Surgical History:   Procedure Laterality Date    ADENOIDECTOMY      APPENDECTOMY      Carotid Stenosis Surgery      CHOLECYSTECTOMY  1965    INGUINAL LYMPHADENECTOMY Right 11/12/2018    Procedure: LYMPHADENECTOMY, INGUINAL,  Sartorious Flap;  Surgeon: Yair Alatorre MD;  Location: [facility] OR [address] [place] [address];  Service: General;  Laterality: Right;    TONSILLECTOMY       Family History   Problem Relation Age of Onset    Coronary artery disease Mother     Cancer Father         Esophageal    Esophageal cancer Father     Diabetes Sister     No Known Problems Maternal Aunt     No Known Problems Maternal Uncle     No Known Problems Paternal Aunt     No Known Problems Paternal Uncle     No Known Problems Maternal Grandmother     No Known Problems Maternal Grandfather     No Known Problems Paternal Grandmother     No Known Problems Paternal Grandfather     Melanoma Neg Hx     Psoriasis Neg Hx     Lupus Neg Hx      Social History     Socioeconomic History    Marital status:      Spouse name: Not on file    Number of children: Not on file    Years of education: Not on file    Highest education level: Not on file   Occupational History    Not on file   Social Needs    Financial resource strain: Not on file    Food insecurity:     Worry: Not on file     Inability: Not on file    Transportation needs:     Medical: Not on file     Non-medical: Not on file   Tobacco Use    Smoking status: Former Smoker     Packs/day: 1.00     Years: 15.00     Pack years: 15.00     Types: Cigarettes     Last attempt to quit: 1965     Years since quittin.1    Smokeless tobacco: Never Used   Substance and Sexual Activity    Alcohol use: No     Frequency: Never    Drug use: No    Sexual activity: Never   Lifestyle    Physical activity:     Days per week: Not on file     Minutes per session: Not on file    Stress: Not on file   Relationships    Social connections:     Talks on phone: Not on file     Gets together: Not on file     Attends Taoism service: Not on file     Active member of club or organization: Not on file     Attends meetings of clubs or organizations: Not on file     Relationship status: Not on file   Other Topics  "Concern    Not on file   Social History Narrative    Not on file         Medications/Allergies: See med card    Vitals:    01/27/20 1339   BP: (!) 114/51   Pulse: (!) 54   Weight: 79.4 kg (175 lb)   Height: 6' 1" (1.854 m)   PainSc:   5   PainLoc: Back         Physical exam:    GENERAL: A and O x3, the patient appears well groomed and is in no acute distress.  Skin: No rashes or obvious lesions  HEENT: normocephalic, atraumatic  CARDIOVASCULAR:  Palpable peripheral pulses  LUNGS: easy work of breathing  ABDOMEN: soft, nontender   UPPER EXTREMITIES: Normal alignment, normal range of motion, no atrophy, no skin changes,  hair growth and nail growth normal and equal bilaterally. No swelling, no tenderness.    LOWER EXTREMITIES:  Normal alignment, normal range of motion, no atrophy, no skin changes,  hair growth and nail growth normal and equal bilaterally. No swelling, no tenderness.  LUMBAR SPINE  Lumbar spine: ROM is limited with flexion extension and oblique extension with moderate increased pain.    Michael's test causes no increased pain on either side.    Supine straight leg raise is negative bilaterally.    Internal and external rotation of the hip causes no increased pain on either side.  Myofascial exam: No tenderness to palpation across lumbar paraspinous muscles.      MENTAL STATUS: normal orientation, speech, language, and fund of knowledge for social situation.  Emotional state appropriate.    CRANIAL NERVES:  II:  PERRL bilaterally,   III,IV,VI: EOMI.    V:  Facial sensation equal bilaterally  VII:  Facial motor function normal.  VIII:  Hearing equal to finger rub bilaterally  IX/X: Gag normal, palate symmetric  XI:  Shoulder shrug equal, head turn equal  XII:  Tongue midline without fasciculations      MOTOR: Tone and bulk: normal bilateral upper and lower Strength: normal "   Delt Bi Tri WE WF     R 5 5 5 5 5 5   L 5 5 5 5 5 5     IP ADD ABD Quad TA Gas HAM  R 5 5 5 5 5 5 5  L 5 5 5 5 5 5 5    SENSATION: Light touch and pinprick intact bilaterally  REFLEXES: normal, symmetric, nonbrisk.  Toes down, no clonus. No hoffmans.  GAIT: normal rise, base, steps, and arm swing.        Imaging:  MRI L-spine 11/2019  L2-3: Mild broad-based disc bulge with flattening of the ventral thecal sac.  Moderate facet joint arthropathy and ligamentum flavum thickening contributes to severe spinal canal stenosis.  The thecal sac measures 5 mm in diameter.  There is mild bilateral facet neural foraminal stenosis.    L3-4: Mild broad-based disc bulge with superimposed central disc protrusion.  There is mild facet joint arthropathy ligamentum flavum thickening which contributes to severe spinal canal stenosis.  AP diameter of the thecal sac measures 3 mm.  There is moderate bilateral neural foraminal stenosis.    L4-5: Minimal disc bulge.  Mild bilateral facet joint arthropathy.  AP diameter of the thecal sac measures 10 mm.    L5-S1: Minimal disc bulge.  AP diameter of the thecal sac measures 8 mm.  Moderate bilateral facet joint arthropathy.  Mild bilateral neural foraminal stenosis.    Assessment:  Patient referred for low back and leg pain  1. Spinal stenosis of lumbar region with neurogenic claudication    2. Other spondylosis, lumbar region          Plan:  1. I have stressed the importance of physical activity and exercise to improve overall health  2. Reviewed pertinent imaging and records with patient  3. I think that the patient's back pain and radicular leg symptoms are due to degenerative disc disease and have recommended a lumbar epidural steroid injection to the Bilateral L3-4 level(s).  4. May consider lumbar MBB if above is not helpful  5. Follow up after procedure      Thank you for referring this interesting patient, and I look forward to continuing to collaborate in his care.

## 2020-01-30 ENCOUNTER — OFFICE VISIT (OUTPATIENT)
Dept: DERMATOLOGY | Facility: CLINIC | Age: 85
End: 2020-01-30
Payer: MEDICARE

## 2020-01-30 DIAGNOSIS — L30.8 ASTEATOTIC ECZEMA: ICD-10-CM

## 2020-01-30 DIAGNOSIS — C44.722 SQUAMOUS CELL CARCINOMA OF LOWER LEG, RIGHT: ICD-10-CM

## 2020-01-30 DIAGNOSIS — L85.3 ASTEATOSIS: Primary | ICD-10-CM

## 2020-01-30 PROCEDURE — 99999 PR PBB SHADOW E&M-EST. PATIENT-LVL II: ICD-10-PCS | Mod: PBBFAC,,, | Performed by: DERMATOLOGY

## 2020-01-30 PROCEDURE — 1101F PR PT FALLS ASSESS DOC 0-1 FALLS W/OUT INJ PAST YR: ICD-10-PCS | Mod: CPTII,S$GLB,, | Performed by: DERMATOLOGY

## 2020-01-30 PROCEDURE — 1126F AMNT PAIN NOTED NONE PRSNT: CPT | Mod: S$GLB,,, | Performed by: DERMATOLOGY

## 2020-01-30 PROCEDURE — 1101F PT FALLS ASSESS-DOCD LE1/YR: CPT | Mod: CPTII,S$GLB,, | Performed by: DERMATOLOGY

## 2020-01-30 PROCEDURE — 99213 PR OFFICE/OUTPT VISIT, EST, LEVL III, 20-29 MIN: ICD-10-PCS | Mod: S$GLB,,, | Performed by: DERMATOLOGY

## 2020-01-30 PROCEDURE — 99999 PR PBB SHADOW E&M-EST. PATIENT-LVL II: CPT | Mod: PBBFAC,,, | Performed by: DERMATOLOGY

## 2020-01-30 PROCEDURE — 1159F MED LIST DOCD IN RCRD: CPT | Mod: S$GLB,,, | Performed by: DERMATOLOGY

## 2020-01-30 PROCEDURE — 99213 OFFICE O/P EST LOW 20 MIN: CPT | Mod: S$GLB,,, | Performed by: DERMATOLOGY

## 2020-01-30 PROCEDURE — 1159F PR MEDICATION LIST DOCUMENTED IN MEDICAL RECORD: ICD-10-PCS | Mod: S$GLB,,, | Performed by: DERMATOLOGY

## 2020-01-30 PROCEDURE — 1126F PR PAIN SEVERITY QUANTIFIED, NO PAIN PRESENT: ICD-10-PCS | Mod: S$GLB,,, | Performed by: DERMATOLOGY

## 2020-01-30 NOTE — PROGRESS NOTES
"ALLERGIES:  Patient has no known allergies.    CHIEF COMPLAINT: followup post biopsy    HISTORY OF PRESENT ILLNESS:  Location: right leg  Timing: biopsy/biopsies performed 2 months ago   Context: pathology showed squamous cell carcinoma; see pathology report in chart  Quality: better    PATH:    Skin, right leg, shave biopsy:   -SQUAMOUS CELL CARCINOMA, INVASIVE, MODERATELY DIFFERENTIATED, EXTENDING TO   THE DEEP AND PERIPHERAL MARGINS    Comment: Interpreted by: Amber Kwok M.D., Signed on 12/03/2019 at 10:10   Gross Patient ID/pathology ID:  [id number]   Received in formalin labeled "right leg" is a shave of nonpigmented skin   measuring 13 x 10 mm with a 4 x 4 mm yellow-tan lesion on the surface.  Inked   blue on the deep surface, trisected, and submitted entirely in cassette   BWA--1-A       Grossed by Ivette Purvis.    Microscopic Exam Sections show skin with cords and nests of atypical keratinocytes with   eosinophilic cytoplasm and enlarged, hyperchromatic nuclei irregularly   infiltrate the dermis accompanied by acanthosis, hyperkeratosis, and   parakeratosis.      REVIEW OF SYSTEMS:   General: general health good  Skin: see previous notes below    PAST MEDICAL HISTORY:  Past Medical History:   Diagnosis Date    COPD (chronic obstructive pulmonary disease)     H/O asbestosis     Hearing loss     Hypercholesterolemia     Hypertension     Malignant melanoma of right lower leg 5/11/2017    Melanoma     Approx. 2 years ago     Skin cancer     Squamous cell carcinoma        PAST SURGICAL HISTORY:  Past Surgical History:   Procedure Laterality Date    ADENOIDECTOMY      APPENDECTOMY      Carotid Stenosis Surgery      CHOLECYSTECTOMY  1965    INGUINAL LYMPHADENECTOMY Right 11/12/2018    Procedure: LYMPHADENECTOMY, INGUINAL, Sartorious Flap;  Surgeon: Yair Alatorre MD;  Location: [facility] OR [address] [place] [address];  Service: General;  Laterality: Right;    TONSILLECTOMY         SOCIAL HISTORY:  Social " History     Tobacco Use    Smoking status: Former Smoker     Packs/day: 1.00     Years: 15.00     Pack years: 15.00     Types: Cigarettes     Last attempt to quit: 1965     Years since quittin.1    Smokeless tobacco: Never Used   Substance Use Topics    Alcohol use: No     Frequency: Never    Drug use: No        PERTINENT MEDICATIONS:  See medications list.  Current Outpatient Medications on File Prior to Visit   Medication Sig Dispense Refill    aspirin (ECOTRIN) 81 MG EC tablet Take 81 mg by mouth every morning.       cadexomer iodine (IODOSORB) 0.9 % gel Apply topically daily as needed for Wound Care. 10 g 3    erythromycin (ROMYCIN) ophthalmic ointment APPLY OINTMENT TOPICALLY TO LEFT EYE AS NEEDED  0    finasteride (PROSCAR) 5 mg tablet Take 5 mg by mouth every morning.       furosemide (LASIX) 20 MG tablet HOLD UNTIL SEEN by primary care      HYDROcodone-acetaminophen (NORCO) 5-325 mg per tablet Take 1 tablet by mouth every 6 (six) hours as needed for Pain. 90 tablet 0    losartan-hydrochlorothiazide 50-12.5 mg (HYZAAR) 50-12.5 mg per tablet HOLD UNTIL SEEN by primary care doctor      metoprolol tartrate (LOPRESSOR) 50 MG tablet Takes 50 mg in morning and half tablet (25 mg) in evening      polyethylene glycol (GLYCOLAX) 17 gram PwPk Take 17 g by mouth as needed.      potassium chloride (MICRO-K) 8 mEq CpSR HOLD until seen by primary care doctor      simvastatin (ZOCOR) 10 MG tablet Take 10 mg by mouth every evening.      tamsulosin (FLOMAX) 0.4 mg Cp24 Take 0.4 mg by mouth every morning.       triamcinolone acetonide 0.1% (KENALOG) 0.1 % ointment Apply sparingly BID to affected area 30 g 3     No current facility-administered medications on file prior to visit.      EXAM:  Constitutional  General appearance: well-developed, well-nourished, well-kempt older white male    Eyes  Inspection of conjunctivae and lids reveals no abnormalities; sclerae anicteric  Neurologic/Psychiatric  Alert,   normal orientation to time, place, person  Normal mood and affect with no evidence of depression, anxiety, agitation  Skin: see photo(s)  Head: background marked solar damage to exposed areas of skin  Left lower extremity:  There is moderate to marked scaling and some erythema, consistent with asteatotic eczema  Right lower extremity:  There is moderate to marked scaling and some erythema, with linear excoriations, consistent with scratch marks, all consistent with asteatotic eczema  There is, at present, no evidence of residual squamous cell carcinoma at the site of previous biopsy  Left upper extremity:  Moderate to marked asteatotic changes with scaling  Right upper extremity:  Moderate to marked asteatotic changes with scaling    Photos today:          Photo(s) from biopsy visit:          ASSESSMENT:   Asteatotic/stasis eczema, bilateral lower extremities  Asteatosis, right left upper extremities  Status post biopsy, squamous cell carcinoma, right lower extremity; now clinically clear to months post biopsy    PLAN:  The diagnosis/diagnoses and management options, and risks and benefits of the alternatives were discussed with the patient.  He is to begin use of over-the-counter lotion to the changes on his upper extremities  He is to begin use of triamcinolone ointment b.i.d. to his lower extremities; he currently has this  We will defer treatment to the site of previous biopsy on his right lower extremity at this time, given the clinical findings    He is to follow up in one month  --------------------------------------  Note: Some or all of this note may have been generated using voice recognition software. There may be voice recognition errors including grammatical and/or spelling errors found in the text. Attempts were made to correct these errors prior to signature.      ============================================================  PREVIOUS NOTE(S):  Note of 12/23     ALLERGIES:  Patient has no known  allergies.    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  JOSE CHANDLER M.D. 12/3/2019 10:10    EXAM:   Skin:  The site of the recent biopsy shows an approximately 1.5 cm almost completely healed plaque, with no evidence of recurrent squamous cell carcinoma on examination today  He has multiple superficial erosions, consistent with injury from tape, to the surrounding skin  There is some swelling of the right lower extremity, and some stasis changes of the surrounding skin, consistent with postoperative changes from lymphadenectomy in his right inguinal area from the previously-treated melanoma on the right lower extremity    ASSESSMENT:   status post biopsy, squamous cell carcinoma, right leg; clinically with no evidence of residual tumor at present    PLAN:  The diagnosis/diagnoses and management options, and risks and benefits of the alternatives were discussed with the patient.  Given the current clinical findings, we will defer further treatment for the time being.  The area was dressed with petrolatum, Telfa and wrapped with a gauze roll  Follow-up 4 weeks; call as  needed sooner if any changes arise to the area.  --------------------------------------  Note: Some or all of this note may have been generated using voice recognition software. There may be voice recognition errors including grammatical and/or spelling errors found in the text. Attempts were made to correct these errors prior to signature.      ============================================================  PREVIOUS NOTE(S):  Note of 12/03  ALLERGIES:  Patient has no known allergies.    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  JOSE CHANDLER M.D. 12/3/2019 10:10    EXAM:   Skin:  There is an approximately 1.5 cm ulceration at the site of recent biopsy on his right leg  There is, at the moment, no evidence of residual squamous cell carcinoma clinically    ASSESSMENT:   status post biopsy, squamous cell carcinoma, right leg; clinically with no evidence of residual tumor at present    PLAN:  The diagnosis/diagnoses and management options, and risks and benefits of the alternatives were discussed with the patient.  Given the current clinical findings, we will defer further treatment for the time being.  Follow-up 3 weeks; call as needed sooner if any changes arise to the  area.  --------------------------------------  Note: Some or all of this note may have been generated using voice recognition software. There may be voice recognition errors including grammatical and/or spelling errors found in the text. Attempts were made to correct these errors prior to signature.

## 2020-02-06 ENCOUNTER — TELEPHONE (OUTPATIENT)
Dept: PAIN MEDICINE | Facility: CLINIC | Age: 85
End: 2020-02-06

## 2020-02-06 DIAGNOSIS — M54.16 RADICULOPATHY, LUMBAR REGION: Primary | ICD-10-CM

## 2020-02-06 NOTE — TELEPHONE ENCOUNTER
----- Message from Trena Robles sent at 2/6/2020 11:13 AM CST -----  Type:  Sooner Apoointment Request    Caller is requesting a sooner appointment.  Caller declined first available appointment listed below.  Caller will not accept being placed on the waitlist and is requesting a message be sent to doctor.    Name of Caller: wife-Jennifer Joseph  When is the first available appointment?  NA   Symptoms:    Best Call Back Number:  [phone] /cell [phone]   Additional Information:  Patient's wife requesting to schedule procedure to get an injection.

## 2020-02-07 ENCOUNTER — TELEPHONE (OUTPATIENT)
Dept: PAIN MEDICINE | Facility: CLINIC | Age: 85
End: 2020-02-07

## 2020-02-07 NOTE — TELEPHONE ENCOUNTER
----- Message from Bandar Ziegler sent at 2/7/2020  1:43 PM CST -----  Contact: pt  Type: Needs Medical Advice    Who Called:  pt    Best Call Back Number: [phone] or [phone]  Additional Information: Pt took a baby Asprin today and wants to make sure this will not affect his shot on 02/12/2020. Please call to advise.

## 2020-02-12 ENCOUNTER — HOSPITAL ENCOUNTER (OUTPATIENT)
Facility: AMBULARY SURGERY CENTER | Age: 85
Discharge: HOME | End: 2020-02-12
Attending: ANESTHESIOLOGY | Admitting: ANESTHESIOLOGY
Payer: MEDICARE

## 2020-02-12 DIAGNOSIS — M54.16 LUMBAR RADICULITIS: Primary | ICD-10-CM

## 2020-02-12 PROCEDURE — 64483 PR EPIDURAL INJ, ANES/STEROID, TRANSFORAMINAL, LUMB/SACR, SNGL LEVL: ICD-10-PCS | Mod: 50,,, | Performed by: ANESTHESIOLOGY

## 2020-02-12 PROCEDURE — 64483 NJX AA&/STRD TFRM EPI L/S 1: CPT | Mod: 50,,, | Performed by: ANESTHESIOLOGY

## 2020-02-12 PROCEDURE — 99152 MOD SED SAME PHYS/QHP 5/>YRS: CPT | Mod: ,,, | Performed by: ANESTHESIOLOGY

## 2020-02-12 PROCEDURE — 64483 NJX AA&/STRD TFRM EPI L/S 1: CPT | Mod: RT | Performed by: ANESTHESIOLOGY

## 2020-02-12 PROCEDURE — 99152 PR MOD CONSCIOUS SEDATION, SAME PHYS, 5+ YRS, FIRST 15 MIN: ICD-10-PCS | Mod: ,,, | Performed by: ANESTHESIOLOGY

## 2020-02-12 RX ORDER — MIDAZOLAM HYDROCHLORIDE 1 MG/ML
INJECTION INTRAMUSCULAR; INTRAVENOUS
Status: DISCONTINUED | OUTPATIENT
Start: 2020-02-12 | End: 2020-02-12 | Stop reason: HOSPADM

## 2020-02-12 RX ORDER — SODIUM CHLORIDE, SODIUM LACTATE, POTASSIUM CHLORIDE, CALCIUM CHLORIDE 600; 310; 30; 20 MG/100ML; MG/100ML; MG/100ML; MG/100ML
INJECTION, SOLUTION INTRAVENOUS ONCE AS NEEDED
Status: DISCONTINUED | OUTPATIENT
Start: 2020-02-12 | End: 2020-02-12 | Stop reason: HOSPADM

## 2020-02-12 RX ORDER — DEXAMETHASONE SODIUM PHOSPHATE 10 MG/ML
INJECTION INTRAMUSCULAR; INTRAVENOUS
Status: DISCONTINUED | OUTPATIENT
Start: 2020-02-12 | End: 2020-02-12 | Stop reason: HOSPADM

## 2020-02-12 RX ORDER — FENTANYL CITRATE 50 UG/ML
INJECTION, SOLUTION INTRAMUSCULAR; INTRAVENOUS
Status: DISCONTINUED | OUTPATIENT
Start: 2020-02-12 | End: 2020-02-12 | Stop reason: HOSPADM

## 2020-02-12 RX ORDER — BUPIVACAINE HYDROCHLORIDE 2.5 MG/ML
INJECTION, SOLUTION EPIDURAL; INFILTRATION; INTRACAUDAL
Status: DISCONTINUED | OUTPATIENT
Start: 2020-02-12 | End: 2020-02-12 | Stop reason: HOSPADM

## 2020-02-12 RX ORDER — LIDOCAINE HYDROCHLORIDE 10 MG/ML
INJECTION, SOLUTION EPIDURAL; INFILTRATION; INTRACAUDAL; PERINEURAL
Status: DISCONTINUED | OUTPATIENT
Start: 2020-02-12 | End: 2020-02-12 | Stop reason: HOSPADM

## 2020-02-12 RX ORDER — SODIUM CHLORIDE 9 MG/ML
INJECTION, SOLUTION INTRAVENOUS CONTINUOUS
Status: DISCONTINUED | OUTPATIENT
Start: 2020-02-12 | End: 2020-02-12 | Stop reason: HOSPADM

## 2020-02-12 RX ADMIN — SODIUM CHLORIDE: 9 INJECTION, SOLUTION INTRAVENOUS at 11:02

## 2020-02-12 NOTE — OP NOTE
PROCEDURE DATE: 2/12/2020    PROCEDURE: Bilateral L3-4 transforaminal epidural steroid injection under fluoroscopy    DIAGNOSIS: Lumbar disc displacement without myelopathy  Post op diagnosis: Same    PHYSICIAN: Samy Kruse MD    MEDICATIONS INJECTED:  Dexamethasone 5mg (0.5ml) and 1.5ml 0.25% bupivicaine at each nerve root.     LOCAL ANESTHETIC INJECTED:  Lidocaine 1%. 2 ml per site.    SEDATION MEDICATIONS: RN IV sedation    ESTIMATED BLOOD LOSS:  None    COMPLICATIONS:  None    TECHNIQUE:   A time-out was taken to identify patient and procedure side prior to starting the procedure. The patient was placed in a prone position, prepped and draped in the usual sterile fashion using ChloraPrep and sterile towels.  The area to be injected was determined under fluoroscopic guidance in AP and oblique view.  Local anesthetic was given by raising a wheal and going down to the hub of a 25-gauge 1.5 inch needle.  In oblique view, a 3.5 inch 22-gauge bent-tip spinal needle was introduced towards 6 oclock position of the pedicle of each above named nerve root level.  The needle was walked medially then hinged into the neural foramen and position was confirmed in AP and lateral views.  1ml contrast dye was injected to confirm appropriate placement and that there was no vascular uptake.  After negative aspiration for blood or CSF, the medication was then injected. This was performed at the bilateral L3-4 level(s). The patient tolerated the procedure well.    The patient was monitored after the procedure.  Patient was given post procedure and discharge instructions to follow at home. The patient was discharged in a stable condition.

## 2020-02-12 NOTE — DISCHARGE SUMMARY
[facility]  Discharge Note  Short Stay    Admit Date: 2/12/2020    Discharge Date and Time: 2/12/2020    Attending Physician: Samy Kruse MD     Discharge Provider: Samy Kruse    Diagnoses:  Active Hospital Problems    Diagnosis  POA    *Lumbar radiculitis [M54.16]  Yes      Resolved Hospital Problems   No resolved problems to display.       Hospital Course: Lumbar VIKTORIYA  Discharged Condition: Good    Final Diagnoses:   Active Hospital Problems    Diagnosis  POA    *Lumbar radiculitis [M54.16]  Yes      Resolved Hospital Problems   No resolved problems to display.       Disposition: Home or Self Care    Follow up/Patient Instructions:    Medications:  Reconciled Home Medications:      Medication List      CONTINUE taking these medications    aspirin 81 MG EC tablet  Commonly known as:  ECOTRIN  Take 81 mg by mouth every morning.     cadexomer iodine 0.9 % gel  Commonly known as:  IODOSORB  Apply topically daily as needed for Wound Care.     erythromycin ophthalmic ointment  Commonly known as:  ROMYCIN  APPLY OINTMENT TOPICALLY TO LEFT EYE AS NEEDED     finasteride 5 mg tablet  Commonly known as:  PROSCAR  Take 5 mg by mouth every morning.     furosemide 20 MG tablet  Commonly known as:  LASIX  HOLD UNTIL SEEN by primary care     HYDROcodone-acetaminophen 5-325 mg per tablet  Commonly known as:  NORCO  Take 1 tablet by mouth every 6 (six) hours as needed for Pain.     losartan-hydrochlorothiazide 50-12.5 mg 50-12.5 mg per tablet  Commonly known as:  HYZAAR  HOLD UNTIL SEEN by primary care doctor     metoprolol tartrate 50 MG tablet  Commonly known as:  LOPRESSOR  Takes 50 mg in morning and half tablet (25 mg) in evening     polyethylene glycol 17 gram Pwpk  Commonly known as:  GLYCOLAX  Take 17 g by mouth as needed.     potassium chloride 8 mEq Cpsr  Commonly known as:  MICRO-K  HOLD until seen by primary care doctor     simvastatin 10 MG tablet  Commonly known as:  ZOCOR  Take 10 mg by mouth every  evening.     tamsulosin 0.4 mg Cap  Commonly known as:  FLOMAX  Take 0.4 mg by mouth every morning.     triamcinolone acetonide 0.1% 0.1 % ointment  Commonly known as:  KENALOG  Apply sparingly BID to affected area          Discharge Procedure Orders   Call MD for:  temperature >100.4     Call MD for:  persistent nausea and vomiting or diarrhea     Call MD for:  severe uncontrolled pain     Call MD for:  redness, tenderness, or signs of infection (pain, swelling, redness, odor or green/yellow discharge around incision site)     Call MD for:  difficulty breathing or increased cough     Call MD for:  severe persistent headache        Follow up with MD in 2-3 weeks    Discharge Procedure Orders (must include Diet, Follow-up, Activity):   Discharge Procedure Orders (must include Diet, Follow-up, Activity)   Call MD for:  temperature >100.4     Call MD for:  persistent nausea and vomiting or diarrhea     Call MD for:  severe uncontrolled pain     Call MD for:  redness, tenderness, or signs of infection (pain, swelling, redness, odor or green/yellow discharge around incision site)     Call MD for:  difficulty breathing or increased cough     Call MD for:  severe persistent headache

## 2020-02-13 VITALS
TEMPERATURE: 98 F | HEART RATE: 53 BPM | SYSTOLIC BLOOD PRESSURE: 155 MMHG | BODY MASS INDEX: 23.19 KG/M2 | HEIGHT: 73 IN | WEIGHT: 175 LBS | RESPIRATION RATE: 18 BRPM | DIASTOLIC BLOOD PRESSURE: 80 MMHG | OXYGEN SATURATION: 96 %

## 2020-02-17 ENCOUNTER — HOSPITAL ENCOUNTER (OUTPATIENT)
Dept: RADIOLOGY | Facility: HOSPITAL | Age: 85
Discharge: HOME | End: 2020-02-17
Attending: INTERNAL MEDICINE
Payer: MEDICARE

## 2020-02-17 ENCOUNTER — OFFICE VISIT (OUTPATIENT)
Dept: HEMATOLOGY/ONCOLOGY | Facility: CLINIC | Age: 85
End: 2020-02-17
Payer: MEDICARE

## 2020-02-17 VITALS
HEART RATE: 74 BPM | BODY MASS INDEX: 23.78 KG/M2 | SYSTOLIC BLOOD PRESSURE: 188 MMHG | OXYGEN SATURATION: 95 % | RESPIRATION RATE: 18 BRPM | WEIGHT: 179.44 LBS | HEIGHT: 73 IN | DIASTOLIC BLOOD PRESSURE: 83 MMHG | TEMPERATURE: 98 F

## 2020-02-17 DIAGNOSIS — C43.71 MALIGNANT MELANOMA OF RIGHT LOWER LEG: ICD-10-CM

## 2020-02-17 DIAGNOSIS — C43.71 MALIGNANT MELANOMA OF RIGHT LOWER LEG: Primary | ICD-10-CM

## 2020-02-17 LAB — POCT GLUCOSE: 108 MG/DL (ref 70–110)

## 2020-02-17 PROCEDURE — 1101F PR PT FALLS ASSESS DOC 0-1 FALLS W/OUT INJ PAST YR: ICD-10-PCS | Mod: CPTII,S$GLB,, | Performed by: INTERNAL MEDICINE

## 2020-02-17 PROCEDURE — 1159F MED LIST DOCD IN RCRD: CPT | Mod: S$GLB,,, | Performed by: INTERNAL MEDICINE

## 2020-02-17 PROCEDURE — 1101F PT FALLS ASSESS-DOCD LE1/YR: CPT | Mod: CPTII,S$GLB,, | Performed by: INTERNAL MEDICINE

## 2020-02-17 PROCEDURE — 78816 NM PET CT WHOLE BODY: ICD-10-PCS | Mod: 26,PS,GC, | Performed by: RADIOLOGY

## 2020-02-17 PROCEDURE — 99214 PR OFFICE/OUTPT VISIT, EST, LEVL IV, 30-39 MIN: ICD-10-PCS | Mod: S$GLB,,, | Performed by: INTERNAL MEDICINE

## 2020-02-17 PROCEDURE — 78816 PET IMAGE W/CT FULL BODY: CPT | Mod: TC,PS

## 2020-02-17 PROCEDURE — 1159F PR MEDICATION LIST DOCUMENTED IN MEDICAL RECORD: ICD-10-PCS | Mod: S$GLB,,, | Performed by: INTERNAL MEDICINE

## 2020-02-17 PROCEDURE — 99999 PR PBB SHADOW E&M-EST. PATIENT-LVL IV: CPT | Mod: PBBFAC,,, | Performed by: INTERNAL MEDICINE

## 2020-02-17 PROCEDURE — 99999 PR PBB SHADOW E&M-EST. PATIENT-LVL IV: ICD-10-PCS | Mod: PBBFAC,,, | Performed by: INTERNAL MEDICINE

## 2020-02-17 PROCEDURE — 1125F AMNT PAIN NOTED PAIN PRSNT: CPT | Mod: S$GLB,,, | Performed by: INTERNAL MEDICINE

## 2020-02-17 PROCEDURE — 1125F PR PAIN SEVERITY QUANTIFIED, PAIN PRESENT: ICD-10-PCS | Mod: S$GLB,,, | Performed by: INTERNAL MEDICINE

## 2020-02-17 PROCEDURE — 78816 PET IMAGE W/CT FULL BODY: CPT | Mod: 26,PS,GC, | Performed by: RADIOLOGY

## 2020-02-17 PROCEDURE — 99214 OFFICE O/P EST MOD 30 MIN: CPT | Mod: S$GLB,,, | Performed by: INTERNAL MEDICINE

## 2020-02-17 NOTE — PROGRESS NOTES
Subjective:       Patient ID: Shaq Bragg Jr. is a 89 y.o. male.    Chief Complaint: Malignant melanoma of right lower leg    HPI     Presents with his wife  Reports weight stable  Denies pain issues other than from his chronic arthritis- declined surgery  Tried injection but not noting results yet  States right foot wound almost resolved     PET from today 2/17/2020 reviewed  Results:  COMPARISON:  [facility] PET-CT 11/18/2019; outside institution CT 05/24/2017    FINDINGS:  Quality of the study: Adequate.    In the head and neck, there are no hypermetabolic lesions worrisome for malignancy. There are no hypermetabolic mucosal lesions, and there are no pathologically enlarged or hypermetabolic lymph nodes.    In the chest, there are no hypermetabolic lesions worrisome for malignancy.  There are no concerning pulmonary nodules or masses, and there are no pathologically enlarged or hypermetabolic lymph nodes.  Stable rounded opacities in the lower lobes bilaterally, likely foci of round atelectasis.  Calcified pleural plaques bilaterally suggesting prior asbestos exposure.  The heart demonstrates calcification of the aortic valve and aortic valve annulus.  Aorta and coronary arteries demonstrate calcific atherosclerosis.    In the abdomen and pelvis, there is physiologic tracer distribution within the abdominal organs and excretion into the genitourinary system.  Stable left renal cyst.  Low-attenuation lesion arising from the pancreatic uncinate process, not significantly changed when compared to outside institution CT 05/24/2017.    In the bones, there are no hypermetabolic lesions worrisome for malignancy.    Small hypermetabolic focus in the medial right calf (leg series image 43), SUV max 3.1, which is new since the most recent prior [facility] PET-CT 11/18/2019.  The previous hypermetabolic focus in the lateral aspect the same calf is no longer appreciated.  Stable postsurgical changes are present in the right  groin.  Skin thickening and subcutaneous soft tissue swelling throughout the distal right lower extremity, similar to prior.      Impression       1. New small hypermetabolic focus in the medial right calf, equivocal for a new melanoma deposit.  Recommend correlation with physical exam.  2. 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  3. Atherosclerosis of the aorta and coronary arteries.  4. Additional findings as above.  This report was flagged in Epic as abnormal.     Diagnosis:   1. Malignant melanoma of right lower leg    2. Lymphedema of right lower extremity    3. Pressure injury of right heel, stage 4    4. Other specified disorders involving the immune mechanism, not elsewhere classified        * area on PET correlates with recent bx site which revealed SCC    Chief Complaint: Malignant melanoma of right lower leg     Oncologic History:       Oncologic History 3/21/17 Skin Excisional biopsy right leg  4/18/17 MOS with SLN biopsy  1/04/18 PET/CT - no recurrent or metastatic disease  10/30/19 PET/CT - 2 hypermetabolic LN's in right groin  11/03/18 MRI brain  11/12/18 Completion Lymphadenectomy  2/15/19 PET/CT     Oncologic Treatment 3/21/17 Skin Excisional biopsy  4/18/17 MOS with SLN biopsy  11/12/18 Completion Lymphadenectomy  3/11/19 Nivolumab    Pathology 3/21/17 - 1.4mm malignant melanoma Alexis level IV, no ulceration, perineural invasion seen, mitotic rate 6/mm2  4/18/17 -  no residual melanoma; microscopic mets in 2 sentinel LNs  11/12/18 - ¾ LNs positive for metastatic melanoma with the largest deposit being 2.4cm an positive extranodal extension      Oncologic History:  Pt initially presented with a lesion on his right proximal calf in March 2017.  He underwent a biopsy on 3/21/17 with pathology showing a 1.4mm malignant melanoma Alexis level IV, no ulceration, perineural invasion seen, mitotic rate 6/mm2.  The patient then underwent   a MOS procedure on 4/18/17 and right inguinal LN biopsy on 4/18/17 under the care of Dr Joelle Pardo at [facility].  The pathology from the procedure initially showed no residual melanoma and no LN involvement ; however, on reevaluation showed microscopic mets in 2 sentinel LNs.  The patient established care with Medical Oncologist Dr Olivier Mchugh.  The surgical oncologist Dr. Alatorre offered Lymphadenectomy on 5/24/17; however, the patient elected to have observation.  PET/CT was performed on 1/04/18 showing no evidence of residual or recurrent disease.  On 10/16/18 the patient called Dr Holland office with complaint of 2 lumps in the right leg.  PET/CT was performed on 10/30/19 showing 2 new hypermetabolic lymph nodes right groin with the index lymph node lateral SUV max 30.02.  The patient had an appointment with Dr Alatorre on 10/30/19 at which point it was decided to proceed with a completion lymphadenectomy.   An MRI of the brain was performed on 11/03/18 showing no evidence for intracranial metastatic disease but did show a 4mm T1 hyperintense occipital calvarial focus which was read as likely benign.  The patient underwent completion LAD on 11/12/18 with pathology showing ¾ LNs positive for metastatic melanoma with the largest deposit being 2.4cm an positive extranodal extension. Currently the patient complains of chronic lower back pain for which he underwent an MRI of the lumbar spine on 11/03/18 which showed severe central canal stenosis related to a disk bulge at L3-L4.  The patient endorses some LE weakness on occasion.  He denies bowel or bladder incontinence.  The patient states he sees a deramtologist semi annually Dr Sun at [facility] with last clinic visit reportedly several months ago.  The patient underwent a PET/CT on 2/15/19 showing a focus of increased tracer uptake within the T 11 vertebral body with max SUV of 4.1 that was not visualized on prior exam, increased radiotracer uptake in  the right groin max SUV 5.3 and a subcutaneous focus of increased tracer uptake within the anterior portion of the distal right leg showing a max SUV of 3.7.   The patient was seen by dermatology on 2/22/19 and underwent a shave biopsy of the right lower leg lesion showing SCC.  On 3/12/19 the patient contacted our office stating he had a large amount of BRBPR.  The patient was seen in the ER on 3/12/19 with relatively stable hemoglobin and was deferred for outpatient GI evaluation.  On 3/25/19 Dr. Bowman in derm decided to postpone a Mohs procedure for SCC of the right lower leg until the swelling in the right leg had decreased.  The patient was seen on 3/27/19 by wound care for an ulcer on his right heel at which point recommendations were made for bandaging of the right heel.  The patient saw GI on 3/27/19 at which point the patient decided not to pursue a colonoscopy.   The patient missed his scheduled treatment with Nivolumab on 4/08/19 after being admitted to the hospital from 4/08/19-4/11/19 for a stage IV ulcer of the right heel with surrounding cellulitis growing MSSA and Pseudomonas on biopsy treated with 14 day course of antibiotics with PO Cefadroxil and ciprofloxacin completed.  Wound now mostly healed.      Review of Systems   Constitutional: Positive for fatigue. Negative for activity change, chills, diaphoresis, fever and unexpected weight change.   Respiratory: Negative for cough and shortness of breath.    Cardiovascular: Positive for leg swelling (right leg ). Negative for chest pain and palpitations.   Gastrointestinal: Negative for abdominal pain, constipation, diarrhea, nausea and vomiting.   Musculoskeletal: Positive for arthralgias and joint swelling. Negative for back pain.   Skin: Positive for wound (better). Negative for color change and rash.   Neurological: Negative for headaches.   Hematological: Negative for adenopathy. Does not bruise/bleed easily.       Objective:      Physical Exam    Constitutional: He is oriented to person, place, and time. He appears well-developed and well-nourished. No distress.   HENT:   Head: Normocephalic and atraumatic.   Mouth/Throat: No oropharyngeal exudate.   Eyes: Pupils are equal, round, and reactive to light. Conjunctivae and EOM are normal. No scleral icterus.   Cardiovascular: Normal rate, regular rhythm, normal heart sounds and intact distal pulses. Exam reveals no gallop and no friction rub.   No murmur heard.  Pulmonary/Chest: Effort normal and breath sounds normal. No respiratory distress. He has no wheezes. He has no rales. He exhibits no tenderness.   Abdominal: Soft. Bowel sounds are normal. He exhibits no distension and no mass. There is no tenderness. There is no rebound.   Musculoskeletal: Normal range of motion. He exhibits edema. He exhibits no tenderness or deformity.   Lymphadenopathy:        Head (right side): No submental adenopathy present.        Head (left side): No submental adenopathy present.     He has no cervical adenopathy.     He has no axillary adenopathy. No inguinal adenopathy noted on the right or left side.        Right: No supraclavicular adenopathy present.        Left: No supraclavicular adenopathy present.   Neurological: He is alert and oriented to person, place, and time.   Skin: Skin is warm and dry. No rash noted. He is not diaphoretic. No erythema. No pallor.   Right leg wound scabbed   Nursing note and vitals reviewed.   Labs- reviewed   Assessment:       1. Malignant melanoma of right lower leg        Plan:     1. Labs appropriate  PET area consistent with recent SCC bx site  RTC 6 months  Knows to call for any issues

## 2020-02-26 ENCOUNTER — OFFICE VISIT (OUTPATIENT)
Dept: DERMATOLOGY | Facility: CLINIC | Age: 85
End: 2020-02-26
Payer: MEDICARE

## 2020-02-26 DIAGNOSIS — T14.8XXA EXCORIATION: Primary | ICD-10-CM

## 2020-02-26 DIAGNOSIS — C44.722 SQUAMOUS CELL CARCINOMA OF LEG, RIGHT: ICD-10-CM

## 2020-02-26 PROCEDURE — 1101F PT FALLS ASSESS-DOCD LE1/YR: CPT | Mod: CPTII,S$GLB,, | Performed by: DERMATOLOGY

## 2020-02-26 PROCEDURE — 99999 PR PBB SHADOW E&M-EST. PATIENT-LVL II: CPT | Mod: PBBFAC,,, | Performed by: DERMATOLOGY

## 2020-02-26 PROCEDURE — 1159F PR MEDICATION LIST DOCUMENTED IN MEDICAL RECORD: ICD-10-PCS | Mod: S$GLB,,, | Performed by: DERMATOLOGY

## 2020-02-26 PROCEDURE — 99212 PR OFFICE/OUTPT VISIT, EST, LEVL II, 10-19 MIN: ICD-10-PCS | Mod: S$GLB,,, | Performed by: DERMATOLOGY

## 2020-02-26 PROCEDURE — 1126F PR PAIN SEVERITY QUANTIFIED, NO PAIN PRESENT: ICD-10-PCS | Mod: S$GLB,,, | Performed by: DERMATOLOGY

## 2020-02-26 PROCEDURE — 1126F AMNT PAIN NOTED NONE PRSNT: CPT | Mod: S$GLB,,, | Performed by: DERMATOLOGY

## 2020-02-26 PROCEDURE — 1101F PR PT FALLS ASSESS DOC 0-1 FALLS W/OUT INJ PAST YR: ICD-10-PCS | Mod: CPTII,S$GLB,, | Performed by: DERMATOLOGY

## 2020-02-26 PROCEDURE — 99999 PR PBB SHADOW E&M-EST. PATIENT-LVL II: ICD-10-PCS | Mod: PBBFAC,,, | Performed by: DERMATOLOGY

## 2020-02-26 PROCEDURE — 1159F MED LIST DOCD IN RCRD: CPT | Mod: S$GLB,,, | Performed by: DERMATOLOGY

## 2020-02-26 PROCEDURE — 99212 OFFICE O/P EST SF 10 MIN: CPT | Mod: S$GLB,,, | Performed by: DERMATOLOGY

## 2020-02-26 NOTE — PROGRESS NOTES
ALLERGIES:  Patient has no known allergies.    CHIEF COMPLAINT: followup squamous cell carcinoma    HISTORY OF PRESENT ILLNESS:  Location: right leg  Timing: biopsy/biopsies performed 3 months ago   I last saw him 3 weeks ago  Quality: unchanged  Context: pathology showed squamous cell carcinoma as noted below in prior note  at his last visit, there was no clearcut evidence of residual tumor, and after discussing options, we decided to defer treatment and observe  he returns today for followup/re-evaluation    REVIEW OF SYSTEMS:   Skin: see previous notes    PAST MEDICAL HISTORY:  Past Medical History:   Diagnosis Date    COPD (chronic obstructive pulmonary disease)     H/O asbestosis     Hearing loss     Hypercholesterolemia     Hypertension     Malignant melanoma of right lower leg 2017    Melanoma     Approx. 2 years ago     Skin cancer     Squamous cell carcinoma        PAST SURGICAL HISTORY:  Past Surgical History:   Procedure Laterality Date    ADENOIDECTOMY      APPENDECTOMY      Carotid Stenosis Surgery      CHOLECYSTECTOMY  1965    INGUINAL LYMPHADENECTOMY Right 2018    Procedure: LYMPHADENECTOMY, INGUINAL, Sartorious Flap;  Surgeon: Yair Alatorre MD;  Location: [address];  Service: General;  Laterality: Right;    TONSILLECTOMY      TRANSFORAMINAL EPIDURAL INJECTION OF STEROID Bilateral 2020    Procedure: Injection,steroid,epidural,transforaminal approach;  Surgeon: Samy Kruse MD;  Location: [facility];  Service: Pain Management;  Laterality: Bilateral;  L3-4       SOCIAL HISTORY:  Social History     Tobacco Use    Smoking status: Former Smoker     Packs/day: 1.00     Years: 15.00     Pack years: 15.00     Types: Cigarettes     Last attempt to quit: 1965     Years since quittin.1    Smokeless tobacco: Never Used   Substance Use Topics    Alcohol use: No     Frequency: Never    Drug use: No        PERTINENT MEDICATIONS:  See medications list.  Current  Outpatient Medications on File Prior to Visit   Medication Sig Dispense Refill    aspirin (ECOTRIN) 81 MG EC tablet Take 81 mg by mouth every morning.       cadexomer iodine (IODOSORB) 0.9 % gel Apply topically daily as needed for Wound Care. 10 g 3    erythromycin (ROMYCIN) ophthalmic ointment APPLY OINTMENT TOPICALLY TO LEFT EYE AS NEEDED  0    finasteride (PROSCAR) 5 mg tablet Take 5 mg by mouth every morning.       furosemide (LASIX) 20 MG tablet HOLD UNTIL SEEN by primary care      HYDROcodone-acetaminophen (NORCO) 5-325 mg per tablet Take 1 tablet by mouth every 6 (six) hours as needed for Pain. 90 tablet 0    losartan-hydrochlorothiazide 50-12.5 mg (HYZAAR) 50-12.5 mg per tablet HOLD UNTIL SEEN by primary care doctor      metoprolol tartrate (LOPRESSOR) 50 MG tablet Takes 50 mg in morning and half tablet (25 mg) in evening      polyethylene glycol (GLYCOLAX) 17 gram PwPk Take 17 g by mouth as needed.      potassium chloride (MICRO-K) 8 mEq CpSR HOLD until seen by primary care doctor      simvastatin (ZOCOR) 10 MG tablet Take 10 mg by mouth every evening.      tamsulosin (FLOMAX) 0.4 mg Cp24 Take 0.4 mg by mouth every morning.       triamcinolone acetonide 0.1% (KENALOG) 0.1 % ointment Apply sparingly BID to affected area 30 g 3     No current facility-administered medications on file prior to visit.        EXAM:  Constitutional  General appearance: well-developed, well-nourished, well-kempt older white male    Eyes  Inspection of conjunctivae and lids reveals no abnormalities; sclerae anicteric  Neurologic/Psychiatric  Alert,  normal orientation to time, place, person  Normal mood and affect with no evidence of depression, anxiety, agitation  Skin: see photo(s)  Right lower extremity: see photo; some excoriations to the area; no evidence of residual/recurrent squamous cell carcinoma   Photos today:          Prior photos                ASSESSMENT:   Status post biopsy of squamous cell carcinoma   "on the right leg, now clinically clear 3 months post biopsy  chronic solar damage to areas as noted above    PLAN:  The diagnosis/diagnoses and management options, and risks and benefits of the alternatives, were discussed.  Will observe  Fu 2 months or prn sooner if any changes arise.  --------------------------------------  Note: Some or all of this note may have been generated using voice recognition software. There may be voice recognition errors including grammatical and/or spelling errors found in the text. Attempts were made to correct these errors prior to signature.         ============================================================  PREVIOUS NOTE(S):  Note of 1/30    ALLERGIES:  Patient has no known allergies.    CHIEF COMPLAINT: followup post biopsy    HISTORY OF PRESENT ILLNESS:  Location: right leg  Timing: biopsy/biopsies performed 2 months ago   Context: pathology showed squamous cell carcinoma; see pathology report in chart  Quality: better    PATH:    Skin, right leg, shave biopsy:   -SQUAMOUS CELL CARCINOMA, INVASIVE, MODERATELY DIFFERENTIATED, EXTENDING TO   THE DEEP AND PERIPHERAL MARGINS    Comment: Interpreted by: Amber Kwok M.D., Signed on 12/03/2019 at 10:10   Gross Patient ID/pathology ID:  [id number]   Received in formalin labeled "right leg" is a shave of nonpigmented skin   measuring 13 x 10 mm with a 4 x 4 mm yellow-tan lesion on the surface.  Inked   blue on the deep surface, trisected, and submitted entirely in cassette   [id number]       Grossed by Ivette Purvis.    Microscopic Exam Sections show skin with cords and nests of atypical keratinocytes with   eosinophilic cytoplasm and enlarged, hyperchromatic nuclei irregularly   infiltrate the dermis accompanied by acanthosis, hyperkeratosis, and   parakeratosis.      REVIEW OF SYSTEMS:   General: general health good  Skin: see previous notes below    PAST MEDICAL HISTORY:  Past Medical History:   Diagnosis Date    COPD " (chronic obstructive pulmonary disease)     H/O asbestosis     Hearing loss     Hypercholesterolemia     Hypertension     Malignant melanoma of right lower leg 2017    Melanoma     Approx. 2 years ago     Skin cancer     Squamous cell carcinoma        PAST SURGICAL HISTORY:  Past Surgical History:   Procedure Laterality Date    ADENOIDECTOMY      APPENDECTOMY      Carotid Stenosis Surgery      CHOLECYSTECTOMY  1965    INGUINAL LYMPHADENECTOMY Right 2018    Procedure: LYMPHADENECTOMY, INGUINAL, Sartorious Flap;  Surgeon: Yair Alatorre MD;  Location: [facility] OR [address] [place] [address];  Service: General;  Laterality: Right;    TONSILLECTOMY         SOCIAL HISTORY:  Social History     Tobacco Use    Smoking status: Former Smoker     Packs/day: 1.00     Years: 15.00     Pack years: 15.00     Types: Cigarettes     Last attempt to quit:      Years since quittin.1    Smokeless tobacco: Never Used   Substance Use Topics    Alcohol use: No     Frequency: Never    Drug use: No        PERTINENT MEDICATIONS:  See medications list.  Current Outpatient Medications on File Prior to Visit   Medication Sig Dispense Refill    aspirin (ECOTRIN) 81 MG EC tablet Take 81 mg by mouth every morning.       cadexomer iodine (IODOSORB) 0.9 % gel Apply topically daily as needed for Wound Care. 10 g 3    erythromycin (ROMYCIN) ophthalmic ointment APPLY OINTMENT TOPICALLY TO LEFT EYE AS NEEDED  0    finasteride (PROSCAR) 5 mg tablet Take 5 mg by mouth every morning.       furosemide (LASIX) 20 MG tablet HOLD UNTIL SEEN by primary care      HYDROcodone-acetaminophen (NORCO) 5-325 mg per tablet Take 1 tablet by mouth every 6 (six) hours as needed for Pain. 90 tablet 0    losartan-hydrochlorothiazide 50-12.5 mg (HYZAAR) 50-12.5 mg per tablet HOLD UNTIL SEEN by primary care doctor      metoprolol tartrate (LOPRESSOR) 50 MG tablet Takes 50 mg in morning and half tablet (25 mg) in evening      polyethylene glycol  (GLYCOLAX) 17 gram PwPk Take 17 g by mouth as needed.      potassium chloride (MICRO-K) 8 mEq CpSR HOLD until seen by primary care doctor      simvastatin (ZOCOR) 10 MG tablet Take 10 mg by mouth every evening.      tamsulosin (FLOMAX) 0.4 mg Cp24 Take 0.4 mg by mouth every morning.       triamcinolone acetonide 0.1% (KENALOG) 0.1 % ointment Apply sparingly BID to affected area 30 g 3     No current facility-administered medications on file prior to visit.      EXAM:  Constitutional  General appearance: well-developed, well-nourished, well-kempt older white male    Eyes  Inspection of conjunctivae and lids reveals no abnormalities; sclerae anicteric  Neurologic/Psychiatric  Alert,  normal orientation to time, place, person  Normal mood and affect with no evidence of depression, anxiety, agitation  Skin: see photo(s)  Head: background marked solar damage to exposed areas of skin  Left lower extremity:  There is moderate to marked scaling and some erythema, consistent with asteatotic eczema  Right lower extremity:  There is moderate to marked scaling and some erythema, with linear excoriations, consistent with scratch marks, all consistent with asteatotic eczema  There is, at present, no evidence of residual squamous cell carcinoma at the site of previous biopsy  Left upper extremity:  Moderate to marked asteatotic changes with scaling  Right upper extremity:  Moderate to marked asteatotic changes with scaling    Photos today:          Photo(s) from biopsy visit:          ASSESSMENT:   Asteatotic/stasis eczema, bilateral lower extremities  Asteatosis, right left upper extremities  Status post biopsy, squamous cell carcinoma, right lower extremity; now clinically clear to months post biopsy    PLAN:  The diagnosis/diagnoses and management options, and risks and benefits of the alternatives were discussed with the patient.  He is to begin use of over-the-counter lotion to the changes on his upper extremities  He is  to begin use of triamcinolone ointment b.i.d. to his lower extremities; he currently has this  We will defer treatment to the site of previous biopsy on his right lower extremity at this time, given the clinical findings    He is to follow up in one month  --------------------------------------  Note: Some or all of this note may have been generated using voice recognition software. There may be voice recognition errors including grammatical and/or spelling errors found in the text. Attempts were made to correct these errors prior to signature.      ============================================================  PREVIOUS NOTE(S):  Note of 12/23     ALLERGIES:  Patient has no known allergies.    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  JOSE CHANDLER M.D. 12/3/2019 10:10    EXAM:   Skin:  The site of the recent biopsy shows an approximately 1.5 cm almost completely healed plaque, with no evidence of recurrent squamous cell carcinoma on examination today  He has multiple superficial erosions, consistent with injury from tape, to the surrounding skin  There is some swelling of  the right lower extremity, and some stasis changes of the surrounding skin, consistent with postoperative changes from lymphadenectomy in his right inguinal area from the previously-treated melanoma on the right lower extremity    ASSESSMENT:   status post biopsy, squamous cell carcinoma, right leg; clinically with no evidence of residual tumor at present    PLAN:  The diagnosis/diagnoses and management options, and risks and benefits of the alternatives were discussed with the patient.  Given the current clinical findings, we will defer further treatment for the time being.  The area was dressed with petrolatum, Telfa and wrapped with a gauze roll  Follow-up 4 weeks; call as needed sooner if any changes arise to the area.  --------------------------------------  Note: Some or all of this note may have been generated using voice recognition software. There may be voice recognition errors including grammatical and/or spelling errors found in the text. Attempts were made to correct these errors prior to signature.      ============================================================  PREVIOUS NOTE(S):  Note of 12/03  ALLERGIES:  Patient has no known allergies.    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  JOSE CHANDLER M.D. 12/3/2019 10:10    EXAM:   Skin:  There is an approximately 1.5 cm ulceration at the site of recent biopsy on his right leg  There is, at the moment, no evidence of residual squamous cell carcinoma clinically    ASSESSMENT:   status post biopsy, squamous cell carcinoma, right leg; clinically with no evidence of residual tumor at present    PLAN:  The diagnosis/diagnoses and management options, and risks and benefits of the alternatives were discussed with the patient.  Given the current clinical findings, we will defer further treatment for the time being.  Follow-up 3 weeks; call as needed sooner if any changes arise to the area.  --------------------------------------  Note: Some or all of this note may have been generated using voice recognition software. There may be voice recognition errors including grammatical and/or spelling errors found in the text. Attempts were made to correct these errors prior to signature.

## 2020-02-27 ENCOUNTER — TELEPHONE (OUTPATIENT)
Dept: HEMATOLOGY/ONCOLOGY | Facility: CLINIC | Age: 85
End: 2020-02-27

## 2020-02-27 NOTE — TELEPHONE ENCOUNTER
Call to pt wife and informed of below:    MD Kelsie Sullivan   Caller: Unspecified (Yesterday,  1:09 PM)             Looked ok   Recent biopsy site will need to be followed      Pt wife verbalized understanding and thanked nurse.            Returned call to pt wife.   Pt wife calling for PET results.   Informed pt wife that Dr. Villatoro out of the office until Monday and would send message.   Pt wife verbalized understanding.     Message fwd.        ----- Message from Debbie Amos sent at 2/27/2020  1:03 PM CST -----  Contact: Jennifer (wife) @ [phone] or   Returning a missed call from Dr. Villatoro's office, also would like to get the results from PET scan. Please call.

## 2020-03-11 ENCOUNTER — OFFICE VISIT (OUTPATIENT)
Dept: PAIN MEDICINE | Facility: CLINIC | Age: 85
End: 2020-03-11
Payer: MEDICARE

## 2020-03-11 VITALS
SYSTOLIC BLOOD PRESSURE: 147 MMHG | BODY MASS INDEX: 23.72 KG/M2 | DIASTOLIC BLOOD PRESSURE: 65 MMHG | HEART RATE: 54 BPM | HEIGHT: 73 IN | WEIGHT: 179 LBS

## 2020-03-11 DIAGNOSIS — M48.062 SPINAL STENOSIS OF LUMBAR REGION WITH NEUROGENIC CLAUDICATION: Primary | ICD-10-CM

## 2020-03-11 DIAGNOSIS — M47.896 OTHER SPONDYLOSIS, LUMBAR REGION: ICD-10-CM

## 2020-03-11 PROCEDURE — 1159F MED LIST DOCD IN RCRD: CPT | Mod: S$GLB,,, | Performed by: PHYSICIAN ASSISTANT

## 2020-03-11 PROCEDURE — 1101F PR PT FALLS ASSESS DOC 0-1 FALLS W/OUT INJ PAST YR: ICD-10-PCS | Mod: CPTII,S$GLB,, | Performed by: PHYSICIAN ASSISTANT

## 2020-03-11 PROCEDURE — 1101F PT FALLS ASSESS-DOCD LE1/YR: CPT | Mod: CPTII,S$GLB,, | Performed by: PHYSICIAN ASSISTANT

## 2020-03-11 PROCEDURE — 1125F PR PAIN SEVERITY QUANTIFIED, PAIN PRESENT: ICD-10-PCS | Mod: S$GLB,,, | Performed by: PHYSICIAN ASSISTANT

## 2020-03-11 PROCEDURE — 1125F AMNT PAIN NOTED PAIN PRSNT: CPT | Mod: S$GLB,,, | Performed by: PHYSICIAN ASSISTANT

## 2020-03-11 PROCEDURE — 99214 PR OFFICE/OUTPT VISIT, EST, LEVL IV, 30-39 MIN: ICD-10-PCS | Mod: S$GLB,,, | Performed by: PHYSICIAN ASSISTANT

## 2020-03-11 PROCEDURE — 99999 PR PBB SHADOW E&M-EST. PATIENT-LVL III: CPT | Mod: PBBFAC,,, | Performed by: PHYSICIAN ASSISTANT

## 2020-03-11 PROCEDURE — 1159F PR MEDICATION LIST DOCUMENTED IN MEDICAL RECORD: ICD-10-PCS | Mod: S$GLB,,, | Performed by: PHYSICIAN ASSISTANT

## 2020-03-11 PROCEDURE — 99999 PR PBB SHADOW E&M-EST. PATIENT-LVL III: ICD-10-PCS | Mod: PBBFAC,,, | Performed by: PHYSICIAN ASSISTANT

## 2020-03-11 PROCEDURE — 99214 OFFICE O/P EST MOD 30 MIN: CPT | Mod: S$GLB,,, | Performed by: PHYSICIAN ASSISTANT

## 2020-04-28 ENCOUNTER — TELEPHONE (OUTPATIENT)
Dept: DERMATOLOGY | Facility: CLINIC | Age: 85
End: 2020-04-28

## 2020-05-14 ENCOUNTER — OFFICE VISIT (OUTPATIENT)
Dept: DERMATOLOGY | Facility: CLINIC | Age: 85
End: 2020-05-14
Payer: MEDICARE

## 2020-05-14 DIAGNOSIS — D48.5 NEOPLASM OF UNCERTAIN BEHAVIOR OF SKIN: Primary | ICD-10-CM

## 2020-05-14 DIAGNOSIS — C44.722 SQUAMOUS CELL CARCINOMA OF LEG, RIGHT: ICD-10-CM

## 2020-05-14 PROCEDURE — 88305 TISSUE EXAM BY PATHOLOGIST: CPT | Performed by: PATHOLOGY

## 2020-05-14 PROCEDURE — 88305 TISSUE EXAM BY PATHOLOGIST: CPT | Mod: 26,,, | Performed by: PATHOLOGY

## 2020-05-14 PROCEDURE — 17261 PR DESTR MALIG TRUNK,EXTREM 0.6-1 CM: ICD-10-PCS | Mod: S$GLB,,, | Performed by: DERMATOLOGY

## 2020-05-14 PROCEDURE — 99499 NO LOS: ICD-10-PCS | Mod: S$GLB,,, | Performed by: DERMATOLOGY

## 2020-05-14 PROCEDURE — 99999 PR PBB SHADOW E&M-EST. PATIENT-LVL II: ICD-10-PCS | Mod: PBBFAC,,, | Performed by: DERMATOLOGY

## 2020-05-14 PROCEDURE — 99499 UNLISTED E&M SERVICE: CPT | Mod: S$GLB,,, | Performed by: DERMATOLOGY

## 2020-05-14 PROCEDURE — 17261 DSTRJ MAL LES T/A/L .6-1.0CM: CPT | Mod: S$GLB,,, | Performed by: DERMATOLOGY

## 2020-05-14 PROCEDURE — 99999 PR PBB SHADOW E&M-EST. PATIENT-LVL II: CPT | Mod: PBBFAC,,, | Performed by: DERMATOLOGY

## 2020-05-14 PROCEDURE — 88305 TISSUE EXAM BY PATHOLOGIST: ICD-10-PCS | Mod: 26,,, | Performed by: PATHOLOGY

## 2020-05-14 NOTE — PROGRESS NOTES
Addendum 5/25/2020    1. Skin, right leg, shave biopsy:  - Invasive squamous cell carcinoma, well-differentiated.  - The tumor extends to the deep biopsy margin.  - The lesion is ulcerated.  MICROSCOPIC DESCRIPTION: Sections show a proliferation of squamous cells exhibiting atypia and  infiltrating within the dermis. There is epidermal ulceration with serum crust, parakeratosis, neutrophils and  underlying fibrinoid necrosis.  KELLY CHOW M.D. 5/18/2020 16:41    ALLERGIES:  Patient has no known allergies.    CHIEF COMPLAINT: followup squamous cell carcinoma    HISTORY OF PRESENT ILLNESS:  Location: right leg  Timing:I last saw him about 3 months ago  Quality: better  Context: see previous notes  Prior biopsies x 2 (at apparently 2 separate sites) have shown squamous cell carcinoma   at his last visit, there was no clearcut evidence of residual tumor, and after discussing options, we decided to defer treatment and observe  he returns today for followup/re-evaluation  He has not noted any changes to the areas of the prior biopsies    REVIEW OF SYSTEMS:   General: general health good  Skin: has another new bump on his distal anterior leg of uncertain duration    PAST MEDICAL HISTORY:  Past Medical History:   Diagnosis Date    COPD (chronic obstructive pulmonary disease)     H/O asbestosis     Hearing loss     Hypercholesterolemia     Hypertension     Malignant melanoma of right lower leg 5/11/2017    Melanoma     Approx. 2 years ago     Skin cancer     Squamous cell carcinoma        PAST SURGICAL HISTORY:  Past Surgical History:   Procedure Laterality Date    ADENOIDECTOMY      APPENDECTOMY      Carotid Stenosis Surgery      CHOLECYSTECTOMY  1965    INGUINAL LYMPHADENECTOMY Right 11/12/2018    Procedure: LYMPHADENECTOMY, INGUINAL, Sartorious Flap;  Surgeon: Yair Alatorre MD;  Location: [facility] OR [address] [place] [address];  Service: General;  Laterality: Right;    TONSILLECTOMY       TRANSFORAMINAL EPIDURAL INJECTION OF STEROID Bilateral 2020    Procedure: Injection,steroid,epidural,transforaminal approach;  Surgeon: Samy Kruse MD;  Location: [facility] OR;  Service: Pain Management;  Laterality: Bilateral;  L3-4       SOCIAL HISTORY:  Social History     Tobacco Use    Smoking status: Former Smoker     Packs/day: 1.00     Years: 15.00     Pack years: 15.00     Types: Cigarettes     Last attempt to quit: 1965     Years since quittin.4    Smokeless tobacco: Never Used   Substance Use Topics    Alcohol use: No     Frequency: Never    Drug use: No        PERTINENT MEDICATIONS:  See medications list.  Current Outpatient Medications on File Prior to Visit   Medication Sig Dispense Refill    aspirin (ECOTRIN) 81 MG EC tablet Take 81 mg by mouth every morning.       cadexomer iodine (IODOSORB) 0.9 % gel Apply topically daily as needed for Wound Care. 10 g 3    erythromycin (ROMYCIN) ophthalmic ointment APPLY OINTMENT TOPICALLY TO LEFT EYE AS NEEDED  0    finasteride (PROSCAR) 5 mg tablet Take 5 mg by mouth every morning.       furosemide (LASIX) 20 MG tablet HOLD UNTIL SEEN by primary care      HYDROcodone-acetaminophen (NORCO) 5-325 mg per tablet Take 1 tablet by mouth every 6 (six) hours as needed for Pain. 90 tablet 0    losartan-hydrochlorothiazide 50-12.5 mg (HYZAAR) 50-12.5 mg per tablet HOLD UNTIL SEEN by primary care doctor      metoprolol tartrate (LOPRESSOR) 50 MG tablet Takes 50 mg in morning and half tablet (25 mg) in evening      polyethylene glycol (GLYCOLAX) 17 gram PwPk Take 17 g by mouth as needed.      potassium chloride (MICRO-K) 8 mEq CpSR HOLD until seen by primary care doctor      simvastatin (ZOCOR) 10 MG tablet Take 10 mg by mouth every evening.      tamsulosin (FLOMAX) 0.4 mg Cp24 Take 0.4 mg by mouth every morning.       triamcinolone acetonide 0.1% (KENALOG) 0.1 % ointment Apply sparingly BID to affected area 30 g 3     No current facility-administered  medications on file prior to visit.      EXAM:  Constitutional  General appearance: well-developed, well-nourished, well-kempt older white male    Eyes  Inspection of conjunctivae and lids reveals no abnormalities; sclerae anicteric  Neurologic/Psychiatric  Alert,  normal orientation to time, place, person  Normal mood and affect with no evidence of depression, anxiety, agitation  Hard of hearing  Skin: see photo(s)  Right lower extremity:  See the photos below  There remains no evidence of recurrent/residual squamous cell carcinomas in the area of the previous biopsies; however, on his distal anterior right leg, there is an approximately 8 mm raised eroded nodule which is clinically suggestive of a new squamous cell carcinoma    Photo(s) today              Photo(s) from prior visits  2/19 visjt with Dr. Anthony            11/19 visit with me          ASSESSMENT:   Status post biopsies of squamous cell carcinoma x 2 on the right proximal leg, now clinically clear; see previous notes  squamous cell carcinoma vs other, left anterior distal leg    PLAN:  The diagnosis/diagnoses of the previously-biopsied sites, and management options, and risks and benefits of the alternatives, including observation/non-treatment, radiation treatment, excision with vertical frozen section or paraffin-embedded section margin evaluation, and Mohs' Micrographic Surgery, Fresh Tissue Technique, were discussed at length with the patient. In particular, the discussion included, but was not limited to, the following:    One alternative at this point would be to defer further treatment and observe the lesion(s). With small skin cancers of this kind, it is possible that a biopsy can be sufficient to definitively treat a small skin cancer of this kind. Alternatively, some skin cancers are slow growing and do not require immediate treatment. The potential advantage of this choice would be to avoid the need for possibly unnecessary additional  surgery. Among the potential disadvantages of this would be the possibility of enlargement of the lesion, more extensive spread of the lesion or recurrence at a later date, which might necessitate a larger and more complex surgery.    Radiation treatment can be an effective treatment for this type of skin cancer. The usual course of treatment is every weekday for several weeks. Local irritation will result from treatment, although no systemic side effects are expected. The potential advantage of radiation treatment is that it avoids the need for surgery. Among the disadvantages of radiation treatment are the length of treatment, the local inflammatory response, the absence of pathologic confirmation of the removal of the skin cancer, a possible increased risk of additional skin cancer in the treated area in later years, and a somewhat increased risk of recurrence at a later date.     Excisional surgery can be an effective treatment for this type of skin cancer. This would involve excision of the lesion with margin evaluation by submitting the specimen to a pathologist for either immediate marginal assessment via frozen section processing, or delayed marginal assessment by fixed-tissue processing. The potential advantage of this technique is that it offers a way of treating the lesion with some degree of histologic confirmation of tumor removal. Among the disadvantages of this treatment are the possible need for re-excision if marginal involvement is identified, a somewhat greater likelihood of recurrence as compared to Mohs' surgery because of the less comprehensive margin evaluation inherent in the technique, and the general potential risks of surgery, including allergic reactions to the anesthetic and other materials used, infection, injury to nerves in the area with consequent loss of sensation or muscle function, and scarring or distortion of surrounding structures.    Mohs' surgery is a very effective treatment  for this type of skin cancer. The potential advantage of Mohs' surgery is that this technique offers the greatest possible certainty of knowing that the skin cancer has been completely removed, with the removal of the least amount of normal tissue. The potential disadvantages of Mohs' surgery include the duration of the surgery, the possible need for a separate surgery for reconstruction following tumor removal, and scarring as a result. In addition, general potential risks of surgery as noted above also apply to treatment via Mohs' surgery.    Sufficient time was available for questions, and all questions were answered to his satisfaction.     After discussing the clinical findings and the treatment alternatives, and the risks and benefits of the alternatives at length and in detail, and given the current clinical findings and absence of any evidence of residual tumor to the biopsy site at present, he and I have decided to postpone surgical treatment and to observe the site(s) for the present.    We also discussed the new lesion on his distal anterior leg.  Given the suspicious nature of this lesion, a biopsy is indicated to establish definitive diagnosis.  I will also proceed with electrodesiccation and curettage for destruction at the same time, to avoid the need for a subsequent surgery if possible.    PROCEDURE NOTE: SHAVE BIOPSY AND DESTRUCTION    The diagnosis and management options and risk, benefits and alternatives were discussed with the patient. All questions were answered. Verbal consent was obtained.    Site: right anterior leg  Indication: clinically suspicious lesion; suggestive of malignancy  Size: 0.8 cm  Prep: Alcohol  Anesthesia: 1% lidocaine plain, less than 2 mL  Shave biopsy performed  Electrodesiccation and curettage carried out for destruction, 3 repetitions  Hemostasis with electrodesiccation  Dressed with petrolatum and bandaid  Specimen placed in formalin to be submitted to  pathology    Routine care instructions given  Followup: six weeks  --------------------------------------  Note: Some or all of this note may have been generated using voice recognition software. There may be voice recognition errors including grammatical and/or spelling errors found in the text. Attempts were made to correct these errors prior to signature.

## 2020-05-18 LAB
FINAL PATHOLOGIC DIAGNOSIS: NORMAL
GROSS: NORMAL

## 2020-05-20 ENCOUNTER — TELEPHONE (OUTPATIENT)
Dept: DERMATOLOGY | Facility: CLINIC | Age: 85
End: 2020-05-20

## 2020-05-20 NOTE — TELEPHONE ENCOUNTER
----- Message from Jose Adorno MD sent at 5/19/2020  3:18 PM CDT -----  This was a skin cancer as expected.   It was treated at the time of the biopsy.   Followup as scheduled in one month.  1. Skin, right leg, shave biopsy:  - Invasive squamous cell carcinoma, well-differentiated.  - The tumor extends to the deep biopsy margin.  - The lesion is ulcerated.  MICROSCOPIC DESCRIPTION: Sections show a proliferation of squamous cells exhibiting atypia and  infiltrating within the dermis. There is epidermal ulceration with serum crust, parakeratosis, neutrophils and  underlying fibrinoid necrosis.  KELLY CHOW M.D. 5/18/2020 16:41

## 2020-06-18 ENCOUNTER — OFFICE VISIT (OUTPATIENT)
Dept: DERMATOLOGY | Facility: CLINIC | Age: 85
End: 2020-06-18
Payer: MEDICARE

## 2020-06-18 DIAGNOSIS — C44.629 SQUAMOUS CELL CARCINOMA OF ARM, LEFT: ICD-10-CM

## 2020-06-18 DIAGNOSIS — D48.5 NEOPLASM OF UNCERTAIN BEHAVIOR OF SKIN: Primary | ICD-10-CM

## 2020-06-18 PROCEDURE — 99499 UNLISTED E&M SERVICE: CPT | Mod: S$GLB,,, | Performed by: DERMATOLOGY

## 2020-06-18 PROCEDURE — 17261 DSTRJ MAL LES T/A/L .6-1.0CM: CPT | Mod: S$GLB,,, | Performed by: DERMATOLOGY

## 2020-06-18 PROCEDURE — 88305 TISSUE EXAM BY PATHOLOGIST: CPT | Performed by: DERMATOLOGY

## 2020-06-18 PROCEDURE — 99499 NO LOS: ICD-10-PCS | Mod: S$GLB,,, | Performed by: DERMATOLOGY

## 2020-06-18 PROCEDURE — 17261 PR DESTR MALIG TRUNK,EXTREM 0.6-1 CM: ICD-10-PCS | Mod: S$GLB,,, | Performed by: DERMATOLOGY

## 2020-06-18 PROCEDURE — 99999 PR PBB SHADOW E&M-EST. PATIENT-LVL III: ICD-10-PCS | Mod: PBBFAC,,, | Performed by: DERMATOLOGY

## 2020-06-18 PROCEDURE — 88305 TISSUE EXAM BY PATHOLOGIST: CPT | Mod: 26,,, | Performed by: DERMATOLOGY

## 2020-06-18 PROCEDURE — 99999 PR PBB SHADOW E&M-EST. PATIENT-LVL III: CPT | Mod: PBBFAC,,, | Performed by: DERMATOLOGY

## 2020-06-18 PROCEDURE — 88305 TISSUE EXAM BY PATHOLOGIST: ICD-10-PCS | Mod: 26,,, | Performed by: DERMATOLOGY

## 2020-06-22 ENCOUNTER — TELEPHONE (OUTPATIENT)
Dept: HEMATOLOGY/ONCOLOGY | Facility: CLINIC | Age: 85
End: 2020-06-22

## 2020-06-22 NOTE — TELEPHONE ENCOUNTER
----- Message from Brittani Robbins sent at 6/22/2020  3:55 PM CDT -----  Contact: Jennifer (spouse)  Scheduling request    Scheduling appt ::  f/u    Treating provider:: Irving    Do you feel you need to be seen today:: No    Contact:: [phone] or [phone]    Additional info::

## 2020-06-23 LAB
FINAL PATHOLOGIC DIAGNOSIS: NORMAL
GROSS: NORMAL
MICROSCOPIC EXAM: NORMAL

## 2020-06-24 ENCOUNTER — TELEPHONE (OUTPATIENT)
Dept: DERMATOLOGY | Facility: CLINIC | Age: 85
End: 2020-06-24

## 2020-07-22 ENCOUNTER — TELEPHONE (OUTPATIENT)
Dept: HEMATOLOGY/ONCOLOGY | Facility: CLINIC | Age: 85
End: 2020-07-22

## 2020-07-22 NOTE — TELEPHONE ENCOUNTER
Returned call to pt wife.   Pt wife stated needs to speak with  re: changing 8/12 appts.  Informed pt wife would send message to .  Pt wife verbalized understanding.         ----- Message from Svitlana Stein sent at 7/22/2020  3:49 PM CDT -----  Contact: patient's wife jovon Quintero called to speak with the doctor concerning upcoming PET.    He would like a callback at [phone]      Thanks  KB

## 2020-07-23 ENCOUNTER — TELEPHONE (OUTPATIENT)
Dept: DERMATOLOGY | Facility: CLINIC | Age: 85
End: 2020-07-23

## 2020-07-27 ENCOUNTER — TELEPHONE (OUTPATIENT)
Dept: HEMATOLOGY/ONCOLOGY | Facility: CLINIC | Age: 85
End: 2020-07-27

## 2020-07-27 NOTE — TELEPHONE ENCOUNTER
"----- Message from Mila Landers sent at 7/27/2020  1:59 PM CDT -----  Consult/Advisory:    Name Of Caller: spouse  Provider Name: Irving MOORE MD  Does patient feel the need to be seen today?  N.A  Relationship to the Pt?: wife   Contact Preference?:  [phone]  What is the nature of the call?:    - pt called inquiring about how to make changes to the schedule.   Please call and obtain the specifics    Additional Notes:  "Thank you for all that you do for our patients'"           "

## 2020-08-12 ENCOUNTER — LAB VISIT (OUTPATIENT)
Dept: LAB | Facility: HOSPITAL | Age: 85
End: 2020-08-12
Attending: INTERNAL MEDICINE
Payer: MEDICARE

## 2020-08-12 ENCOUNTER — HOSPITAL ENCOUNTER (OUTPATIENT)
Dept: RADIOLOGY | Facility: HOSPITAL | Age: 85
Discharge: HOME | End: 2020-08-12
Attending: INTERNAL MEDICINE
Payer: MEDICARE

## 2020-08-12 VITALS — HEIGHT: 72 IN | BODY MASS INDEX: 24.65 KG/M2 | WEIGHT: 182 LBS

## 2020-08-12 DIAGNOSIS — C43.71 MALIGNANT MELANOMA OF RIGHT LOWER LEG: ICD-10-CM

## 2020-08-12 LAB
ALBUMIN SERPL BCP-MCNC: 3.9 G/DL (ref 3.5–5.2)
ALP SERPL-CCNC: 85 U/L (ref 55–135)
ALT SERPL W/O P-5'-P-CCNC: 18 U/L (ref 10–44)
ANION GAP SERPL CALC-SCNC: 9 MMOL/L (ref 8–16)
AST SERPL-CCNC: 22 U/L (ref 10–40)
BASOPHILS # BLD AUTO: 0.03 K/UL (ref 0–0.2)
BASOPHILS NFR BLD: 0.5 % (ref 0–1.9)
BILIRUB SERPL-MCNC: 1.1 MG/DL (ref 0.1–1)
BUN SERPL-MCNC: 19 MG/DL (ref 8–23)
CALCIUM SERPL-MCNC: 9.3 MG/DL (ref 8.7–10.5)
CHLORIDE SERPL-SCNC: 103 MMOL/L (ref 95–110)
CO2 SERPL-SCNC: 28 MMOL/L (ref 23–29)
CREAT SERPL-MCNC: 1.3 MG/DL (ref 0.5–1.4)
DIFFERENTIAL METHOD: ABNORMAL
EOSINOPHIL # BLD AUTO: 0.2 K/UL (ref 0–0.5)
EOSINOPHIL NFR BLD: 2.9 % (ref 0–8)
ERYTHROCYTE [DISTWIDTH] IN BLOOD BY AUTOMATED COUNT: 12.4 % (ref 11.5–14.5)
EST. GFR  (AFRICAN AMERICAN): 56 ML/MIN/1.73 M^2
EST. GFR  (NON AFRICAN AMERICAN): 48 ML/MIN/1.73 M^2
GLUCOSE SERPL-MCNC: 97 MG/DL (ref 70–110)
GLUCOSE SERPL-MCNC: 97 MG/DL (ref 70–110)
HCT VFR BLD AUTO: 41.8 % (ref 40–54)
HGB BLD-MCNC: 13.8 G/DL (ref 14–18)
IMM GRANULOCYTES # BLD AUTO: 0.02 K/UL (ref 0–0.04)
IMM GRANULOCYTES NFR BLD AUTO: 0.3 % (ref 0–0.5)
LDH SERPL L TO P-CCNC: 190 U/L (ref 110–260)
LYMPHOCYTES # BLD AUTO: 1 K/UL (ref 1–4.8)
LYMPHOCYTES NFR BLD: 16 % (ref 18–48)
MCH RBC QN AUTO: 32.2 PG (ref 27–31)
MCHC RBC AUTO-ENTMCNC: 33 G/DL (ref 32–36)
MCV RBC AUTO: 97 FL (ref 82–98)
MONOCYTES # BLD AUTO: 0.5 K/UL (ref 0.3–1)
MONOCYTES NFR BLD: 8.4 % (ref 4–15)
NEUTROPHILS # BLD AUTO: 4.3 K/UL (ref 1.8–7.7)
NEUTROPHILS NFR BLD: 71.9 % (ref 38–73)
NRBC BLD-RTO: 0 /100 WBC
PLATELET # BLD AUTO: 145 K/UL (ref 150–350)
PMV BLD AUTO: 9.8 FL (ref 9.2–12.9)
POTASSIUM SERPL-SCNC: 4 MMOL/L (ref 3.5–5.1)
PROT SERPL-MCNC: 7 G/DL (ref 6–8.4)
RBC # BLD AUTO: 4.29 M/UL (ref 4.6–6.2)
SODIUM SERPL-SCNC: 140 MMOL/L (ref 136–145)
WBC # BLD AUTO: 5.92 K/UL (ref 3.9–12.7)

## 2020-08-12 PROCEDURE — 83615 LACTATE (LD) (LDH) ENZYME: CPT

## 2020-08-12 PROCEDURE — 36415 COLL VENOUS BLD VENIPUNCTURE: CPT

## 2020-08-12 PROCEDURE — 80053 COMPREHEN METABOLIC PANEL: CPT

## 2020-08-12 PROCEDURE — 78816 PET IMAGE W/CT FULL BODY: CPT | Mod: TC,PO,PS

## 2020-08-12 PROCEDURE — 85025 COMPLETE CBC W/AUTO DIFF WBC: CPT

## 2020-08-13 ENCOUNTER — OFFICE VISIT (OUTPATIENT)
Dept: DERMATOLOGY | Facility: CLINIC | Age: 85
End: 2020-08-13
Payer: MEDICARE

## 2020-08-13 DIAGNOSIS — I87.2 STASIS DERMATITIS OF BOTH LEGS: ICD-10-CM

## 2020-08-13 DIAGNOSIS — Z85.828 HISTORY OF NONMELANOMA SKIN CANCER: ICD-10-CM

## 2020-08-13 DIAGNOSIS — L85.3 XEROSIS CUTIS: Primary | ICD-10-CM

## 2020-08-13 PROCEDURE — 99212 OFFICE O/P EST SF 10 MIN: CPT | Mod: S$GLB,,, | Performed by: DERMATOLOGY

## 2020-08-13 PROCEDURE — 1101F PT FALLS ASSESS-DOCD LE1/YR: CPT | Mod: CPTII,S$GLB,, | Performed by: DERMATOLOGY

## 2020-08-13 PROCEDURE — 99999 PR PBB SHADOW E&M-EST. PATIENT-LVL III: CPT | Mod: PBBFAC,,, | Performed by: DERMATOLOGY

## 2020-08-13 PROCEDURE — 1159F PR MEDICATION LIST DOCUMENTED IN MEDICAL RECORD: ICD-10-PCS | Mod: S$GLB,,, | Performed by: DERMATOLOGY

## 2020-08-13 PROCEDURE — 1126F AMNT PAIN NOTED NONE PRSNT: CPT | Mod: S$GLB,,, | Performed by: DERMATOLOGY

## 2020-08-13 PROCEDURE — 99999 PR PBB SHADOW E&M-EST. PATIENT-LVL III: ICD-10-PCS | Mod: PBBFAC,,, | Performed by: DERMATOLOGY

## 2020-08-13 PROCEDURE — 99212 PR OFFICE/OUTPT VISIT, EST, LEVL II, 10-19 MIN: ICD-10-PCS | Mod: S$GLB,,, | Performed by: DERMATOLOGY

## 2020-08-13 PROCEDURE — 1126F PR PAIN SEVERITY QUANTIFIED, NO PAIN PRESENT: ICD-10-PCS | Mod: S$GLB,,, | Performed by: DERMATOLOGY

## 2020-08-13 PROCEDURE — 1101F PR PT FALLS ASSESS DOC 0-1 FALLS W/OUT INJ PAST YR: ICD-10-PCS | Mod: CPTII,S$GLB,, | Performed by: DERMATOLOGY

## 2020-08-13 PROCEDURE — 1159F MED LIST DOCD IN RCRD: CPT | Mod: S$GLB,,, | Performed by: DERMATOLOGY

## 2020-08-13 NOTE — PROGRESS NOTES
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  Patient reports no problems    ROS: also status post destruction of well-differentiated squamous cell carcinoma  on the right distal anterior leg 3 months ago  No problems noted to site  Dryness to the legs; he uses lotion about every other day    Final Pathologic Diagnosis Skin, left elbow, biopsy:   - SUGGESTIVE OF A RESOLVING KERATOACANTHOMA (SQUAMOUS CELL CARCINOMA),   EXCISED IN THE PLANES OF SECTIONS EXAMINED   Comment: Interpreted by: Amber Kwok M.D., Signed on 06/23/2020 at 17:18       EXAM:  Neurologic/Psychiatric  Alert,  normal orientation to time, place, person  Normal mood and affect with no evidence of depression, anxiety, agitation  Skin:  Right upper extremity: marked chronic solar damage  Left upper extremity: marked  chronic solar damage; the site of prior biopsy/destruction is well healed, and cannot be identified with certainty today  Right lower extremity: edema as previously notes; diffuse dryness with areas of irregular hyperkeratosis/scaling  The site of previous biopsy on the distal anterior leg appears to be healing well, with no evidence of recurrent/residual squamous cell carcinoma    Photos this visit:      PHoto from prior visit          IMPRESSION: Well healed post biopsy/destruction, keratoacanthoma-type squamous cell carcinoma, left elbow  Healing well post biopsy/destruction, squamous cell carcinoma, right distal anterior leg  Chronic stasis skin changes of the right lower extremity, with xerosis    PLAN:  Reviewed pathology results  Discussed anticipated course  Encouraged daily use of lotion to the right lower extremity and arms  Followup 3 months; as needed if any changes arise areas in the  meantime.    ============================================================  PREVIOUS NOTE(S):  Note of 6/18    Addendum 6/24  Final Pathologic Diagnosis Skin, left elbow, biopsy:   - SUGGESTIVE OF A RESOLVING KERATOACANTHOMA (SQUAMOUS CELL CARCINOMA),   EXCISED IN THE PLANES OF SECTIONS EXAMINED    Comment: Interpreted by: Amber wKok M.D., Signed on 06/23/2020 at 17:18       =============================    CC: 89 y.o.male patient is here for followup post biopsy/destruction    The patient is accompanied to this visit by his wife.     HPI: Patient is 4 week(s) s/p biopsy and destruction via electrodesiccation and curettage x 3 of a squamous cell carcinoma on the right leg  Patient reports no problems    ROS:   Skin: new growth left elbow x several weeks or more    EXAM:  On the left elbow there is a 6 mm verrucoid papule, clinically consistent with a small squamous cell carcinoma   The site on his right distal leg has an approx 1.4 cm eschar; but appears to be healing well otherwise    IMPRESSION: Healing post biopsy/destruction, right leg  squamous cell carcinoma vs other, left elbow    PLAN:  Reviewed pathology results of right leg lesion  Continue current care  Followup 4 weeks  Discussed left elbow lesion  See procedure note below    PROCEDURE NOTE: SHAVE BIOPSY AND DESTRUCTION    The diagnosis and management options and risk, benefits and alternatives were discussed with the patient. All questions were answered. Verbal consent was obtained.    Site: left elbow  Indication: clinically suspicious lesion; suggestive of malignancy  Size: 0.6 cm  Prep: Alcohol  Anesthesia: 1% lidocaine plain, less than 2 mL  Shave biopsy performed  Electrodesiccation and curettage carried out for destruction, 3 repetitions  Hemostasis with electrodesiccation  Dressed with petrolatum and bandaid  Specimen placed in formalin to be submitted to pathology    Routine care instructions given  Followup: 4 weeks    ============================================================  PREVIOUS NOTE(S):  Note of 5/14  Addendum 5/25/2020    1. Skin, right leg, shave biopsy:  - Invasive squamous cell carcinoma, well-differentiated.  - The tumor extends to the deep biopsy margin.  - The lesion is ulcerated.  MICROSCOPIC DESCRIPTION: Sections show a proliferation of squamous cells exhibiting atypia and  infiltrating within the dermis. There is epidermal ulceration with serum crust, parakeratosis, neutrophils and  underlying fibrinoid necrosis.  KELLY CHOW M.D. 5/18/2020 16:41    ALLERGIES:  Patient has no known allergies.    CHIEF COMPLAINT: followup squamous cell carcinoma    HISTORY OF PRESENT ILLNESS:  Location: right leg  Timing:I last saw him about 3 months ago  Quality: better  Context: see previous notes  Prior biopsies x 2 (at apparently 2 separate sites) have shown squamous cell carcinoma   at his last visit, there was no clearcut evidence of residual tumor, and after discussing options, we decided to defer treatment and observe  he returns today for followup/re-evaluation  He has not noted any changes to the areas of the prior biopsies    REVIEW OF SYSTEMS:   General: general health good  Skin: has another new bump on his distal anterior leg of uncertain duration    PAST MEDICAL HISTORY:  Past Medical History:   Diagnosis Date    COPD (chronic obstructive pulmonary disease)     H/O asbestosis     Hearing loss     Hypercholesterolemia     Hypertension     Malignant melanoma of right lower leg 5/11/2017    Melanoma     Approx. 2 years ago     Skin cancer     Squamous cell carcinoma        PAST SURGICAL HISTORY:  Past Surgical History:   Procedure Laterality Date    ADENOIDECTOMY      APPENDECTOMY      Carotid Stenosis Surgery      CHOLECYSTECTOMY  1965    INGUINAL LYMPHADENECTOMY Right 11/12/2018    Procedure: LYMPHADENECTOMY, INGUINAL, Sartorious Flap;  Surgeon: Yair Alatorre MD;   [facility];  Service: General;  Laterality: Right;    TONSILLECTOMY      TRANSFORAMINAL EPIDURAL INJECTION OF STEROID Bilateral 2020    Procedure: Injection,steroid,epidural,transforaminal approach;  Surgeon: Samy Kruse MD;  Location: [facility] OR;  Service: Pain Management;  Laterality: Bilateral;  L3-4       SOCIAL HISTORY:  Social History     Tobacco Use    Smoking status: Former Smoker     Packs/day: 1.00     Years: 15.00     Pack years: 15.00     Types: Cigarettes     Last attempt to quit: 1965     Years since quittin.4    Smokeless tobacco: Never Used   Substance Use Topics    Alcohol use: No     Frequency: Never    Drug use: No        PERTINENT MEDICATIONS:  See medications list.  Current Outpatient Medications on File Prior to Visit   Medication Sig Dispense Refill    aspirin (ECOTRIN) 81 MG EC tablet Take 81 mg by mouth every morning.       cadexomer iodine (IODOSORB) 0.9 % gel Apply topically daily as needed for Wound Care. 10 g 3    erythromycin (ROMYCIN) ophthalmic ointment APPLY OINTMENT TOPICALLY TO LEFT EYE AS NEEDED  0    finasteride (PROSCAR) 5 mg tablet Take 5 mg by mouth every morning.       furosemide (LASIX) 20 MG tablet HOLD UNTIL SEEN by primary care      HYDROcodone-acetaminophen (NORCO) 5-325 mg per tablet Take 1 tablet by mouth every 6 (six) hours as needed for Pain. 90 tablet 0    losartan-hydrochlorothiazide 50-12.5 mg (HYZAAR) 50-12.5 mg per tablet HOLD UNTIL SEEN by primary care doctor      metoprolol tartrate (LOPRESSOR) 50 MG tablet Takes 50 mg in morning and half tablet (25 mg) in evening      polyethylene glycol (GLYCOLAX) 17 gram PwPk Take 17 g by mouth as needed.      potassium chloride (MICRO-K) 8 mEq CpSR HOLD until seen by primary care doctor      simvastatin (ZOCOR) 10 MG tablet Take 10 mg by mouth every evening.      tamsulosin (FLOMAX) 0.4 mg Cp24 Take 0.4 mg by mouth every morning.       triamcinolone acetonide 0.1% (KENALOG) 0.1 %  ointment Apply sparingly BID to affected area 30 g 3     No current facility-administered medications on file prior to visit.      EXAM:  Constitutional  General appearance: well-developed, well-nourished, well-kempt older white male    Eyes  Inspection of conjunctivae and lids reveals no abnormalities; sclerae anicteric  Neurologic/Psychiatric  Alert,  normal orientation to time, place, person  Normal mood and affect with no evidence of depression, anxiety, agitation  Hard of hearing  Skin: see photo(s)  Right lower extremity:  See the photos below  There remains no evidence of recurrent/residual squamous cell carcinomas in the area of the previous biopsies; however, on his distal anterior right leg, there is an approximately 8 mm raised eroded nodule which is clinically suggestive of a new squamous cell carcinoma    Photo(s) today              Photo(s) from prior visits  2/19 visjt with Dr. Anthony            11/19 visit with me          ASSESSMENT:   Status post biopsies of squamous cell carcinoma x 2 on the right proximal leg, now clinically clear; see previous notes  squamous cell carcinoma vs other, left anterior distal leg    PLAN:  The diagnosis/diagnoses of the previously-biopsied sites, and management options, and risks and benefits of the alternatives, including observation/non-treatment, radiation treatment, excision with vertical frozen section or paraffin-embedded section margin evaluation, and Mohs' Micrographic Surgery, Fresh Tissue Technique, were discussed at length with the patient. In particular, the discussion included, but was not limited to, the following:    One alternative at this point would be to defer further treatment and observe the lesion(s). With small skin cancers of this kind, it is possible that a biopsy can be sufficient to definitively treat a small skin cancer of this kind. Alternatively, some skin cancers are slow growing and do not require immediate treatment. The potential  advantage of this choice would be to avoid the need for possibly unnecessary additional surgery. Among the potential disadvantages of this would be the possibility of enlargement of the lesion, more extensive spread of the lesion or recurrence at a later date, which might necessitate a larger and more complex surgery.    Radiation treatment can be an effective treatment for this type of skin cancer. The usual course of treatment is every weekday for several weeks. Local irritation will result from treatment, although no systemic side effects are expected. The potential advantage of radiation treatment is that it avoids the need for surgery. Among the disadvantages of radiation treatment are the length of treatment, the local inflammatory response, the absence of pathologic confirmation of the removal of the skin cancer, a possible increased risk of additional skin cancer in the treated area in later years, and a somewhat increased risk of recurrence at a later date.     Excisional surgery can be an effective treatment for this type of skin cancer. This would involve excision of the lesion with margin evaluation by submitting the specimen to a pathologist for either immediate marginal assessment via frozen section processing, or delayed marginal assessment by fixed-tissue processing. The potential advantage of this technique is that it offers a way of treating the lesion with some degree of histologic confirmation of tumor removal. Among the disadvantages of this treatment are the possible need for re-excision if marginal involvement is identified, a somewhat greater likelihood of recurrence as compared to Mohs' surgery because of the less comprehensive margin evaluation inherent in the technique, and the general potential risks of surgery, including allergic reactions to the anesthetic and other materials used, infection, injury to nerves in the area with consequent loss of sensation or muscle function, and scarring  or distortion of surrounding structures.    Mohs' surgery is a very effective treatment for this type of skin cancer. The potential advantage of Mohs' surgery is that this technique offers the greatest possible certainty of knowing that the skin cancer has been completely removed, with the removal of the least amount of normal tissue. The potential disadvantages of Mohs' surgery include the duration of the surgery, the possible need for a separate surgery for reconstruction following tumor removal, and scarring as a result. In addition, general potential risks of surgery as noted above also apply to treatment via Mohs' surgery.    Sufficient time was available for questions, and all questions were answered to his satisfaction.     After discussing the clinical findings and the treatment alternatives, and the risks and benefits of the alternatives at length and in detail, and given the current clinical findings and absence of any evidence of residual tumor to the biopsy site at present, he and I have decided to postpone surgical treatment and to observe the site(s) for the present.    We also discussed the new lesion on his distal anterior leg.  Given the suspicious nature of this lesion, a biopsy is indicated to establish definitive diagnosis.  I will also proceed with electrodesiccation and curettage for destruction at the same time, to avoid the need for a subsequent surgery if possible.    PROCEDURE NOTE: SHAVE BIOPSY AND DESTRUCTION    The diagnosis and management options and risk, benefits and alternatives were discussed with the patient. All questions were answered. Verbal consent was obtained.    Site: right anterior leg  Indication: clinically suspicious lesion; suggestive of malignancy  Size: 0.8 cm  Prep: Alcohol  Anesthesia: 1% lidocaine plain, less than 2 mL  Shave biopsy performed  Electrodesiccation and curettage carried out for destruction, 3 repetitions  Hemostasis with electrodesiccation  Dressed  with petrolatum and bandaid  Specimen placed in formalin to be submitted to pathology    Routine care instructions given  Followup: six weeks  --------------------------------------  Note: Some or all of this note may have been generated using voice recognition software. There may be voice recognition errors including grammatical and/or spelling errors found in the text. Attempts were made to correct these errors prior to signature.

## 2020-08-14 ENCOUNTER — OFFICE VISIT (OUTPATIENT)
Dept: HEMATOLOGY/ONCOLOGY | Facility: CLINIC | Age: 85
End: 2020-08-14
Payer: MEDICARE

## 2020-08-14 VITALS
OXYGEN SATURATION: 96 % | WEIGHT: 181 LBS | DIASTOLIC BLOOD PRESSURE: 73 MMHG | TEMPERATURE: 98 F | BODY MASS INDEX: 24.52 KG/M2 | RESPIRATION RATE: 16 BRPM | SYSTOLIC BLOOD PRESSURE: 160 MMHG | HEART RATE: 58 BPM | HEIGHT: 72 IN

## 2020-08-14 DIAGNOSIS — L97.909 ULCER OF LOWER EXTREMITY, UNSPECIFIED LATERALITY, UNSPECIFIED ULCER STAGE: ICD-10-CM

## 2020-08-14 DIAGNOSIS — G89.3 NEOPLASM RELATED PAIN: ICD-10-CM

## 2020-08-14 DIAGNOSIS — I50.9 CHRONIC CONGESTIVE HEART FAILURE, UNSPECIFIED HEART FAILURE TYPE: ICD-10-CM

## 2020-08-14 DIAGNOSIS — C43.71 MALIGNANT MELANOMA OF RIGHT LOWER LEG: Primary | ICD-10-CM

## 2020-08-14 PROCEDURE — 99999 PR PBB SHADOW E&M-EST. PATIENT-LVL IV: CPT | Mod: PBBFAC,,, | Performed by: INTERNAL MEDICINE

## 2020-08-14 PROCEDURE — 99999 PR PBB SHADOW E&M-EST. PATIENT-LVL IV: ICD-10-PCS | Mod: PBBFAC,,, | Performed by: INTERNAL MEDICINE

## 2020-08-14 PROCEDURE — 1101F PT FALLS ASSESS-DOCD LE1/YR: CPT | Mod: CPTII,S$GLB,, | Performed by: INTERNAL MEDICINE

## 2020-08-14 PROCEDURE — 99214 PR OFFICE/OUTPT VISIT, EST, LEVL IV, 30-39 MIN: ICD-10-PCS | Mod: S$GLB,,, | Performed by: INTERNAL MEDICINE

## 2020-08-14 PROCEDURE — 1159F PR MEDICATION LIST DOCUMENTED IN MEDICAL RECORD: ICD-10-PCS | Mod: S$GLB,,, | Performed by: INTERNAL MEDICINE

## 2020-08-14 PROCEDURE — 1126F AMNT PAIN NOTED NONE PRSNT: CPT | Mod: S$GLB,,, | Performed by: INTERNAL MEDICINE

## 2020-08-14 PROCEDURE — 1159F MED LIST DOCD IN RCRD: CPT | Mod: S$GLB,,, | Performed by: INTERNAL MEDICINE

## 2020-08-14 PROCEDURE — 1101F PR PT FALLS ASSESS DOC 0-1 FALLS W/OUT INJ PAST YR: ICD-10-PCS | Mod: CPTII,S$GLB,, | Performed by: INTERNAL MEDICINE

## 2020-08-14 PROCEDURE — 99214 OFFICE O/P EST MOD 30 MIN: CPT | Mod: S$GLB,,, | Performed by: INTERNAL MEDICINE

## 2020-08-14 PROCEDURE — 1126F PR PAIN SEVERITY QUANTIFIED, NO PAIN PRESENT: ICD-10-PCS | Mod: S$GLB,,, | Performed by: INTERNAL MEDICINE

## 2020-08-14 RX ORDER — HYDROCODONE BITARTRATE AND ACETAMINOPHEN 5; 325 MG/1; MG/1
1 TABLET ORAL EVERY 6 HOURS PRN
Qty: 90 TABLET | Refills: 0 | Status: SHIPPED | OUTPATIENT
Start: 2020-08-14

## 2020-08-14 NOTE — PROGRESS NOTES
"Subjective:       Patient ID: Shaq Bragg Jr. is a 89 y.o. male.    Chief Complaint: No chief complaint on file.    HPI    Presents for follow up and PET scan results    8/12/2020 PET scan:  Impression:  1. Resolution of previous discrete focus of FDG uptake in medial right calf since the prior PET-CT on 02/17/2020.  2. No current evidence of FDG avid recurrent malignancy or metastatic disease.  3. Unchanged pancreatic head hypodensity, differential diagnosis of which has been previously discussed.  4. Unchanged bilateral lower lobe round atelectasis and bilateral calcified pleural plaques.  In regards to # 3- prior scans reviewed  "Low-attenuation lesion arising from the pancreatic uncinate process, not significantly changed when compared to outside institution CT 05/24/2017.  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 Malignant melanoma of right lower leg    2. Lymphedema of right lower extremity    3. Pressure injury of right heel, stage 4    4. Other specified disorders involving the immune mechanism, not elsewhere classified        * area on PET correlates with recent bx site which revealed SCC     Chief Complaint: Malignant melanoma of right lower leg     Oncologic History:       Oncologic History 3/21/17 Skin Excisional biopsy right leg  4/18/17 MOS with SLN biopsy  1/04/18 PET/CT - no recurrent or metastatic disease  10/30/19 PET/CT - 2 hypermetabolic LN's in right groin  11/03/18 MRI brain  11/12/18 Completion Lymphadenectomy  2/15/19 PET/CT     Oncologic Treatment 3/21/17 Skin Excisional biopsy  4/18/17 MOS with SLN biopsy  11/12/18 Completion Lymphadenectomy  3/11/19 Nivolumab    Pathology 3/21/17 - 1.4mm malignant " melanoma Alexis level IV, no ulceration, perineural invasion seen, mitotic rate 6/mm2  4/18/17 -  no residual melanoma; microscopic mets in 2 sentinel LNs  11/12/18 - ¾ LNs positive for metastatic melanoma with the largest deposit being 2.4cm an positive extranodal extension      Oncologic History:  Pt initially presented with a lesion on his right proximal calf in March 2017.  He underwent a biopsy on 3/21/17 with pathology showing a 1.4mm malignant melanoma Alexis level IV, no ulceration, perineural invasion seen, mitotic rate 6/mm2.  The patient then underwent  a MOS procedure on 4/18/17 and right inguinal LN biopsy on 4/18/17 under the care of Dr Joelle Pardo at [facility].  The pathology from the procedure initially showed no residual melanoma and no LN involvement ; however, on reevaluation showed microscopic mets in 2 sentinel LNs.  The patient established care with Medical Oncologist Dr Olivier Mchugh.  The surgical oncologist Dr. Alatorre offered Lymphadenectomy on 5/24/17; however, the patient elected to have observation.  PET/CT was performed on 1/04/18 showing no evidence of residual or recurrent disease.  On 10/16/18 the patient called Dr Holland office with complaint of 2 lumps in the right leg.  PET/CT was performed on 10/30/19 showing 2 new hypermetabolic lymph nodes right groin with the index lymph node lateral SUV max 30.02.  The patient had an appointment with Dr Alatorre on 10/30/19 at which point it was decided to proceed with a completion lymphadenectomy.   An MRI of the brain was performed on 11/03/18 showing no evidence for intracranial metastatic disease but did show a 4mm T1 hyperintense occipital calvarial focus which was read as likely benign.  The patient underwent completion LAD on 11/12/18 with pathology showing ¾ LNs positive for metastatic melanoma with the largest deposit being 2.4cm an positive extranodal extension. Currently the patient complains of chronic lower back pain for which he  underwent an MRI of the lumbar spine on 11/03/18 which showed severe central canal stenosis related to a disk bulge at L3-L4.  The patient endorses some LE weakness on occasion.  He denies bowel or bladder incontinence.  The patient states he sees a deramtologist semi annually Dr Sun at [facility] with last clinic visit reportedly several months ago.  The patient underwent a PET/CT on 2/15/19 showing a focus of increased tracer uptake within the T 11 vertebral body with max SUV of 4.1 that was not visualized on prior exam, increased radiotracer uptake in the right groin max SUV 5.3 and a subcutaneous focus of increased tracer uptake within the anterior portion of the distal right leg showing a max SUV of 3.7.   The patient was seen by dermatology on 2/22/19 and underwent a shave biopsy of the right lower leg lesion showing SCC.  On 3/12/19 the patient contacted our office stating he had a large amount of BRBPR.  The patient was seen in the ER on 3/12/19 with relatively stable hemoglobin and was deferred for outpatient GI evaluation.  On 3/25/19 Dr. Bowman in derm decided to postpone a Mohs procedure for SCC of the right lower leg until the swelling in the right leg had decreased.  The patient was seen on 3/27/19 by wound care for an ulcer on his right heel at which point recommendations were made for bandaging of the right heel.  The patient saw GI on 3/27/19 at which point the patient decided not to pursue a colonoscopy.   The patient missed his scheduled treatment with Nivolumab on 4/08/19 after being admitted to the hospital from 4/08/19-4/11/19 for a stage IV ulcer of the right heel with surrounding cellulitis growing MSSA and Pseudomonas on biopsy treated with 14 day course of antibiotics with PO Cefadroxil and ciprofloxacin completed.  Wound resolved.    Review of Systems   Constitutional: Negative for activity change, appetite change, fatigue, fever and unexpected weight change.   Respiratory:  Negative for cough, shortness of breath and wheezing.    Cardiovascular: Positive for leg swelling. Negative for chest pain and palpitations.   Gastrointestinal: Negative for abdominal distention, abdominal pain, blood in stool, constipation, diarrhea, nausea and vomiting.   Genitourinary: Negative for decreased urine volume, difficulty urinating, dysuria, frequency and urgency.   Musculoskeletal: Negative for arthralgias, back pain and myalgias.   Neurological: Negative for vertigo, weakness and numbness.   Hematological: Negative for adenopathy. Does not bruise/bleed easily.   Psychiatric/Behavioral: Negative for dysphoric mood. The patient is not nervous/anxious.          Objective:      Physical Exam  Vitals signs and nursing note reviewed.   Constitutional:       General: He is not in acute distress.     Appearance: Normal appearance. He is well-developed and normal weight. He is not diaphoretic.      Comments: Presents with his wife   HENT:      Head: Normocephalic and atraumatic.   Eyes:      General: No scleral icterus.     Conjunctiva/sclera: Conjunctivae normal.      Pupils: Pupils are equal, round, and reactive to light.   Cardiovascular:      Rate and Rhythm: Normal rate and regular rhythm.      Heart sounds: Normal heart sounds. No murmur. No friction rub. No gallop.    Pulmonary:      Effort: Pulmonary effort is normal. No respiratory distress.      Breath sounds: Normal breath sounds. No wheezing or rales.   Chest:      Chest wall: No tenderness.   Abdominal:      General: Abdomen is flat. Bowel sounds are normal. There is no distension.      Palpations: Abdomen is soft. There is no mass.      Tenderness: There is no abdominal tenderness. There is no rebound.      Comments: No organomegaly   Musculoskeletal: Normal range of motion.         General: No swelling, tenderness or deformity.      Right lower leg: Edema present.      Left lower leg: No edema.   Lymphadenopathy:      Head:      Right side of  head: No submental adenopathy.      Left side of head: No submental adenopathy.      Cervical: No cervical adenopathy.      Upper Body:      Right upper body: No supraclavicular adenopathy.      Left upper body: No supraclavicular adenopathy.      Lower Body: No right inguinal adenopathy. No left inguinal adenopathy.   Skin:     General: Skin is warm and dry.      Coloration: Skin is not jaundiced or pale.      Findings: No bruising, erythema or rash.      Comments: Right leg wound scabbed   Neurological:      Mental Status: He is alert and oriented to person, place, and time.       Imaging- reviewed  Labs- reviewed  Assessment:       1. Malignant melanoma of right lower leg    2. Ulcer of lower extremity, unspecified laterality, unspecified ulcer stage    3. Chronic congestive heart failure, unspecified heart failure type        Plan:     1. SADIE clinically and PET scan negative for recurrence  2. Resolved  3. Managed by cardiology and he reports doing well    25 minutes total

## 2020-08-14 NOTE — PROGRESS NOTES
Patient, Shaq Bragg Jr. (MRN #[id number]), presented with a recent Platelet count less than 150 K/uL consistent with the definition of thrombocytopenia (ICD10 - D69.6).    Platelets   Date Value Ref Range Status   08/12/2020 145 (L) 150 - 350 K/uL Final     The patient's thrombocytopenia was monitored, evaluated, addressed and/or treated. This addendum to the medical record is made on 08/14/2020.

## 2020-10-05 ENCOUNTER — TELEPHONE (OUTPATIENT)
Dept: DERMATOLOGY | Facility: CLINIC | Age: 85
End: 2020-10-05

## 2020-10-05 ENCOUNTER — TELEPHONE (OUTPATIENT)
Dept: HEMATOLOGY/ONCOLOGY | Facility: CLINIC | Age: 85
End: 2020-10-05

## 2020-10-05 NOTE — TELEPHONE ENCOUNTER
----- Message from Myla Villatoro MD sent at 10/5/2020  3:19 PM CDT -----  Regarding: RE: PT  Contact: PT's Wife Jennifer  Called and condolences offered  ----- Message -----  From: Kelsie Marie  Sent: 10/5/2020   2:40 PM CDT  To: Myla Villatoro MD  Subject: FW: PT                                           FYI  ----- Message -----  From: Irasema Redding  Sent: 10/5/2020   2:35 PM CDT  To: [facility] Staff  Subject: PT                                               PT's wife called to let the doctor know that the PT passed away last Friday 10/2.

## 2020-10-05 NOTE — TELEPHONE ENCOUNTER
Message fwd to MD.       ----- Message from Irasema Redding sent at 10/5/2020  2:35 PM CDT -----  Regarding: PT  Contact: PT's Wife Jennifer  PT's wife called to let the doctor know that the PT passed away last Friday 10/2.

## 2021-07-24 NOTE — PROGRESS NOTES
INFECTIOUS DISEASE CLINIC  07/18/2019 10:58 AM    Subjective:      Chief Complaint: foot infection    History of Present Illness:    Patient Shaq Bragg Jr. is a 88 y.o. male who presents today for f/u of foot infection. Pt has h/o spinal stenosis,  R leg melanoma stage IIIB 2017 s/p excisional biopsy and MOS with SLN biopsy/resection of 2 positive nodes 4/2017 with subsequent lymphedema),  (s/p local resection; PET 6/2019 with uptake in the T11 spine, RLE, and R groin consistent with metastatic disease; s/p nivolumab x3 in 5/2019), and R heel ulcer. Recently admitted 4/8 with progressive R heal ulceration exposing achilles tendon with associated surrounding cellulitis, MRI with no osteo, tissue culture taken  podiatry grew MSSA and pseudomonas at that time treated with cefadroxil and cipro.  Seen 6/18 in podiatry with cultures again growing pseudomonas and MSSA and anaerococcus now readmitted 6/24 with worsening purulent drainage from ankle wounds with increased foul odor without systemic signs or symptoms of infection, MRI negative for abscess or osteomyelitis. ID was consulted and recommended IV cefepime (and later PO metronidazole was added). Pt reports compliance with antibiotics until they were stopped last Monday, 7/15. Having issues tolerating metronidazole 2/2 decreased oral intake. Seeing Dr Soni with podiatry weekly. Reports wound healing. Says he was told not to let dressings be removed of his R ankle, other than podiatry.       Reviewed radiology- no osteomyelitis on MRI    Reviewed path- venous stasis/chronic inflammation    Reviewed cultures (as per above)    Reviewed labs-     Sedimentation rate (Order [id number])   Reviewed By     Caesar Mir MD on 7/16/2019 09:42    MyChart Results Release     MyChart Status: Declined       Sedimentation rate    Ref Range & Units 3d ago   SED RATE (WESTERGREN) 0 - 20.2 mm/hr 15            CRP- normal at 5.       Review of Symptoms:  Constitutional:  Denies fevers, chills, or weakness.  ENT: Denies dysphagia, nasal discharge, ear pain or discharge.  Cardiovascular: Denies chest pain, palpitations, orthopnea, or claudication.  Respiratory: Denies shortness of breath, cough, hemoptysis, or wheezing.  GI: Denies nausea/vomitting, hematochezia, melena, abd pain, or changes in appetite.  : Denies dysuria, incontinence, or hematuria.  Musculoskeletal: Denies joint pain or myalgias. picc line  Skin/breast: Denies rashes, lumps, lesions, or discharge.  Neurologic: Denies headache, dizziness, vertigo, or paresthesias.    Past Medical History:   Diagnosis Date    COPD (chronic obstructive pulmonary disease)     H/O asbestosis     Hearing loss     Hypercholesterolemia     Hypertension     Malignant melanoma of right lower leg 5/11/2017    Melanoma     Approx. 2 years ago     Skin cancer     Squamous cell carcinoma        Past Surgical History:   Procedure Laterality Date    ADENOIDECTOMY      APPENDECTOMY      Carotid Stenosis Surgery      CHOLECYSTECTOMY  1965    LYMPHADENECTOMY, INGUINAL, Sartorious Flap Right 11/12/2018    Performed by Yair Alatorre MD at [facility] OR [address] [place] [address]    TONSILLECTOMY         Family History   Problem Relation Age of Onset    Coronary artery disease Mother     Cancer Father         Esophageal    Esophageal cancer Father     Diabetes Sister     No Known Problems Maternal Aunt     No Known Problems Maternal Uncle     No Known Problems Paternal Aunt     No Known Problems Paternal Uncle     No Known Problems Maternal Grandmother     No Known Problems Maternal Grandfather     No Known Problems Paternal Grandmother     No Known Problems Paternal Grandfather     Melanoma Neg Hx     Psoriasis Neg Hx     Lupus Neg Hx        Social History     Socioeconomic History    Marital status:      Spouse name: Not on file    Number of children: Not on file    Years of education: Not on file    Highest education level:  Not on file   Occupational History    Not on file   Social Needs    Financial resource strain: Not on file    Food insecurity:     Worry: Not on file     Inability: Not on file    Transportation needs:     Medical: Not on file     Non-medical: Not on file   Tobacco Use    Smoking status: Former Smoker     Packs/day: 1.00     Years: 15.00     Pack years: 15.00     Types: Cigarettes     Last attempt to quit: 1965     Years since quittin.5    Smokeless tobacco: Never Used   Substance and Sexual Activity    Alcohol use: No     Frequency: Never    Drug use: No    Sexual activity: Never   Lifestyle    Physical activity:     Days per week: Not on file     Minutes per session: Not on file    Stress: Not on file   Relationships    Social connections:     Talks on phone: Not on file     Gets together: Not on file     Attends Jewish service: Not on file     Active member of club or organization: Not on file     Attends meetings of clubs or organizations: Not on file     Relationship status: Not on file   Other Topics Concern    Not on file   Social History Narrative    Not on file       Review of patient's allergies indicates:  No Known Allergies      Objective:   BP (!) 161/72 (BP Location: Right arm, Patient Position: Sitting)   Pulse 64   Temp 97.8 °F (36.6 °C) (Oral)   Wt 81.1 kg (178 lb 12.7 oz)   BMI 24.25 kg/m²   General: Afebrile, alert, comfortable, no acute distress.   HEENT: CONRADO. EOMI, no scleral icterus.   Pulmonary: Non labored,clear to auscultation A/P/L.   Cardiac: normal S1 & S2 w/o rubs/murmurs/gallops.   Abdominal: Non-tender, non-distended.Bowel sounds present x 4.   Extremities: Moves all extremities x 4.  2+ pulses. + picc line, dressings c/d/i. No tenderness or erythema or drainage; bilateral legs with 1+ edema and chronic venous stasis changes. Fresh dressings on R heal, no wound vac  Neurological:  Alert and oriented x 4.     Labs:  Glucose   Date Value Ref Range Status    06/26/2019 103 70 - 110 mg/dL Final   06/26/2019 107 70 - 110 mg/dL Final   06/25/2019 87 70 - 110 mg/dL Final     Calcium   Date Value Ref Range Status   06/26/2019 9.1 8.7 - 10.5 mg/dL Final   06/26/2019 9.5 8.7 - 10.5 mg/dL Final   06/25/2019 8.8 8.7 - 10.5 mg/dL Final     Albumin   Date Value Ref Range Status   06/24/2019 3.3 (L) 3.5 - 5.2 g/dL Final   06/24/2019 3.1 (L) 3.5 - 5.2 g/dL Final   05/27/2019 3.5 3.5 - 5.2 g/dL Final     Total Protein   Date Value Ref Range Status   06/24/2019 7.4 6.0 - 8.4 g/dL Final   06/24/2019 6.8 6.0 - 8.4 g/dL Final   05/27/2019 6.6 6.0 - 8.4 g/dL Final     Sodium   Date Value Ref Range Status   06/26/2019 134 (L) 136 - 145 mmol/L Final   06/26/2019 136 136 - 145 mmol/L Final   06/25/2019 134 (L) 136 - 145 mmol/L Final     Potassium   Date Value Ref Range Status   06/26/2019 4.2 3.5 - 5.1 mmol/L Final   06/26/2019 3.7 3.5 - 5.1 mmol/L Final   06/25/2019 4.3 3.5 - 5.1 mmol/L Final     CO2   Date Value Ref Range Status   06/26/2019 23 23 - 29 mmol/L Final   06/26/2019 29 23 - 29 mmol/L Final   06/25/2019 18 (L) 23 - 29 mmol/L Final     Chloride   Date Value Ref Range Status   06/26/2019 100 95 - 110 mmol/L Final   06/26/2019 99 95 - 110 mmol/L Final   06/25/2019 102 95 - 110 mmol/L Final     BUN, Bld   Date Value Ref Range Status   06/26/2019 15 8 - 23 mg/dL Final   06/26/2019 14 8 - 23 mg/dL Final   06/25/2019 14 8 - 23 mg/dL Final     Creatinine   Date Value Ref Range Status   06/26/2019 1.5 (H) 0.5 - 1.4 mg/dL Final   06/26/2019 1.6 (H) 0.5 - 1.4 mg/dL Final   06/25/2019 1.3 0.5 - 1.4 mg/dL Final     Alkaline Phosphatase   Date Value Ref Range Status   06/24/2019 113 55 - 135 U/L Final   06/24/2019 105 55 - 135 U/L Final   05/27/2019 95 55 - 135 U/L Final     ALT   Date Value Ref Range Status   06/24/2019 20 10 - 44 U/L Final   06/24/2019 19 10 - 44 U/L Final   05/27/2019 12 10 - 44 U/L Final     AST   Date Value Ref Range Status   06/24/2019 26 10 - 40 U/L Final    06/24/2019 24 10 - 40 U/L Final   05/27/2019 18 10 - 40 U/L Final     Total Bilirubin   Date Value Ref Range Status   06/24/2019 0.8 0.1 - 1.0 mg/dL Final     Comment:     For infants and newborns, interpretation of results should be based  on gestational age, weight and in agreement with clinical  observations.  Premature Infant recommended reference ranges:  Up to 24 hours.............<8.0 mg/dL  Up to 48 hours............<12.0 mg/dL  3-5 days..................<15.0 mg/dL  6-29 days.................<15.0 mg/dL     06/24/2019 0.8 0.1 - 1.0 mg/dL Final     Comment:     For infants and newborns, interpretation of results should be based  on gestational age, weight and in agreement with clinical  observations.  Premature Infant recommended reference ranges:  Up to 24 hours.............<8.0 mg/dL  Up to 48 hours............<12.0 mg/dL  3-5 days..................<15.0 mg/dL  6-29 days.................<15.0 mg/dL     05/27/2019 0.9 0.1 - 1.0 mg/dL Final     Comment:     For infants and newborns, interpretation of results should be based  on gestational age, weight and in agreement with clinical  observations.  Premature Infant recommended reference ranges:  Up to 24 hours.............<8.0 mg/dL  Up to 48 hours............<12.0 mg/dL  3-5 days..................<15.0 mg/dL  6-29 days.................<15.0 mg/dL       WBC   Date Value Ref Range Status   07/15/2019 4.8 4.8 - 10.8 hugh/L Final   06/26/2019 8.01 3.90 - 12.70 K/uL Final   06/26/2019 5.98 3.90 - 12.70 K/uL Final   06/25/2019 7.05 3.90 - 12.70 K/uL Final     Hemoglobin   Date Value Ref Range Status   07/15/2019 12.9 (A) 13.5 - 17.5 g/dL Final   06/26/2019 13.3 (L) 14.0 - 18.0 g/dL Final   06/26/2019 11.7 (L) 14.0 - 18.0 g/dL Final     Hematocrit   Date Value Ref Range Status   07/15/2019 37.2 (A) 42 - 52 % Final   06/26/2019 39.8 (L) 40.0 - 54.0 % Final   06/26/2019 35.5 (L) 40.0 - 54.0 % Final   06/25/2019 37.2 (L) 40.0 - 54.0 % Final     Mean Corpuscular  Volume   Date Value Ref Range Status   06/26/2019 94 82 - 98 fL Final   06/26/2019 97 82 - 98 fL Final   06/25/2019 98 82 - 98 fL Final     Platelets   Date Value Ref Range Status   06/26/2019 239 150 - 350 K/uL Final   06/26/2019 190 150 - 350 K/uL Final   06/25/2019 169 150 - 350 K/uL Final     Lab Results   Component Value Date    CHOL 152 04/01/2019    CHOL 150 12/14/2016    CHOL 141 11/23/2015     Lab Results   Component Value Date    HDL 59 04/01/2019    HDL 53 12/14/2016    HDL 54 11/23/2015     Lab Results   Component Value Date    LDLCALC 77.6 04/01/2019    LDLCALC 82.8 12/14/2016    LDLCALC 74.2 11/23/2015     Lab Results   Component Value Date    TRIG 77 04/01/2019    TRIG 71 12/14/2016    TRIG 64 11/23/2015     Lab Results   Component Value Date    CHOLHDL 38.8 04/01/2019    CHOLHDL 35.3 12/14/2016    CHOLHDL 38.3 11/23/2015     No results found for: RPR  No results found for: QUANTIFERON    Medications:  Current Outpatient Medications on File Prior to Visit   Medication Sig Dispense Refill    aspirin (ECOTRIN) 81 MG EC tablet Take 81 mg by mouth every morning.       cadexomer iodine (IODOSORB) 0.9 % gel Apply topically daily as needed for Wound Care. 10 g 3    finasteride (PROSCAR) 5 mg tablet Take 5 mg by mouth every morning.       furosemide (LASIX) 20 MG tablet HOLD UNTIL SEEN by primary care      HYDROcodone-acetaminophen (NORCO) 5-325 mg per tablet Take 1 tablet by mouth every 6 (six) hours as needed for Pain. 30 tablet 0    losartan-hydrochlorothiazide 50-12.5 mg (HYZAAR) 50-12.5 mg per tablet HOLD UNTIL SEEN by primary care doctor      metoprolol tartrate (LOPRESSOR) 50 MG tablet Takes 50 mg in morning and half tablet (25 mg) in evening      metroNIDAZOLE (FLAGYL) 500 MG tablet Take 1 tablet (500 mg total) by mouth 3 (three) times daily. for 14 days 42 tablet 0    potassium chloride (MICRO-K) 8 mEq CpSR HOLD until seen by primary care doctor      simvastatin (ZOCOR) 10 MG tablet Take 10  mg by mouth every evening.      tamsulosin (FLOMAX) 0.4 mg Cp24 Take 0.4 mg by mouth every morning.        No current facility-administered medications on file prior to visit.        Antibiotics:   Antibiotics (From admission, onward)    None          HIV: No components found for: HIV 1/2 AG/AB  Hepatitis C IgG: No components found for: HEPATITIS C  Syphilis: No results found for: RPR    Hepatitis A IgG: No components found for: HEPATITIS A IGG  Hepatitis Bc IgG: No components found for: HEPATITIS B CORE IGG  Hepatitis Bs IgG:  Quantiferon: No results found for: QUANTIFERON  VZV IgG: No components found for: VARICELLA IGG    No components found for: SEDIMENTATION RATE  No components found for: C-REACTIVE PROTEIN      Microbiology x 7d:   Microbiology Results (last 7 days)     ** No results found for the last 168 hours. **            There is no immunization history on file for this patient.      Assessment:     R foot infection  Venous stasis of BLE    Plan:     MRI from 6/24 with soft tissue ulceration and inflammation, no abscess or osteomyelitis. Inflammatory markers. Has been on IV cefepime for 3 weeks, with clinical improvement and normalization of inflammatory markers. Pt unwilling to let dressings on this heal be removed today. Attempted to call podiatrist to discuss via hospital , left message. Sent epic message as well. Assuming podiatrist doesn't have any objections, plan to stop antibiotics today and pull picc. BASILIA results wnl. Counseled on management of venous stasis including leg elevation. Encouraged podiatry f/u.     rtc prn    Eladia Wells MD, MPH  Infectious Disease     fall

## 2022-02-10 NOTE — PLAN OF CARE
Problem: Occupational Therapy Goal  Goal: Occupational Therapy Goal  Pt is currently performing ADLs, functional mobility & t/fs at baseline and displays age-appropriate strength, endurance & balance. OT services are not recommended at this time and patient is safe to D/C home.  Outcome: Outcome(s) achieved Date Met: 06/26/19  Pt at functional baseline. D/c home with no OT needs.   Justina pineda, OT  6/26/2019         Prednisone Counseling:  I discussed with the patient the risks of prolonged use of prednisone including but not limited to weight gain, insomnia, osteoporosis, mood changes, diabetes, susceptibility to infection, glaucoma and high blood pressure.  In cases where prednisone use is prolonged, patients should be monitored with blood pressure checks, serum glucose levels and an eye exam.  Additionally, the patient may need to be placed on GI prophylaxis, PCP prophylaxis, and calcium and vitamin D supplementation and/or a bisphosphonate.  The patient verbalized understanding of the proper use and the possible adverse effects of prednisone.  All of the patient's questions and concerns were addressed.

## 2022-02-26 NOTE — TELEPHONE ENCOUNTER
----- Message from Basilia Covington sent at 10/31/2018 12:22 PM CDT -----  Contact: Jennifer (wife)  Wife would like to discuss MRI appts that pt has scheduled for 11/03    Contact number [phone]  Thanks   
show

## 2022-12-16 NOTE — NURSING
18 Gx 10cm PowerGlide Midline placed to pts LEFT brachial vein with the use of ultrasound guidance.    Ultrasound guidance: yes  Vessel Caliber: large and patent, compressibility normal  Needle advanced into vessel with real time Ultrasound guidance.  Guidewire confirmed in vessel.  Image recorded and saved.  Sterile sheath used.  Sterile dressing applied  Arm circumference- 27CM    Dressing dated     
no fever/no chills

## 2023-01-26 NOTE — PROGRESS NOTES
Referring Physician: No ref. provider found    PCP: Ata Jeter MD      CC:  Low back and leg pain    Interval History:  Shaq Bragg Jr. eis a 89 y.o. male with low back and leg pain who presents today for f/u s/p lumbar bilateral L3-4 TF VIKTORIYA. Reports some improvement of radicular pain but low back pain has remained the same. He also reports lower extremities become wobbly if he walks long distances. Weakness improves if he rests for a minute. Denies any b/b changes. Pain today is rated 6/10.  Pt has been seen in the clinic before, however pt is new to me.     History below per Dr. Kruse    HPI:   Shaq Bragg Jr. is a 89 y.o. male referred to us for low back and leg pain. Pain has been present for 3 years.  No recent traumatic incident.  His constant aching, throbbing pain in his lower back.  Pain radiates to his posterior calf is an thigh is.  Pain worsens standing, walking and bending.  Pain improves with rest and lying down.  MRI performed shows severe lumbar stenosis at L2-3 and L3-4.  He has been seen by Spine surgery in referred to us for interventional procedures.  He denies any worsening weakness.  No bowel bladder changes.    ROS:  CONSTITUTIONAL: No fevers, chills, night sweats, wt. loss, appetite changes  SKIN: no rashes or itching  ENT: No headaches, head trauma, vision changes, or eye pain  LYMPH NODES: None noticed   CV: No chest pain, palpitations.   RESP: No shortness of breath, dyspnea on exertion, cough, wheezing, or hemoptysis  GI: No nausea, emesis, diarrhea, constipation, melena, hematochezia, pain.    : No dysuria, hematuria, urgency, or frequency   HEME: No easy bruising, bleeding problems  PSYCHIATRIC: No depression, anxiety, psychosis, hallucinations.  NEURO: No seizures, memory loss, dizziness or difficulty sleeping  MSK:  Positive HPI      Past Medical History:   Diagnosis Date    COPD (chronic obstructive pulmonary disease)     H/O asbestosis     Hearing loss      Hypercholesterolemia     Hypertension     Malignant melanoma of right lower leg 5/11/2017    Melanoma     Approx. 2 years ago     Skin cancer     Squamous cell carcinoma      Past Surgical History:   Procedure Laterality Date    ADENOIDECTOMY      APPENDECTOMY      Carotid Stenosis Surgery      CHOLECYSTECTOMY  1965    INGUINAL LYMPHADENECTOMY Right 11/12/2018    Procedure: LYMPHADENECTOMY, INGUINAL, Sartorious Flap;  Surgeon: Yair Alatorre MD;  Location: [address];  Service: General;  Laterality: Right;    TONSILLECTOMY      TRANSFORAMINAL EPIDURAL INJECTION OF STEROID Bilateral 2/12/2020    Procedure: Injection,steroid,epidural,transforaminal approach;  Surgeon: Samy Kruse MD;  Location: [facility];  Service: Pain Management;  Laterality: Bilateral;  L3-4     Family History   Problem Relation Age of Onset    Coronary artery disease Mother     Cancer Father         Esophageal    Esophageal cancer Father     Diabetes Sister     No Known Problems Maternal Aunt     No Known Problems Maternal Uncle     No Known Problems Paternal Aunt     No Known Problems Paternal Uncle     No Known Problems Maternal Grandmother     No Known Problems Maternal Grandfather     No Known Problems Paternal Grandmother     No Known Problems Paternal Grandfather     Melanoma Neg Hx     Psoriasis Neg Hx     Lupus Neg Hx      Social History     Socioeconomic History    Marital status:      Spouse name: Not on file    Number of children: Not on file    Years of education: Not on file    Highest education level: Not on file   Occupational History    Not on file   Social Needs    Financial resource strain: Not on file    Food insecurity:     Worry: Not on file     Inability: Not on file    Transportation needs:     Medical: Not on file     Non-medical: Not on file   Tobacco Use    Smoking status: Former Smoker     Packs/day: 1.00     Years: 15.00     Pack years: 15.00     Types: Cigarettes     " Last attempt to quit: 1965     Years since quittin.2    Smokeless tobacco: Never Used   Substance and Sexual Activity    Alcohol use: No     Frequency: Never    Drug use: No    Sexual activity: Never   Lifestyle    Physical activity:     Days per week: Not on file     Minutes per session: Not on file    Stress: Not on file   Relationships    Social connections:     Talks on phone: Not on file     Gets together: Not on file     Attends Mandaen service: Not on file     Active member of club or organization: Not on file     Attends meetings of clubs or organizations: Not on file     Relationship status: Not on file   Other Topics Concern    Not on file   Social History Narrative    Not on file         Medications/Allergies: See med card    Vitals:    20 1107   BP: (!) 147/65   Pulse: (!) 54   Weight: 81.2 kg (179 lb)   Height: 6' 1" (1.854 m)   PainSc:   6   PainLoc: Back         Physical exam:    GENERAL: A and O x3, the patient appears well groomed and is in no acute distress.  Skin: No rashes or obvious lesions  HEENT: normocephalic, atraumatic  CARDIOVASCULAR:  bradycardia  LUNGS: non labored breathing  ABDOMEN: soft, nontender   UPPER EXTREMITIES: Normal alignment, normal range of motion, no atrophy, no skin changes,  hair growth and nail growth normal and equal bilaterally. No swelling, no tenderness.    LOWER EXTREMITIES:  Normal alignment, normal range of motion, no atrophy, no skin changes,  hair growth and nail growth normal and equal bilaterally. No swelling, no tenderness.  LUMBAR SPINE  Lumbar spine: ROM is limited with flexion extension and oblique extension with moderate increased pain.    Michael's test causes no increased pain on either side.    Supine straight leg raise is negative bilaterally.    Internal and external rotation of the hip causes no increased pain on either side.  Myofascial exam: No tenderness to palpation across lumbar paraspinous muscles.  + facet loading " bilaterally    MENTAL STATUS: normal orientation, speech, language, and fund of knowledge for social situation.  Emotional state appropriate.    CRANIAL NERVES:  II:  PERRL bilaterally,   III,IV,VI: EOMI.    V:  Facial sensation equal bilaterally  VII:  Facial motor function normal.  VIII:  Hearing equal to finger rub bilaterally  IX/X: Gag normal, palate symmetric  XI:  Shoulder shrug equal, head turn equal  XII:  Tongue midline without fasciculations      MOTOR: Tone and bulk: normal bilateral upper and lower Strength: normal   Delt Bi Tri WE WF     R 5 5 5 5 5 5   L 5 5 5 5 5 5     IP ADD ABD Quad TA Gas HAM  R 5 5 5 5 5 5 5  L 5 5 5 5 5 5 5    SENSATION: Light touch and pinprick intact bilaterally  REFLEXES: normal, symmetric, nonbrisk.  Toes down, no clonus. No hoffmans.  GAIT: normal rise, base, steps, and arm swing.        Imaging:  MRI L-spine 11/2019  L2-3: Mild broad-based disc bulge with flattening of the ventral thecal sac.  Moderate facet joint arthropathy and ligamentum flavum thickening contributes to severe spinal canal stenosis.  The thecal sac measures 5 mm in diameter.  There is mild bilateral facet neural foraminal stenosis.    L3-4: Mild broad-based disc bulge with superimposed central disc protrusion.  There is mild facet joint arthropathy ligamentum flavum thickening which contributes to severe spinal canal stenosis.  AP diameter of the thecal sac measures 3 mm.  There is moderate bilateral neural foraminal stenosis.    L4-5: Minimal disc bulge.  Mild bilateral facet joint arthropathy.  AP diameter of the thecal sac measures 10 mm.    L5-S1: Minimal disc bulge.  AP diameter of the thecal sac measures 8 mm.  Moderate bilateral facet joint arthropathy.  Mild bilateral neural foraminal stenosis.    Assessment:  Shaq Bragg Jr. is a 89 y.o. male with low back and leg pain  1. Spinal stenosis of lumbar region with neurogenic claudication    2. Other spondylosis, lumbar region       Plan:  1. I have stressed the importance of physical activity and exercise to improve overall health  2. Monitor progress and consider repeat lumbar epidural steroid injection to the Bilateral L3-4 level(s) if radicular pain wrosensi.  3. Recommend lumbar MBB workup at L3, 4, 5 bilaterally. Will call if intersted  4. F/u prn        Normal volume, rate, productivity, spontaneity and articulation

## 2025-07-11 NOTE — TELEPHONE ENCOUNTER
----- Message from Kendall Jones MD sent at 4/12/2019  6:35 PM CDT -----  Please schedule an appt for Mr Bragg with Dr. Villegas for next week. Thank you.  
Patient was schedule to be seen at 3.45 pm left a voicemail to heidy our office if there are questions are concern   
no

## (undated) DEVICE — SYR DISP LL 5CC

## (undated) DEVICE — GOWN SMART IMP BREATHABLE XXLG

## (undated) DEVICE — DRESSING ANTIMICROBIAL 1 INCH

## (undated) DEVICE — GLOVE SURGEONS ULTRA TOUCH 6.5

## (undated) DEVICE — TRAY MINOR GEN SURG

## (undated) DEVICE — Device

## (undated) DEVICE — GOWN SURGICAL X-LARGE

## (undated) DEVICE — GAUZE SPONGE 4X4 12PLY

## (undated) DEVICE — CHLORAPREP 10.5 ML APPLICATOR

## (undated) DEVICE — NDL HYPODERMIC BLUNT 18G 1.5IN

## (undated) DEVICE — SUT VICRYL CT-1 2-0 27IN

## (undated) DEVICE — GLOVE SURG ULTRA TOUCH 7.5

## (undated) DEVICE — SEALER LIGASURE MARYLAND 23CM

## (undated) DEVICE — SYS LABEL CORRECT MED

## (undated) DEVICE — SUT 3-0 12-18IN SILK

## (undated) DEVICE — CLOSURE SKIN 1X5 STERI-STRIP

## (undated) DEVICE — SEE MEDLINE ITEM 157148

## (undated) DEVICE — TUBING MINIBORE EXTENSION

## (undated) DEVICE — POWDER ARISTA AH 3G

## (undated) DEVICE — TEGADERM IV

## (undated) DEVICE — SPONGE IV DRAIN 4X4 STERILE

## (undated) DEVICE — SUT 2-0 12-18IN SILK

## (undated) DEVICE — BLADE SURG SZ20 CARBON STEEL

## (undated) DEVICE — GLOVE SURG ULTRA TOUCH 6

## (undated) DEVICE — APPLIER CLIP LIAGCLIP 9.375IN

## (undated) DEVICE — NDL SAFETY 25G X 1.5 ECLIPSE

## (undated) DEVICE — DRAIN CHANNEL ROUND 19FR

## (undated) DEVICE — ELECTRODE REM PLYHSV RETURN 9

## (undated) DEVICE — ELECTRODE BLADE INSULATED 1 IN

## (undated) DEVICE — SUT SILK 3-0 SH 18IN BLACK

## (undated) DEVICE — NDL SPINAL SPINOCAN 22GX3.5

## (undated) DEVICE — SEE MEDLINE ITEM 152622

## (undated) DEVICE — APPLICATOR CHLORAPREP ORN 26ML

## (undated) DEVICE — APPLIER LIGACLIP MED 11IN

## (undated) DEVICE — SUT 3-0 VICRYL / LIGAPACK

## (undated) DEVICE — SUT VICRYL 3-0 27 SH